# Patient Record
Sex: FEMALE | Race: WHITE | NOT HISPANIC OR LATINO | Employment: OTHER | URBAN - METROPOLITAN AREA
[De-identification: names, ages, dates, MRNs, and addresses within clinical notes are randomized per-mention and may not be internally consistent; named-entity substitution may affect disease eponyms.]

---

## 2017-07-13 ENCOUNTER — TELEPHONE (OUTPATIENT)
Dept: MEDICAL GROUP | Facility: MEDICAL CENTER | Age: 67
End: 2017-07-13

## 2017-07-13 NOTE — TELEPHONE ENCOUNTER
Future Appointments       Provider Department Center    7/19/2017 7:40 AM [facility] MG 2 [facility] [address]    7/19/2017 8:40 AM CHRISTIAN Taylor.P.N. [address]    10/17/2017 9:00 AM [address] 1 [facility] CT [address]    12/18/2017 11:00 AM CHRISTIAN Taylor.P.LEORA.; [facility]  [address]        ESTABLISHED PATIENT PRE-VISIT PLANNING     Note: Patient will not be contacted if there is no indication to call.     1.  Reviewed note from last office visit with PCP and/or other med group provider: Yes    2.  If any orders were placed at last visit, do we have Results/Consult Notes?        •  Labs - Labs ordered, NOT completed. Patient advised to complete prior to next appointment.       •  Imaging - Imaging was not ordered at last office visit.       •  Referrals - No referrals were ordered at last office visit.    3.  Immunizations were updated in [facility] using WebIZ?: Yes       •  Web Iz Recommendations: HEPATITIS A , PNEUMOVAX (PPSV23) and ZOSTAVAX (Shingles)    4.  Patient is due for the following Health Maintenance Topics:   Health Maintenance Due   Topic Date Due   • BONE DENSITY  12/23/2014   • MAMMOGRAM  01/04/2017           5.  Patient was not informed to arrive 15 min prior to their scheduled appointment and bring in their medication bottles.

## 2017-07-15 ENCOUNTER — HOSPITAL ENCOUNTER (OUTPATIENT)
Dept: LAB | Facility: MEDICAL CENTER | Age: 67
End: 2017-07-15
Attending: NURSE PRACTITIONER
Payer: MEDICARE

## 2017-07-15 DIAGNOSIS — E55.9 VITAMIN D DEFICIENCY DISEASE: ICD-10-CM

## 2017-07-15 DIAGNOSIS — R73.01 IMPAIRED FASTING GLUCOSE: ICD-10-CM

## 2017-07-15 DIAGNOSIS — Z13.220 SCREENING CHOLESTEROL LEVEL: ICD-10-CM

## 2017-07-15 LAB
ALBUMIN SERPL BCP-MCNC: 4.2 G/DL (ref 3.2–4.9)
ALBUMIN/GLOB SERPL: 1.7 G/DL
ALP SERPL-CCNC: 40 U/L (ref 30–99)
ALT SERPL-CCNC: 9 U/L (ref 2–50)
ANION GAP SERPL CALC-SCNC: 8 MMOL/L (ref 0–11.9)
AST SERPL-CCNC: 14 U/L (ref 12–45)
BILIRUB SERPL-MCNC: 0.6 MG/DL (ref 0.1–1.5)
BUN SERPL-MCNC: 24 MG/DL (ref 8–22)
CALCIUM SERPL-MCNC: 9.7 MG/DL (ref 8.5–10.5)
CHLORIDE SERPL-SCNC: 106 MMOL/L (ref 96–112)
CHOLEST SERPL-MCNC: 205 MG/DL (ref 100–199)
CO2 SERPL-SCNC: 27 MMOL/L (ref 20–33)
CREAT SERPL-MCNC: 0.91 MG/DL (ref 0.5–1.4)
EST. AVERAGE GLUCOSE BLD GHB EST-MCNC: 103 MG/DL
GFR SERPL CREATININE-BSD FRML MDRD: >60 ML/MIN/1.73 M 2
GLOBULIN SER CALC-MCNC: 2.5 G/DL (ref 1.9–3.5)
GLUCOSE SERPL-MCNC: 100 MG/DL (ref 65–99)
HBA1C MFR BLD: 5.2 % (ref 0–5.6)
HDLC SERPL-MCNC: 47 MG/DL
LDLC SERPL CALC-MCNC: 120 MG/DL
POTASSIUM SERPL-SCNC: 3.9 MMOL/L (ref 3.6–5.5)
PROT SERPL-MCNC: 6.7 G/DL (ref 6–8.2)
SODIUM SERPL-SCNC: 141 MMOL/L (ref 135–145)
TRIGL SERPL-MCNC: 192 MG/DL (ref 0–149)

## 2017-07-15 PROCEDURE — 83036 HEMOGLOBIN GLYCOSYLATED A1C: CPT

## 2017-07-15 PROCEDURE — 80061 LIPID PANEL: CPT

## 2017-07-15 PROCEDURE — 80053 COMPREHEN METABOLIC PANEL: CPT

## 2017-07-15 PROCEDURE — 82652 VIT D 1 25-DIHYDROXY: CPT

## 2017-07-15 PROCEDURE — 36415 COLL VENOUS BLD VENIPUNCTURE: CPT

## 2017-07-17 LAB — 1,25(OH)2D3 SERPL-MCNC: 40.9 PG/ML (ref 19.9–79.3)

## 2017-07-19 ENCOUNTER — HOSPITAL ENCOUNTER (OUTPATIENT)
Dept: RADIOLOGY | Facility: MEDICAL CENTER | Age: 67
End: 2017-07-19
Attending: NURSE PRACTITIONER
Payer: MEDICARE

## 2017-07-19 ENCOUNTER — OFFICE VISIT (OUTPATIENT)
Dept: MEDICAL GROUP | Facility: MEDICAL CENTER | Age: 67
End: 2017-07-19
Payer: MEDICARE

## 2017-07-19 VITALS
OXYGEN SATURATION: 96 % | TEMPERATURE: 97.2 F | RESPIRATION RATE: 16 BRPM | SYSTOLIC BLOOD PRESSURE: 114 MMHG | HEIGHT: 60 IN | BODY MASS INDEX: 34.75 KG/M2 | DIASTOLIC BLOOD PRESSURE: 62 MMHG | HEART RATE: 73 BPM | WEIGHT: 177 LBS

## 2017-07-19 DIAGNOSIS — D75.839 THROMBOCYTOSIS: ICD-10-CM

## 2017-07-19 DIAGNOSIS — E66.9 OBESITY (BMI 30-39.9): ICD-10-CM

## 2017-07-19 DIAGNOSIS — F12.90 MARIJUANA USE: ICD-10-CM

## 2017-07-19 DIAGNOSIS — Z12.31 ENCOUNTER FOR SCREENING MAMMOGRAM FOR BREAST CANCER: ICD-10-CM

## 2017-07-19 DIAGNOSIS — D45 POLYCYTHEMIA VERA(238.4): ICD-10-CM

## 2017-07-19 DIAGNOSIS — I10 ESSENTIAL HYPERTENSION: ICD-10-CM

## 2017-07-19 DIAGNOSIS — E55.9 VITAMIN D DEFICIENCY DISEASE: ICD-10-CM

## 2017-07-19 PROCEDURE — 77063 BREAST TOMOSYNTHESIS BI: CPT

## 2017-07-19 PROCEDURE — 99213 OFFICE O/P EST LOW 20 MIN: CPT | Performed by: NURSE PRACTITIONER

## 2017-07-19 RX ORDER — ERGOCALCIFEROL 1.25 MG/1
50000 CAPSULE ORAL
Qty: 4 CAP | Refills: 11 | Status: SHIPPED | OUTPATIENT
Start: 2017-07-19 | End: 2018-07-20 | Stop reason: SDUPTHER

## 2017-07-19 NOTE — PROGRESS NOTES
Subjective:      Jina Barnard is a 66 y.o. female who presents with Follow-Up            HPI Jina Barnard is a pleasant 66-year-old female here today for follow-up on lab results related to vitamin D deficiency and elevated fasting blood sugar.      1. Essential hypertension  Patient continues with her hydrochlorothiazide and blood pressure has been well maintained and potassium levels are normal.    2. Vitamin D deficiency disease  Patient on 50,000 units of vitamin D weekly and she feels it is very helpful for her. Vitamin D levels are within normal range on the medication.    3. Marijuana use  Patient admits she smokes marijuana on a daily basis because it makes her feel better. She states she does not have any abnormal side effects from the marijuana. She states she does not drink alcohol or use other drugs.    4. Thrombocytosis (CMS-HCC)  Patient reports she continues to follow with Dr. Rojas in hematology and it has been controlled.    5. Polycythemia vera (CMS-HCC)  Patient reports she has not needed phlebotomy in years and continues on the hydroxyurea which he prescribes for this. She reports she was last seen by him in October although we do not have those notes.        Social History   Substance Use Topics   • Smoking status: Never Smoker    • Smokeless tobacco: Never Used   • Alcohol Use: No     Current Outpatient Prescriptions   Medication Sig Dispense Refill   • vitamin D, Ergocalciferol, (DRISDOL) 16111 UNITS Cap capsule Take 1 Cap by mouth every 7 days. 4 Cap 11   • hydrochlorothiazide (HYDRODIURIL) 12.5 MG tablet Take 1 Tab by mouth every day. 30 Tab 11   • BLACK COHOSH PO Take  by mouth.     • ibuprofen (MOTRIN) 200 MG Tab Take 200 mg by mouth every 6 hours as needed.     • hydroxyurea (HYDREA) 500 MG Cap Take  by mouth every day.       No current facility-administered medications for this visit.     Past Medical History   Diagnosis Date   • Thrombocytosis (CMS-HCC)    • Hypertension       Family History   Problem Relation Age of Onset   • Alcohol/Drug Father      alcohol   • Other Father      malnutrition   • Psychiatry Father    • Other Mother      blood clot from surgery   • Hypertension Mother    • Diabetes Mother      ?   • Psychiatry Sister    • Other Sister      malnutrition   • No Known Problems Maternal Grandmother    • No Known Problems Maternal Grandfather    • No Known Problems Paternal Grandmother    • No Known Problems Paternal Grandfather        Review of Systems   All other systems reviewed and are negative.         Objective:     /62 mmHg  Pulse 73  Temp(Src) 36.2 °C (97.2 °F)  Resp 16  Ht 1.524 m (5')  Wt 80.287 kg (177 lb)  BMI 34.57 kg/m2  SpO2 96%     Physical Exam   Constitutional: She is oriented to person, place, and time. She appears well-developed and well-nourished. No distress.   HENT:   Head: Normocephalic and atraumatic.   Right Ear: External ear normal.   Left Ear: External ear normal.   Nose: Nose normal.   Eyes: Right eye exhibits no discharge. Left eye exhibits no discharge.   Neck: Normal range of motion. Neck supple. No thyromegaly present.   Cardiovascular: Normal rate, regular rhythm and normal heart sounds.  Exam reveals no gallop and no friction rub.    No murmur heard.  Pulmonary/Chest: Effort normal and breath sounds normal. She has no wheezes. She has no rales.   Musculoskeletal: She exhibits no edema or tenderness.   Neurological: She is alert and oriented to person, place, and time. She displays normal reflexes.   Skin: Skin is warm and dry. No rash noted. She is not diaphoretic.   Psychiatric: She has a normal mood and affect. Her behavior is normal. Judgment and thought content normal.   Nursing note and vitals reviewed.         Component      Latest Ref Rng 7/15/2017 7/15/2017 7/15/2017 7/15/2017           7:25 AM  7:25 AM  7:25 AM  7:25 AM   Sodium      135 - 145 mmol/L    141   Potassium      3.6 - 5.5 mmol/L    3.9   Chloride      96  - 112 mmol/L    106   Co2      20 - 33 mmol/L    27   Anion Gap      0.0 - 11.9    8.0   Glucose      65 - 99 mg/dL    100 (H)   Bun      8 - 22 mg/dL    24 (H)   Creatinine      0.50 - 1.40 mg/dL    0.91   Calcium      8.5 - 10.5 mg/dL    9.7   AST(SGOT)      12 - 45 U/L    14   ALT(SGPT)      2 - 50 U/L    9   Alkaline Phosphatase      30 - 99 U/L    40   Total Bilirubin      0.1 - 1.5 mg/dL    0.6   Albumin      3.2 - 4.9 g/dL    4.2   Total Protein      6.0 - 8.2 g/dL    6.7   Globulin      1.9 - 3.5 g/dL    2.5   A-G Ratio          1.7   Cholesterol,Tot      100 - 199 mg/dL  205 (H)     Triglycerides      0 - 149 mg/dL  192 (H)     HDL      >=40 mg/dL  47     LDL      <100 mg/dL  120 (H)     Glycohemoglobin      0.0 - 5.6 %   5.2    Estim. Avg Glu         103    GFR If African American      >60 mL/min/1.73 m 2 >60      GFR If Non African American      >60 mL/min/1.73 m 2 >60      Vitamin D-1, 25-Dihydroxy      19.9 - 79.3 pg/mL         Component      Latest Ref Rng 7/15/2017           7:25 AM   Sodium      135 - 145 mmol/L    Potassium      3.6 - 5.5 mmol/L    Chloride      96 - 112 mmol/L    Co2      20 - 33 mmol/L    Anion Gap      0.0 - 11.9    Glucose      65 - 99 mg/dL    Bun      8 - 22 mg/dL    Creatinine      0.50 - 1.40 mg/dL    Calcium      8.5 - 10.5 mg/dL    AST(SGOT)      12 - 45 U/L    ALT(SGPT)      2 - 50 U/L    Alkaline Phosphatase      30 - 99 U/L    Total Bilirubin      0.1 - 1.5 mg/dL    Albumin      3.2 - 4.9 g/dL    Total Protein      6.0 - 8.2 g/dL    Globulin      1.9 - 3.5 g/dL    A-G Ratio          Cholesterol,Tot      100 - 199 mg/dL    Triglycerides      0 - 149 mg/dL    HDL      >=40 mg/dL    LDL      <100 mg/dL    Glycohemoglobin      0.0 - 5.6 %    Estim. Avg Glu          GFR If African American      >60 mL/min/1.73 m 2    GFR If Non African American      >60 mL/min/1.73 m 2    Vitamin D-1, 25-Dihydroxy      19.9 - 79.3 pg/mL 40.9        Assessment/Plan:     1. Essential  hypertension  Blood pressure continues to be well controlled and electrolytes are good. I will have her continue on medication and do yearly blood work. I reviewed with her that her hemoglobin A1c was good.    2. Vitamin D deficiency disease  Patient wishes to continue with the weekly vitamin D rather than go to daily over-the-counter vitamin D.  - vitamin D, Ergocalciferol, (DRISDOL) 62125 UNITS Cap capsule; Take 1 Cap by mouth every 7 days.  Dispense: 4 Cap; Refill: 11    3. Marijuana use  I reviewed with patient the risks of regular marijuana usage including falls and confusion. She does not wish to quit this.    4. Thrombocytosis (CMS-HCC)  Patient follows with Dr. Rojas twice a year and we will request records today.    5. Polycythemia vera (CMS-HCC)  Patient reports that this has become stable and she has not needed phlebotomy in years.    6. Obesity (BMI 30-39.9)    - Patient identified as having weight management issue.  Appropriate orders and counseling given.

## 2017-07-19 NOTE — MR AVS SNAPSHOT
Jina Barnard   2017 8:40 AM   Office Visit   MRN: [id number]    Department:  [address] [place] [address]   Dept Phone:  [phone]    Description:  Female : 1950   Provider:  RAMILA Taylor           Reason for Visit     Follow-Up lab results      Allergies as of 2017     No Known Allergies      You were diagnosed with     Essential hypertension   [6690551]       Vitamin D deficiency disease   [865950]       Marijuana use   [660674]       Thrombocytosis (CMS-HCC)   [628297]       Polycythemia vera (CMS-HCC)   [238.4.ICD-9-CM]       Obesity (BMI 30-39.9)   [193142]         Vital Signs     Blood Pressure Pulse Temperature Respirations Height Weight    114/62 mmHg 73 36.2 °C (97.2 °F) 16 1.524 m (5') 80.287 kg (177 lb)    Body Mass Index Oxygen Saturation Smoking Status             34.57 kg/m2 96% Never Smoker          Basic Information     Date Of Birth Sex Race Ethnicity Preferred Language    1950 Female White Non- English      Your appointments     Oct 17, 2017  9:00 AM   CT BODY WITH with [address]   [facility] IMAGING - CT - [address]    [address]  [facility] [id number] [phone]           Some exams require specific prep instructions that would have been given to you at time of scheduling. If you have any additional questions about the prep instructions, please call Imaging Scheduling at [phone] and press #2.            Dec 18, 2017 11:00 AM   ANNUAL WELLNESS with RAMILA Taylor, [facility]    [facility] [address]    [address]  [place] [id number] [phone]            2018 10:00 AM   Established Patient with RAMILA Taylor   [facility] [address]    [address]  [place] [id number] [phone]           You will be receiving a confirmation call a few days before your appointment from our automated call confirmation system.            Problem List              ICD-10-CM Priority Class Noted - Resolved    Thrombocytosis (CMS-HCC) D47.3   6/9/2016 - Present    Essential hypertension I10   6/9/2016 - Present    Vitamin D deficiency disease E55.9   6/9/2016 - Present    Refused pneumococcal vaccination Z28.21   10/10/2016 - Present    Refused influenza vaccine Z28.21   10/10/2016 - Present    Marijuana use F12.10   7/19/2017 - Present    Polycythemia vera (CMS-HCC) D45   7/19/2017 - Present    Obesity (BMI 30-39.9) E66.9   7/19/2017 - Present      Health Maintenance        Date Due Completion Dates    BONE DENSITY 12/23/2014 ---    MAMMOGRAM 1/4/2017 1/4/2016, 1/4/2016    IMM PNEUMOCOCCAL 65+ (ADULT) LOW/MEDIUM RISK SERIES (2 of 2 - PPSV23) 12/7/2017 (Originally 12/29/2016) 12/29/2015    IMM ZOSTER VACCINE 12/7/2017 (Originally 12/23/2009) ---    IMM INFLUENZA (1) 12/8/2017 (Originally 9/1/2017) ---    COLONOSCOPY 12/29/2025 12/29/2015 (Done)    Override on 12/29/2015: Done    IMM DTaP/Tdap/Td Vaccine (2 - Td) 12/29/2025 12/29/2015            Current Immunizations     13-VALENT PCV PREVNAR 12/29/2015    Tdap Vaccine 12/29/2015      Below and/or attached are the medications your provider expects you to take. Review all of your home medications and newly ordered medications with your provider and/or pharmacist. Follow medication instructions as directed by your provider and/or pharmacist. Please keep your medication list with you and share with your provider. Update the information when medications are discontinued, doses are changed, or new medications (including over-the-counter products) are added; and carry medication information at all times in the event of emergency situations     Allergies:  No Known Allergies          Medications  Valid as of: July 19, 2017 -  8:48 AM    Generic Name Brand Name Tablet Size Instructions for use    Black Cohosh   Take  by mouth.        Ergocalciferol (Cap) DRISDOL 02887 UNITS Take 1 Cap by mouth every 7  days.        HydroCHLOROthiazide (Tab) HYDRODIURIL 12.5 MG Take 1 Tab by mouth every day.        Hydroxyurea (Cap) HYDREA 500 MG Take  by mouth every day.        Ibuprofen (Tab) MOTRIN 200 MG Take 200 mg by mouth every 6 hours as needed.        .                 Medicines prescribed today were sent to:     [id number] - [place], [place] - [address] [place] [address]    Phone: [phone] Fax: [phone]    Open 24 Hours?: No      Medication refill instructions:       If your prescription bottle indicates you have medication refills left, it is not necessary to call your provider’s office. Please contact your pharmacy and they will refill your medication.    If your prescription bottle indicates you do not have any refills left, you may request refills at any time through one of the following ways: The online InfoNow system (except Urgent Care), by calling your provider’s office, or by asking your pharmacy to contact your provider’s office with a refill request. Medication refills are processed only during regular business hours and may not be available until the next business day. Your provider may request additional information or to have a follow-up visit with you prior to refilling your medication.   *Please Note: Medication refills are assigned a new Rx number when refilled electronically. Your pharmacy may indicate that no refills were authorized even though a new prescription for the same medication is available at the pharmacy. Please request the medicine by name with the pharmacy before contacting your provider for a refill.           InfoNow Access Code: [email or web address]  Expires: 8/18/2017  7:42 AM    InfoNow  A secure, online tool to manage your health information     [employer]’s InfoNow® is a secure, online tool that connects you to your personalized health information from the privacy of your home -- day or night - making it very easy for you to manage your  healthcare. Once the activation process is completed, you can even access your medical information using the PST Tankers marija, which is available for free in the Apple Marija store or Google Play store.     PST Tankers provides the following levels of access (as shown below):   My Chart Features   [facility] Doctor [facility]  Specialists [facility]  Urgent  Care Non-[facility]  Primary Care  Doctor   Email your healthcare team securely and privately 24/7 X X X    Manage appointments: schedule your next appointment; view details of past/upcoming appointments X      Request prescription refills. X      View recent personal medical records, including lab and immunizations X X X X   View health record, including health history, allergies, medications X X X X   Read reports about your outpatient visits, procedures, consult and ER notes X X X X   See your discharge summary, which is a recap of your hospital and/or ER visit that includes your diagnosis, lab results, and care plan. X X       How to register for PST Tankers:  1. Go to  [email or web address].  2. Click on the Sign Up Now box, which takes you to the New Member Sign Up page. You will need to provide the following information:  a. Enter your PST Tankers Access Code exactly as it appears at the top of this page. (You will not need to use this code after you’ve completed the sign-up process. If you do not sign up before the expiration date, you must request a new code.)   b. Enter your date of birth.   c. Enter your home email address.   d. Click Submit, and follow the next screen’s instructions.  3. Create a PST Tankers ID. This will be your PST Tankers login ID and cannot be changed, so think of one that is secure and easy to remember.  4. Create a PST Tankers password. You can change your password at any time.  5. Enter your Password Reset Question and Answer. This can be used at a later time if you forget your password.   6. Enter your e-mail address. This allows you to receive e-mail  notifications when new information is available in [employer].  7. Click Sign Up. You can now view your health information.    For assistance activating your theAudience account, call [phone]

## 2017-07-19 NOTE — Clinical Note
[facility]  RAMILA Taylor  [address]  [place] [address]  Fax: [phone]   Authorization for Release/Disclosure of   Protected Health Information   Name: JOSE MARIA COOK : 1950 SSN: [id number]   Address: [address]  [address]  [place] [address] Phone:    [phone] (home)    I authorize the entity listed below to release/disclose the PHI below to:   [facility]/RAMILA Taylor and RAMILA Taylor   Provider or Entity Name:     Address   City, State, Zip   Phone:      Fax:     Reason for request: continuity of care   Information to be released:    [  ] LAST COLONOSCOPY,  including any PATH REPORT and follow-up  [  ] LAST FIT/COLOGUARD RESULT [  ] LAST DEXA  [  ] LAST MAMMOGRAM  [  ] LAST PAP  [  ] LAST LABS [  ] RETINA EXAM REPORT  [  ] IMMUNIZATION RECORDS  [  ] Release all info      [  ] Check here and initial the line next to each item to release ALL health information INCLUDING  _____ Care and treatment for drug and / or alcohol abuse  _____ HIV testing, infection status, or AIDS  _____ Genetic Testing    DATES OF SERVICE OR TIME PERIOD TO BE DISCLOSED: _____________  I understand and acknowledge that:  * This Authorization may be revoked at any time by you in writing, except if your health information has already been used or disclosed.  * Your health information that will be used or disclosed as a result of you signing this authorization could be re-disclosed by the recipient. If this occurs, your re-disclosed health information may no longer be protected by State or Federal laws.  * You may refuse to sign this Authorization. Your refusal will not affect your ability to obtain treatment.  * This Authorization becomes effective upon signing and will  on (date) __________.      If no date is indicated, this Authorization will  one (1) year from the signature date.    Name: Jose Maria Cook    Signature:   Date:     2017       PLEASE FAX  REQUESTED RECORDS BACK TO: [phone]

## 2017-07-24 ENCOUNTER — HOSPITAL ENCOUNTER (OUTPATIENT)
Dept: RADIOLOGY | Facility: MEDICAL CENTER | Age: 67
End: 2017-07-24

## 2017-07-24 ENCOUNTER — TELEPHONE (OUTPATIENT)
Dept: MEDICAL GROUP | Facility: MEDICAL CENTER | Age: 67
End: 2017-07-24

## 2017-07-24 NOTE — TELEPHONE ENCOUNTER
1. Caller Name: Jina Barnard                                           Call Back Number: [phone] (home)         Patient approves a detailed voicemail message: N\A    Patient said she had her mammogram done on 7/19, and wanted to get her results? Please advise

## 2017-07-24 NOTE — TELEPHONE ENCOUNTER
It looks like it was done at [facility]. The results are normal and I will mail a copy to patient today.

## 2017-10-05 ENCOUNTER — TELEPHONE (OUTPATIENT)
Dept: MEDICAL GROUP | Facility: MEDICAL CENTER | Age: 67
End: 2017-10-05

## 2017-10-06 DIAGNOSIS — I10 ESSENTIAL HYPERTENSION: ICD-10-CM

## 2017-10-06 RX ORDER — HYDROCHLOROTHIAZIDE 12.5 MG/1
12.5 TABLET ORAL DAILY
Qty: 30 TAB | Refills: 11 | Status: SHIPPED | OUTPATIENT
Start: 2017-10-06 | End: 2018-11-15

## 2017-10-11 ENCOUNTER — OFFICE VISIT (OUTPATIENT)
Dept: MEDICAL GROUP | Facility: MEDICAL CENTER | Age: 67
End: 2017-10-11
Payer: MEDICARE

## 2017-10-11 VITALS
WEIGHT: 187 LBS | DIASTOLIC BLOOD PRESSURE: 86 MMHG | OXYGEN SATURATION: 96 % | BODY MASS INDEX: 34.41 KG/M2 | TEMPERATURE: 97.8 F | HEIGHT: 62 IN | SYSTOLIC BLOOD PRESSURE: 140 MMHG | RESPIRATION RATE: 16 BRPM | HEART RATE: 76 BPM

## 2017-10-11 DIAGNOSIS — D45 POLYCYTHEMIA VERA (HCC): ICD-10-CM

## 2017-10-11 DIAGNOSIS — I10 ESSENTIAL HYPERTENSION: ICD-10-CM

## 2017-10-11 DIAGNOSIS — D75.839 THROMBOCYTOSIS: ICD-10-CM

## 2017-10-11 DIAGNOSIS — L98.9 SKIN LESION: ICD-10-CM

## 2017-10-11 DIAGNOSIS — J30.1 CHRONIC ALLERGIC RHINITIS DUE TO POLLEN, UNSPECIFIED SEASONALITY: ICD-10-CM

## 2017-10-11 PROCEDURE — 99213 OFFICE O/P EST LOW 20 MIN: CPT | Performed by: NURSE PRACTITIONER

## 2017-10-11 ASSESSMENT — ENCOUNTER SYMPTOMS: NERVOUS/ANXIOUS: 1

## 2017-10-11 NOTE — PROGRESS NOTES
Subjective:      Jina Barnard is a 66 y.o. female who presents with Blood Pressure Problem            HPI Jina BarnardIs here today for pulse, blood pressure, and skin concerns.      1. Essential hypertension  Patient is currently on HCTZ 12.5 mg for hypertension and has been on this routine for quite a while. She is concerned because she takes her blood pressure at home a few times per week and sometimes her readings a bit high. She also states that her pulse is always 76 and sometimes it has been in the 80s range. Her blood pressure readings with her today do show occasional readings above 140/90 but consistently they are below that.    2. Skin lesion  Patient has a number of skin lesions she would like to have looked at by dermatology. There is one on her right lower leg that she would like me to look at today which may have changed in color but she has difficulty visualizing it.    3. Thrombocytosis (CMS-[facility])  This is followed by Dr. Rojas in hematology.    4. Polycythemia vera(238.4)  Patient follows with hematology yearly.    5. Chronic allergic rhinitis due to pollen, unspecified seasonality  Patient states she has been having some nasal congestion and sneezing for the past few weeks and admits to history of allergies and has been treating this with over-the-counter eardrops? She is having some fullness in her ears but no pain.  Current Outpatient Prescriptions   Medication Sig Dispense Refill   • hydrochlorothiazide (HYDRODIURIL) 12.5 MG tablet Take 1 Tab by mouth every day. 30 Tab 11   • vitamin D, Ergocalciferol, (DRISDOL) 14282 UNITS Cap capsule Take 1 Cap by mouth every 7 days. 4 Cap 11   • BLACK COHOSH PO Take  by mouth.     • ibuprofen (MOTRIN) 200 MG Tab Take 200 mg by mouth every 6 hours as needed.     • hydroxyurea (HYDREA) 500 MG Cap Take  by mouth every day.       No current facility-administered medications for this visit.      Social History   Substance Use Topics   • Smoking status:  "Never Smoker   • Smokeless tobacco: Never Used   • Alcohol use No     Family History   Problem Relation Age of Onset   • Alcohol/Drug Father      alcohol   • Other Father      malnutrition   • Psychiatry Father    • Other Mother      blood clot from surgery   • Hypertension Mother    • Diabetes Mother      ?   • Psychiatry Sister    • Other Sister      malnutrition   • No Known Problems Maternal Grandmother    • No Known Problems Maternal Grandfather    • No Known Problems Paternal Grandmother    • No Known Problems Paternal Grandfather      Past Medical History:   Diagnosis Date   • Hypertension    • Thrombocytosis (CMS-HCC)        Review of Systems   Skin: Positive for rash.   Endo/Heme/Allergies: Positive for environmental allergies.   Psychiatric/Behavioral: The patient is nervous/anxious.    All other systems reviewed and are negative.         Objective:     /86   Pulse 76   Temp 36.6 °C (97.8 °F)   Resp 16   Ht 1.575 m (5' 2\")   Wt 84.8 kg (187 lb)   SpO2 96%   BMI 34.20 kg/m²      Physical Exam   Constitutional: She is oriented to person, place, and time. She appears well-developed and well-nourished. No distress.   HENT:   Head: Normocephalic and atraumatic.   Right Ear: External ear normal.   Left Ear: External ear normal.   Nose: Nose normal.   Eyes: Right eye exhibits no discharge. Left eye exhibits no discharge.   Neck: Normal range of motion. Neck supple. No thyromegaly present.   Cardiovascular: Normal rate, regular rhythm and normal heart sounds.  Exam reveals no gallop and no friction rub.    No murmur heard.  Pulmonary/Chest: Effort normal and breath sounds normal. She has no wheezes. She has no rales.   Musculoskeletal: She exhibits no edema or tenderness.   Neurological: She is alert and oriented to person, place, and time. She displays normal reflexes.   Skin: Skin is warm and dry. Rash noted. She is not diaphoretic.   There is a hyperpigmented flat area on her right lower leg " posteriorly but it is not multicolored and is not asymmetrical. She has a number of other pigmented areas on her skin over most of her body.   Psychiatric: She has a normal mood and affect. Her behavior is normal. Judgment and thought content normal.   Nursing note and vitals reviewed.              Assessment/Plan:     1. Essential hypertension  I reviewed with patient her blood pressure in the office today and reviewed that most of her blood pressure readings are good and that it is not unusual to get the occasional low or high reading but that we need to look at the average which appears to be normal. I also told her that a pulse in the 80s is also normal and she is not having palpitations or chest pain.    2. Skin lesion  Skin lesion on right leg appears benign but she has multiple other areas which I will refer her to dermatology for evaluation.  - REFERRAL TO DERMATOLOGY    3. Thrombocytosis (CMS-HCC)  Patient will be following with hematology who does her CBCs.    4. Polycythemia vera(238.4)  Patient will be following with hematology.    5. Chronic allergic rhinitis due to pollen, unspecified seasonality  I advised patient that the eardrops she uses will not help her allergies and that she should consider an antihistamine and possible steroid nasal spray.

## 2017-10-13 ENCOUNTER — HOSPITAL ENCOUNTER (OUTPATIENT)
Facility: MEDICAL CENTER | Age: 67
End: 2017-10-13
Attending: INTERNAL MEDICINE
Payer: MEDICARE

## 2017-10-13 LAB
ALBUMIN SERPL BCP-MCNC: 3.7 G/DL (ref 3.2–4.9)
ALBUMIN/GLOB SERPL: 1.5 G/DL
ALP SERPL-CCNC: 45 U/L (ref 30–99)
ALT SERPL-CCNC: 13 U/L (ref 2–50)
ANION GAP SERPL CALC-SCNC: 9 MMOL/L (ref 0–11.9)
AST SERPL-CCNC: 15 U/L (ref 12–45)
BASOPHILS # BLD AUTO: 0.4 % (ref 0–1.8)
BASOPHILS # BLD: 0.03 K/UL (ref 0–0.12)
BILIRUB SERPL-MCNC: 0.4 MG/DL (ref 0.1–1.5)
BUN SERPL-MCNC: 24 MG/DL (ref 8–22)
CALCIUM SERPL-MCNC: 9.3 MG/DL (ref 8.5–10.5)
CHLORIDE SERPL-SCNC: 105 MMOL/L (ref 96–112)
CO2 SERPL-SCNC: 26 MMOL/L (ref 20–33)
CREAT SERPL-MCNC: 1.1 MG/DL (ref 0.5–1.4)
EOSINOPHIL # BLD AUTO: 0.2 K/UL (ref 0–0.51)
EOSINOPHIL NFR BLD: 2.9 % (ref 0–6.9)
ERYTHROCYTE [DISTWIDTH] IN BLOOD BY AUTOMATED COUNT: 53.4 FL (ref 35.9–50)
GFR SERPL CREATININE-BSD FRML MDRD: 49 ML/MIN/1.73 M 2
GLOBULIN SER CALC-MCNC: 2.5 G/DL (ref 1.9–3.5)
GLUCOSE SERPL-MCNC: 95 MG/DL (ref 65–99)
HCT VFR BLD AUTO: 43.4 % (ref 37–47)
HGB BLD-MCNC: 15.3 G/DL (ref 12–16)
IMM GRANULOCYTES # BLD AUTO: 0.04 K/UL (ref 0–0.11)
IMM GRANULOCYTES NFR BLD AUTO: 0.6 % (ref 0–0.9)
LYMPHOCYTES # BLD AUTO: 1.89 K/UL (ref 1–4.8)
LYMPHOCYTES NFR BLD: 27.3 % (ref 22–41)
MCH RBC QN AUTO: 41.5 PG (ref 27–33)
MCHC RBC AUTO-ENTMCNC: 35.3 G/DL (ref 33.6–35)
MCV RBC AUTO: 117.6 FL (ref 81.4–97.8)
MONOCYTES # BLD AUTO: 0.45 K/UL (ref 0–0.85)
MONOCYTES NFR BLD AUTO: 6.5 % (ref 0–13.4)
NEUTROPHILS # BLD AUTO: 4.32 K/UL (ref 2–7.15)
NEUTROPHILS NFR BLD: 62.3 % (ref 44–72)
NRBC # BLD AUTO: 0 K/UL
NRBC BLD AUTO-RTO: 0 /100 WBC
PLATELET # BLD AUTO: 227 K/UL (ref 164–446)
PMV BLD AUTO: 10 FL (ref 9–12.9)
POTASSIUM SERPL-SCNC: 4.1 MMOL/L (ref 3.6–5.5)
PROT SERPL-MCNC: 6.2 G/DL (ref 6–8.2)
RBC # BLD AUTO: 3.69 M/UL (ref 4.2–5.4)
SODIUM SERPL-SCNC: 140 MMOL/L (ref 135–145)
WBC # BLD AUTO: 6.9 K/UL (ref 4.8–10.8)

## 2017-10-13 PROCEDURE — 85025 COMPLETE CBC W/AUTO DIFF WBC: CPT

## 2017-10-13 PROCEDURE — 80053 COMPREHEN METABOLIC PANEL: CPT

## 2017-10-17 ENCOUNTER — HOSPITAL ENCOUNTER (OUTPATIENT)
Dept: RADIOLOGY | Facility: MEDICAL CENTER | Age: 67
End: 2017-10-17
Attending: INTERNAL MEDICINE
Payer: MEDICARE

## 2017-10-17 DIAGNOSIS — D45 CHRONIC ERYTHREMIA IN REMISSION (HCC): ICD-10-CM

## 2017-10-17 PROCEDURE — 74177 CT ABD & PELVIS W/CONTRAST: CPT

## 2017-10-17 PROCEDURE — 700117 HCHG RX CONTRAST REV CODE 255: Performed by: INTERNAL MEDICINE

## 2017-10-17 RX ADMIN — IOHEXOL 100 ML: 350 INJECTION, SOLUTION INTRAVENOUS at 09:12

## 2017-10-17 RX ADMIN — IOHEXOL 50 ML: 240 INJECTION, SOLUTION INTRATHECAL; INTRAVASCULAR; INTRAVENOUS; ORAL at 09:17

## 2017-11-09 ENCOUNTER — OFFICE VISIT (OUTPATIENT)
Dept: DERMATOLOGY | Facility: IMAGING CENTER | Age: 67
End: 2017-11-09
Payer: MEDICARE

## 2017-11-09 ENCOUNTER — HOSPITAL ENCOUNTER (OUTPATIENT)
Facility: MEDICAL CENTER | Age: 67
End: 2017-11-09
Attending: DERMATOLOGY
Payer: MEDICARE

## 2017-11-09 VITALS — TEMPERATURE: 98.9 F | HEIGHT: 62 IN | WEIGHT: 187 LBS | BODY MASS INDEX: 34.41 KG/M2

## 2017-11-09 DIAGNOSIS — D18.01 CHERRY ANGIOMA: ICD-10-CM

## 2017-11-09 DIAGNOSIS — L81.4 LENTIGINES: ICD-10-CM

## 2017-11-09 DIAGNOSIS — D48.5 NEOPLASM OF UNCERTAIN BEHAVIOR OF SKIN: ICD-10-CM

## 2017-11-09 DIAGNOSIS — R20.9 DISTURBANCE OF SKIN SENSATION: ICD-10-CM

## 2017-11-09 PROCEDURE — 88305 TISSUE EXAM BY PATHOLOGIST: CPT | Mod: 59

## 2017-11-09 PROCEDURE — 99203 OFFICE O/P NEW LOW 30 MIN: CPT | Mod: 25 | Performed by: DERMATOLOGY

## 2017-11-09 PROCEDURE — 11100 PR BIOPSY OF SKIN LESION: CPT | Performed by: DERMATOLOGY

## 2017-11-09 ASSESSMENT — ENCOUNTER SYMPTOMS
FEVER: 0
CHILLS: 0

## 2017-11-09 NOTE — PROGRESS NOTES
Dermatology New Patient Visit    Chief Complaint   Patient presents with   • Nevus       Subjective:     HPI:   Jina Barnard is a 66 y.o. female presenting for    Skin lesions of concern today: on abdomen, brown spots on the face, back   Spot on the right side, has been present for 15-20 years, but irritated often, has bled in the past, she is unsure of any change  Brown spots on the face, back - she cannot monitor them very well  Unsure of any changes, they are asymptomatic, but she does not like the appearance of several of them  History of skin cancer: No  History of biopsies:No  History of blistering/severe sunburns:Yes, Details: during youth  Family history of skin cancer:No  Family history of atypical moles:No        Past Medical History:   Diagnosis Date   • Hypertension    • Thrombocytosis (CMS-[facility])        Current Outpatient Prescriptions on File Prior to Visit   Medication Sig Dispense Refill   • hydrochlorothiazide (HYDRODIURIL) 12.5 MG tablet Take 1 Tab by mouth every day. 30 Tab 11   • vitamin D, Ergocalciferol, (DRISDOL) 89150 UNITS Cap capsule Take 1 Cap by mouth every 7 days. 4 Cap 11   • BLACK COHOSH PO Take  by mouth.     • ibuprofen (MOTRIN) 200 MG Tab Take 200 mg by mouth every 6 hours as needed.     • hydroxyurea (HYDREA) 500 MG Cap Take  by mouth every day.       No current facility-administered medications on file prior to visit.        No Known Allergies    Family History   Problem Relation Age of Onset   • Alcohol/Drug Father      alcohol   • Other Father      malnutrition   • Psychiatry Father    • Other Mother      blood clot from surgery   • Hypertension Mother    • Diabetes Mother      ?   • Psychiatry Sister    • Other Sister      malnutrition   • No Known Problems Maternal Grandmother    • No Known Problems Maternal Grandfather    • No Known Problems Paternal Grandmother    • No Known Problems Paternal Grandfather        Social History     Social History   • Marital status: Single  "    Spouse name: N/A   • Number of children: N/A   • Years of education: N/A     Occupational History   • Not on file.     Social History Main Topics   • Smoking status: Never Smoker   • Smokeless tobacco: Never Used   • Alcohol use No   • Drug use:      Types: Marijuana      Comment: daily   • Sexual activity: No     Other Topics Concern   • Not on file     Social History Narrative   • No narrative on file       Review of Systems   Constitutional: Negative for chills and fever.   Skin: Negative for itching and rash.   All other systems reviewed and are negative.       Objective:     A full mucocutaneous exam was completed including: scalp, hair, ears, face, eyelids, conjunctiva, lips, gums/tongue/oropharynx, neck, chest breasts, abdomen, back, left and right upper extremities (including hands/digits and fingernails), left and right lower extremities (including feet/toes, but toenails covered in polish), buttocks, excluding external genitalia (patient refusal) with the following pertinent findings listed below. Remaining above-listed examined areas within normal limits / negative for rashes or lesions.    Temperature 37.2 °C (98.9 °F), height 1.575 m (5' 2\"), weight 84.8 kg (187 lb).    Physical Exam   Constitutional: She is oriented to person, place, and time and well-developed, well-nourished, and in no distress.   HENT:   Head: Normocephalic and atraumatic.       Right Ear: External ear normal.   Left Ear: External ear normal.   Nose: Nose normal.   Mouth/Throat: Oropharynx is clear and moist.   Eyes: Conjunctivae and lids are normal.   Neck: Normal range of motion. Neck supple.   Cardiovascular: Intact distal pulses.    Pulmonary/Chest: Effort normal.   Neurological: She is alert and oriented to person, place, and time.   Skin: Skin is warm and dry.        Psychiatric: Mood and affect normal.   Vitals reviewed.      DATA: none applicable to review    Assessment and Plan:     1. Neoplasm of uncertain behavior of " skin - right lateral sidewall, + disturbance of sensation  Procedure Note   Procedure: Biopsy by shave technique  Location: as noted above  Size: as noted in exam  Preoperative diagnosis: angioma vs blue nevus vs other  Risks, benefits and alternatives of procedure discussed and written informed consent obtained. Time out completed. Area of biopsy prepped with alcohol. Anesthesia with 1% lidocaine with epinephrine administered with 30 gauge needle. Shave biopsy of the site performed. Hemostasis achieved with pressure and aluminum chloride. Vaseline applied to wound with bandage. Patient tolerated procedure well and there were no complications. The specimen was sent to the pathology lab by the staff. Wound care was discussed.    2. Lentigines  - Benign-appearing nature of lesions discussed. Advised to return to clinic for any new or concerning changes.  - discussed cosmetic treatment options, cryo vs BBL; patient will think about possible treatment in the future    3. Cherry angiomas  - Benign-appearing nature of lesions discussed. Advised to return to clinic for any new or concerning changes.    4. Skin cancer education  - discussed importance of sun protective clothing, eyewear  - discussed importance of daily use of broad spectrum sunscreen with SPF 30 or greater, as well as need for reapplication ~every 2 hours when exposed to UVR  - discussed importance of regular self-exams, ideally once per month, annual exams in clinic  - ABCDE's of melanoma discussed  - patient to bring any new or concerning lesions to my attention    Followup: Return in about 1 year (around 11/9/2018) for bee.    Jacklyn Dickson M.D.

## 2017-11-13 ENCOUNTER — TELEPHONE (OUTPATIENT)
Dept: DERMATOLOGY | Facility: IMAGING CENTER | Age: 67
End: 2017-11-13

## 2017-12-18 ENCOUNTER — TELEPHONE (OUTPATIENT)
Dept: MEDICAL GROUP | Facility: MEDICAL CENTER | Age: 67
End: 2017-12-18

## 2018-03-27 ENCOUNTER — OFFICE VISIT (OUTPATIENT)
Dept: MEDICAL GROUP | Facility: MEDICAL CENTER | Age: 68
End: 2018-03-27
Payer: MEDICARE

## 2018-03-27 VITALS
DIASTOLIC BLOOD PRESSURE: 72 MMHG | HEIGHT: 62 IN | HEART RATE: 87 BPM | OXYGEN SATURATION: 95 % | RESPIRATION RATE: 16 BRPM | SYSTOLIC BLOOD PRESSURE: 123 MMHG | BODY MASS INDEX: 34.04 KG/M2 | WEIGHT: 185 LBS | TEMPERATURE: 98.1 F

## 2018-03-27 DIAGNOSIS — D75.839 THROMBOCYTOSIS: ICD-10-CM

## 2018-03-27 DIAGNOSIS — F12.90 MARIJUANA USE: ICD-10-CM

## 2018-03-27 DIAGNOSIS — E66.9 OBESITY (BMI 30-39.9): ICD-10-CM

## 2018-03-27 DIAGNOSIS — D45 POLYCYTHEMIA VERA (HCC): ICD-10-CM

## 2018-03-27 DIAGNOSIS — I10 ESSENTIAL HYPERTENSION: ICD-10-CM

## 2018-03-27 DIAGNOSIS — R10.31 RIGHT LOWER QUADRANT ABDOMINAL PAIN: ICD-10-CM

## 2018-03-27 DIAGNOSIS — F43.9 STRESS AT HOME: ICD-10-CM

## 2018-03-27 PROCEDURE — 99213 OFFICE O/P EST LOW 20 MIN: CPT | Performed by: NURSE PRACTITIONER

## 2018-03-27 ASSESSMENT — ENCOUNTER SYMPTOMS
ABDOMINAL PAIN: 1
NERVOUS/ANXIOUS: 1

## 2018-03-27 ASSESSMENT — PATIENT HEALTH QUESTIONNAIRE - PHQ9: CLINICAL INTERPRETATION OF PHQ2 SCORE: 0

## 2018-03-27 NOTE — PROGRESS NOTES
Subjective:      Jina Hess is a 67 y.o. female who presents with Follow-Up (6 months )        CC: Patient here today for 6 month follow-up on hypertension and blood disorder as well new problem with stress and abdominal pain.    HPI Jina Hess      1. Stress at home  Patient spends most of the visit explaining her home situation and stress. She states it started 3 months ago with not having her car available due to repairs but then developing shingles. She states in January and February she had both of her children in the hospital, one for thyroidectomy due to thyroid cancer and the other for femoral artery bypass. 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 She does not believe in medications for anxiety or depression. She states she is not suicidal.    2. Polycythemia vera(238.4)  Patient follows with Dr. Rojas every 6 months and her last lab work from October was stable.    3. Thrombocytosis (CMS-[facility])  Patient follows with Dr. Rojas and platelet count in October was normal.    4. Essential hypertension  Patient continues on hydrochlorothiazide low dose blood pressure has been good.    5. Marijuana use  Patient states she smokes marijuana regularly for many years and does not wish to quit.    6. Obesity (BMI 30-39.9)  Weight remains elevated.    7. Right lower quadrant abdominal pain  Patient states she has history of diverticulitis and she has had a few episodes of pain in her right lower abdomen which she states is diverticulitis related. Symptoms improved on their own but she would be willing to go for imaging studies now. She denies diarrhea or constipation. She denies nausea, vomiting or abdominal pain elsewhere.  Social History   Substance Use Topics   • Smoking status: Never Smoker   • Smokeless tobacco: Never Used   • Alcohol use No     Current Outpatient Prescriptions   Medication Sig Dispense Refill   • hydrochlorothiazide  "(HYDRODIURIL) 12.5 MG tablet Take 1 Tab by mouth every day. 30 Tab 11   • vitamin D, Ergocalciferol, (DRISDOL) 78917 UNITS Cap capsule Take 1 Cap by mouth every 7 days. 4 Cap 11   • hydroxyurea (HYDREA) 500 MG Cap Take  by mouth every day.     • BLACK COHOSH PO Take  by mouth.     • ibuprofen (MOTRIN) 200 MG Tab Take 200 mg by mouth every 6 hours as needed.       No current facility-administered medications for this visit.      Family History   Problem Relation Age of Onset   • Alcohol/Drug Father      alcohol   • Other Father      malnutrition   • Psychiatry Father    • Other Mother      blood clot from surgery   • Hypertension Mother    • Diabetes Mother      ?   • Psychiatry Sister    • Other Sister      malnutrition   • No Known Problems Maternal Grandmother    • No Known Problems Maternal Grandfather    • No Known Problems Paternal Grandmother    • No Known Problems Paternal Grandfather      Past Medical History:   Diagnosis Date   • Hypertension    • Thrombocytosis (CMS-HCC)        Review of Systems   Gastrointestinal: Positive for abdominal pain.   Psychiatric/Behavioral: The patient is nervous/anxious.    All other systems reviewed and are negative.         Objective:     /72   Pulse 87   Temp 36.7 °C (98.1 °F)   Resp 16   Ht 1.575 m (5' 2\")   Wt 83.9 kg (185 lb)   SpO2 95%   BMI 33.84 kg/m²      Physical Exam   Constitutional: She is oriented to person, place, and time. She appears well-developed and well-nourished. No distress.   HENT:   Head: Normocephalic and atraumatic.   Right Ear: External ear normal.   Left Ear: External ear normal.   Nose: Nose normal.   Eyes: Right eye exhibits no discharge. Left eye exhibits no discharge.   Neck: Normal range of motion. Neck supple. No thyromegaly present.   Cardiovascular: Normal rate, regular rhythm and normal heart sounds.  Exam reveals no gallop and no friction rub.    No murmur heard.  Pulmonary/Chest: Effort normal and breath sounds normal. She " has no wheezes. She has no rales.   Abdominal: Soft. Bowel sounds are normal. She exhibits no distension and no mass. There is no tenderness. There is no rebound and no guarding.   Patient points to her right mid to lower abdomen as to where the pain occurs although there is no tenderness in the office.   Musculoskeletal: She exhibits no edema or tenderness.   Neurological: She is alert and oriented to person, place, and time. She displays normal reflexes.   Skin: Skin is warm and dry. No rash noted. She is not diaphoretic.   Psychiatric: She has a normal mood and affect. Her behavior is normal. Judgment and thought content normal.   Patient is crying intermittently while explaining her home situation but appears in good mood before leaving today.   Nursing note and vitals reviewed.              Assessment/Plan:     1. Stress at home  I discussed options with patient including counseling or medication and she declines both feeling that she can deal with the stress well herself. She is wanting me to increase her vitamin D, 50,000 units weekly to more frequent but I explained I cannot do this. Her last vitamin D levels were therapeutic at this dosage. I explained about the risk for toxicity with too high a dosage.    2. Polycythemia vera(238.4)  Patient states she has appointment with Dr. Rojas next month.    3. Thrombocytosis (CMS-[facility])  Platelet count was normal in October and she follows with Dr. Rojas.    4. Essential hypertension  Blood pressure remains good.    5. Marijuana use  Patient does not want to change her marijuana use habits.    6. Obesity (BMI 30-39.9)    - Patient identified as having weight management issue.  Appropriate orders and counseling given.    7. Right lower quadrant abdominal pain  Advised patient this does not sound like diverticulitis but I agree that an imaging study would be warranted and will order this for next week and have her do BUN and creatinine before the test.  -  CT-ABDOMEN-PELVIS WITH; Future  - BASIC METABOLIC PANEL; Future

## 2018-04-06 ENCOUNTER — HOSPITAL ENCOUNTER (OUTPATIENT)
Dept: LAB | Facility: MEDICAL CENTER | Age: 68
End: 2018-04-06
Attending: NURSE PRACTITIONER
Payer: MEDICARE

## 2018-04-06 DIAGNOSIS — R10.31 RIGHT LOWER QUADRANT ABDOMINAL PAIN: ICD-10-CM

## 2018-04-06 LAB
ANION GAP SERPL CALC-SCNC: 7 MMOL/L (ref 0–11.9)
BUN SERPL-MCNC: 22 MG/DL (ref 8–22)
CALCIUM SERPL-MCNC: 9.7 MG/DL (ref 8.5–10.5)
CHLORIDE SERPL-SCNC: 103 MMOL/L (ref 96–112)
CO2 SERPL-SCNC: 28 MMOL/L (ref 20–33)
CREAT SERPL-MCNC: 0.83 MG/DL (ref 0.5–1.4)
GLUCOSE SERPL-MCNC: 136 MG/DL (ref 65–99)
POTASSIUM SERPL-SCNC: 4.1 MMOL/L (ref 3.6–5.5)
SODIUM SERPL-SCNC: 138 MMOL/L (ref 135–145)

## 2018-04-06 PROCEDURE — 36415 COLL VENOUS BLD VENIPUNCTURE: CPT

## 2018-04-06 PROCEDURE — 80048 BASIC METABOLIC PNL TOTAL CA: CPT

## 2018-05-07 ENCOUNTER — HOSPITAL ENCOUNTER (OUTPATIENT)
Dept: RADIOLOGY | Facility: MEDICAL CENTER | Age: 68
End: 2018-05-07
Attending: NURSE PRACTITIONER
Payer: MEDICARE

## 2018-05-07 DIAGNOSIS — R10.31 RIGHT LOWER QUADRANT ABDOMINAL PAIN: ICD-10-CM

## 2018-05-07 DIAGNOSIS — D25.9 UTERINE LEIOMYOMA, UNSPECIFIED LOCATION: ICD-10-CM

## 2018-05-07 PROCEDURE — 700117 HCHG RX CONTRAST REV CODE 255: Performed by: NURSE PRACTITIONER

## 2018-05-07 PROCEDURE — 74177 CT ABD & PELVIS W/CONTRAST: CPT

## 2018-05-07 RX ORDER — METRONIDAZOLE 500 MG/1
500 TABLET ORAL 3 TIMES DAILY
Qty: 30 TAB | Refills: 0 | Status: SHIPPED | OUTPATIENT
Start: 2018-05-07 | End: 2018-05-10 | Stop reason: SDUPTHER

## 2018-05-07 RX ORDER — CIPROFLOXACIN 500 MG/1
500 TABLET, FILM COATED ORAL 2 TIMES DAILY
Qty: 20 TAB | Refills: 0 | Status: SHIPPED | OUTPATIENT
Start: 2018-05-07 | End: 2018-05-10 | Stop reason: SDUPTHER

## 2018-05-07 RX ADMIN — IOHEXOL 100 ML: 350 INJECTION, SOLUTION INTRAVENOUS at 10:20

## 2018-05-07 RX ADMIN — IOHEXOL 50 ML: 240 INJECTION, SOLUTION INTRATHECAL; INTRAVASCULAR; INTRAVENOUS; ORAL at 10:20

## 2018-05-08 ENCOUNTER — TELEPHONE (OUTPATIENT)
Dept: MEDICAL GROUP | Facility: MEDICAL CENTER | Age: 68
End: 2018-05-08

## 2018-05-10 RX ORDER — METRONIDAZOLE 500 MG/1
500 TABLET ORAL 3 TIMES DAILY
Qty: 30 TAB | Refills: 0 | Status: SHIPPED | OUTPATIENT
Start: 2018-05-10 | End: 2018-05-20

## 2018-05-10 RX ORDER — CIPROFLOXACIN 500 MG/1
500 TABLET, FILM COATED ORAL 2 TIMES DAILY
Qty: 20 TAB | Refills: 0 | Status: SHIPPED | OUTPATIENT
Start: 2018-05-10 | End: 2018-05-20

## 2018-06-13 ENCOUNTER — TELEPHONE (OUTPATIENT)
Dept: MEDICAL GROUP | Facility: MEDICAL CENTER | Age: 68
End: 2018-06-13

## 2018-06-13 DIAGNOSIS — D25.9 UTERINE LEIOMYOMA, UNSPECIFIED LOCATION: ICD-10-CM

## 2018-06-13 NOTE — TELEPHONE ENCOUNTER
Per referral department, a new referral needs to be  submitted in order to get it process for patient since the  previews one was assigned by them to this location that doesn't take her insurance

## 2018-06-13 NOTE — TELEPHONE ENCOUNTER
1. Caller Name: Jina                       Call Back Number: [phone] (home)       2. Message: patient had a referral to [facility]. And they are not taking medicare patients she would like the referral resubmitted for another office.    3. Patient approves office to leave a detailed voicemail/MyChart message: N\A

## 2018-06-13 NOTE — TELEPHONE ENCOUNTER
I did not assign her to this group, it was done through scheduling. Please contact outpatient scheduling or have patient do so to find a gynecologist who does take Medicare.

## 2018-06-14 NOTE — TELEPHONE ENCOUNTER
I left a detailed message for pt letting her know that [facility] actually did place a new referral and that she can call the referral department so they can schedule her with somebody that takes her insurance or they will be calling her in the next few days and to call me back if she has any questions

## 2018-07-20 DIAGNOSIS — E55.9 VITAMIN D DEFICIENCY DISEASE: ICD-10-CM

## 2018-07-20 RX ORDER — ERGOCALCIFEROL 1.25 MG/1
CAPSULE ORAL
Qty: 12 CAP | Refills: 2 | Status: SHIPPED | OUTPATIENT
Start: 2018-07-20 | End: 2018-11-15 | Stop reason: SDUPTHER

## 2018-10-09 ENCOUNTER — TELEPHONE (OUTPATIENT)
Dept: MEDICAL GROUP | Facility: MEDICAL CENTER | Age: 68
End: 2018-10-09

## 2018-10-09 NOTE — TELEPHONE ENCOUNTER
Pt called asking for a call back in regards to her referral,  Called her back but she did not answered

## 2018-10-29 ENCOUNTER — OFFICE VISIT (OUTPATIENT)
Dept: MEDICAL GROUP | Facility: MEDICAL CENTER | Age: 68
End: 2018-10-29
Payer: MEDICARE

## 2018-10-29 VITALS
TEMPERATURE: 97.7 F | RESPIRATION RATE: 16 BRPM | BODY MASS INDEX: 36.25 KG/M2 | SYSTOLIC BLOOD PRESSURE: 128 MMHG | HEIGHT: 62 IN | WEIGHT: 197 LBS | OXYGEN SATURATION: 97 % | DIASTOLIC BLOOD PRESSURE: 72 MMHG | HEART RATE: 76 BPM

## 2018-10-29 DIAGNOSIS — Z12.39 SCREENING FOR BREAST CANCER: ICD-10-CM

## 2018-10-29 DIAGNOSIS — D45 POLYCYTHEMIA VERA (HCC): ICD-10-CM

## 2018-10-29 DIAGNOSIS — D25.9 UTERINE LEIOMYOMA, UNSPECIFIED LOCATION: ICD-10-CM

## 2018-10-29 DIAGNOSIS — D75.839 THROMBOCYTOSIS: ICD-10-CM

## 2018-10-29 DIAGNOSIS — F33.1 MODERATE EPISODE OF RECURRENT MAJOR DEPRESSIVE DISORDER (HCC): ICD-10-CM

## 2018-10-29 DIAGNOSIS — R73.01 IMPAIRED FASTING GLUCOSE: ICD-10-CM

## 2018-10-29 DIAGNOSIS — I10 ESSENTIAL HYPERTENSION: ICD-10-CM

## 2018-10-29 PROCEDURE — 99213 OFFICE O/P EST LOW 20 MIN: CPT | Performed by: NURSE PRACTITIONER

## 2018-10-29 ASSESSMENT — ACTIVITIES OF DAILY LIVING (ADL): BATHING_REQUIRES_ASSISTANCE: 0

## 2018-10-29 ASSESSMENT — PATIENT HEALTH QUESTIONNAIRE - PHQ9
CLINICAL INTERPRETATION OF PHQ2 SCORE: 3
5. POOR APPETITE OR OVEREATING: 3 - NEARLY EVERY DAY
SUM OF ALL RESPONSES TO PHQ QUESTIONS 1-9: 18

## 2018-10-29 ASSESSMENT — ENCOUNTER SYMPTOMS
DEPRESSION: 1
GENERAL WELL-BEING: GOOD

## 2018-10-29 NOTE — PROGRESS NOTES
Subjective:      Jina Barnard is a 67 y.o. female who presents with Follow-Up (6 month)        CC: Patient here today for six-month follow-up on hypertension, thrombocytosis and polycythemia as well as new problem with depression.    HPI Jina Barnard      1. Moderate episode of recurrent major depressive disorder (HCC)  Patient showed depression when given depression testing by the medical assistant.  Patient states she is not suicidal but does often feel depressed.  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  She is against any type of medication for depression but states she might be willing to see a counselor.    2. Polycythemia vera(238.4)  Patient follows with Dr. Rojas although we do not have any consult notes from him.  She states she is coming up on her yearly visit with him.    3. Uterine leiomyoma, unspecified location  Patient was referred to gynecology twice for this and apparently there were some issues with getting an appointment but she states she plans on doing so in the near future.    4. Thrombocytosis (HCC)  Patient follows with Dr. Rojas does her CBC testing.  She is on Hydrea.    5. Essential hypertension  Patient continues on HCTZ which she has found helpful.    6. Screening for breast cancer  Patient overdue for yearly testing.    7. Impaired fasting glucose  Previous fasting blood sugars have been mildly elevated although her last hemoglobin A1c from a year ago was normal  Social History   Substance Use Topics   • Smoking status: Never Smoker   • Smokeless tobacco: Never Used   • Alcohol use No     Current Outpatient Prescriptions   Medication Sig Dispense Refill   • vitamin D, Ergocalciferol, (DRISDOL) 46664 units Cap capsule TAKE 1 CAPSULE BY MOUTH ONCE A WEEK 12 Cap 2   • hydrochlorothiazide (HYDRODIURIL) 12.5 MG tablet Take 1 Tab by mouth every day. 30 Tab 11   • ibuprofen (MOTRIN) 200 MG Tab Take 200  "mg by mouth every 6 hours as needed.     • hydroxyurea (HYDREA) 500 MG Cap Take  by mouth every day.     • BLACK COHOSH PO Take  by mouth.       No current facility-administered medications for this visit.      Past Medical History:   Diagnosis Date   • Hypertension    • Thrombocytosis (HCC)      Family History   Problem Relation Age of Onset   • Alcohol/Drug Father         alcohol   • Other Father         malnutrition   • Psychiatry Father    • Other Mother         blood clot from surgery   • Hypertension Mother    • Diabetes Mother         ?   • Psychiatry Sister    • Other Sister         malnutrition   • No Known Problems Maternal Grandmother    • No Known Problems Maternal Grandfather    • No Known Problems Paternal Grandmother    • No Known Problems Paternal Grandfather        Review of Systems   Psychiatric/Behavioral: Positive for depression.   All other systems reviewed and are negative.         Objective:     /72 (BP Location: Right arm, Patient Position: Sitting, BP Cuff Size: Adult)   Pulse 76   Temp 36.5 °C (97.7 °F) (Temporal)   Resp 16   Ht 1.575 m (5' 2\")   Wt 89.4 kg (197 lb)   SpO2 97%   BMI 36.03 kg/m²      Physical Exam   Constitutional: She is oriented to person, place, and time. She appears well-developed and well-nourished. No distress.   HENT:   Head: Normocephalic and atraumatic.   Right Ear: External ear normal.   Left Ear: External ear normal.   Nose: Nose normal.   Eyes: Right eye exhibits no discharge. Left eye exhibits no discharge.   Neck: Normal range of motion. Neck supple. No thyromegaly present.   Cardiovascular: Normal rate, regular rhythm and normal heart sounds.  Exam reveals no gallop and no friction rub.    No murmur heard.  Pulmonary/Chest: Effort normal and breath sounds normal. She has no wheezes. She has no rales.   Musculoskeletal: She exhibits no edema or tenderness.   Neurological: She is alert and oriented to person, place, and time. She displays normal " reflexes.   Skin: Skin is warm and dry. No rash noted. She is not diaphoretic.   Psychiatric: She has a normal mood and affect. Her behavior is normal. Judgment and thought content normal.   Patient alternating laughing and crying in the office.  She does not appear suicidal.   Nursing note and vitals reviewed.              Assessment/Plan:     1. Moderate episode of recurrent major depressive disorder (HCC)  I discussed options with patient and she does not feel she needs to go the emergency room because she is not suicidal or homicidal.  She declines going on medication for depression.  She states she would be willing to talk to a counselor so a referral has been placed.  - REFERRAL TO PSYCHOLOGY    2. Polycythemia vera(238.4)  Patient advised to be sure that her oncologist since this notes from her next visit.    3. Uterine leiomyoma, unspecified location  Patient given information regarding her 2 previous referrals and need to get back to scheduling to set up an appointment for this.    4. Thrombocytosis (HCC)  Patient on Hydrea and follows with oncology.    5. Essential hypertension  Blood pressure remains well controlled and she is due for her yearly blood work.  - COMP METABOLIC PANEL; Future  - LIPID PROFILE; Future  - TSH; Future    6. Screening for breast cancer    - MA-SCREENING MAMMO BILAT W/TOMOSYNTHESIS W/CAD; Future    7. Impaired fasting glucose  I will continue to monitor for diabetes since she is at risk with her obesity and age.  - HEMOGLOBIN A1C; Future

## 2018-11-10 ENCOUNTER — HOSPITAL ENCOUNTER (OUTPATIENT)
Dept: RADIOLOGY | Facility: MEDICAL CENTER | Age: 68
End: 2018-11-10
Attending: NURSE PRACTITIONER
Payer: MEDICARE

## 2018-11-10 DIAGNOSIS — Z12.39 SCREENING FOR BREAST CANCER: ICD-10-CM

## 2018-11-10 PROCEDURE — 77067 SCR MAMMO BI INCL CAD: CPT

## 2018-11-12 ENCOUNTER — OFFICE VISIT (OUTPATIENT)
Dept: DERMATOLOGY | Facility: IMAGING CENTER | Age: 68
End: 2018-11-12
Payer: MEDICARE

## 2018-11-12 DIAGNOSIS — L57.0 ACTINIC KERATOSES: ICD-10-CM

## 2018-11-12 DIAGNOSIS — D18.01 CHERRY ANGIOMA: ICD-10-CM

## 2018-11-12 DIAGNOSIS — N63.10 BREAST MASS, RIGHT: ICD-10-CM

## 2018-11-12 DIAGNOSIS — L82.1 SEBORRHEIC KERATOSES: ICD-10-CM

## 2018-11-12 DIAGNOSIS — L72.0 MILIA: ICD-10-CM

## 2018-11-12 PROCEDURE — 17000 DESTRUCT PREMALG LESION: CPT | Performed by: DERMATOLOGY

## 2018-11-12 PROCEDURE — 99213 OFFICE O/P EST LOW 20 MIN: CPT | Mod: 25 | Performed by: DERMATOLOGY

## 2018-11-12 PROCEDURE — 17003 DESTRUCT PREMALG LES 2-14: CPT | Performed by: DERMATOLOGY

## 2018-11-12 ASSESSMENT — ENCOUNTER SYMPTOMS
CHILLS: 0
FEVER: 0

## 2018-11-12 NOTE — PROGRESS NOTES
Dermatology Return Patient Visit    No chief complaint on file.      Subjective:     HPI:   Jina Barnard is a 66 y.o. female presenting for    Follow up MELI  Areas of concern: face, chest  Has a few rough spots  Appeared a few months ago  Itchy  No treatments    HPI/location: white spot on the forehead  Time present: months  Painful lesion: No  Itching lesion: No  Enlarging lesion: No  Anything make it better or worse? N/a - just doesn't like it    Also notes increasing brown spots on the face, chest  Does not monitor them well  No itching/bleeding/pain    History of skin cancer: No  History of biopsies:No  History of blistering/severe sunburns:Yes, Details: during youth  Family history of skin cancer:No  Family history of atypical moles:No        Past Medical History:   Diagnosis Date   • Hypertension    • Thrombocytosis (HCC)        Current Outpatient Prescriptions on File Prior to Visit   Medication Sig Dispense Refill   • vitamin D, Ergocalciferol, (DRISDOL) 01970 units Cap capsule TAKE 1 CAPSULE BY MOUTH ONCE A WEEK 12 Cap 2   • hydrochlorothiazide (HYDRODIURIL) 12.5 MG tablet Take 1 Tab by mouth every day. 30 Tab 11   • BLACK COHOSH PO Take  by mouth.     • ibuprofen (MOTRIN) 200 MG Tab Take 200 mg by mouth every 6 hours as needed.     • hydroxyurea (HYDREA) 500 MG Cap Take  by mouth every day.       No current facility-administered medications on file prior to visit.        No Known Allergies    Family History   Problem Relation Age of Onset   • Alcohol/Drug Father         alcohol   • Other Father         malnutrition   • Psychiatry Father    • Other Mother         blood clot from surgery   • Hypertension Mother    • Diabetes Mother         ?   • Psychiatry Sister    • Other Sister         malnutrition   • No Known Problems Maternal Grandmother    • No Known Problems Maternal Grandfather    • No Known Problems Paternal Grandmother    • No Known Problems Paternal Grandfather        Social History          Social History   • Marital status: Single     Spouse name: N/A   • Number of children: N/A   • Years of education: N/A     Occupational History   • Not on file.     Social History Main Topics   • Smoking status: Never Smoker   • Smokeless tobacco: Never Used   • Alcohol use No   • Drug use: Yes     Types: Marijuana      Comment: daily   • Sexual activity: No     Other Topics Concern   • Not on file     Social History Narrative   • No narrative on file       Review of Systems   Constitutional: Negative for chills and fever.   Skin: Negative for itching and rash.   All other systems reviewed and are negative.       Objective:     A full mucocutaneous exam was completed including: scalp, hair, ears, face, eyelids, conjunctiva, lips, gums/tongue/oropharynx, neck, chest breasts, abdomen, back, left and right upper extremities (including hands/digits and fingernails), left and right lower extremities (including feet/toes, but toenails covered in polish), buttocks, excluding external genitalia (patient refusal) with the following pertinent findings listed below. Remaining above-listed examined areas within normal limits / negative for rashes or lesions.    There were no vitals taken for this visit.    Physical Exam   Constitutional: She is oriented to person, place, and time and well-developed, well-nourished, and in no distress.   HENT:   Head: Normocephalic and atraumatic.       Right Ear: External ear normal.   Left Ear: External ear normal.   Nose: Nose normal.   Mouth/Throat: Oropharynx is clear and moist.   Eyes: Conjunctivae and lids are normal.   Neck: Normal range of motion. Neck supple.   Cardiovascular: Intact distal pulses.    Pulmonary/Chest: Effort normal.   Neurological: She is alert and oriented to person, place, and time.   Skin: Skin is warm and dry.        Psychiatric: Mood and affect normal.       DATA: none applicable to review    Assessment and Plan:     1. Actinic keratoses  CRYOTHERAPY:  Risks  (including, but not limited to: hypo or hyperpigmentation, redness, blister, blood blister, recurrence, need for further treatment, infection, scar) and benefits of cryotherapy discussed. Patient verbally agreed to proceed with treatment. 2 cryotherapy freeze thaw cycles of 10 seconds were applied to 6 lesions on the face, left upper chest with cryac. Patient tolerated procedure well. Aftercare instructions given.  - patient instructed to RTC if not resolved in 6-8 weeks    2. Milia  - Benign-appearing nature of lesion discussed. Advised to return to clinic for any new or concerning changes.    3. Seborrheic keratoses  - Benign-appearing nature of lesions discussed. Advised to return to clinic for any new or concerning changes.    4. Cherry angiomas  - Benign-appearing nature of lesions discussed. Advised to return to clinic for any new or concerning changes.      Followup: Return in about 1 year (around 11/12/2019).    Jacklyn Dickson M.D.

## 2018-11-15 DIAGNOSIS — E55.9 VITAMIN D DEFICIENCY DISEASE: ICD-10-CM

## 2018-11-15 RX ORDER — HYDROCHLOROTHIAZIDE 12.5 MG/1
CAPSULE, GELATIN COATED ORAL
Qty: 90 CAP | Refills: 2 | Status: SHIPPED | OUTPATIENT
Start: 2018-11-15 | End: 2019-08-28 | Stop reason: SDUPTHER

## 2018-11-15 RX ORDER — ERGOCALCIFEROL 1.25 MG/1
CAPSULE ORAL
Qty: 12 CAP | Refills: 2 | Status: SHIPPED | OUTPATIENT
Start: 2018-11-15 | End: 2019-08-30 | Stop reason: SDUPTHER

## 2018-11-16 ENCOUNTER — APPOINTMENT (OUTPATIENT)
Dept: RADIOLOGY | Facility: MEDICAL CENTER | Age: 68
End: 2018-11-16
Attending: NURSE PRACTITIONER
Payer: MEDICARE

## 2018-11-21 ENCOUNTER — APPOINTMENT (OUTPATIENT)
Dept: RADIOLOGY | Facility: MEDICAL CENTER | Age: 68
End: 2018-11-21
Attending: NURSE PRACTITIONER
Payer: MEDICARE

## 2018-12-03 ENCOUNTER — APPOINTMENT (OUTPATIENT)
Dept: OBGYN | Facility: CLINIC | Age: 68
End: 2018-12-03
Payer: MEDICARE

## 2018-12-11 ENCOUNTER — TELEPHONE (OUTPATIENT)
Dept: MEDICAL GROUP | Facility: MEDICAL CENTER | Age: 68
End: 2018-12-11

## 2018-12-11 NOTE — TELEPHONE ENCOUNTER
1. Caller Name: Jina Barnard                                         Call Back Number: [phone] (home)       Patient approves a detailed voicemail message: yes      Vannessa, with the breast center called to ask if we could reach out to the pt about her abnormal mammogram. She canceled her first appointment because of her insurance not being able to cover the appoitment. The breast center has a new code and has been trying to reach her.

## 2018-12-12 ENCOUNTER — TELEPHONE (OUTPATIENT)
Dept: MEDICAL GROUP | Facility: MEDICAL CENTER | Age: 68
End: 2018-12-12

## 2019-02-05 ENCOUNTER — APPOINTMENT (OUTPATIENT)
Dept: RADIOLOGY | Facility: MEDICAL CENTER | Age: 69
End: 2019-02-05
Attending: NURSE PRACTITIONER
Payer: MEDICARE

## 2019-02-06 ENCOUNTER — HOSPITAL ENCOUNTER (OUTPATIENT)
Dept: RADIOLOGY | Facility: MEDICAL CENTER | Age: 69
End: 2019-02-06
Attending: NURSE PRACTITIONER
Payer: MEDICARE

## 2019-02-06 DIAGNOSIS — R92.8 ABNORMAL MAMMOGRAM: ICD-10-CM

## 2019-02-06 PROCEDURE — 76642 ULTRASOUND BREAST LIMITED: CPT | Mod: RT

## 2019-02-06 PROCEDURE — G0279 TOMOSYNTHESIS, MAMMO: HCPCS | Mod: RT

## 2019-02-07 ENCOUNTER — TELEPHONE (OUTPATIENT)
Dept: MEDICAL GROUP | Facility: MEDICAL CENTER | Age: 69
End: 2019-02-07

## 2019-02-07 NOTE — TELEPHONE ENCOUNTER
1. Caller Name: Jina            Call Back Number: [phone] (home)       2. Message: patient is requesting an order for her labs for doris barr to see if that's why she feels so fatigued. She would like the orders sent to her in the mail     3. Patient approves office to leave a detailed voicemail/MyChart message: yes

## 2019-02-07 NOTE — TELEPHONE ENCOUNTER
Please advise patient that there is no medical correlation between an elevated Tracy-Barr and fatigue so we do not do Tracy-Jenkins titers and adults at this office.  I am willing to see her to look at other options such as thyroid disease or diabetes.  If she insists that she needs this, she would need to find another office which be willing to do this.

## 2019-03-01 ENCOUNTER — TELEPHONE (OUTPATIENT)
Dept: MEDICAL GROUP | Facility: MEDICAL CENTER | Age: 69
End: 2019-03-01

## 2019-03-01 DIAGNOSIS — Z78.0 MENOPAUSE: ICD-10-CM

## 2019-03-01 NOTE — TELEPHONE ENCOUNTER
I have ordered lab work as she requested and will need to discuss with gynecology the results when they are in.

## 2019-03-01 NOTE — TELEPHONE ENCOUNTER
Pt called she states she has an appt with GYN and is getting labwork done but would like to know if you could please place some lab orders to check her hormone levels? She said she would like this to be completed before she sees her GYN

## 2019-03-20 ENCOUNTER — GYNECOLOGY VISIT (OUTPATIENT)
Dept: OBGYN | Facility: CLINIC | Age: 69
End: 2019-03-20
Payer: MEDICARE

## 2019-03-20 VITALS — DIASTOLIC BLOOD PRESSURE: 80 MMHG | BODY MASS INDEX: 36.03 KG/M2 | WEIGHT: 197 LBS | SYSTOLIC BLOOD PRESSURE: 150 MMHG

## 2019-03-20 DIAGNOSIS — R10.31 RIGHT LOWER QUADRANT ABDOMINAL PAIN: ICD-10-CM

## 2019-03-20 DIAGNOSIS — N85.8 UTERINE MASS: ICD-10-CM

## 2019-03-20 DIAGNOSIS — R93.89 ABNORMAL CT SCAN: ICD-10-CM

## 2019-03-20 DIAGNOSIS — R93.89 THICKENED ENDOMETRIUM: ICD-10-CM

## 2019-03-20 DIAGNOSIS — Z78.0 POSTMENOPAUSAL: ICD-10-CM

## 2019-03-20 PROCEDURE — 99203 OFFICE O/P NEW LOW 30 MIN: CPT | Performed by: OBSTETRICS & GYNECOLOGY

## 2019-03-20 NOTE — PROGRESS NOTES
Subjective:      Jina Barnard is a 68 y.o. female who presents to discuss abnormal CAT scan results            HPI patient is a 68-year-old postmenopausal female who was referred to discuss abnormal CT scan findings.  Patient has had chronic right lower quadrant pain with diverticulosis and had a CT scan of in May of last year which was abnormal.  Patient states she missed her previous appointment but presented today to discuss findings.  She reports continuation of chronic intermittent right lower quadrant pain.    Patient states she went through menopause around age 49 and has not use any hormone replacement therapy.  She denies any postmenopausal bleeding.  She denies any menopausal symptoms. she reports normal bladder functions.    Patient reports that her last Pap smear was 3 years ago and was normal.  Has had no prior abnormal Pap smear.      ROS all organ systems were reviewed and are currently negative except for complaints in HPI       Objective:     /80 (BP Location: Left arm)   Wt 89.4 kg (197 lb)   BMI 36.03 kg/m²      Physical Exam   Constitutional: She appears well-developed and well-nourished. No distress.   Skin: She is not diaphoretic.   Psychiatric: She has a normal mood and affect. Her behavior is normal. Judgment and thought content normal.   Nursing note and vitals reviewed.         Discussion:  I reviewed pelvic CT scan findings with patient showing an 8-1/2 cm mixed density mass with lobulated margins which is contiguous with the anterior superior aspect of the uterus and I discussed this may likely represent a fibroid tumor.  I discussed that the fibroid could be contributing to her pain symptoms due to mass/compression effects on surrounding organs and tissues.  I discussed that there was some abnormal thickening of the endometrium on 1 of her CT scan also.  I discussed that I would recommend a pelvic ultrasound to evaluate the pelvic mass and endometrium and patient agrees.  I  discussed treatment options for fibroid tumors including observation versus surgical management and I discussed that if her endometrium continues to appear abnormally thickened that I would recommend endometrial sampling and patient agrees.  Pelvic ultrasound was ordered and patient will follow-up after study for evaluation.     5/7/2018 10:15 AM    HISTORY/REASON FOR EXAM:  gives history of diverticulitis and having pain right side  Pain.  Chronic erythremia/polycythemia in remission    TECHNIQUE/EXAM DESCRIPTION:   CT scan of the abdomen and pelvis with contrast.    Contrast-enhanced helical scanning was obtained from the diaphragmatic domes through the pubic symphysis following the bolus administration of nonionic contrast without complication.    100 mL of Omnipaque 350 nonionic contrast was administered without complication.    Low dose optimization technique was utilized for this CT exam including automated exposure control and adjustment of the mA and/or kV according to patient size.    COMPARISON: 10/17/2017, 9/21/2016    FINDINGS: The visible lung bases are clear.  CT Abdomen:  The liver is unchanged in size. Hypodense lesion in the posterior superior right hepatic lobe segment 7 measures 2.2 x 1.8 cm. It measured 2.1 x 2.5 cm on previous exam. Low-attenuation lesion in segment 6 measures 9.3 x 9.9 mm compared to 14 x 10 mm on   previous exam. No new liver lesions are appreciated. The spleen is normal in size and unchanged. The adrenal glands and pancreas are unchanged in appearance. There is a suggestion of very low density material within the gallbladder which may represent   small stones. The kidneys are symmetric in size and parenchymal enhancement. Tiny very low attenuation lesion is present in the lateral upper pole of the left kidney, unchanged. Peripheral calcification is appreciated in the aorta and branch vessels. No   dilated upper abdominal bowel loops are appreciated. Diverticula are present  throughout the colon.    CT Pelvis:  There is an 8.2 x 8.2 x 8.4 cm mixed density mass with lobulated margins which is contiguous with the anterior superior aspect of the uterus and causes mass effect on the central and left sides of the bladder dome. There are scattered low-density   components within the mass. Finding may represent a large pedunculated fibroid. Focal uterine parenchymal calcification is also appreciated. Uterine endometrium appears thickened. Numerous diverticula are present in the proximal right colon and pericecal   distribution, however no obvious acute right lower quadrant inflammatory process can be appreciated. Extensive diverticular formation is present throughout the sigmoid colon. Some streaky soft tissue infiltration of pericolonic fat is a little   identified along the inferior aspect of the sigmoid colon which could indicate diverticulitis. A small amount of free fluid is present in the inferior right adnexa and the inferior aspect of the right hemipelvis. No findings suspicious for abscess are   appreciated. No adenopathy is observed.    Degenerative changes are present throughout the bony structures.   Impression       1.  Extensive diverticulosis.    2.  Streaky soft tissue infiltration of pericolonic fat along the inferior margin of the sigmoid colon which could indicate diverticulitis. No focal abscess appreciated at this time.    3.  Small amount of fluid in the inferior right adnexa and posterior inferior right hemipelvis.    4.  8.2 x 8.2 x 8.4 cm mixed density soft tissue mass contiguous with the anterior superior uterus which may represent a very large pedunculated fibroid. Finding causes mass effect on the uterus and dome of the bladder.    5.  Stable hypodense liver lesions.    6.  Atherosclerotic vascular disease.    7.  Probable tiny gallstones.       Assessment/Plan:     1. Uterine mass  Abnormal appearing uterine mass on CT scan of the pelvis.  We discussed likelihood  of fibroid tumor.  Will reevaluate with pelvic ultrasound.  - US-PELVIC TRANSVAGINAL ONLY; Future    2. Thickened endometrium  Findings reviewed with patient.  Will reevaluate with pelvic ultrasound.  Will possibly need endometrial biopsy endometrium is abnormally thickened on follow-up study  - US-PELVIC TRANSVAGINAL ONLY; Future    3. Right lower quadrant abdominal pain  Potential causes of pain were discussed including mass compression and diverticulitis  - US-PELVIC TRANSVAGINAL ONLY; Future    4. Abnormal CT scan  Results reviewed with patient  - US-PELVIC TRANSVAGINAL ONLY; Future    5. Postmenopausal  Patient is asymptomatic currently    6.  Precautions and plan of care were reviewed.  Patient will follow-up for evaluation after pelvic ultrasound.    20 minutes spent with patient today.  All time was for face-to-face counseling

## 2019-04-02 ENCOUNTER — APPOINTMENT (OUTPATIENT)
Dept: RADIOLOGY | Facility: MEDICAL CENTER | Age: 69
End: 2019-04-02
Attending: OBSTETRICS & GYNECOLOGY
Payer: MEDICARE

## 2019-04-08 ENCOUNTER — TELEPHONE (OUTPATIENT)
Dept: MEDICAL GROUP | Facility: MEDICAL CENTER | Age: 69
End: 2019-04-08

## 2019-04-08 NOTE — TELEPHONE ENCOUNTER
I have not seen patient in 6 months and she was not seen for abdominal pain at that visit.  It appears she was seen at gynecology recently and they discussed options for her abdominal pain.  I would need to see her to assess this and decide how treatment would be and cannot do this over the phone.  I agree that we should offer her an early appointment and if she decides not to come, but hopefully she will at least go to urgent care or the ER if the pain worsens.

## 2019-04-08 NOTE — TELEPHONE ENCOUNTER
"1. Caller Name: Jina Barnard                                          Call Back Number: [phone] (home)       Patient called stating she was having abdominal pain and she wanted you to send an antibiotic to the pharmacy because she thinks is diverticulitis, I explain to the patient that in order for Aniket to prescribe antibiotic she would need to be seen because we need to make sure that that's  What it is... She got really upset and started saying that I wasn't listening  to her, that she does not have the energy to come to the office to be seen to get the antibiotics that she doesn't even feel like getting dress and started saying that if a visit is what she wanted then she would have schedule it already... 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

## 2019-04-26 ENCOUNTER — OFFICE VISIT (OUTPATIENT)
Dept: MEDICAL GROUP | Facility: MEDICAL CENTER | Age: 69
End: 2019-04-26
Payer: MEDICARE

## 2019-04-26 ENCOUNTER — HOSPITAL ENCOUNTER (OUTPATIENT)
Dept: LAB | Facility: MEDICAL CENTER | Age: 69
End: 2019-04-26
Attending: NURSE PRACTITIONER
Payer: MEDICARE

## 2019-04-26 VITALS
BODY MASS INDEX: 34.96 KG/M2 | HEIGHT: 62 IN | DIASTOLIC BLOOD PRESSURE: 76 MMHG | HEART RATE: 71 BPM | OXYGEN SATURATION: 99 % | SYSTOLIC BLOOD PRESSURE: 122 MMHG | RESPIRATION RATE: 16 BRPM | WEIGHT: 190 LBS

## 2019-04-26 DIAGNOSIS — I10 ESSENTIAL HYPERTENSION: ICD-10-CM

## 2019-04-26 DIAGNOSIS — R73.01 IMPAIRED FASTING GLUCOSE: ICD-10-CM

## 2019-04-26 DIAGNOSIS — K57.90 DIVERTICULOSIS OF INTESTINE WITHOUT BLEEDING, UNSPECIFIED INTESTINAL TRACT LOCATION: ICD-10-CM

## 2019-04-26 DIAGNOSIS — R10.32 LEFT LOWER QUADRANT PAIN: ICD-10-CM

## 2019-04-26 DIAGNOSIS — F33.1 MODERATE EPISODE OF RECURRENT MAJOR DEPRESSIVE DISORDER (HCC): ICD-10-CM

## 2019-04-26 DIAGNOSIS — N85.8 UTERINE MASS: ICD-10-CM

## 2019-04-26 DIAGNOSIS — D45 POLYCYTHEMIA VERA (HCC): ICD-10-CM

## 2019-04-26 LAB
ALBUMIN SERPL BCP-MCNC: 3.9 G/DL (ref 3.2–4.9)
ALBUMIN/GLOB SERPL: 1.6 G/DL
ALP SERPL-CCNC: 52 U/L (ref 30–99)
ALT SERPL-CCNC: 11 U/L (ref 2–50)
ANION GAP SERPL CALC-SCNC: 7 MMOL/L (ref 0–11.9)
AST SERPL-CCNC: 12 U/L (ref 12–45)
BILIRUB SERPL-MCNC: 0.4 MG/DL (ref 0.1–1.5)
BUN SERPL-MCNC: 22 MG/DL (ref 8–22)
CALCIUM SERPL-MCNC: 9.4 MG/DL (ref 8.5–10.5)
CHLORIDE SERPL-SCNC: 101 MMOL/L (ref 96–112)
CHOLEST SERPL-MCNC: 186 MG/DL (ref 100–199)
CO2 SERPL-SCNC: 29 MMOL/L (ref 20–33)
CREAT SERPL-MCNC: 0.9 MG/DL (ref 0.5–1.4)
DHEA-S SERPL-MCNC: 46 UG/DL (ref 9.4–246)
EST. AVERAGE GLUCOSE BLD GHB EST-MCNC: 134 MG/DL
FASTING STATUS PATIENT QL REPORTED: NORMAL
FSH SERPL-ACNC: 71.8 MIU/ML
GLOBULIN SER CALC-MCNC: 2.5 G/DL (ref 1.9–3.5)
GLUCOSE SERPL-MCNC: 124 MG/DL (ref 65–99)
HBA1C MFR BLD: 6.3 % (ref 0–5.6)
HDLC SERPL-MCNC: 42 MG/DL
LDLC SERPL CALC-MCNC: 113 MG/DL
LH SERPL-ACNC: 24 IU/L
POTASSIUM SERPL-SCNC: 3.5 MMOL/L (ref 3.6–5.5)
PROLACTIN SERPL-MCNC: 9.69 NG/ML (ref 2.8–26)
PROT SERPL-MCNC: 6.4 G/DL (ref 6–8.2)
SODIUM SERPL-SCNC: 137 MMOL/L (ref 135–145)
T4 FREE SERPL-MCNC: 0.95 NG/DL (ref 0.53–1.43)
TESTOST SERPL-MCNC: 35 NG/DL (ref 9–75)
TRIGL SERPL-MCNC: 157 MG/DL (ref 0–149)
TSH SERPL DL<=0.005 MIU/L-ACNC: 3.24 UIU/ML (ref 0.38–5.33)

## 2019-04-26 PROCEDURE — 84146 ASSAY OF PROLACTIN: CPT

## 2019-04-26 PROCEDURE — 82627 DEHYDROEPIANDROSTERONE: CPT

## 2019-04-26 PROCEDURE — 80053 COMPREHEN METABOLIC PANEL: CPT

## 2019-04-26 PROCEDURE — 83002 ASSAY OF GONADOTROPIN (LH): CPT

## 2019-04-26 PROCEDURE — 80061 LIPID PANEL: CPT

## 2019-04-26 PROCEDURE — 84443 ASSAY THYROID STIM HORMONE: CPT

## 2019-04-26 PROCEDURE — 83036 HEMOGLOBIN GLYCOSYLATED A1C: CPT | Mod: GA

## 2019-04-26 PROCEDURE — 84439 ASSAY OF FREE THYROXINE: CPT

## 2019-04-26 PROCEDURE — 99214 OFFICE O/P EST MOD 30 MIN: CPT | Performed by: NURSE PRACTITIONER

## 2019-04-26 PROCEDURE — 36415 COLL VENOUS BLD VENIPUNCTURE: CPT

## 2019-04-26 PROCEDURE — 84403 ASSAY OF TOTAL TESTOSTERONE: CPT

## 2019-04-26 PROCEDURE — 83001 ASSAY OF GONADOTROPIN (FSH): CPT

## 2019-04-26 RX ORDER — CIPROFLOXACIN 500 MG/1
500 TABLET, FILM COATED ORAL 2 TIMES DAILY
Qty: 20 TAB | Refills: 0 | Status: SHIPPED | OUTPATIENT
Start: 2019-04-26 | End: 2019-05-06

## 2019-04-26 RX ORDER — METRONIDAZOLE 500 MG/1
500 TABLET ORAL 3 TIMES DAILY
Qty: 30 TAB | Refills: 0 | Status: SHIPPED | OUTPATIENT
Start: 2019-04-26 | End: 2019-05-06

## 2019-04-26 ASSESSMENT — ENCOUNTER SYMPTOMS
ABDOMINAL PAIN: 1
DEPRESSION: 1

## 2019-04-26 NOTE — PROGRESS NOTES
Subjective:      Jina Barnard is a 68 y.o. female who presents with Follow-Up (6 month)        CC: Patient here today for abdominal concerns and depression.    HPI Jina Barnard        1. Left lower quadrant pain  Patient states approximately 3 to 4 weeks ago she developed abdominal pain.  It affected mostly her left lower abdomen and she thought it might be her diverticulitis acting up.  We advised her to come into the office and she is only coming in today.  She states it is still irritating her though it is not as bad as before.  CT of the abdomen from a year ago showed diverticulosis with possible diverticulitis which responded to antibiotics.  She denies nausea, vomiting, fever or chills.  She states she is moving her bowels.    2. Diverticulosis of intestine without bleeding, unspecified intestinal tract location  Previous finding on CT as mentioned above and she believes it has been bothering her recently again with her last episode a year ago.    3. Moderate episode of recurrent major depressive disorder (HCC)  Patient crying in the office today and describes her depression.  She has declined medication in the past and I did refer her to psychology in October of last year but I do not see that the referral went to any further and patient states she was never contacted.  She would like to do counseling.    4. Essential hypertension  Patient continues on low-dose HCTZ and blood pressure has been controlled.  Patient adjusted her lab work this morning.    5. Uterine mass  Patient followed with gynecology  Last month because of abnormal CT in May with questionable mass.  Gynecology discussed with her possibilities including fibroid tumor but that she needed a pelvic ultrasound especially with thickened endometrium.  It was explained that she may need a biopsy following the study.  Unfortunately, patient canceled her ultrasound because she did not feel well.  She also was depressed.      6. Polycythemia  "vera(238.4)  Patient sees Dr. Rojas every 6 months for this and is on Hydrea.  The last CBC I see in her chart was from 2017 with normal platelet count and normal hemoglobin and hematocrit.  Current Outpatient Prescriptions   Medication Sig Dispense Refill   • metroNIDAZOLE (FLAGYL) 500 MG Tab Take 1 Tab by mouth 3 times a day for 10 days. 30 Tab 0   • ciprofloxacin (CIPRO) 500 MG Tab Take 1 Tab by mouth 2 times a day for 10 days. 20 Tab 0   • hydrochlorothiazide (MICROZIDE) 12.5 MG capsule TAKE 1 CAPSULE BY MOUTH EVERY DAY 90 Cap 2   • vitamin D, Ergocalciferol, (DRISDOL) 11721 units Cap capsule TAKE 1 CAPSULE BY MOUTH EVERY WEEK 12 Cap 2   • BLACK COHOSH PO Take  by mouth.     • ibuprofen (MOTRIN) 200 MG Tab Take 200 mg by mouth every 6 hours as needed.     • hydroxyurea (HYDREA) 500 MG Cap Take  by mouth every day.       No current facility-administered medications for this visit.      Social History   Substance Use Topics   • Smoking status: Never Smoker   • Smokeless tobacco: Never Used   • Alcohol use No     Past Medical History:   Diagnosis Date   • Hypertension    • Thrombocytosis (HCC)      Family History   Problem Relation Age of Onset   • Alcohol/Drug Father         alcohol   • Other Father         malnutrition   • Psychiatry Father    • Other Mother         blood clot from surgery   • Hypertension Mother    • Diabetes Mother         ?   • Psychiatry Sister    • Other Sister         malnutrition   • No Known Problems Maternal Grandmother    • No Known Problems Maternal Grandfather    • No Known Problems Paternal Grandmother    • No Known Problems Paternal Grandfather        Review of Systems   Gastrointestinal: Positive for abdominal pain.   Psychiatric/Behavioral: Positive for depression.   All other systems reviewed and are negative.         Objective:     /76 (BP Location: Right arm, Patient Position: Sitting, BP Cuff Size: Adult)   Pulse 71   Resp 16   Ht 1.575 m (5' 2\")   Wt 86.2 kg (190 " lb)   SpO2 99%   BMI 34.75 kg/m²      Physical Exam   Constitutional: She is oriented to person, place, and time. She appears well-developed and well-nourished. No distress.   HENT:   Head: Normocephalic and atraumatic.   Right Ear: External ear normal.   Left Ear: External ear normal.   Nose: Nose normal.   Eyes: Right eye exhibits no discharge. Left eye exhibits no discharge.   Neck: Normal range of motion. Neck supple. No thyromegaly present.   Cardiovascular: Normal rate, regular rhythm and normal heart sounds.  Exam reveals no gallop and no friction rub.    No murmur heard.  Pulmonary/Chest: Effort normal and breath sounds normal. She has no wheezes. She has no rales.   Abdominal: Soft. Normal appearance. There is tenderness in the left lower quadrant.   Musculoskeletal: She exhibits no edema or tenderness.   Neurological: She is alert and oriented to person, place, and time. She displays normal reflexes.   Skin: Skin is warm and dry. No rash noted. She is not diaphoretic.   Psychiatric: Her behavior is normal. Judgment and thought content normal. She exhibits a depressed mood.   Patient was crying frequently throughout the visit splinting her situation but was feeling better at time of checkout.   Nursing note and vitals reviewed.              Assessment/Plan:     1. Left lower quadrant pain  Patient has history of diverticulosis and possible diverticulitis so I advised her to follow-up with gastroenterology because she probably needs a colonoscopy follow-up.  She also has a ultrasound pending for gynecology and I spoke with her about a repeat CT but she declined.  I told her if the pain worsens despite treatment, she is to go to the emergency room.  Otherwise she should consider a repeat CT.  - metroNIDAZOLE (FLAGYL) 500 MG Tab; Take 1 Tab by mouth 3 times a day for 10 days.  Dispense: 30 Tab; Refill: 0  - ciprofloxacin (CIPRO) 500 MG Tab; Take 1 Tab by mouth 2 times a day for 10 days.  Dispense: 20 Tab;  Refill: 0  - REFERRAL TO GASTROENTEROLOGY    2. Diverticulosis of intestine without bleeding, unspecified intestinal tract location  Patient needs to follow with GI and she was started on medication today for possible diverticulitis.  I did explain that we cannot call in antibiotics for this and in the future she would again need to be seen.  - REFERRAL TO GASTROENTEROLOGY    3. Moderate episode of recurrent major depressive disorder (HCC)  Patient depressed but does not want to start on medicines.  I did place a referral for her 6 months ago but apparently she was not contacted.  I will again start the process and advised her to call scheduling in 2 weeks if she has not heard anything by then.  She will go to the ER if her depression becomes severe.  - REFERRAL TO PSYCHOLOGY    4. Essential hypertension  Appears controlled on her HCTZ.  She is wanting me to prescribe electrolytes for her such as potassium pills but I told her I will need to wait until I see her lab work next week to decide whether she needs medication.    5. Uterine mass  Patient strongly encouraged to get her ultrasound done for gynecology.    6. Polycythemia vera(238.4)  Patient states she will be making her appointment soon with Dr. Rojas for follow-up lab work.

## 2019-05-07 ENCOUNTER — TELEPHONE (OUTPATIENT)
Dept: MEDICAL GROUP | Facility: MEDICAL CENTER | Age: 69
End: 2019-05-07

## 2019-05-07 DIAGNOSIS — I10 ESSENTIAL HYPERTENSION: ICD-10-CM

## 2019-05-07 RX ORDER — POTASSIUM CHLORIDE 750 MG/1
10 TABLET, EXTENDED RELEASE ORAL DAILY
Qty: 90 EACH | Refills: 3 | Status: SHIPPED | OUTPATIENT
Start: 2019-05-07 | End: 2019-08-06 | Stop reason: SDUPTHER

## 2019-05-07 NOTE — TELEPHONE ENCOUNTER
1. Caller Name: Jina Barnard                                          Call Back Number: [phone] (home)     Patient left a vm sating her potassium is low in her results and she thinks she should have gotten a prescription for it....she would like you to send it to her local pharmacy        Patient approves a detailed voicemail message: N\A

## 2019-05-08 NOTE — TELEPHONE ENCOUNTER
Pt call me back upset stating that with the potassium she now feels so much better. She was asking me to give her the exact date that she called stating she had abdominal pain and depression, I told her it was 4/8, and raising her voice she started telling me that if we should have giving her the potasium rx since then that she wouldn't had the depression and abdominal pain that she had till now and that she had to go back and read her labwork and notice the potassium was low and kept on blaming me for not sending the rx for potassium and stated she wanted everything documented in her chart. I told her that everything gets documented and kept on arguing and finally hang up.

## 2019-05-08 NOTE — TELEPHONE ENCOUNTER
I attempted to reach patient by telephone and left message that not taking 10 mEq of potassium with a potassium level of 3.5 should not cause depression or abdominal pain.  I advised her if she is unhappy with thought she may wish to consider establishing with another medical provider.

## 2019-05-17 ENCOUNTER — HOSPITAL ENCOUNTER (OUTPATIENT)
Dept: RADIOLOGY | Facility: MEDICAL CENTER | Age: 69
End: 2019-05-17
Attending: OBSTETRICS & GYNECOLOGY
Payer: MEDICARE

## 2019-05-17 DIAGNOSIS — R93.89 ABNORMAL CT SCAN: ICD-10-CM

## 2019-05-17 DIAGNOSIS — N85.8 UTERINE MASS: ICD-10-CM

## 2019-05-17 DIAGNOSIS — R10.31 RIGHT LOWER QUADRANT ABDOMINAL PAIN: ICD-10-CM

## 2019-05-17 DIAGNOSIS — R93.89 THICKENED ENDOMETRIUM: ICD-10-CM

## 2019-05-17 PROCEDURE — 76856 US EXAM PELVIC COMPLETE: CPT

## 2019-05-20 ENCOUNTER — TELEPHONE (OUTPATIENT)
Dept: MEDICAL GROUP | Facility: MEDICAL CENTER | Age: 69
End: 2019-05-20

## 2019-05-20 DIAGNOSIS — R10.2 PELVIC PAIN: ICD-10-CM

## 2019-05-20 DIAGNOSIS — R93.89 THICKENED ENDOMETRIUM: ICD-10-CM

## 2019-05-20 DIAGNOSIS — N85.2 BULKY OR ENLARGED UTERUS: ICD-10-CM

## 2019-05-20 DIAGNOSIS — Z78.0 POSTMENOPAUSAL: ICD-10-CM

## 2019-05-20 NOTE — TELEPHONE ENCOUNTER
1. Caller Name:    Jina Barnard                                    Call Back Number: [phone] (home)       Patient called asking if we had received her ultrasound results, I told her we did received them but the person who orders them, needs to give her the results and this time was her GYN office, I gave her their phone number and she said she would call them to get the.... Also she started asking when was the fist time that Aniket ordered labwork on her, I told her it was 7/2017, she asked what was her potassium result at that time, I told her it was 3.9... Then she changed the tone of her voice and started being upset and telling me she is trying to figure it why she started taking potassium and right away after 30 minutes she started feeling much better and kept on going repeating  herself and I told her that I wasn't sure but that I had to go room a pt and I was going to offer her an appt to come in and discuss it with Aniket but she just hang up on me.            Patient approves a detailed voicemail message: N\A

## 2019-05-21 ENCOUNTER — TELEPHONE (OUTPATIENT)
Dept: OBGYN | Facility: CLINIC | Age: 69
End: 2019-05-21

## 2019-05-21 NOTE — TELEPHONE ENCOUNTER
----- Message from Julio Singletary M.D. sent at 5/20/2019 10:55 AM PDT -----  Patient has an enlarged fibroid uterus.  She will need endometrial biopsy and follow-up evaluation.  Endometrial biopsy request placed.      -Called patient, no answer. LVM asking patient to give us a call back to go over her most recent test results.     Patient called back asking for results, it was explained to patient what MD stated on his note. Patient was also adv once her referral has been approved by her insurance someone from our office will be calling her to schedule for this procedure.  Patient verbalized understanding and had no further questions.

## 2019-06-11 ENCOUNTER — TELEPHONE (OUTPATIENT)
Dept: MEDICAL GROUP | Facility: MEDICAL CENTER | Age: 69
End: 2019-06-11

## 2019-06-12 ENCOUNTER — TELEPHONE (OUTPATIENT)
Dept: MEDICAL GROUP | Facility: MEDICAL CENTER | Age: 69
End: 2019-06-12

## 2019-06-12 DIAGNOSIS — E87.6 HYPOKALEMIA: ICD-10-CM

## 2019-06-12 NOTE — TELEPHONE ENCOUNTER
1. Caller Name: Jina Barnard                                         Call Back Number: [phone] (home)     Patient lvm stating she would like to know if you can order lab work to see how is her potassium? And if this labwork will need to be fasting?           Patient approves a detailed voicemail message: N\A

## 2019-06-28 ENCOUNTER — HOSPITAL ENCOUNTER (OUTPATIENT)
Dept: LAB | Facility: MEDICAL CENTER | Age: 69
End: 2019-06-28
Attending: NURSE PRACTITIONER
Payer: MEDICARE

## 2019-06-28 DIAGNOSIS — E87.6 HYPOKALEMIA: ICD-10-CM

## 2019-06-28 LAB
ALBUMIN SERPL BCP-MCNC: 4.1 G/DL (ref 3.2–4.9)
ALBUMIN/GLOB SERPL: 1.6 G/DL
ALP SERPL-CCNC: 54 U/L (ref 30–99)
ALT SERPL-CCNC: 9 U/L (ref 2–50)
ANION GAP SERPL CALC-SCNC: 12 MMOL/L (ref 0–11.9)
AST SERPL-CCNC: 14 U/L (ref 12–45)
BILIRUB SERPL-MCNC: 0.6 MG/DL (ref 0.1–1.5)
BUN SERPL-MCNC: 19 MG/DL (ref 8–22)
CALCIUM SERPL-MCNC: 9.5 MG/DL (ref 8.5–10.5)
CHLORIDE SERPL-SCNC: 102 MMOL/L (ref 96–112)
CO2 SERPL-SCNC: 27 MMOL/L (ref 20–33)
CREAT SERPL-MCNC: 1 MG/DL (ref 0.5–1.4)
GLOBULIN SER CALC-MCNC: 2.6 G/DL (ref 1.9–3.5)
GLUCOSE SERPL-MCNC: 109 MG/DL (ref 65–99)
POTASSIUM SERPL-SCNC: 4.2 MMOL/L (ref 3.6–5.5)
PROT SERPL-MCNC: 6.7 G/DL (ref 6–8.2)
SODIUM SERPL-SCNC: 141 MMOL/L (ref 135–145)

## 2019-06-28 PROCEDURE — 36415 COLL VENOUS BLD VENIPUNCTURE: CPT

## 2019-06-28 PROCEDURE — 80053 COMPREHEN METABOLIC PANEL: CPT

## 2019-07-01 ENCOUNTER — TELEPHONE (OUTPATIENT)
Dept: MEDICAL GROUP | Facility: MEDICAL CENTER | Age: 69
End: 2019-07-01

## 2019-07-01 NOTE — TELEPHONE ENCOUNTER
A letter with results was mailed out today since she is not on my chart but I did attempt to contact her by phone and received voice mail where I left a message that everything looked good with her potassium and her blood sugar was just a little bit high and her GFR just a little bit low.

## 2019-07-25 ENCOUNTER — OFFICE VISIT (OUTPATIENT)
Dept: DERMATOLOGY | Facility: IMAGING CENTER | Age: 69
End: 2019-07-25
Payer: MEDICARE

## 2019-07-25 DIAGNOSIS — D17.30 FIBROLIPOMA OF SKIN: ICD-10-CM

## 2019-07-25 DIAGNOSIS — L57.0 ACTINIC KERATOSES: ICD-10-CM

## 2019-07-25 DIAGNOSIS — R20.8 SKIN PAIN: ICD-10-CM

## 2019-07-25 DIAGNOSIS — L29.9 PRURITUS: ICD-10-CM

## 2019-07-25 PROCEDURE — 17000 DESTRUCT PREMALG LESION: CPT | Performed by: DERMATOLOGY

## 2019-07-25 PROCEDURE — 99211 OFF/OP EST MAY X REQ PHY/QHP: CPT | Mod: 25 | Performed by: DERMATOLOGY

## 2019-07-25 PROCEDURE — 17003 DESTRUCT PREMALG LES 2-14: CPT | Performed by: DERMATOLOGY

## 2019-07-25 ASSESSMENT — ENCOUNTER SYMPTOMS
FEVER: 0
CHILLS: 0

## 2019-07-25 NOTE — PROGRESS NOTES
Dermatology Return Patient Visit    Chief Complaint   Patient presents with   • Skin Lesion       Subjective:     HPI:   Jina Barnard is a 66 y.o. female presenting for    Follow up for new area of concern today    HPI/location: Upper & lower lip, crusty lesions bleed sometimes   Time present: Many years, but have worsened  Painful lesion: No  Itching lesion: Yes  Enlarging lesion: No  Anything make it better or worse? N/a    HPI/location: spot in gluteal cleft  Time present: years  Painful lesion: Yes  Itching lesion: Yes  Enlarging lesion: Yes  Anything make it better or worse? Always traumatizing it    History of skin cancer: No  History of biopsies:No  History of blistering/severe sunburns:Yes, Details: during youth  Family history of skin cancer:No  Family history of atypical moles:No        Past Medical History:   Diagnosis Date   • Hypertension    • Thrombocytosis (HCC)        Current Outpatient Prescriptions on File Prior to Visit   Medication Sig Dispense Refill   • potassium chloride SA (K-DUR) 10 MEQ Tab CR Take 1 Tab by mouth every day. 90 Each 3   • hydrochlorothiazide (MICROZIDE) 12.5 MG capsule TAKE 1 CAPSULE BY MOUTH EVERY DAY 90 Cap 2   • vitamin D, Ergocalciferol, (DRISDOL) 75116 units Cap capsule TAKE 1 CAPSULE BY MOUTH EVERY WEEK 12 Cap 2   • BLACK COHOSH PO Take  by mouth.     • ibuprofen (MOTRIN) 200 MG Tab Take 200 mg by mouth every 6 hours as needed.     • hydroxyurea (HYDREA) 500 MG Cap Take  by mouth every day.       No current facility-administered medications on file prior to visit.        No Known Allergies    Family History   Problem Relation Age of Onset   • Alcohol/Drug Father         alcohol   • Other Father         malnutrition   • Psychiatry Father    • Other Mother         blood clot from surgery   • Hypertension Mother    • Diabetes Mother         ?   • Psychiatry Sister    • Other Sister         malnutrition   • No Known Problems Maternal Grandmother    • No Known Problems  Maternal Grandfather    • No Known Problems Paternal Grandmother    • No Known Problems Paternal Grandfather        Social History     Social History   • Marital status: Single     Spouse name: N/A   • Number of children: N/A   • Years of education: N/A     Occupational History   • Not on file.     Social History Main Topics   • Smoking status: Never Smoker   • Smokeless tobacco: Never Used   • Alcohol use No   • Drug use: Yes     Types: Marijuana      Comment: daily   • Sexual activity: No     Other Topics Concern   • Not on file     Social History Narrative   • No narrative on file       Review of Systems   Constitutional: Negative for chills and fever.   Skin: Positive for itching. Negative for rash.   All other systems reviewed and are negative.       Objective:     A full mucocutaneous exam was completed including: scalp, hair, ears, face, eyelids, conjunctiva, lips, gums/tongue/oropharynx, neck, chest breasts, abdomen, back, left and right upper extremities (including hands/digits and fingernails), left and right lower extremities (including feet/toes, but toenails covered in polish), buttocks, excluding external genitalia (patient refusal) with the following pertinent findings listed below. Remaining above-listed examined areas within normal limits / negative for rashes or lesions.    There were no vitals taken for this visit.    Physical Exam   Constitutional: She is oriented to person, place, and time and well-developed, well-nourished, and in no distress.   HENT:   Head: Normocephalic and atraumatic.       Right Ear: External ear normal.   Left Ear: External ear normal.   Nose: Nose normal.   Mouth/Throat: Oropharynx is clear and moist.   Eyes: Conjunctivae and lids are normal.   Neck: Normal range of motion. Neck supple.   Cardiovascular: Intact distal pulses.    Pulmonary/Chest: Effort normal.   Neurological: She is alert and oriented to person, place, and time.   Skin: Skin is warm and dry.           Psychiatric: Mood and affect normal.       DATA: none applicable to review    Assessment and Plan:     1. Actinic keratoses  CRYOTHERAPY:  Risks (including, but not limited to: hypo or hyperpigmentation, redness, blister, blood blister, recurrence, need for further treatment, infection, scar) and benefits of cryotherapy discussed. Patient verbally agreed to proceed with treatment. 2 cryotherapy freeze thaw cycles of 10 seconds were applied to 4 lesions on the vermilion lips with cryac. Patient tolerated procedure well. Aftercare instructions given.    2. Fibrolipoma of skin, +skin pain/pruritus  - patient to make appointment for procedure per her schedule      Followup: Return for procedure.    Jacklyn Dickson M.D.

## 2019-08-06 DIAGNOSIS — I10 ESSENTIAL HYPERTENSION: ICD-10-CM

## 2019-08-06 RX ORDER — POTASSIUM CHLORIDE 750 MG/1
10 TABLET, EXTENDED RELEASE ORAL 2 TIMES DAILY
Qty: 120 TAB | Refills: 3 | Status: SHIPPED | OUTPATIENT
Start: 2019-08-06 | End: 2020-05-21 | Stop reason: SDUPTHER

## 2019-08-06 NOTE — TELEPHONE ENCOUNTER
Was the patient seen in the last year in this department? Yes    Does patient have an active prescription for medications requested? No     Received Request Via: Pharmacy   Pt takes 2 a day

## 2019-08-26 ENCOUNTER — OFFICE VISIT (OUTPATIENT)
Dept: DERMATOLOGY | Facility: IMAGING CENTER | Age: 69
End: 2019-08-26
Payer: MEDICARE

## 2019-08-26 VITALS
HEIGHT: 62 IN | DIASTOLIC BLOOD PRESSURE: 78 MMHG | TEMPERATURE: 98.3 F | SYSTOLIC BLOOD PRESSURE: 112 MMHG | WEIGHT: 183 LBS | BODY MASS INDEX: 33.68 KG/M2

## 2019-08-26 DIAGNOSIS — D48.5 NEOPLASM OF UNCERTAIN BEHAVIOR OF SKIN: ICD-10-CM

## 2019-08-26 DIAGNOSIS — R20.8 SKIN PAIN: ICD-10-CM

## 2019-08-26 DIAGNOSIS — D17.9 FIBROLIPOMA: ICD-10-CM

## 2019-08-26 PROCEDURE — 11102 TANGNTL BX SKIN SINGLE LES: CPT | Mod: 59 | Performed by: DERMATOLOGY

## 2019-08-26 PROCEDURE — 12031 INTMD RPR S/A/T/EXT 2.5 CM/<: CPT | Mod: 59 | Performed by: DERMATOLOGY

## 2019-08-26 PROCEDURE — 11402 EXC TR-EXT B9+MARG 1.1-2 CM: CPT | Performed by: DERMATOLOGY

## 2019-08-26 NOTE — PROGRESS NOTES
"EXCISION PROCEDURE NOTE:    Removal of skin lesion 2/2 skin painisks, benefits and alternatives of procedure, including, but not limited to scar/poor cosmetic outcome, bleeding, pain, infection, nerve damage, recurrence of lesion, failed surgery, and need for further surgery, were discussed and written informed consent obtained. Verbal time out completed.     Allergies reviewed: Yes  Pacemaker/defibrillator: No  Artificial joints: No  Antibiotics given: No  Blood thinners/anticoagulants: No    Pre-op diagnosis: fibprolipoma  Post-op diagnosis: Same  Site: right gluteal cleft  Pre-op size:2cm    /78   Temp 36.8 °C (98.3 °F)   Ht 1.575 m (5' 2\")   Wt 83 kg (183 lb)     Procedure: Area of surgery was prepped with alcohol, marked with a sterile marking pen. Anesthesia with 1% lidocaine with epinephrine administered with 30 gauge needle. The area was again cleaned with povidine-iodine swab. With sterile technique, a 15 blade scalpel was used to make an elliptical incision around the lesion to the level of the subcutaneous fat. Dissection was completed with iris/tissue scissors, and the mass was removed. Hemostasis was achieved with pressure.  Specimen was placed into biopsy container and sent to pathology by staff.    Intermediate closure note:  4.0 monocryl buried vertical mattress sutures were placed x 2 to close dead space. 4.0 superficial interrupted sutures x 3 were placed to approximate wound edge.  Vaseline applied to wound with bandage. Patient tolerated procedure well and there were no complications, blood loss was minimal.     Final wound size: 11mm    Spot on the chin, darkening over the years  No itching/bleeding/pain  Irregular medium brown patch on the chin    Neoplasm uncertain  Procedure Note   Procedure: Biopsy by shave technique  Location: chin  Size: as noted in exam  Preoperative diagnosis:macular SK, r/o atypia  Risks, benefits and alternatives of procedure discussed and written informed " consent obtained. Time out completed. Area of biopsy prepped with alcohol. Anesthesia with 1% lidocaine with epinephrine administered with 30 gauge needle. Shave biopsy of the site performed. Hemostasis achieved with pressure and aluminum chloride. Vaseline applied to wound with bandage. Patient tolerated procedure well and there were no complications. The specimen was sent to the pathology lab by the staff. Wound care was discussed.    Bandages placed with vaseline, telfa, gauze and tape. Wound care was discussed with the patient, and written instructions were provided. Patient to return to clinic in 10-14 days for suture removal. Patient to call us if any problems or concerns with the procedure site arise prior to scheduled appointment.    Jacklyn Dickson M.D.

## 2019-08-28 ENCOUNTER — TELEPHONE (OUTPATIENT)
Dept: DERMATOLOGY | Facility: IMAGING CENTER | Age: 69
End: 2019-08-28

## 2019-08-28 RX ORDER — HYDROCHLOROTHIAZIDE 12.5 MG/1
12.5 CAPSULE, GELATIN COATED ORAL
Qty: 90 CAP | Refills: 2 | Status: SHIPPED | OUTPATIENT
Start: 2019-08-28 | End: 2020-06-18 | Stop reason: SDUPTHER

## 2019-08-29 ENCOUNTER — TELEPHONE (OUTPATIENT)
Dept: DERMATOLOGY | Facility: IMAGING CENTER | Age: 69
End: 2019-08-29

## 2019-08-29 NOTE — TELEPHONE ENCOUNTER
Attempted to call patient regarding pathology results, left a VM. Will need f/u in 4-6 weeks to treat area on the chin.

## 2019-08-29 NOTE — TELEPHONE ENCOUNTER
Called LM for patient to inform of benign results through [facility] pathology . Follow up cryo in 4- 6 weeks

## 2019-08-30 DIAGNOSIS — E55.9 VITAMIN D DEFICIENCY DISEASE: ICD-10-CM

## 2019-09-03 RX ORDER — ERGOCALCIFEROL 1.25 MG/1
50000 CAPSULE ORAL
Qty: 12 CAP | Refills: 2 | Status: SHIPPED | OUTPATIENT
Start: 2019-09-03 | End: 2020-07-07 | Stop reason: SDUPTHER

## 2019-09-05 ENCOUNTER — NON-PROVIDER VISIT (OUTPATIENT)
Dept: DERMATOLOGY | Facility: IMAGING CENTER | Age: 69
End: 2019-09-05
Payer: MEDICARE

## 2019-09-05 NOTE — PROGRESS NOTES
Jina Barnard is a 68 y.o. female here for a Non-Provider Visit for Suture Removal.    Sutures were placed by Dr. Dickson on date: 8/26/19  Skin is healed: Yes  Provider notified if skin is not healed, or if there is redness, heat, pain, or drainage from incision: N\A  Sutures removed.   Mastisol and steristips are placed: No    Advised to use emollient (vaseline, aquaphor, etc.) as needed, avoid peroxide and antibiotic ointment to reduce irritation.     Path report has been reviewed by provider.  Path report has reviewed with patient.

## 2019-10-10 ENCOUNTER — TELEPHONE (OUTPATIENT)
Dept: DERMATOLOGY | Facility: IMAGING CENTER | Age: 69
End: 2019-10-10

## 2019-12-24 ENCOUNTER — OFFICE VISIT (OUTPATIENT)
Dept: MEDICAL GROUP | Facility: MEDICAL CENTER | Age: 69
End: 2019-12-24
Payer: MEDICARE

## 2019-12-24 VITALS
OXYGEN SATURATION: 96 % | HEIGHT: 62 IN | SYSTOLIC BLOOD PRESSURE: 122 MMHG | BODY MASS INDEX: 34.96 KG/M2 | WEIGHT: 190 LBS | DIASTOLIC BLOOD PRESSURE: 80 MMHG | HEART RATE: 78 BPM | TEMPERATURE: 98.1 F

## 2019-12-24 DIAGNOSIS — D25.1 INTRAMURAL LEIOMYOMA OF UTERUS: ICD-10-CM

## 2019-12-24 DIAGNOSIS — R06.02 SOB (SHORTNESS OF BREATH): ICD-10-CM

## 2019-12-24 DIAGNOSIS — E55.9 VITAMIN D DEFICIENCY DISEASE: ICD-10-CM

## 2019-12-24 DIAGNOSIS — F33.1 MODERATE EPISODE OF RECURRENT MAJOR DEPRESSIVE DISORDER (HCC): ICD-10-CM

## 2019-12-24 DIAGNOSIS — I10 ESSENTIAL HYPERTENSION: ICD-10-CM

## 2019-12-24 DIAGNOSIS — E78.5 DYSLIPIDEMIA: ICD-10-CM

## 2019-12-24 DIAGNOSIS — R73.01 IMPAIRED FASTING GLUCOSE: ICD-10-CM

## 2019-12-24 DIAGNOSIS — Z11.59 NEED FOR HEPATITIS C SCREENING TEST: ICD-10-CM

## 2019-12-24 LAB
HBA1C MFR BLD: 5.2 % (ref 0–5.6)
INT CON NEG: NEGATIVE
INT CON POS: POSITIVE

## 2019-12-24 PROCEDURE — 83036 HEMOGLOBIN GLYCOSYLATED A1C: CPT | Performed by: NURSE PRACTITIONER

## 2019-12-24 PROCEDURE — 99214 OFFICE O/P EST MOD 30 MIN: CPT | Performed by: NURSE PRACTITIONER

## 2019-12-24 ASSESSMENT — PATIENT HEALTH QUESTIONNAIRE - PHQ9
7. TROUBLE CONCENTRATING ON THINGS, SUCH AS READING THE NEWSPAPER OR WATCHING TELEVISION: NOT AT ALL
6. FEELING BAD ABOUT YOURSELF - OR THAT YOU ARE A FAILURE OR HAVE LET YOURSELF OR YOUR FAMILY DOWN: NOT AL ALL
1. LITTLE INTEREST OR PLEASURE IN DOING THINGS: NOT AT ALL
9. THOUGHTS THAT YOU WOULD BE BETTER OFF DEAD, OR OF HURTING YOURSELF: NOT AT ALL
SUM OF ALL RESPONSES TO PHQ9 QUESTIONS 1 AND 2: 0
2. FEELING DOWN, DEPRESSED, IRRITABLE, OR HOPELESS: NOT AT ALL
SUM OF ALL RESPONSES TO PHQ QUESTIONS 1-9: 0
5. POOR APPETITE OR OVEREATING: NOT AT ALL
3. TROUBLE FALLING OR STAYING ASLEEP OR SLEEPING TOO MUCH: NOT AT ALL
4. FEELING TIRED OR HAVING LITTLE ENERGY: NOT AT ALL
8. MOVING OR SPEAKING SO SLOWLY THAT OTHER PEOPLE COULD HAVE NOTICED. OR THE OPPOSITE, BEING SO FIGETY OR RESTLESS THAT YOU HAVE BEEN MOVING AROUND A LOT MORE THAN USUAL: NOT AT ALL

## 2019-12-24 ASSESSMENT — ENCOUNTER SYMPTOMS
SHORTNESS OF BREATH: 1
DEPRESSION: 1

## 2019-12-24 NOTE — PROGRESS NOTES
"Subjective:      Jina Barnard is a 69 y.o. female who presents with Requesting Labs (wants to know her potassium) and Shortness of Breath (since April 8, 2019)    CC: Patient here today accompanied by her son who is also a patient.  She is here for follow-up on impaired fasting glucose, depression, dyslipidemia and other issues.  Patient's last office visit was 8 months ago.        HPI       1. Impaired fasting glucose  Patient had previous hemoglobin A1c at 6.3 in April and she states she has made no change in her diet but today comes back surprisingly well at 5.2.    2. Moderate episode of recurrent major depressive disorder (HCC)  Patient has history of depression based on screenings but she has been resistant to treat this.  She was recently seen by the gerontology group and was advised to go on antidepressants but she declined then and declines now.  She feels she is doing well living with her son.    3. Essential hypertension  Patient currently on low-dose HCTZ with potassium and her blood pressure is good at 122/80.    4. Vitamin D deficiency disease  Patient continues on weekly vitamin D and calls her \"happy pill\" and does not want to switch to over-the-counter vitamin D    5. Intramural leiomyoma of uterus  Patient had previous CT of the abdomen showing a fibroid so was referred to gynecology and ultrasound showed enlarged fibroid uterus for which she was advised to have an endometrial biopsy but patient never followed back.    6. Dyslipidemia  Patient shows elevated cholesterol but declines any sort of medication treatment    7. SOB (shortness of breath)  Patient states she has occasional shortness of breath and much phlegm in her throat.  She feels it is helped by potassium?  She would like to have a pulmonary function test to check for underlying lung disease.    8. Need for hepatitis C screening test  Patient due for one-time screening  Past Medical History:   Diagnosis Date   • Hypertension    • " Thrombocytosis (HCC)      Social History     Socioeconomic History   • Marital status: Single     Spouse name: Not on file   • Number of children: Not on file   • Years of education: Not on file   • Highest education level: Not on file   Occupational History   • Not on file   Social Needs   • Financial resource strain: Not on file   • Food insecurity:     Worry: Not on file     Inability: Not on file   • Transportation needs:     Medical: Not on file     Non-medical: Not on file   Tobacco Use   • Smoking status: Never Smoker   • Smokeless tobacco: Never Used   Substance and Sexual Activity   • Alcohol use: No   • Drug use: Yes     Types: Marijuana     Comment: daily   • Sexual activity: Never     Partners: Male     Birth control/protection: Post-Menopausal   Lifestyle   • Physical activity:     Days per week: Not on file     Minutes per session: Not on file   • Stress: Not on file   Relationships   • Social connections:     Talks on phone: Not on file     Gets together: Not on file     Attends [employer] service: Not on file     Active member of club or organization: Not on file     Attends meetings of clubs or organizations: Not on file     Relationship status: Not on file   • Intimate partner violence:     Fear of current or ex partner: Not on file     Emotionally abused: Not on file     Physically abused: Not on file     Forced sexual activity: Not on file   Other Topics Concern   • Not on file   Social History Narrative   • Not on file     Current Outpatient Medications   Medication Sig Dispense Refill   • vitamin D, Ergocalciferol, (DRISDOL) 50601 units Cap capsule Take 1 Cap by mouth every 7 days. 12 Cap 2   • hydrochlorothiazide (MICROZIDE) 12.5 MG capsule Take 1 Cap by mouth every day. 90 Cap 2   • potassium chloride SA (K-DUR) 10 MEQ Tab CR Take 1 Tab by mouth 2 times a day. 120 Tab 3   • BLACK COHOSH PO Take  by mouth.     • ibuprofen (MOTRIN) 200 MG Tab Take 200 mg by mouth every 6 hours as needed.     •  "hydroxyurea (HYDREA) 500 MG Cap Take  by mouth every day.       No current facility-administered medications for this visit.      Family History   Problem Relation Age of Onset   • Alcohol/Drug Father         alcohol   • Other Father         malnutrition   • Psychiatric Illness Father    • Other Mother         blood clot from surgery   • Hypertension Mother    • Diabetes Mother         ?   • Psychiatric Illness Sister    • Other Sister         malnutrition   • No Known Problems Maternal Grandmother    • No Known Problems Maternal Grandfather    • No Known Problems Paternal Grandmother    • No Known Problems Paternal Grandfather          Review of Systems   Respiratory: Positive for shortness of breath.    Psychiatric/Behavioral: Positive for depression.   All other systems reviewed and are negative.         Objective:     /80 (BP Location: Right arm, Patient Position: Sitting, BP Cuff Size: Adult)   Pulse 78   Temp 36.7 °C (98.1 °F) (Temporal)   Ht 1.575 m (5' 2\")   Wt 86.2 kg (190 lb)   SpO2 96%   BMI 34.75 kg/m²      Physical Exam  Vitals signs and nursing note reviewed.   Constitutional:       General: She is not in acute distress.     Appearance: She is well-developed. She is not diaphoretic.   HENT:      Head: Normocephalic and atraumatic.      Right Ear: External ear normal.      Left Ear: External ear normal.      Nose: Nose normal.   Eyes:      General:         Right eye: No discharge.         Left eye: No discharge.   Neck:      Musculoskeletal: Normal range of motion and neck supple.      Thyroid: No thyromegaly.   Cardiovascular:      Rate and Rhythm: Normal rate and regular rhythm.      Heart sounds: Normal heart sounds. No murmur. No friction rub. No gallop.    Pulmonary:      Effort: Pulmonary effort is normal.      Breath sounds: Normal breath sounds. No wheezing or rales.      Comments: No wheezing or rhonchi noted  Musculoskeletal:         General: No tenderness.   Skin:     General: " Skin is warm and dry.      Findings: No rash.   Neurological:      Mental Status: She is alert and oriented to person, place, and time.      Deep Tendon Reflexes: Reflexes normal.   Psychiatric:         Behavior: Behavior normal.         Thought Content: Thought content normal.         Judgment: Judgment normal.      Comments: In the office patient is pleasant and talkative and does not appear depressed                 Assessment/Plan:       1. Impaired fasting glucose  Patient's hemoglobin A1c surprisingly has gone down to normal range at 5.2 and I told her to continue to try to watch her carbohydrates and lose weight.  - HEMOGLOBIN A1C; Future  - POCT  A1C    2. Moderate episode of recurrent major depressive disorder (HCC)  Patient had previous screening showing depression but she does not feel she is depressed and does not want to start on antidepressants or go to counseling.    3. Essential hypertension  Blood pressure appears well-controlled on current medication    4. Vitamin D deficiency disease  Patient does not want to switch to over-the-counter vitamin D.  - VITAMIN D,25 HYDROXY; Future    5. Intramural leiomyoma of uterus  I told patient is very important she get back to her gynecologist who wanted to do a biopsy to rule out neoplasm    6. Dyslipidemia  Cholesterol levels have been elevated but patient refuses statins  - Comp Metabolic Panel; Future  - Lipid Profile; Future    7. SOB (shortness of breath)  I will do a PFT to check lung function and refer her if necessary pulmonology which she declines today.  - PULMONARY FUNCTION TESTS -Test requested: Complete Pulmonary Function Test; Future    8. Need for hepatitis C screening test    - HEP C VIRUS ANTIBODY; Future

## 2020-01-08 ENCOUNTER — APPOINTMENT (OUTPATIENT)
Dept: PULMONOLOGY | Facility: MEDICAL CENTER | Age: 70
End: 2020-01-08
Attending: NURSE PRACTITIONER
Payer: MEDICARE

## 2020-01-16 ENCOUNTER — HOSPITAL ENCOUNTER (OUTPATIENT)
Dept: PULMONOLOGY | Facility: MEDICAL CENTER | Age: 70
End: 2020-01-16
Attending: NURSE PRACTITIONER
Payer: MEDICARE

## 2020-01-16 DIAGNOSIS — R06.02 SOB (SHORTNESS OF BREATH): ICD-10-CM

## 2020-01-16 PROCEDURE — 94729 DIFFUSING CAPACITY: CPT

## 2020-01-16 PROCEDURE — 94726 PLETHYSMOGRAPHY LUNG VOLUMES: CPT

## 2020-01-16 PROCEDURE — 94060 EVALUATION OF WHEEZING: CPT

## 2020-01-16 ASSESSMENT — PULMONARY FUNCTION TESTS
FEV1_PREDICTED: 2.46
FEV1/FVC: 73
FEV1/FVC_PERCENT_CHANGE: 2
FEV1_PERCENT_CHANGE: 22
FEV1/FVC: 72.88
FEV1_PERCENT_PREDICTED: 69
FEV1/FVC_PERCENT_LLN: 64.68
FEV1/FVC_PERCENT_PREDICTED: 78
FEV1: 1.71
FEV1_LLN: 2.05
FEV1/FVC: 74.38
FEV1/FVC_PERCENT_PREDICTED: 94
FVC_LLN: 2.65
FEV1/FVC_PERCENT_CHANGE: 116
FEV1/FVC_PREDICTED: 77.46
FEV1/FVC_PERCENT_LLN: 64.68
FVC_PREDICTED: 3.17
FEV1/FVC_PERCENT_PREDICTED: 93
FEV1_PERCENT_PREDICTED: 56
FEV1_LLN: 2.05
FVC_PERCENT_PREDICTED: 72
FVC: 2.3
FVC: 1.92
FEV1/FVC: 74.35
FVC_PERCENT_PREDICTED: 60
FVC_LLN: 2.65
FEV1_PERCENT_CHANGE: 19
FEV1/FVC_PERCENT_PREDICTED: 96
FEV1/FVC_PERCENT_PREDICTED: 96
FEV1: 1.4

## 2020-01-17 ENCOUNTER — TELEPHONE (OUTPATIENT)
Dept: MEDICAL GROUP | Facility: MEDICAL CENTER | Age: 70
End: 2020-01-17

## 2020-01-17 DIAGNOSIS — R06.02 SOB (SHORTNESS OF BREATH): ICD-10-CM

## 2020-01-17 RX ORDER — ALBUTEROL SULFATE 90 UG/1
2 AEROSOL, METERED RESPIRATORY (INHALATION) EVERY 6 HOURS PRN
Qty: 8.5 G | Refills: 11 | Status: SHIPPED | OUTPATIENT
Start: 2020-01-17 | End: 2020-08-24 | Stop reason: SDUPTHER

## 2020-01-17 NOTE — TELEPHONE ENCOUNTER
1. Caller Name: Jina Barnard                                         Call Back Number: [phone] (home)       Patient called wondering if we received the report form Pulmonology? Also she said she thinks she needs an Inhaler to be sent to the pharmacy         Patient approves a detailed voicemail message: N\A

## 2020-01-17 NOTE — TELEPHONE ENCOUNTER
I have not received anything from pulmonology as of yet.  An albuterol inhaler has been sent to her pharmacy.

## 2020-01-19 PROCEDURE — 94060 EVALUATION OF WHEEZING: CPT | Mod: 26 | Performed by: INTERNAL MEDICINE

## 2020-01-19 PROCEDURE — 94729 DIFFUSING CAPACITY: CPT | Mod: 26 | Performed by: INTERNAL MEDICINE

## 2020-01-19 PROCEDURE — 94726 PLETHYSMOGRAPHY LUNG VOLUMES: CPT | Mod: 26 | Performed by: INTERNAL MEDICINE

## 2020-01-19 NOTE — PROCEDURES
DATE OF SERVICE:  01/16/2020    PULMONARY FUNCTION TEST INTERPRETATION    REQUESTING PROVIDER:  KENYETTA Mason    REASON FOR REQUEST:  Shortness of breath.    FINDINGS:  1. Acceptable and reproducible.  2. FEV1 1.4 liters (56%), FVC 1.92 liters (60%), ratio 72%.  3. Flow volume loops consistent with restriction.  4. TLC 5.87 liters (108%).  5. DLCO corrected for hemoglobin 18.09 mL/min/mmHg (90%).    IMPRESSION:  Pseudorestriction with reduced forced vital capacity of 60% with   response to bronchodilator.  Evidence of air trapping, but total lung capacity   is normal.  Elevated RV/TLC supports underlying obstructive lung disease and   normal gas transfer.  Clinically correlate.             ____________________________________     MD MERVAT Hamm / PARI    DD:  01/19/2020 07:16:27  DT:  01/19/2020 07:45:23    D#:  [id number]  Job#:  [id number]    cc: ASHLEY KUMARI

## 2020-01-21 NOTE — TELEPHONE ENCOUNTER
I attempted to reach patient at 430 this afternoon and got her voicemail.  I left a message regarding the results.

## 2020-05-21 ENCOUNTER — HOSPITAL ENCOUNTER (OUTPATIENT)
Dept: LAB | Facility: MEDICAL CENTER | Age: 70
End: 2020-05-21
Attending: NURSE PRACTITIONER
Payer: MEDICARE

## 2020-05-21 DIAGNOSIS — E78.5 DYSLIPIDEMIA: ICD-10-CM

## 2020-05-21 DIAGNOSIS — Z11.59 NEED FOR HEPATITIS C SCREENING TEST: ICD-10-CM

## 2020-05-21 DIAGNOSIS — E55.9 VITAMIN D DEFICIENCY DISEASE: ICD-10-CM

## 2020-05-21 DIAGNOSIS — I10 ESSENTIAL HYPERTENSION: ICD-10-CM

## 2020-05-21 DIAGNOSIS — R73.01 IMPAIRED FASTING GLUCOSE: ICD-10-CM

## 2020-05-21 LAB
25(OH)D3 SERPL-MCNC: 54 NG/ML (ref 30–100)
ALBUMIN SERPL BCP-MCNC: 4.2 G/DL (ref 3.2–4.9)
ALBUMIN/GLOB SERPL: 1.8 G/DL
ALP SERPL-CCNC: 52 U/L (ref 30–99)
ALT SERPL-CCNC: 11 U/L (ref 2–50)
ANION GAP SERPL CALC-SCNC: 13 MMOL/L (ref 7–16)
AST SERPL-CCNC: 16 U/L (ref 12–45)
BILIRUB SERPL-MCNC: 0.4 MG/DL (ref 0.1–1.5)
BUN SERPL-MCNC: 20 MG/DL (ref 8–22)
CALCIUM SERPL-MCNC: 9.7 MG/DL (ref 8.5–10.5)
CHLORIDE SERPL-SCNC: 99 MMOL/L (ref 96–112)
CHOLEST SERPL-MCNC: 204 MG/DL (ref 100–199)
CO2 SERPL-SCNC: 22 MMOL/L (ref 20–33)
CREAT SERPL-MCNC: 0.78 MG/DL (ref 0.5–1.4)
EST. AVERAGE GLUCOSE BLD GHB EST-MCNC: 120 MG/DL
GLOBULIN SER CALC-MCNC: 2.4 G/DL (ref 1.9–3.5)
GLUCOSE SERPL-MCNC: 121 MG/DL (ref 65–99)
HBA1C MFR BLD: 5.8 % (ref 0–5.6)
HCV AB SER QL: NORMAL
HDLC SERPL-MCNC: 53 MG/DL
LDLC SERPL CALC-MCNC: 126 MG/DL
POTASSIUM SERPL-SCNC: 3.8 MMOL/L (ref 3.6–5.5)
PROT SERPL-MCNC: 6.6 G/DL (ref 6–8.2)
SODIUM SERPL-SCNC: 134 MMOL/L (ref 135–145)
TRIGL SERPL-MCNC: 127 MG/DL (ref 0–149)

## 2020-05-21 PROCEDURE — 82306 VITAMIN D 25 HYDROXY: CPT

## 2020-05-21 PROCEDURE — 83036 HEMOGLOBIN GLYCOSYLATED A1C: CPT | Mod: GA

## 2020-05-21 PROCEDURE — 36415 COLL VENOUS BLD VENIPUNCTURE: CPT

## 2020-05-21 PROCEDURE — 80061 LIPID PANEL: CPT

## 2020-05-21 PROCEDURE — 80053 COMPREHEN METABOLIC PANEL: CPT

## 2020-05-21 PROCEDURE — 86803 HEPATITIS C AB TEST: CPT

## 2020-05-21 RX ORDER — POTASSIUM CHLORIDE 750 MG/1
10 TABLET, EXTENDED RELEASE ORAL 2 TIMES DAILY
Qty: 120 TAB | Refills: 3 | Status: SHIPPED | OUTPATIENT
Start: 2020-05-21 | End: 2020-06-10 | Stop reason: SDUPTHER

## 2020-05-22 ENCOUNTER — OFFICE VISIT (OUTPATIENT)
Dept: MEDICAL GROUP | Facility: MEDICAL CENTER | Age: 70
End: 2020-05-22
Payer: MEDICARE

## 2020-05-22 ENCOUNTER — TELEPHONE (OUTPATIENT)
Dept: MEDICAL GROUP | Facility: MEDICAL CENTER | Age: 70
End: 2020-05-22

## 2020-05-22 VITALS
HEART RATE: 89 BPM | BODY MASS INDEX: 36.07 KG/M2 | WEIGHT: 196 LBS | RESPIRATION RATE: 16 BRPM | TEMPERATURE: 98.2 F | DIASTOLIC BLOOD PRESSURE: 74 MMHG | SYSTOLIC BLOOD PRESSURE: 132 MMHG | OXYGEN SATURATION: 96 % | HEIGHT: 62 IN

## 2020-05-22 DIAGNOSIS — Z12.31 ENCOUNTER FOR SCREENING MAMMOGRAM FOR BREAST CANCER: ICD-10-CM

## 2020-05-22 DIAGNOSIS — D45 POLYCYTHEMIA VERA (HCC): ICD-10-CM

## 2020-05-22 DIAGNOSIS — E55.9 VITAMIN D DEFICIENCY: ICD-10-CM

## 2020-05-22 DIAGNOSIS — D25.1 INTRAMURAL LEIOMYOMA OF UTERUS: ICD-10-CM

## 2020-05-22 DIAGNOSIS — F33.1 MODERATE EPISODE OF RECURRENT MAJOR DEPRESSIVE DISORDER (HCC): ICD-10-CM

## 2020-05-22 DIAGNOSIS — E66.9 OBESITY (BMI 35.0-39.9 WITHOUT COMORBIDITY): ICD-10-CM

## 2020-05-22 DIAGNOSIS — E87.6 HYPOKALEMIA: ICD-10-CM

## 2020-05-22 PROCEDURE — 99214 OFFICE O/P EST MOD 30 MIN: CPT | Performed by: NURSE PRACTITIONER

## 2020-05-22 ASSESSMENT — ENCOUNTER SYMPTOMS: DEPRESSION: 1

## 2020-05-22 NOTE — PROGRESS NOTES
Subjective:      Jina Barnard is a 69 y.o. female who presents with Follow-Up (6 month)        CC: Patient is here today for 5-month follow-up on hypokalemia, polycythemia and depression.    HPI       1. Polycythemia vera(238.4)  Patient continues to follow with oncology and review of last note shows she is stable and was advised to continue with her Hydrea on a regular basis.    2. Intramural leiomyoma of uterus  Patient was supposed to have biopsy and at her last 2 visits I have reminded her about the need to follow through and she has not done so but might be willing to follow through now.    3. Moderate episode of recurrent major depressive disorder (HCC)  History of depression but she has declined counseling or SSRI medication.    4. Hypokalemia  Most recent potassium was at 3.8 on her 10 mEq of potassium twice a day.  She is on HCTZ.  Sodium was just mildly low.  Her oncologist did request a copy of labs which we will send to him.    5. Vitamin D deficiency disease  Vitamin D levels are good on her 50,000 units of vitamin D weekly and patient has wanted to continue with this dosing because she feels it helps with her depression.    6. Encounter for screening mammogram for breast cancer  Patient overdue for mammogram    7. Obesity (BMI 35.0-39.9 without comorbidity)  Patient continues to be obese.  Past Medical History:   Diagnosis Date   • Hypertension    • Thrombocytosis (HCC)      Social History     Socioeconomic History   • Marital status: Single     Spouse name: Not on file   • Number of children: Not on file   • Years of education: Not on file   • Highest education level: Not on file   Occupational History   • Not on file   Social Needs   • Financial resource strain: Not on file   • Food insecurity     Worry: Not on file     Inability: Not on file   • Transportation needs     Medical: Not on file     Non-medical: Not on file   Tobacco Use   • Smoking status: Never Smoker   • Smokeless tobacco: Never  Used   Substance and Sexual Activity   • Alcohol use: No   • Drug use: Yes     Types: Marijuana     Comment: daily   • Sexual activity: Never     Partners: Male     Birth control/protection: Post-Menopausal   Lifestyle   • Physical activity     Days per week: Not on file     Minutes per session: Not on file   • Stress: Not on file   Relationships   • Social connections     Talks on phone: Not on file     Gets together: Not on file     Attends Taoist service: Not on file     Active member of club or organization: Not on file     Attends meetings of clubs or organizations: Not on file     Relationship status: Not on file   • Intimate partner violence     Fear of current or ex partner: Not on file     Emotionally abused: Not on file     Physically abused: Not on file     Forced sexual activity: Not on file   Other Topics Concern   • Not on file   Social History Narrative   • Not on file     Current Outpatient Medications   Medication Sig Dispense Refill   • potassium chloride SA (K-DUR) 10 MEQ Tab CR Take 1 Tab by mouth 2 times a day. 120 Tab 3   • albuterol 108 (90 Base) MCG/ACT Aero Soln inhalation aerosol Inhale 2 Puffs by mouth every 6 hours as needed. 8.5 g 11   • vitamin D, Ergocalciferol, (DRISDOL) 21851 units Cap capsule Take 1 Cap by mouth every 7 days. 12 Cap 2   • hydrochlorothiazide (MICROZIDE) 12.5 MG capsule Take 1 Cap by mouth every day. 90 Cap 2   • BLACK COHOSH PO Take  by mouth.     • ibuprofen (MOTRIN) 200 MG Tab Take 200 mg by mouth every 6 hours as needed.     • hydroxyurea (HYDREA) 500 MG Cap Take  by mouth every day.       No current facility-administered medications for this visit.      Family History   Problem Relation Age of Onset   • Alcohol/Drug Father         alcohol   • Other Father         malnutrition   • Psychiatric Illness Father    • Other Mother         blood clot from surgery   • Hypertension Mother    • Diabetes Mother         ?   • Psychiatric Illness Sister    • Other Sister   "       malnutrition   • No Known Problems Maternal Grandmother    • No Known Problems Maternal Grandfather    • No Known Problems Paternal Grandmother    • No Known Problems Paternal Grandfather          Review of Systems   Psychiatric/Behavioral: Positive for depression.   All other systems reviewed and are negative.         Objective:     /74 (BP Location: Right arm, Patient Position: Sitting, BP Cuff Size: Adult)   Pulse 89   Temp 36.8 °C (98.2 °F) (Temporal)   Resp 16   Ht 1.575 m (5' 2\")   Wt 88.9 kg (196 lb)   SpO2 96%   BMI 35.85 kg/m²      Physical Exam  Vitals signs and nursing note reviewed.   Constitutional:       General: She is not in acute distress.     Appearance: She is well-developed. She is not diaphoretic.   HENT:      Head: Normocephalic and atraumatic.      Right Ear: External ear normal.      Left Ear: External ear normal.      Nose: Nose normal.   Eyes:      General:         Right eye: No discharge.         Left eye: No discharge.   Neck:      Musculoskeletal: Normal range of motion and neck supple.      Thyroid: No thyromegaly.   Cardiovascular:      Rate and Rhythm: Normal rate and regular rhythm.      Heart sounds: Normal heart sounds. No murmur. No friction rub. No gallop.    Pulmonary:      Effort: Pulmonary effort is normal.      Breath sounds: Normal breath sounds. No wheezing or rales.   Musculoskeletal:         General: No tenderness.   Skin:     General: Skin is warm and dry.      Findings: No rash.   Neurological:      Mental Status: She is alert and oriented to person, place, and time.      Deep Tendon Reflexes: Reflexes normal.   Psychiatric:         Behavior: Behavior normal.         Thought Content: Thought content normal.         Judgment: Judgment normal.                 Assessment/Plan:       1. Polycythemia vera(238.4)  I will send a copy of patient's lab work to her oncologist and apparently she is staying stable on her Hydrea.  We did discuss going on a baby " aspirin as did her oncologist but she insists on taking her ibuprofen regularly and we would be concerned for GI bleed.    2. Intramural leiomyoma of uterus  Patient again will be referred to gynecology and I stressed the importance of following through.  Her son states he will help to be sure she does so.  - REFERRAL TO GYNECOLOGY    3. Moderate episode of recurrent major depressive disorder (HCC)  Patient again declines use of antidepressants or counseling    4. Hypokalemia  Potassium levels within acceptable range on her potassium twice a day.  She declined stopping her low-dose diuretic.    5. Vitamin D deficiency disease  Vitamin D levels are within normal range and she wants to continue on her weekly vitamin D    6. Encounter for screening mammogram for breast cancer    - MA-SCREENING MAMMO BILAT W/TOMOSYNTHESIS W/CAD; Future    7. Obesity (BMI 35.0-39.9 without comorbidity)    - Patient identified as having weight management issue.  Appropriate orders and counseling given.

## 2020-06-08 ENCOUNTER — GYNECOLOGY VISIT (OUTPATIENT)
Dept: OBGYN | Facility: CLINIC | Age: 70
End: 2020-06-08
Payer: MEDICARE

## 2020-06-08 VITALS — DIASTOLIC BLOOD PRESSURE: 86 MMHG | BODY MASS INDEX: 35.85 KG/M2 | WEIGHT: 196 LBS | SYSTOLIC BLOOD PRESSURE: 138 MMHG

## 2020-06-08 DIAGNOSIS — N85.2 BULKY OR ENLARGED UTERUS: ICD-10-CM

## 2020-06-08 PROCEDURE — 99213 OFFICE O/P EST LOW 20 MIN: CPT | Performed by: OBSTETRICS & GYNECOLOGY

## 2020-06-08 NOTE — PROGRESS NOTES
69 y.o. No obstetric history on file. female previously seen for : Chief Complaint:  fibroids    Specialty Problems     None      . Patient now here in follow up. Patient with apparent known Uterine Fibroids , and was seen 2019 . And was to have follow up scan , she did, but didnt follow up with us .   Patient who denies pain , of PMB , and no sexually active presetns for F/U    No LMP recorded. Patient has had an ablation.      Subjective: Abdominal Pain:  Vaginal Bleedingnegative  Menstrual Cycle: normal: negative  Dysmenorrhea:negative:   Dyspareunia:negative  Urinary Symptoms: negative   Vaginal Discharge:{No          Current Outpatient Medications:   •  potassium chloride SA (K-DUR) 10 MEQ Tab CR, Take 1 Tab by mouth 2 times a day., Disp: 120 Tab, Rfl: 3  •  albuterol 108 (90 Base) MCG/ACT Aero Soln inhalation aerosol, Inhale 2 Puffs by mouth every 6 hours as needed., Disp: 8.5 g, Rfl: 11  •  vitamin D, Ergocalciferol, (DRISDOL) 19710 units Cap capsule, Take 1 Cap by mouth every 7 days., Disp: 12 Cap, Rfl: 2  •  hydrochlorothiazide (MICROZIDE) 12.5 MG capsule, Take 1 Cap by mouth every day., Disp: 90 Cap, Rfl: 2  •  ibuprofen (MOTRIN) 200 MG Tab, Take 200 mg by mouth every 6 hours as needed., Disp: , Rfl:   •  hydroxyurea (HYDREA) 500 MG Cap, Take  by mouth every day., Disp: , Rfl:   •  BLACK COHOSH PO, Take  by mouth., Disp: , Rfl:   ROS: no change in ROS since visit of : Visit date not found.  :No results found for this or any previous visit (from the past 336 hour(s)).    Vitals:    20 0928   BP: 138/86   Weight: 88.9 kg (196 lb)     Past Medical History:   Diagnosis Date   • Hypertension    • Thrombocytosis (HCC)      PGYN: Tubal Ligation and  x 2   Social History     Socioeconomic History   • Marital status: Single     Spouse name: Not on file   • Number of children: Not on file   • Years of education: Not on file   • Highest education level: Not on file   Occupational History   • Not on file    Social Needs   • Financial resource strain: Not on file   • Food insecurity     Worry: Not on file     Inability: Not on file   • Transportation needs     Medical: Not on file     Non-medical: Not on file   Tobacco Use   • Smoking status: Never Smoker   • Smokeless tobacco: Never Used   Substance and Sexual Activity   • Alcohol use: No   • Drug use: Yes     Types: Marijuana     Comment: daily   • Sexual activity: Never     Partners: Male     Birth control/protection: Post-Menopausal   Lifestyle   • Physical activity     Days per week: Not on file     Minutes per session: Not on file   • Stress: Not on file   Relationships   • Social connections     Talks on phone: Not on file     Gets together: Not on file     Attends Jain service: Not on file     Active member of club or organization: Not on file     Attends meetings of clubs or organizations: Not on file     Relationship status: Not on file   • Intimate partner violence     Fear of current or ex partner: Not on file     Emotionally abused: Not on file     Physically abused: Not on file     Forced sexual activity: Not on file   Other Topics Concern   • Not on file   Social History Narrative   • Not on file     Family History   Problem Relation Age of Onset   • Alcohol/Drug Father         alcohol   • Other Father         malnutrition   • Psychiatric Illness Father    • Other Mother         blood clot from surgery   • Hypertension Mother    • Diabetes Mother         ?   • Psychiatric Illness Sister    • Other Sister         malnutrition   • No Known Problems Maternal Grandmother    • No Known Problems Maternal Grandfather    • No Known Problems Paternal Grandmother    • No Known Problems Paternal Grandfather      Past Surgical History:   Procedure Laterality Date   • TUBAL COAGULATION LAPAROSCOPIC BILATERAL           Exam:   General : Awake, alert and oriented x 3, Cranial nerves II-XII grossly intact, Reflexes symmetrical  Abdominal : obese, soft,  non-distended no rebound or guarding  Pelvic :  external genitalia normal, Bartholin's glands, urethra, Lagro's glands negative, vaginal mucosa normal;  uterine fibroids palpable 9-10 weeks size , non tender  Pelvic Support system : normal      Ass: Uterine fibroids , currently asymptomatic,           menopasual , no bleeding    Spent 15 minutes minutes with the patient ; Face to Face, with >50% of this time spent in counseling and coordination of care, surrounding the above mentioned issues as well as:discuss MIS , vs observation , Patient wishes to observe for now   P.     Follow up : Return visit in 6 month(s)

## 2020-06-10 DIAGNOSIS — I10 ESSENTIAL HYPERTENSION: ICD-10-CM

## 2020-06-10 RX ORDER — POTASSIUM CHLORIDE 750 MG/1
10 TABLET, EXTENDED RELEASE ORAL 2 TIMES DAILY
Qty: 180 TAB | Refills: 3 | Status: SHIPPED | OUTPATIENT
Start: 2020-06-10 | End: 2021-05-24

## 2020-06-18 RX ORDER — HYDROCHLOROTHIAZIDE 12.5 MG/1
12.5 CAPSULE, GELATIN COATED ORAL
Qty: 90 CAP | Refills: 2 | Status: SHIPPED | OUTPATIENT
Start: 2020-06-18 | End: 2021-03-15

## 2020-07-07 DIAGNOSIS — E55.9 VITAMIN D DEFICIENCY DISEASE: ICD-10-CM

## 2020-07-07 RX ORDER — ERGOCALCIFEROL 1.25 MG/1
50000 CAPSULE ORAL
Qty: 12 CAP | Refills: 2 | Status: SHIPPED | OUTPATIENT
Start: 2020-07-07 | End: 2020-12-15 | Stop reason: SDUPTHER

## 2020-08-24 DIAGNOSIS — R06.02 SOB (SHORTNESS OF BREATH): ICD-10-CM

## 2020-08-24 RX ORDER — ALBUTEROL SULFATE 90 UG/1
2 AEROSOL, METERED RESPIRATORY (INHALATION) EVERY 6 HOURS PRN
Qty: 8.5 G | Refills: 11 | Status: SHIPPED | OUTPATIENT
Start: 2020-08-24 | End: 2020-10-23 | Stop reason: SDUPTHER

## 2020-10-12 ENCOUNTER — TELEPHONE (OUTPATIENT)
Dept: MEDICAL GROUP | Facility: MEDICAL CENTER | Age: 70
End: 2020-10-12

## 2020-10-12 DIAGNOSIS — R06.02 SOB (SHORTNESS OF BREATH): ICD-10-CM

## 2020-10-12 NOTE — TELEPHONE ENCOUNTER
Pt lvm stating she has been using her inhaler more than as needed and would like to know if you could refer her to see a pulmonologist

## 2020-10-23 DIAGNOSIS — R06.02 SOB (SHORTNESS OF BREATH): ICD-10-CM

## 2020-10-23 RX ORDER — ALBUTEROL SULFATE 90 UG/1
2 AEROSOL, METERED RESPIRATORY (INHALATION) EVERY 6 HOURS PRN
Qty: 8.5 G | Refills: 11 | Status: SHIPPED | OUTPATIENT
Start: 2020-10-23 | End: 2020-12-14 | Stop reason: SDUPTHER

## 2020-11-19 ENCOUNTER — OFFICE VISIT (OUTPATIENT)
Dept: SLEEP MEDICINE | Facility: MEDICAL CENTER | Age: 70
End: 2020-11-19
Payer: MEDICARE

## 2020-11-19 VITALS
DIASTOLIC BLOOD PRESSURE: 82 MMHG | SYSTOLIC BLOOD PRESSURE: 124 MMHG | WEIGHT: 212 LBS | HEIGHT: 62 IN | BODY MASS INDEX: 39.01 KG/M2 | OXYGEN SATURATION: 97 % | RESPIRATION RATE: 16 BRPM | HEART RATE: 74 BPM

## 2020-11-19 DIAGNOSIS — F12.90 MARIJUANA USE: ICD-10-CM

## 2020-11-19 DIAGNOSIS — J44.89 COPD WITH ASTHMA (HCC): ICD-10-CM

## 2020-11-19 DIAGNOSIS — D45 POLYCYTHEMIA VERA (HCC): ICD-10-CM

## 2020-11-19 PROCEDURE — 99203 OFFICE O/P NEW LOW 30 MIN: CPT | Performed by: INTERNAL MEDICINE

## 2020-11-19 ASSESSMENT — PAIN SCALES - GENERAL: PAINLEVEL: NO PAIN

## 2020-11-19 NOTE — PROGRESS NOTES
Jina Barnard is a 69 y.o. female here for reactive airways. Patient was referred by her primary care doctor.    History of Present Illness: As follows  This lady comes in with her son for evaluation of reactive airways.  She underwent lung function testing in January, shows fairly dramatic reduction of mid flows at 49%, residual volume is up to 183% COPD pattern is explained by her constant daily use of marijuana.  She tells me she started at age 18, smoked many times a day, more recently with the more potent preparations is down to 3 times a day but I suspect this accounts for her hyperinflation COPD and reactive airways.    She uses the rescue inhaler with good benefit, we did talk about long-acting inhalers but she politely declines.  She also politely declines the flu shot and pneumonia shot.    We will check an x-ray to make sure that it is clean and clear, assuming the best her current approach is well understood and we would see her on an as-needed basis.  Constitutional ROS: No unexpected change in weight, No unexplained fevers  Eyes: No change in vision or blurring or double vision  Mouth/Throat ROS: No sore throat, No recent change in voice or hoarseness  Pulmonary ROS: See present history for pertinent positives  Cardiovascular ROS: No chest pain to suggest acute coronary syndrome  Gastrointestinal ROS: No abdominal pain to suggest peptic disease  Musculoskeletal/Extremities ROS: no acute artritis or unusual swelling  Hematologic/Lymphatic ROS: No easy bleeding or unusual lymph node swelling  Neurologic ROS: No new or unusual weakness  Psychiatric ROS: No hallucinations  Allergic/Immunologic: No  urticaria or allergic rash      Current Outpatient Medications   Medication Sig Dispense Refill   • albuterol 108 (90 Base) MCG/ACT Aero Soln inhalation aerosol Inhale 2 Puffs by mouth every 6 hours as needed. 8.5 g 11   • vitamin D, Ergocalciferol, (DRISDOL) 1.25 MG (26715 UT) Cap capsule Take 1 Cap by  "mouth every 7 days. 12 Cap 2   • hydrochlorothiazide (MICROZIDE) 12.5 MG capsule Take 1 Cap by mouth every day. 90 Cap 2   • potassium chloride SA (K-DUR) 10 MEQ Tab CR Take 1 Tab by mouth 2 times a day. 180 Tab 3   • BLACK COHOSH PO Take  by mouth.     • hydroxyurea (HYDREA) 500 MG Cap Take  by mouth every day.     • ibuprofen (MOTRIN) 200 MG Tab Take 200 mg by mouth every 6 hours as needed.       No current facility-administered medications for this visit.        Social History     Tobacco Use   • Smoking status: Never Smoker   • Smokeless tobacco: Never Used   Substance Use Topics   • Alcohol use: No   • Drug use: Yes     Types: Marijuana     Comment: daily        Past Medical History:   Diagnosis Date   • Hypertension    • Thrombocytosis (HCC)        Past Surgical History:   Procedure Laterality Date   • TUBAL COAGULATION LAPAROSCOPIC BILATERAL         Allergies: Patient has no known allergies.    Family History   Problem Relation Age of Onset   • Alcohol/Drug Father         alcohol   • Other Father         malnutrition   • Psychiatric Illness Father    • Other Mother         blood clot from surgery   • Hypertension Mother    • Diabetes Mother         ?   • Psychiatric Illness Sister    • Other Sister         malnutrition   • No Known Problems Maternal Grandmother    • No Known Problems Maternal Grandfather    • No Known Problems Paternal Grandmother    • No Known Problems Paternal Grandfather        Physical Examination    Vitals:    11/19/20 1346   Height: 1.575 m (5' 2\")   Weight: 96.2 kg (212 lb)   Weight % change since last entry.: 0 %   BP: 124/82   Pulse: 74   BMI (Calculated): 38.78   Resp: 16       General Appearance: alert, no distress  Skin: Skin color, texture, turgor normal. No rashes or lesions.  Eyes: negative  Oropharynx: Lips, mucosa, and tongue normal. Teeth and gums normal. Oropharynx moist and without lesion  Lungs: positive findings: Quiet and clear without wheezes or rhonchi  Heart: " negative. RRR without murmur, gallop, or rubs.  No ectopy.  Abdomen: Abdomen soft, non-tender. . No masses,  No organomegaly  Extremities:  No deformities, edema, or skin discoloration  Joints: No acute arthritis  Peripheral Pulses:perfused  Neurologic: intact grossly  Giggles uncontrollably at times    Imaging: Ordered and pending    PFTS: Noted previously, bronchodilator response      Assessment and Plan  1. Marijuana use  Substantial daily for over 50 years, accounts for her underlying COPD, hyperinflation, reduced exercise tolerance, and is not going to be a resolvable problem given her extended ongoing use    2. Polycythemia vera(238.4)  Noted, followed by hematology    3. COPD with asthma (HCC)  Noted      Await current x-ray, if any surprises we will contact the patient, otherwise follow-up as needed  Followup Return if symptoms worsen or fail to improve.

## 2020-11-19 NOTE — PATIENT INSTRUCTIONS
This lady comes in with her son for evaluation of reactive airways.  She underwent lung function testing in January, shows fairly dramatic reduction of mid flows at 49%, residual volume is up to 183% COPD pattern is explained by her constant daily use of marijuana.  She tells me she started at age 18, smoked many times a day, more recently with the more potent preparations is down to 3 times a day but I suspect this accounts for her hyperinflation COPD and reactive airways.    She uses the rescue inhaler with good benefit, we did talk about long-acting inhalers but she politely declines.  She also politely declines the flu shot and pneumonia shot.    We will check an x-ray to make sure that it is clean and clear, assuming the best her current approach is well understood and we would see her on an as-needed basis.

## 2020-11-25 ENCOUNTER — TELEPHONE (OUTPATIENT)
Dept: MEDICAL GROUP | Facility: MEDICAL CENTER | Age: 70
End: 2020-11-25

## 2020-11-25 NOTE — TELEPHONE ENCOUNTER
"Phone Number Called: [phone]    Call outcome: Left detailed message for patient. Informed to call back with any additional questions.    Message: Patient called wanting to know if we do \"blood work\" to check for COVID or if you must have a nasal swab to see if you have had COVID. This RN informed the patient that we only do COVID swabs for currently active cases and it will not tell you if you have had COVID in the past. This RN informed patient that [facility] does not currently do blood testing for COVID.    "

## 2020-12-14 ENCOUNTER — HOSPITAL ENCOUNTER (OUTPATIENT)
Dept: RADIOLOGY | Facility: MEDICAL CENTER | Age: 70
End: 2020-12-14
Attending: INTERNAL MEDICINE
Payer: MEDICARE

## 2020-12-14 ENCOUNTER — HOSPITAL ENCOUNTER (OUTPATIENT)
Dept: LAB | Facility: MEDICAL CENTER | Age: 70
End: 2020-12-14
Attending: INTERNAL MEDICINE
Payer: MEDICARE

## 2020-12-14 ENCOUNTER — TELEPHONE (OUTPATIENT)
Dept: MEDICAL GROUP | Facility: MEDICAL CENTER | Age: 70
End: 2020-12-14

## 2020-12-14 DIAGNOSIS — I10 ESSENTIAL HYPERTENSION: ICD-10-CM

## 2020-12-14 DIAGNOSIS — F12.90 MARIJUANA USE: ICD-10-CM

## 2020-12-14 DIAGNOSIS — J44.89 COPD WITH ASTHMA (HCC): ICD-10-CM

## 2020-12-14 DIAGNOSIS — D45 POLYCYTHEMIA VERA (HCC): ICD-10-CM

## 2020-12-14 DIAGNOSIS — D75.839 THROMBOCYTOSIS: ICD-10-CM

## 2020-12-14 DIAGNOSIS — R06.02 SOB (SHORTNESS OF BREATH): ICD-10-CM

## 2020-12-14 LAB
ALBUMIN SERPL BCP-MCNC: 4.5 G/DL (ref 3.2–4.9)
ALBUMIN/GLOB SERPL: 2 G/DL
ALP SERPL-CCNC: 51 U/L (ref 30–99)
ALT SERPL-CCNC: 14 U/L (ref 2–50)
ANION GAP SERPL CALC-SCNC: 10 MMOL/L (ref 7–16)
AST SERPL-CCNC: 15 U/L (ref 12–45)
BASOPHILS # BLD AUTO: 0.6 % (ref 0–1.8)
BASOPHILS # BLD: 0.04 K/UL (ref 0–0.12)
BILIRUB SERPL-MCNC: 0.4 MG/DL (ref 0.1–1.5)
BUN SERPL-MCNC: 23 MG/DL (ref 8–22)
CALCIUM SERPL-MCNC: 9.7 MG/DL (ref 8.5–10.5)
CHLORIDE SERPL-SCNC: 100 MMOL/L (ref 96–112)
CO2 SERPL-SCNC: 30 MMOL/L (ref 20–33)
CREAT SERPL-MCNC: 0.85 MG/DL (ref 0.5–1.4)
EOSINOPHIL # BLD AUTO: 0.3 K/UL (ref 0–0.51)
EOSINOPHIL NFR BLD: 4.3 % (ref 0–6.9)
ERYTHROCYTE [DISTWIDTH] IN BLOOD BY AUTOMATED COUNT: 54.5 FL (ref 35.9–50)
GLOBULIN SER CALC-MCNC: 2.2 G/DL (ref 1.9–3.5)
GLUCOSE SERPL-MCNC: 98 MG/DL (ref 65–99)
HCT VFR BLD AUTO: 47.2 % (ref 37–47)
HGB BLD-MCNC: 16 G/DL (ref 12–16)
IMM GRANULOCYTES # BLD AUTO: 0.02 K/UL (ref 0–0.11)
IMM GRANULOCYTES NFR BLD AUTO: 0.3 % (ref 0–0.9)
LYMPHOCYTES # BLD AUTO: 2.78 K/UL (ref 1–4.8)
LYMPHOCYTES NFR BLD: 39.8 % (ref 22–41)
MCH RBC QN AUTO: 37.9 PG (ref 27–33)
MCHC RBC AUTO-ENTMCNC: 33.9 G/DL (ref 33.6–35)
MCV RBC AUTO: 111.8 FL (ref 81.4–97.8)
MONOCYTES # BLD AUTO: 0.52 K/UL (ref 0–0.85)
MONOCYTES NFR BLD AUTO: 7.4 % (ref 0–13.4)
NEUTROPHILS # BLD AUTO: 3.32 K/UL (ref 2–7.15)
NEUTROPHILS NFR BLD: 47.6 % (ref 44–72)
NRBC # BLD AUTO: 0 K/UL
NRBC BLD-RTO: 0 /100 WBC
PLATELET # BLD AUTO: 331 K/UL (ref 164–446)
PMV BLD AUTO: 10.6 FL (ref 9–12.9)
POTASSIUM SERPL-SCNC: 3.9 MMOL/L (ref 3.6–5.5)
PROT SERPL-MCNC: 6.7 G/DL (ref 6–8.2)
RBC # BLD AUTO: 4.22 M/UL (ref 4.2–5.4)
SODIUM SERPL-SCNC: 140 MMOL/L (ref 135–145)
WBC # BLD AUTO: 7 K/UL (ref 4.8–10.8)

## 2020-12-14 PROCEDURE — 71046 X-RAY EXAM CHEST 2 VIEWS: CPT

## 2020-12-14 PROCEDURE — 80053 COMPREHEN METABOLIC PANEL: CPT

## 2020-12-14 PROCEDURE — 85025 COMPLETE CBC W/AUTO DIFF WBC: CPT

## 2020-12-14 PROCEDURE — 36415 COLL VENOUS BLD VENIPUNCTURE: CPT

## 2020-12-14 RX ORDER — ALBUTEROL SULFATE 90 UG/1
2 AEROSOL, METERED RESPIRATORY (INHALATION) EVERY 6 HOURS PRN
Qty: 8.5 G | Refills: 11 | Status: SHIPPED | OUTPATIENT
Start: 2020-12-14 | End: 2021-04-01 | Stop reason: SDUPTHER

## 2020-12-14 NOTE — TELEPHONE ENCOUNTER
ericm for pt letting her know her labwork has been ordered as requested and to call me back If she has any questions

## 2020-12-15 ENCOUNTER — TELEPHONE (OUTPATIENT)
Dept: MEDICAL GROUP | Facility: MEDICAL CENTER | Age: 70
End: 2020-12-15

## 2020-12-15 DIAGNOSIS — E55.9 VITAMIN D DEFICIENCY DISEASE: ICD-10-CM

## 2020-12-15 RX ORDER — ERGOCALCIFEROL 1.25 MG/1
50000 CAPSULE ORAL
Qty: 12 CAP | Refills: 2 | Status: SHIPPED | OUTPATIENT
Start: 2020-12-15 | End: 2021-05-24

## 2020-12-15 NOTE — TELEPHONE ENCOUNTER
Phone Number Called: [phone]    Call outcome: Spoke to patient regarding message below.    Message: Called to inform patient of message below.     Patient wants a prescription for Vitamin D every five days if Heath is willing as she feels better when she does that.       ----- Message from RAMILA Taylor sent at 12/14/2020  3:44 PM PST -----  Advise patient that her CBC looks basically unchanged from 3 years ago and continue to follow with Dr. Rojas if needed.  Chemistry panel shows normal blood sugar, liver and kidney functions.  No other lab work was needed since much was done in May.

## 2020-12-15 NOTE — TELEPHONE ENCOUNTER
Phone Number Called: [phone]    Call outcome: Left detailed message for patient. Informed to call back with any additional questions.    Message: Called to inform patient that Heath ordered Vitamin D for patient to use once every seven days.

## 2020-12-15 NOTE — TELEPHONE ENCOUNTER
A prescription for vitamin D 50,000 units has been sent to her pharmacy but it cannot be used more than once every 7 days.

## 2021-01-14 ENCOUNTER — OFFICE VISIT (OUTPATIENT)
Dept: MEDICAL GROUP | Facility: MEDICAL CENTER | Age: 71
End: 2021-01-14
Payer: MEDICARE

## 2021-01-14 VITALS
WEIGHT: 196 LBS | BODY MASS INDEX: 36.07 KG/M2 | HEIGHT: 62 IN | RESPIRATION RATE: 16 BRPM | DIASTOLIC BLOOD PRESSURE: 72 MMHG | TEMPERATURE: 97.2 F | OXYGEN SATURATION: 95 % | HEART RATE: 71 BPM | SYSTOLIC BLOOD PRESSURE: 124 MMHG

## 2021-01-14 DIAGNOSIS — D25.1 INTRAMURAL LEIOMYOMA OF UTERUS: ICD-10-CM

## 2021-01-14 DIAGNOSIS — Z78.0 MENOPAUSE: ICD-10-CM

## 2021-01-14 DIAGNOSIS — E55.9 VITAMIN D DEFICIENCY: ICD-10-CM

## 2021-01-14 DIAGNOSIS — D45 POLYCYTHEMIA VERA (HCC): ICD-10-CM

## 2021-01-14 DIAGNOSIS — J44.89 COPD WITH ASTHMA (HCC): ICD-10-CM

## 2021-01-14 DIAGNOSIS — I10 ESSENTIAL HYPERTENSION: ICD-10-CM

## 2021-01-14 DIAGNOSIS — E78.5 DYSLIPIDEMIA: ICD-10-CM

## 2021-01-14 DIAGNOSIS — F12.90 MARIJUANA USE: ICD-10-CM

## 2021-01-14 DIAGNOSIS — R73.01 IMPAIRED FASTING GLUCOSE: ICD-10-CM

## 2021-01-14 DIAGNOSIS — E87.6 HYPOKALEMIA: ICD-10-CM

## 2021-01-14 DIAGNOSIS — R22.1 NECK SWELLING: ICD-10-CM

## 2021-01-14 PROBLEM — E66.9 OBESITY (BMI 30-39.9): Status: RESOLVED | Noted: 2017-07-19 | Resolved: 2021-01-14

## 2021-01-14 PROCEDURE — 99214 OFFICE O/P EST MOD 30 MIN: CPT | Performed by: NURSE PRACTITIONER

## 2021-01-14 ASSESSMENT — FIBROSIS 4 INDEX: FIB4 SCORE: 0.86

## 2021-01-14 ASSESSMENT — ENCOUNTER SYMPTOMS
SHORTNESS OF BREATH: 1
GENERAL WELL-BEING: EXCELLENT

## 2021-01-14 ASSESSMENT — PATIENT HEALTH QUESTIONNAIRE - PHQ9: CLINICAL INTERPRETATION OF PHQ2 SCORE: 0

## 2021-01-14 ASSESSMENT — LIFESTYLE VARIABLES: SUBSTANCE_ABUSE: 1

## 2021-01-14 ASSESSMENT — ACTIVITIES OF DAILY LIVING (ADL): BATHING_REQUIRES_ASSISTANCE: 0

## 2021-01-14 NOTE — PROGRESS NOTES
Subjective:      Jina Barnard is a 70 y.o. female who presents with Annual Exam        CC: Patient is here today accompanied by her son for issues including COPD, hypertension, hypokalemia, polycythemia, vitamin D deficiency, impaired fasting glucose and dyslipidemia.    HPI         1. COPD with asthma (HCC)  Patient recently seen by pulmonology for follow-up on x-ray showing hyperinflation and pulmonary function testing showing reduction of airflow.  It was appropriately felt that this most likely was secondary to her chronic daily marijuana usage and she continues to use this and does not wish to stop.  She states she does feel some fullness in her neck and wants me to prescribe antibiotics for her on a regular basis just to be on the safe side.  She does have albuterol to use as needed and declined preventative inhalers    2. Essential hypertension  Blood pressure appears controlled on her low-dose HCTZ which she insists on using this medicine only.    3. Hypokalemia  Previous potassium levels were low on HCTZ and patient did not want to stop the medicine so she was started on potassium twice a day and her most recent potassium levels were normal    4. Intramural leiomyoma of uterus  Patient finally followed with gynecology for this and patient being asymptomatic preferred no treatment.    5. Marijuana use  As mentioned above, patient uses marijuana on a daily basis which does contribute to her COPD but she does not wish to quit    6. Polycythemia vera(238.4)  Patient follows with hematology regularly and is on hydroxyurea through the hematology office and she does have an appointment coming up within the month.  Recent CBC results on medication showed hematocrit just slightly above normal and hemoglobin normal with mild macrocytosis.    7. Vitamin D deficiency disease  Patient taking over-the-counter vitamin D    8. Impaired fasting glucose  Previous A1c from May was mildly elevated at 5.8.  She has never  needed diabetic medicine    9. Dyslipidemia  Most recent lipid panel showed LDL of 126 with a good HDL of 53.  Her recent chest x-ray did show some atherosclerosis    10. Menopause  Patient due for bone density scan    11. Neck swelling  Patient feels she has some swelling in the side of her neck bilaterally although she admits this could be musculoskeletal and she has not palpated any particular notes.  She has had no difficulty with swallowing or breathing.  Past Medical History:   Diagnosis Date   • Hypertension    • Thrombocytosis (HCC)      Social History     Socioeconomic History   • Marital status: Single     Spouse name: Not on file   • Number of children: Not on file   • Years of education: Not on file   • Highest education level: Not on file   Occupational History   • Not on file   Social Needs   • Financial resource strain: Not on file   • Food insecurity     Worry: Not on file     Inability: Not on file   • Transportation needs     Medical: Not on file     Non-medical: Not on file   Tobacco Use   • Smoking status: Never Smoker   • Smokeless tobacco: Never Used   Substance and Sexual Activity   • Alcohol use: No   • Drug use: Yes     Types: Marijuana     Comment: daily   • Sexual activity: Never     Partners: Male     Birth control/protection: Post-Menopausal   Lifestyle   • Physical activity     Days per week: Not on file     Minutes per session: Not on file   • Stress: Not on file   Relationships   • Social connections     Talks on phone: Not on file     Gets together: Not on file     Attends Yarsani service: Not on file     Active member of club or organization: Not on file     Attends meetings of clubs or organizations: Not on file     Relationship status: Not on file   • Intimate partner violence     Fear of current or ex partner: Not on file     Emotionally abused: Not on file     Physically abused: Not on file     Forced sexual activity: Not on file   Other Topics Concern   • Not on file   Social  "History Narrative   • Not on file     Current Outpatient Medications   Medication Sig Dispense Refill   • vitamin D, Ergocalciferol, (DRISDOL) 1.25 MG (90239 UT) Cap capsule Take 1 Cap by mouth every 7 days. 12 Cap 2   • albuterol 108 (90 Base) MCG/ACT Aero Soln inhalation aerosol Inhale 2 Puffs every 6 hours as needed. 8.5 g 11   • hydrochlorothiazide (MICROZIDE) 12.5 MG capsule Take 1 Cap by mouth every day. 90 Cap 2   • potassium chloride SA (K-DUR) 10 MEQ Tab CR Take 1 Tab by mouth 2 times a day. 180 Tab 3   • ibuprofen (MOTRIN) 200 MG Tab Take 200 mg by mouth every 6 hours as needed.     • hydroxyurea (HYDREA) 500 MG Cap Take  by mouth every day.     • BLACK COHOSH PO Take  by mouth.       No current facility-administered medications for this visit.      Family History   Problem Relation Age of Onset   • Alcohol/Drug Father         alcohol   • Other Father         malnutrition   • Psychiatric Illness Father    • Other Mother         blood clot from surgery   • Hypertension Mother    • Diabetes Mother         ?   • Psychiatric Illness Sister    • Other Sister         malnutrition   • No Known Problems Maternal Grandmother    • No Known Problems Maternal Grandfather    • No Known Problems Paternal Grandmother    • No Known Problems Paternal Grandfather          Review of Systems   Respiratory: Positive for shortness of breath.    Psychiatric/Behavioral: Positive for substance abuse.   All other systems reviewed and are negative.         Objective:     /72 (BP Location: Right arm, Patient Position: Sitting, BP Cuff Size: Adult)   Pulse 71   Temp 36.2 °C (97.2 °F) (Temporal)   Resp 16   Ht 1.575 m (5' 2\")   Wt 88.9 kg (196 lb)   SpO2 95%   BMI 35.85 kg/m²      Physical Exam  Vitals signs and nursing note reviewed.   Constitutional:       General: She is not in acute distress.     Appearance: She is well-developed. She is not diaphoretic.   HENT:      Head: Normocephalic and atraumatic.      Right Ear: " External ear normal.      Left Ear: External ear normal.      Nose: Nose normal.   Eyes:      General:         Right eye: No discharge.         Left eye: No discharge.   Neck:      Musculoskeletal: Normal range of motion and neck supple.      Thyroid: No thyromegaly.      Comments: No obvious lymphadenopathy noted or swelling of neck  Cardiovascular:      Rate and Rhythm: Normal rate and regular rhythm.      Heart sounds: Normal heart sounds. No murmur. No friction rub. No gallop.    Pulmonary:      Effort: Pulmonary effort is normal.      Breath sounds: Normal breath sounds. No wheezing or rales.   Musculoskeletal:         General: No tenderness.   Skin:     General: Skin is warm and dry.      Findings: No rash.   Neurological:      Mental Status: She is alert and oriented to person, place, and time.      Deep Tendon Reflexes: Reflexes normal.   Psychiatric:         Behavior: Behavior normal.         Thought Content: Thought content normal.         Judgment: Judgment normal.                 Assessment/Plan:        1. COPD with asthma (HCC)  I reminded patient that she would do better if she stopped her marijuana usage but she is not ready to do so.  She would like to try some pulmonary rehab so a referral was placed today.  She has albuterol to use as needed but has not wanted a preventative inhaler.  - REFERRAL TO PULMONARY REHAB    2. Essential hypertension  Blood pressure controlled on current medications  - Comp Metabolic Panel; Future    3. Hypokalemia  Potassium levels okay on potassium supplementation and she wants to continue on HCTZ  - Comp Metabolic Panel; Future    4. Intramural leiomyoma of uterus  Patient finally followed with gynecology and will continue to follow-up as needed    5. Marijuana use  Patient advised to decrease or stop marijuana usage because of her lung function    6. Polycythemia vera(238.4)  Patient will be following with her hematologist soon and she was given copies of her lab work  to bring with her with her most recent CBC showing good hemoglobin and hematocrit on Hydrea.    7. Vitamin D deficiency disease  Patient insists on continuing with weekly vitamin D calling at her happy pill although I had to insist she not take it more than once every 7 days  - VITAMIN D,25 HYDROXY; Future    8. Impaired fasting glucose  Patient had mild elevation in A1c at her last testing so would like to recheck this since she is at risk for diabetes  - HEMOGLOBIN A1C; Future    9. Dyslipidemia  If cholesterol comes back elevated on her next testing I will recommend a statin with her recent chest x-ray showing atherosclerosis  - Lipid Profile; Future    10. Menopause  I advised a bone density scan when she does her mammogram  - DS-BONE DENSITY STUDY (DEXA); Future    11. Neck swelling  Although I could not feel any obvious swelling or lymphadenopathy I will order an ultrasound since patient is nervous over this.  - US-SOFT TISSUES OF HEAD - NECK; Future

## 2021-01-15 DIAGNOSIS — Z23 NEED FOR VACCINATION: ICD-10-CM

## 2021-02-03 ENCOUNTER — HOSPITAL ENCOUNTER (OUTPATIENT)
Dept: RADIOLOGY | Facility: MEDICAL CENTER | Age: 71
End: 2021-02-03
Attending: NURSE PRACTITIONER
Payer: MEDICARE

## 2021-02-03 DIAGNOSIS — R22.1 NECK SWELLING: ICD-10-CM

## 2021-02-03 PROCEDURE — 76536 US EXAM OF HEAD AND NECK: CPT

## 2021-02-04 ENCOUNTER — TELEPHONE (OUTPATIENT)
Dept: MEDICAL GROUP | Facility: MEDICAL CENTER | Age: 71
End: 2021-02-04

## 2021-02-04 NOTE — TELEPHONE ENCOUNTER
Phone Number Called: [phone]    Call outcome: Spoke to patient    Message: Called to inform patient of message below.    Patient son Albert had to have an amuputation. They had to amputate right below his knee.        ----- Message from RAMILA Taylor sent at 2/3/2021  2:27 PM PST -----  Please advise patient that her thyroid ultrasound is normal and no nodules found.

## 2021-02-19 ENCOUNTER — TELEPHONE (OUTPATIENT)
Dept: MEDICAL GROUP | Facility: MEDICAL CENTER | Age: 71
End: 2021-02-19

## 2021-02-19 NOTE — TELEPHONE ENCOUNTER
Patient is requesting a referral to see a psychiatrist. She is having a hard time dealing with her son and feels she needs to see someone.

## 2021-02-20 DIAGNOSIS — F43.21 GRIEF REACTION: ICD-10-CM

## 2021-03-01 ENCOUNTER — TELEPHONE (OUTPATIENT)
Dept: MEDICAL GROUP | Facility: MEDICAL CENTER | Age: 71
End: 2021-03-01

## 2021-03-01 NOTE — TELEPHONE ENCOUNTER
"1. Caller Name: Jina Barnard                          Call Back Number: [phone] (home)     Patient called stating she requested a referral to see somebody to \"talk\" and she hasn't heard back from anybody to schedule, I checked and seems like she was referred to [facility] on the 20th, I gave her their address and phone number to call and schedule an appt.. she did sound desperate, she said she was hoping she could speak to you, she said this is in regards something that happen with her son.... I did mention to her that you are busy with patients but that I would relay the message            How would the patient prefer to be contacted with a response: Phone call OK to leave a detailed message      "

## 2021-03-01 NOTE — TELEPHONE ENCOUNTER
This morning I  received an escalation e-mail to see if we could get patient in to see Aniket sooner.. there was an opening at 10:00:am so I called patient multiple times with no success to try to see if she could come in, vm was also left.

## 2021-03-01 NOTE — TELEPHONE ENCOUNTER
Patient frustrated over events with her son's roommate at time of patient's admission to the hospital and she felt better after venting her frustration as she is awaiting her son to return home to his place.

## 2021-04-01 DIAGNOSIS — R06.02 SOB (SHORTNESS OF BREATH): ICD-10-CM

## 2021-04-01 RX ORDER — ALBUTEROL SULFATE 90 UG/1
2 AEROSOL, METERED RESPIRATORY (INHALATION) EVERY 6 HOURS PRN
Qty: 8.5 G | Refills: 0 | Status: SHIPPED | OUTPATIENT
Start: 2021-04-01 | End: 2021-07-02 | Stop reason: SDUPTHER

## 2021-04-01 NOTE — TELEPHONE ENCOUNTER
Patient called stating she needs a refill on her albuterol inhaler but she needs 2 prescriptions because 1 is not enough... she said she had discuss this with Heath this before

## 2021-05-24 DIAGNOSIS — I10 ESSENTIAL HYPERTENSION: ICD-10-CM

## 2021-05-24 DIAGNOSIS — E55.9 VITAMIN D DEFICIENCY DISEASE: ICD-10-CM

## 2021-05-24 RX ORDER — ERGOCALCIFEROL 1.25 MG/1
CAPSULE ORAL
Qty: 12 CAPSULE | Refills: 1 | Status: SHIPPED | OUTPATIENT
Start: 2021-05-24 | End: 2022-10-21 | Stop reason: SDUPTHER

## 2021-05-24 RX ORDER — POTASSIUM CHLORIDE 750 MG/1
TABLET, EXTENDED RELEASE ORAL
Qty: 180 TABLET | Refills: 2 | Status: SHIPPED | OUTPATIENT
Start: 2021-05-24 | End: 2021-10-21 | Stop reason: SDUPTHER

## 2021-05-25 ENCOUNTER — TELEPHONE (OUTPATIENT)
Dept: MEDICAL GROUP | Facility: MEDICAL CENTER | Age: 71
End: 2021-05-25

## 2021-05-25 DIAGNOSIS — J44.89 COPD WITH ASTHMA (HCC): ICD-10-CM

## 2021-05-25 NOTE — TELEPHONE ENCOUNTER
"Patient called stating she has called three times in 6 months re:    Albuterol change request  She states she uses 4 Puffs Q4-6 hours and wants her prescription changed so she has more than 300 doses per month to reflect usage of 4 puffs     She states she will make an appointment if she can get a ride. States \"She is very worried about this and running out\"            "

## 2021-05-26 NOTE — TELEPHONE ENCOUNTER
She is unfortunately using more than the FDA approved dose.  We cannot increase that prescription to 4 puffs at a time, that is not appropriate.  Also, Medicare will not authorize it.  Since she is needing her inhaler so often she should see a lung specialist.  I have made a referral to pulmonology.

## 2021-07-02 ENCOUNTER — OFFICE VISIT (OUTPATIENT)
Dept: SLEEP MEDICINE | Facility: MEDICAL CENTER | Age: 71
End: 2021-07-02
Payer: MEDICARE

## 2021-07-02 VITALS
BODY MASS INDEX: 33.49 KG/M2 | RESPIRATION RATE: 16 BRPM | HEART RATE: 81 BPM | WEIGHT: 182 LBS | DIASTOLIC BLOOD PRESSURE: 76 MMHG | HEIGHT: 62 IN | OXYGEN SATURATION: 94 % | SYSTOLIC BLOOD PRESSURE: 128 MMHG

## 2021-07-02 DIAGNOSIS — R06.02 SHORTNESS OF BREATH: ICD-10-CM

## 2021-07-02 DIAGNOSIS — J45.40 MODERATE PERSISTENT ASTHMA, UNSPECIFIED WHETHER COMPLICATED: ICD-10-CM

## 2021-07-02 DIAGNOSIS — I10 ESSENTIAL HYPERTENSION: ICD-10-CM

## 2021-07-02 DIAGNOSIS — F12.90 MARIJUANA USE: ICD-10-CM

## 2021-07-02 PROCEDURE — 99214 OFFICE O/P EST MOD 30 MIN: CPT | Performed by: NURSE PRACTITIONER

## 2021-07-02 RX ORDER — ALBUTEROL SULFATE 90 UG/1
2 AEROSOL, METERED RESPIRATORY (INHALATION) EVERY 6 HOURS PRN
Qty: 8.5 G | Refills: 5 | Status: SHIPPED | OUTPATIENT
Start: 2021-07-02 | End: 2021-12-09

## 2021-07-02 RX ORDER — IPRATROPIUM BROMIDE AND ALBUTEROL SULFATE 2.5; .5 MG/3ML; MG/3ML
3 SOLUTION RESPIRATORY (INHALATION) EVERY 6 HOURS PRN
Qty: 3 ML | Refills: 5 | Status: SHIPPED | OUTPATIENT
Start: 2021-07-02 | End: 2021-07-07

## 2021-07-02 ASSESSMENT — FIBROSIS 4 INDEX: FIB4 SCORE: 0.86

## 2021-07-02 NOTE — PROGRESS NOTES
"Chief Complaint   Patient presents with   • Follow-Up     Asthma-Last seen 11/19/2020       HPI:      Mrs. Barnard is a 69 y/o female patient who is in today for asthma f/u. PMH includes normocytosis, hypertension, vitamin D deficiency, marijuana use since 1972, polycythemia, depression,  asthma, dyslipidemia, obesity, never smoker.    She was last seen by Dr. Maurer on 11/19/2020.  Patient is in today complaining of shortness of breath at rest which is worse with exertion.  She states that it is \"harder to breathe\" and she feels it is getting worse.  She has had to use her rescue albuterol 2 puffs every 4 hours daily or more.  She reports that she used to have a productive cough with phlegm that was brown but as of 2019 the phlegm has changed to clear or white and it is more \"sticky\".  She has difficulty clearing the mucus and she reports that she coughs consistently throughout the day.  The cough is wet.  She denies coughing up blood.  She reports wheezing but denies chest pain or chest tightness.  She continues to smoke marijuana and states she takes \"2 bong hits 3 times a day.  She has been smoking marijuana since 1972 and does not have any plans to quit.  She denies smoking tobacco.  She also reports that she takes potassium 10 mg 4-5 times a day which is prescribed by her PCP and this also helps her to breathe better.  She has difficulty with staying active due to the shortness of breath and typically after about 2 or 5 minutes she has to stop and catch her breath.    Pulmonary/Cardiac Study History:   Chest X-ray from 12/14/20 indicated no evidence of acute cardiopulmonary disease. Emphysema. Atherosclerosis.     PFT from 1/16/20 indicated pseudorestriction with reduced forced vital capacity of 60% with response to bronchodilator. Evidence of air trapping, but total lung capacity is normal.  Elevated RV/TLC supports underlying obstructive lung disease and normal gas transfer. FVC 1.92 or 60% predicted, FEV1 of " 1.4 or 56% predicted, FEV1/FVC ratio 72, TLC 5.87 or 108% predicted, RV/TLC 62.75 or 98% predicted, DLCO cor 18.09 with 90% predicted, DLCO unc 19.65 with 98% predicted. Study was reviewed with Dr. Villarreal on on 7/2/2021 which is indicative of asthma rather than COPD.    No Echo on file.     ROS:  Constitutional: Denies fevers, chills, sweats, fatigue, weight loss  Eyes: Denies glasses, vision loss, pain, drainage, double vision  Ears/Nose/Mouth/Throat: Denies rhinitis, nasal congestion, ear ache, difficulty hearing, sore throat, persistent hoarseness, decayed teeth/toothache  Cardiovascular: Denies chest pain, tightness, palpitations, swelling in feet/legs, fainting, difficulty breathing when laying down  Respiratory: Denies shortness of breath, cough, sputum, wheezing, painful breathing, coughing up blood  GI: Denies heartburn, difficulty swallowing, nausea, vomiting, abdominal pain, diarrhea, + diverticulosis, constiptation  : Denies frequent urination, painful urination  Integumentary: Denies rashes, lumps or color changes  Neurological: Denies frequent headaches, dizziness, weakness        Past Medical History:   Diagnosis Date   • Hypertension    • Thrombocytosis (HCC)        Past Surgical History:   Procedure Laterality Date   • TUBAL COAGULATION LAPAROSCOPIC BILATERAL         Family History   Problem Relation Age of Onset   • Alcohol/Drug Father         alcohol   • Other Father         malnutrition   • Psychiatric Illness Father    • Other Mother         blood clot from surgery   • Hypertension Mother    • Diabetes Mother         ?   • Psychiatric Illness Sister    • Other Sister         malnutrition   • No Known Problems Maternal Grandmother    • No Known Problems Maternal Grandfather    • No Known Problems Paternal Grandmother    • No Known Problems Paternal Grandfather        Social History     Socioeconomic History   • Marital status: Single     Spouse name: Not on file   • Number of children: Not on  file   • Years of education: Not on file   • Highest education level: Not on file   Occupational History   • Not on file   Tobacco Use   • Smoking status: Never Smoker   • Smokeless tobacco: Never Used   Vaping Use   • Vaping Use: Never used   Substance and Sexual Activity   • Alcohol use: No   • Drug use: Yes     Types: Marijuana     Comment: daily   • Sexual activity: Never     Partners: Male     Birth control/protection: Post-Menopausal   Other Topics Concern   • Not on file   Social History Narrative   • Not on file     Social Determinants of Health     Financial Resource Strain:    • Difficulty of Paying Living Expenses:    Food Insecurity:    • Worried About Running Out of Food in the Last Year:    • Ran Out of Food in the Last Year:    Transportation Needs:    • Lack of Transportation (Medical):    • Lack of Transportation (Non-Medical):    Physical Activity:    • Days of Exercise per Week:    • Minutes of Exercise per Session:    Stress:    • Feeling of Stress :    Social Connections:    • Frequency of Communication with Friends and Family:    • Frequency of Social Gatherings with Friends and Family:    • Attends Jehovah's witness Services:    • Active Member of Clubs or Organizations:    • Attends Club or Organization Meetings:    • Marital Status:    Intimate Partner Violence:    • Fear of Current or Ex-Partner:    • Emotionally Abused:    • Physically Abused:    • Sexually Abused:        Allergies as of 07/02/2021   • (No Known Allergies)        Vitals:  Vitals:    07/02/21 1249   BP: 128/76   Pulse: 81   Resp: 16   SpO2: 94%       Current medications as of today   Current Outpatient Medications   Medication Sig Dispense Refill   • Fluticasone Furoate-Vilanterol (BREO ELLIPTA) 200-25 MCG/INH AEROSOL POWDER, BREATH ACTIVATED Inhale 1 Puff every day for 30 days. Rinse mouth after use. 1 Each 3   • albuterol 108 (90 Base) MCG/ACT Aero Soln inhalation aerosol Inhale 2 Puffs every 6 hours as needed. 8.5 g 5   •  ipratropium-albuterol (DUONEB) 0.5-2.5 (3) MG/3ML nebulizer solution Take 3 mL by nebulization every 6 hours as needed for Shortness of Breath for up to 30 days. 3 mL 5   • potassium chloride SA (K-DUR) 10 MEQ Tab CR TAKE 1 TABLET BY MOUTH TWICE DAILY 180 tablet 2   • vitamin D, Ergocalciferol, (DRISDOL) 1.25 MG (75044 UT) Cap capsule TAKE 1 CAPSULE BY MOUTH EVERY 7 DAYS 12 capsule 1   • hydrochlorothiazide (MICROZIDE) 12.5 MG capsule TAKE 1 CAPSULE BY MOUTH EVERY DAY 90 capsule 3   • BLACK COHOSH PO Take  by mouth.     • ibuprofen (MOTRIN) 200 MG Tab Take 200 mg by mouth every 6 hours as needed.     • hydroxyurea (HYDREA) 500 MG Cap Take  by mouth every day.       No current facility-administered medications for this visit.         Physical Exam:   Appearance: Well developed, well nourished, no acute distress  Eyes: PERRL, EOM intact, sclera white, conjunctiva moist  Ears: no lesions or deformities  Hearing: grossly intact  Nose: no lesions or deformities  Respiratory effort: no intercostal retractions or use of accessory muscles  Lung auscultation: no rales, rhonchi bilaterally. Clear to ausculation bilaterally, but noted expiratory wheezing bilaterally throughout. Wet, nonproductive cough is noted.   Heart auscultation: no murmur rub or gallop, RRR  Extremities: no cyanosis or edema  Abdomen: soft  Gait and Station: normal  Digits and nails: no clubbing, cyanosis, petechiae or nodes.  Cranial nerves: grossly intact  Skin: no visible rashes, lesions or ulcers noted  Orientation: Oriented to time, person and place  Mood and affect: mood and affect appropriate, normal interaction with examiner  Judgement: Intact    Assessment:  1. Moderate persistent asthma, unspecified whether complicated  PULMONARY FUNCTION TESTS -Test requested: Complete Pulmonary Function Test    Fluticasone Furoate-Vilanterol (BREO ELLIPTA) 200-25 MCG/INH AEROSOL POWDER, BREATH ACTIVATED    albuterol 108 (90 Base) MCG/ACT Aero Soln inhalation  aerosol    DME Nebulizer    ipratropium-albuterol (DUONEB) 0.5-2.5 (3) MG/3ML nebulizer solution   2. Shortness of breath  PULMONARY FUNCTION TESTS -Test requested: Complete Pulmonary Function Test    Fluticasone Furoate-Vilanterol (BREO ELLIPTA) 200-25 MCG/INH AEROSOL POWDER, BREATH ACTIVATED    albuterol 108 (90 Base) MCG/ACT Aero Soln inhalation aerosol    DME Nebulizer    ipratropium-albuterol (DUONEB) 0.5-2.5 (3) MG/3ML nebulizer solution   3. Marijuana use     4. Essential hypertension     5. BMI 33.0-33.9,adult           Plan    1-2.  Recent review of prior PFT study with Dr. Villarreal is indicative of asthma rather than COPD.      *Order updated PFT which will be reviewed at next follow-up office visit. Chest x-ray or CT imaging is not indicated at this time.     *Start Breo Ellipta 200-25 mcg 1 puff qd, filled today. Continue albuterol 108 mcg q 6 hrs prn SOB, filled today. Start Duoneb 3 ml q 6 hrs prn SOB, filled today.  Will reassess efficacy of inhalers at follow-up office visit and if indicated will obtain imaging.  *DME order ([facility]) for nebulizer is also placed.     3. Patient is a non-smoker but continues to smoke marijuana with no plan to quit. Patient is encouraged to consume marijuana instead of inhaling it to avoid exacerbation of asthma symptoms.  4. Continue to f/u with PCP for BP management.  5. Encouraged a healthy diet and light conditioning to help with weight loss and management.   6. Follow up with the appropriate healthcare practitioners for all other medical problems.  7. F/u in 2 months for COPD with PFT.       CARMINA Wynn.      This dictation was created using voice recognition software. The accuracy of the dictation is limited to the abilities of the software. I expect there may be some errors of grammar and possibly content.

## 2021-07-07 DIAGNOSIS — J45.40 MODERATE PERSISTENT ASTHMA, UNSPECIFIED WHETHER COMPLICATED: ICD-10-CM

## 2021-07-07 DIAGNOSIS — R06.02 SHORTNESS OF BREATH: ICD-10-CM

## 2021-07-07 RX ORDER — IPRATROPIUM BROMIDE AND ALBUTEROL SULFATE 2.5; .5 MG/3ML; MG/3ML
SOLUTION RESPIRATORY (INHALATION)
Qty: 1080 ML | Refills: 3 | Status: SHIPPED | OUTPATIENT
Start: 2021-07-07 | End: 2022-05-09

## 2021-07-07 NOTE — TELEPHONE ENCOUNTER
Have we ever prescribed this med? Yes.  If yes, what date? 07/02/2021    PT REQUEST 90 DAY SUPPLY

## 2021-07-16 ENCOUNTER — NON-PROVIDER VISIT (OUTPATIENT)
Dept: SLEEP MEDICINE | Facility: MEDICAL CENTER | Age: 71
End: 2021-07-16
Payer: MEDICARE

## 2021-07-16 VITALS — WEIGHT: 182 LBS | BODY MASS INDEX: 33.49 KG/M2 | HEIGHT: 62 IN

## 2021-07-16 DIAGNOSIS — J45.40 MODERATE PERSISTENT ASTHMA, UNSPECIFIED WHETHER COMPLICATED: ICD-10-CM

## 2021-07-16 DIAGNOSIS — R06.02 SHORTNESS OF BREATH: ICD-10-CM

## 2021-07-16 PROCEDURE — 94010 BREATHING CAPACITY TEST: CPT | Performed by: INTERNAL MEDICINE

## 2021-07-16 PROCEDURE — 94726 PLETHYSMOGRAPHY LUNG VOLUMES: CPT | Performed by: INTERNAL MEDICINE

## 2021-07-16 PROCEDURE — 94729 DIFFUSING CAPACITY: CPT | Performed by: INTERNAL MEDICINE

## 2021-07-16 ASSESSMENT — PULMONARY FUNCTION TESTS
FEV1_PERCENT_PREDICTED: 39
FEV1/FVC: 53
FEV1/FVC: 53
FEV1/FVC_PERCENT_PREDICTED: 78
FVC_PREDICTED: 2.62
FEV1/FVC_PERCENT_PREDICTED: 68
FVC_LLN: 2.19
FVC_PERCENT_PREDICTED: 57
FVC: 1.52
FEV1/FVC_PERCENT_LLN: 65
FEV1: .81
FEV1_LLN: 1.70
FEV1/FVC_PREDICTED: 78
FEV1/FVC_PERCENT_PREDICTED: 68
FEV1_PREDICTED: 2.04

## 2021-07-16 ASSESSMENT — FIBROSIS 4 INDEX: FIB4 SCORE: 0.86

## 2021-07-16 NOTE — PROCEDURES
Tech: Jazz Correia, RRT, CPFT  Tech notes: Good patient effort & cooperation.  FVC induced bronchospasm.  The results of this test meet the ATS/ERS standards for acceptability & reproducibility.  Test was performed on the Vital Access Body Plethysmograph-Elite DX system.  Predicted values were GLI-2012 for spirometry, GLI- 2017 for DLCO, ITS for Lung Volumes.  The DLCO was uncorrected for Hgb.    Interpretation:  1.  There is a moderately severe obstructive ventilatory defect on spirometry.  No bronchodilator testing was performed.  Compared to prior testing in 1/2020, the FEV1 has declined significantly from 1.40 L to 0.81 L, or 39% predicted.  2.  Lung volumes show evidence of air trapping evidenced by the elevated RV and RV/TLC ratio.  Expiratory reserve volume is low, likely attributable to patient's reported BMI of 33.3.  3.  Diffusion capacity is preserved.  4.  Flow volume loop consistent with above interpretation.  __________  Ham Aparicio MD  Pulmonary and Critical Care Medicine  [facility]

## 2021-08-13 ENCOUNTER — OFFICE VISIT (OUTPATIENT)
Dept: DERMATOLOGY | Facility: IMAGING CENTER | Age: 71
End: 2021-08-13
Payer: MEDICARE

## 2021-08-13 DIAGNOSIS — D22.9 NEVUS: ICD-10-CM

## 2021-08-13 DIAGNOSIS — L57.0 ACTINIC KERATOSIS: ICD-10-CM

## 2021-08-13 DIAGNOSIS — L82.1 SEBORRHEIC KERATOSIS: ICD-10-CM

## 2021-08-13 DIAGNOSIS — L90.8 SKIN AGING: ICD-10-CM

## 2021-08-13 DIAGNOSIS — D18.01 CHERRY ANGIOMA: ICD-10-CM

## 2021-08-13 DIAGNOSIS — Z12.83 SKIN CANCER SCREENING: ICD-10-CM

## 2021-08-13 DIAGNOSIS — L81.4 LENTIGO: ICD-10-CM

## 2021-08-13 PROCEDURE — 99213 OFFICE O/P EST LOW 20 MIN: CPT | Mod: 25 | Performed by: DERMATOLOGY

## 2021-08-13 PROCEDURE — 17003 DESTRUCT PREMALG LES 2-14: CPT | Performed by: DERMATOLOGY

## 2021-08-13 PROCEDURE — 17000 DESTRUCT PREMALG LESION: CPT | Performed by: DERMATOLOGY

## 2021-08-13 RX ORDER — FLUOROURACIL 50 MG/G
CREAM TOPICAL
Qty: 40 G | Refills: 0 | Status: SHIPPED | OUTPATIENT
Start: 2021-08-13 | End: 2022-08-09

## 2021-08-13 NOTE — PROGRESS NOTES
CC: Follow up MELI     Subjective: Previously seen patient here for skin check.     Last exam 07/25/2019  No new concerns today. Denies sites I/B/C/B  Desires trx of tan spots on face.     History of skin cancer: No  History of biopsies:No  History of blistering/severe sunburns:Yes, Details: during youth  Family history of skin cancer:No  Family history of atypical moles:No     ROS: no fevers/chills.  No itch.  No cough  Relevant PMH: mult med comorbidities  Social: NS    PE: Gen:WDWN female in NAD.  Skin: Scalp/face/eyes/lips/neck/chest/back/arms/legs/hands/feet/buttocks - without suspicious lesions noted.  Genitals exam declined  -thin HK papules on face  -many tan macules on face, very few waxy papules on face/torso/extremities  -scattered hyperpigmented macules/papules appearing on torso/extremities, appearing benign without suspicious features  -cherry red vascular macules/papules on face/body  -xerosis of skin    A/P: AKs:  -counseled on diagnosis and treatment, including risks/benefits/alternatives of cyrotherapy  -LN2 25 sec X 2 cycles X 2lesions  -f/u if persists at 1 month    Efudex cream BID X 2-4 weeks, patient preference for AKs found in interim to next visit.    Nevi: benign appearing:  -Reviewed ABCDEs  -Reviewed skin cancer detection/prevention  -RTC PRN growth/changes/concerning features    Lentigos/SKs: benign  -reassurance  -reviewed skin cancer detection/prevention  -counseled on cosmetic treatment PRN, including IPL procedures    Cherry angioma: benign  -counseled on cosmetic treatment PRN, including laser procedures    F/u 1 year/PRN    I have reviewed medications relevant to my specialty.

## 2021-09-08 ENCOUNTER — TELEPHONE (OUTPATIENT)
Dept: SLEEP MEDICINE | Facility: MEDICAL CENTER | Age: 71
End: 2021-09-08

## 2021-09-08 DIAGNOSIS — J44.89 COPD WITH ASTHMA (HCC): ICD-10-CM

## 2021-09-08 NOTE — TELEPHONE ENCOUNTER
Patient called in stating she is using Albuterol up to 6 times a day and is requesting a 3 month supply because she is using up to one and a half inhalers a month.   I advised that over using this inhaler can do more harm then good, she states that it is the only thing that helps her breathe during the day and it is causing her extreme anxiety. She advised the breo does not work and so she is not using that at this time.   Patient is very anxious to get another inhaler and repeats she is using over 300 puffs a month.

## 2021-09-14 NOTE — TELEPHONE ENCOUNTER
Spoke with patient reviewing current symptoms.  Not using maintenance inhaler, denied benefit but reviewed purpose of rescue and maintenance inhaler.  Patient states improved understanding and amenable to using Trelegy x 2 weeks until follow up appointment with me on 9/28/21. Reviewed oral care. She has nebulizer available if has difficulty breathing.

## 2021-09-28 ENCOUNTER — SLEEP CENTER VISIT (OUTPATIENT)
Dept: SLEEP MEDICINE | Facility: MEDICAL CENTER | Age: 71
End: 2021-09-28
Payer: MEDICARE

## 2021-09-28 VITALS
SYSTOLIC BLOOD PRESSURE: 130 MMHG | RESPIRATION RATE: 16 BRPM | WEIGHT: 186 LBS | DIASTOLIC BLOOD PRESSURE: 80 MMHG | OXYGEN SATURATION: 95 % | HEART RATE: 70 BPM | HEIGHT: 62 IN | BODY MASS INDEX: 34.23 KG/M2

## 2021-09-28 DIAGNOSIS — F12.90 MARIJUANA USE: ICD-10-CM

## 2021-09-28 DIAGNOSIS — J44.89 COPD WITH ASTHMA (HCC): ICD-10-CM

## 2021-09-28 PROCEDURE — 99213 OFFICE O/P EST LOW 20 MIN: CPT | Performed by: PHYSICIAN ASSISTANT

## 2021-09-28 ASSESSMENT — COPD QUESTIONNAIRES
GOLD_GROUP: GOLD GROUP B
QUESTION2_PHLEGM: 3
CAT_TOTALSCORE: 27
QUESTION3_CHESTTIGHTNESS: NO TIGHTNESS AT ALL
QUESTION4_WALKINCLINE: VERY BREATHLESS
TOTAL_EXACERBATIONS_PASTYEAR: 0 OR 1 WITHOUT HOSPITALIZATION
QUESTION1_COUGHFREQUENCY: 3
QUESTION5_HOMEACTIVITIES: 3
MMRC DYSPNEA SCALE: I STOP FOR BREATH AFTER WALKING 100 YARDS OR AFTER A FEW MINUTES ON LEVEL GROUND
QUESTION8_ENERGYLEVEL: NO ENERGY AT ALL
QUESTION6_LEAVINGHOUSE: CONFIDENT LEAVING HOME

## 2021-09-28 ASSESSMENT — ENCOUNTER SYMPTOMS
SINUS PAIN: 0
HEARTBURN: 1
HEADACHES: 0
DIZZINESS: 0
SORE THROAT: 0
INSOMNIA: 1
WEIGHT LOSS: 0
CHILLS: 0
SPUTUM PRODUCTION: 1
PALPITATIONS: 0
FEVER: 0
WHEEZING: 1
COUGH: 1
SHORTNESS OF BREATH: 1
ORTHOPNEA: 0
TREMORS: 0

## 2021-09-28 ASSESSMENT — FIBROSIS 4 INDEX: FIB4 SCORE: 0.86

## 2021-09-28 NOTE — PROGRESS NOTES
CC: Follow-up    HPI:  Jina Barnard is a 70 y.o. year old female here today for follow-up on COPD with asthma. Last seen in clinic 7-2-2021 by DOMINGA Andrews.  Patient is a never smoker.    Pertinent past medical history includes hypertension, thrombocytosis, marijuana use, polycythemia vera, intramural leiomyoma uterus.    Reviewed in clinic vitals including /80,  HR 70, oxygen saturation 95%.  Patient's body mass index is 34.02 kg/m². Exercise and nutrition counseling were performed at this visit.    Reviewed home medication regimen including Trelegy, DuoNeb and albuterol.  She reports use of albuterol 2-4 times a day, DuoNeb 1-2 times per day more during poor air quality conditions.    Reviewed most recent imaging including chest x-ray obtained 12/14/2020 demonstrating no acute cardiopulmonary disease, emphysema, atherosclerosis.    Reviewed pulmonary function testing demonstrating an FEV1 of 0.81 L or 39% predicted, FVC of 1.52 L or 57% predicted, FEV1/FVC ratio of 53, residual volume 163% predicted, % predicted, DLCO 91% predicted.  Per pulmonologist interpretation moderately severe obstructive ventilatory defect, significant reduction in FEV1 noted, air trapping, preserved diffusion capacity and flow volume loop consistent with interpretation.    Review of Systems   Constitutional: Negative for chills, fever, malaise/fatigue and weight loss.   HENT: Positive for congestion (mild ). Negative for hearing loss, nosebleeds, sinus pain, sore throat and tinnitus.    Eyes:        Presc glasses   Respiratory: Positive for cough, sputum production (slimey clear ), shortness of breath (all the time ) and wheezing.    Cardiovascular: Positive for leg swelling (mild left ). Negative for chest pain, palpitations and orthopnea.   Gastrointestinal: Positive for heartburn (occasional ).        Diverticulitis, 8 teeth bottom front remaining, no difficulty swallowing    Neurological: Negative for  dizziness, tremors and headaches.   Psychiatric/Behavioral: The patient has insomnia (both falling and staying asleep ).        Past Medical History:   Diagnosis Date   • Hypertension    • Thrombocytosis (HCC)        Past Surgical History:   Procedure Laterality Date   • TUBAL COAGULATION LAPAROSCOPIC BILATERAL         Family History   Problem Relation Age of Onset   • Alcohol/Drug Father         alcohol   • Other Father         malnutrition   • Psychiatric Illness Father    • Other Mother         blood clot from surgery   • Hypertension Mother    • Diabetes Mother         ?   • Psychiatric Illness Sister    • Other Sister         malnutrition   • No Known Problems Maternal Grandmother    • No Known Problems Maternal Grandfather    • No Known Problems Paternal Grandmother    • No Known Problems Paternal Grandfather        Social History     Socioeconomic History   • Marital status: Single     Spouse name: Not on file   • Number of children: Not on file   • Years of education: Not on file   • Highest education level: Not on file   Occupational History   • Not on file   Tobacco Use   • Smoking status: Never Smoker   • Smokeless tobacco: Never Used   Vaping Use   • Vaping Use: Never used   Substance and Sexual Activity   • Alcohol use: No   • Drug use: Yes     Types: Marijuana     Comment: daily   • Sexual activity: Never     Partners: Male     Birth control/protection: Post-Menopausal   Other Topics Concern   • Not on file   Social History Narrative   • Not on file     Social Determinants of Health     Financial Resource Strain:    • Difficulty of Paying Living Expenses:    Food Insecurity:    • Worried About Running Out of Food in the Last Year:    • Ran Out of Food in the Last Year:    Transportation Needs:    • Lack of Transportation (Medical):    • Lack of Transportation (Non-Medical):    Physical Activity:    • Days of Exercise per Week:    • Minutes of Exercise per Session:    Stress:    • Feeling of Stress :   "  Social Connections:    • Frequency of Communication with Friends and Family:    • Frequency of Social Gatherings with Friends and Family:    • Attends Orthodoxy Services:    • Active Member of Clubs or Organizations:    • Attends Club or Organization Meetings:    • Marital Status:    Intimate Partner Violence:    • Fear of Current or Ex-Partner:    • Emotionally Abused:    • Physically Abused:    • Sexually Abused:        Allergies as of 09/28/2021   • (No Known Allergies)        @Vital signs for this encounter:  Vitals:    09/28/21 0800   Height: 1.575 m (5' 2\")   Weight: 84.4 kg (186 lb)   Weight % change since last entry.: 0 %   BP: 130/80   Pulse: 70   BMI (Calculated): 34.02   Resp: 16       Current medications as of today   Current Outpatient Medications   Medication Sig Dispense Refill   • Fluticasone-Umeclidin-Vilant (TRELEGY ELLIPTA) 100-62.5-25 MCG/INH AEROSOL POWDER, BREATH ACTIVATED Inhale 1 Puff every day. Rinse mouth after use 1 Each 3   • fluorouracil (EFUDEX) 5 % cream AAA face BID for rough/red crusted skin - actinic keratoses.  Use for 2-4 weeks, then stop. 40 g 0   • ipratropium-albuterol (DUONEB) 0.5-2.5 (3) MG/3ML nebulizer solution USE ONE VIAL IN NEBULIZER EVERY 6 HOURS AS NEEDED FOR SHORTNESS OF BREATH UP TO 30 DAYS 1080 mL 3   • albuterol 108 (90 Base) MCG/ACT Aero Soln inhalation aerosol Inhale 2 Puffs every 6 hours as needed. 8.5 g 5   • potassium chloride SA (K-DUR) 10 MEQ Tab CR TAKE 1 TABLET BY MOUTH TWICE DAILY 180 tablet 2   • vitamin D, Ergocalciferol, (DRISDOL) 1.25 MG (29257 UT) Cap capsule TAKE 1 CAPSULE BY MOUTH EVERY 7 DAYS 12 capsule 1   • hydrochlorothiazide (MICROZIDE) 12.5 MG capsule TAKE 1 CAPSULE BY MOUTH EVERY DAY 90 capsule 3   • BLACK COHOSH PO Take  by mouth.     • ibuprofen (MOTRIN) 200 MG Tab Take 200 mg by mouth every 6 hours as needed.     • hydroxyurea (HYDREA) 500 MG Cap Take  by mouth every day.       No current facility-administered medications for this " visit.         Physical Exam:   Gen:           Alert and oriented, No apparent distress. Mood and affect appropriate, normal interaction with provider.  Eyes:          sclere white, conjunctive moist.  Hearing:     Grossly intact.  Dentition:    Upper dentures, poor bottom dentition   oropharynx:   Tongue normal, posterior pharynx without erythema or exudate.  Neck:        Supple, trachea midline, no masses.  Respiratory Effort: No intercostal retractions or use of accessory muscles.   Lung Auscultation:      Clear to auscultation bilaterally; no rales, rhonchi or wheezing.  CV:            Regular rate and rhythm. No edema. No murmurs, rubs or gallops.  Digits, Nails, Ext: No clubbing, cyanosis, petechiae, or nodes.   Skin:        No rashes, lesions or ulcers noted on exposed skin surfaces.                     Assessment:  1. COPD with asthma (HCC)  Fluticasone-Umeclidin-Vilant (TRELEGY ELLIPTA) 100-62.5-25 MCG/INH AEROSOL POWDER, BREATH ACTIVATED   2. Adult BMI 34.0-34.9 kg/sq m     3. Marijuana use         Immunizations:    Flu: Refused  Pneumovax 23: Refused  Prevnar 13: 12/29/2015  SARS-CoV-2 vaccine: Refused    Plan:  70 y.o. year old female here today for follow-up on COPD with asthma. Last seen in clinic 7-2-2021 by DOMINGA Andrews.  Patient is a never smoker.    Marijuana smoker: Complete smoking cessation advised.    Pertinent past medical history includes hypertension, thrombocytosis, marijuana use, polycythemia vera, intramural leiomyoma uterus.    Reviewed in clinic vitals including /80,  HR 70, oxygen saturation 95%.  Patient's body mass index is 34.02 kg/m².     Elevated BMI: Exercise and nutrition counseling were performed at this visit.    Reviewed home medication regimen including Trelegy, DuoNeb and albuterol.  She reports use of albuterol 2-4 times a day, DuoNeb 1-2 times per day more during poor air quality conditions.    COPD with asthma: Reviewed medication types and therapeutic  goals, reviewed pulmonary function testing, reviewed oral care.  Refills provided.  Continue same regimen.    Declines vaccinations.  Follow-up in 6 months.    This dictation was created using voice recognition software. The accuracy of the dictation is limited to the abilities of the software. I expect there may be some errors of grammar and possibly content.

## 2021-09-28 NOTE — PATIENT INSTRUCTIONS
1-doing well on Trelegy  2-3 months at a time refill provided  3-reviewed oral care to include brushing teeth and tongue  4-reviewed PFT results repeat next year  5-declines vaccination  6-follow up in 6 months

## 2021-09-29 ENCOUNTER — HOSPITAL ENCOUNTER (OUTPATIENT)
Dept: LAB | Facility: MEDICAL CENTER | Age: 71
End: 2021-09-29
Attending: NURSE PRACTITIONER
Payer: MEDICARE

## 2021-09-29 DIAGNOSIS — R73.01 IMPAIRED FASTING GLUCOSE: ICD-10-CM

## 2021-09-29 DIAGNOSIS — E87.6 HYPOKALEMIA: ICD-10-CM

## 2021-09-29 DIAGNOSIS — E78.5 DYSLIPIDEMIA: ICD-10-CM

## 2021-09-29 DIAGNOSIS — E55.9 VITAMIN D DEFICIENCY: ICD-10-CM

## 2021-09-29 DIAGNOSIS — I10 ESSENTIAL HYPERTENSION: ICD-10-CM

## 2021-09-29 LAB
25(OH)D3 SERPL-MCNC: 38 NG/ML (ref 30–100)
ALBUMIN SERPL BCP-MCNC: 4.3 G/DL (ref 3.2–4.9)
ALBUMIN/GLOB SERPL: 2 G/DL
ALP SERPL-CCNC: 48 U/L (ref 30–99)
ALT SERPL-CCNC: 10 U/L (ref 2–50)
ANION GAP SERPL CALC-SCNC: 12 MMOL/L (ref 7–16)
AST SERPL-CCNC: 19 U/L (ref 12–45)
BILIRUB SERPL-MCNC: 0.3 MG/DL (ref 0.1–1.5)
BUN SERPL-MCNC: 18 MG/DL (ref 8–22)
CALCIUM SERPL-MCNC: 9.7 MG/DL (ref 8.5–10.5)
CHLORIDE SERPL-SCNC: 103 MMOL/L (ref 96–112)
CHOLEST SERPL-MCNC: 216 MG/DL (ref 100–199)
CO2 SERPL-SCNC: 22 MMOL/L (ref 20–33)
CREAT SERPL-MCNC: 0.78 MG/DL (ref 0.5–1.4)
EST. AVERAGE GLUCOSE BLD GHB EST-MCNC: 114 MG/DL
FASTING STATUS PATIENT QL REPORTED: NORMAL
GLOBULIN SER CALC-MCNC: 2.1 G/DL (ref 1.9–3.5)
GLUCOSE SERPL-MCNC: 93 MG/DL (ref 65–99)
HBA1C MFR BLD: 5.6 % (ref 4–5.6)
HDLC SERPL-MCNC: 62 MG/DL
LDLC SERPL CALC-MCNC: 124 MG/DL
POTASSIUM SERPL-SCNC: 4.4 MMOL/L (ref 3.6–5.5)
PROT SERPL-MCNC: 6.4 G/DL (ref 6–8.2)
SODIUM SERPL-SCNC: 137 MMOL/L (ref 135–145)
TRIGL SERPL-MCNC: 152 MG/DL (ref 0–149)

## 2021-09-29 PROCEDURE — 83036 HEMOGLOBIN GLYCOSYLATED A1C: CPT | Mod: GA

## 2021-09-29 PROCEDURE — 82306 VITAMIN D 25 HYDROXY: CPT

## 2021-09-29 PROCEDURE — 80061 LIPID PANEL: CPT

## 2021-09-29 PROCEDURE — 36415 COLL VENOUS BLD VENIPUNCTURE: CPT | Mod: GA

## 2021-09-29 PROCEDURE — 80053 COMPREHEN METABOLIC PANEL: CPT

## 2021-10-21 ENCOUNTER — OFFICE VISIT (OUTPATIENT)
Dept: MEDICAL GROUP | Facility: MEDICAL CENTER | Age: 71
End: 2021-10-21
Payer: MEDICARE

## 2021-10-21 VITALS
TEMPERATURE: 97.2 F | DIASTOLIC BLOOD PRESSURE: 70 MMHG | RESPIRATION RATE: 16 BRPM | WEIGHT: 187 LBS | SYSTOLIC BLOOD PRESSURE: 122 MMHG | HEIGHT: 62 IN | BODY MASS INDEX: 34.41 KG/M2 | HEART RATE: 78 BPM | OXYGEN SATURATION: 95 %

## 2021-10-21 DIAGNOSIS — I10 ESSENTIAL HYPERTENSION: ICD-10-CM

## 2021-10-21 DIAGNOSIS — F33.1 MODERATE EPISODE OF RECURRENT MAJOR DEPRESSIVE DISORDER (HCC): ICD-10-CM

## 2021-10-21 DIAGNOSIS — Z78.0 MENOPAUSE: ICD-10-CM

## 2021-10-21 DIAGNOSIS — R60.9 EDEMA, UNSPECIFIED TYPE: ICD-10-CM

## 2021-10-21 DIAGNOSIS — F12.90 MARIJUANA USE: ICD-10-CM

## 2021-10-21 DIAGNOSIS — J44.89 COPD WITH ASTHMA (HCC): ICD-10-CM

## 2021-10-21 DIAGNOSIS — Z12.31 ENCOUNTER FOR SCREENING MAMMOGRAM FOR MALIGNANT NEOPLASM OF BREAST: ICD-10-CM

## 2021-10-21 DIAGNOSIS — R73.01 IMPAIRED FASTING GLUCOSE: ICD-10-CM

## 2021-10-21 PROCEDURE — 99213 OFFICE O/P EST LOW 20 MIN: CPT | Performed by: STUDENT IN AN ORGANIZED HEALTH CARE EDUCATION/TRAINING PROGRAM

## 2021-10-21 RX ORDER — POTASSIUM CHLORIDE 750 MG/1
10 TABLET, EXTENDED RELEASE ORAL 3 TIMES DAILY
Qty: 270 EACH | Refills: 3 | Status: SHIPPED
Start: 2021-10-21 | End: 2022-08-09

## 2021-10-21 ASSESSMENT — FIBROSIS 4 INDEX: FIB4 SCORE: 1.29

## 2021-10-21 NOTE — PROGRESS NOTES
Subjective:     CC: Edema, COPD    HPI:   Jina presents today to establish care and discuss COPD, marijuana use, polycythemia, stress, and edema.  This is a previous patient of Aniket ORR.    Edema  Patient states that she has lower extremity edema worse in the left leg than the right.  Patient states that her son recently had to have his leg amputated due to a clot in his lower extremity.  Patient states that this is caused a lot of distress and now patient is extremely fearful that she also has a clot in her lower extremity.  Patient requesting CC Dr. Villegas with [facility], the same doctor her son saw, because she feels that she also needs evaluated.  Patient has no previous history of DVT or clots.  Patient does have history of polycythemia for which she is seeing hematology every 6 months.    Marijuana use  Patient continues to use marijuana daily.  Pulmonary has advised complete cessation.    Asthma  Patient continues on Trelegy.  Patient states that she was previously using albuterol consistently and this has reduced her use.  Patient states the Trelegy has been extremely helpful in improving her lung quality.    Polycythemia  Patient states that she does continue to follow with hematology.  Recent CBC shows elevated hematocrit.    Patient continues on black cohosh.  Patient states that it is helpful and mind clarity.  Black cohosh is normally used for menopause.    Patient states that she is using potassium up to 4 times daily.  Patient states that she has always been hypokalemic and that the increase in potassium has relieved depression and body aches.  Recent labs show potassium within normal limits.    ROS:  Gen: no fevers/chills  Pulm: no sob, no cough  CV: no chest pain, no palpitations  GI: no nausea/vomiting, no diarrhea  Neuro: no headaches, no numbness/tingling  Heme/Lymph: no easy bruising      Objective:     Exam:  /70 (BP Location: Right arm, Patient Position: Sitting, BP Cuff  "Size: Adult)   Pulse 78   Temp 36.2 °C (97.2 °F) (Temporal)   Resp 16   Ht 1.575 m (5' 2\")   Wt 84.8 kg (187 lb)   SpO2 95%   BMI 34.20 kg/m²  Body mass index is 34.2 kg/m².    Gen: Alert and oriented, No apparent distress.  Neck: Neck is supple without lymphadenopathy.  Lungs: Normal effort, CTA bilaterally, no wheezes, rhonchi, or rales  CV: Regular rate and rhythm. No murmurs, rubs, or gallops.  Ext: No clubbing, cyanosis, edema.      Assessment & Plan:     70 y.o. female with the following -     1. Edema, unspecified type  Patient states that her son recently to get an amputation due to a blood clot in lower extremity.  Patient states that this has made her extremely fearful and due to her varicosities and increased leg swelling she would also like to be examined.  Ultrasound of bilateral lower extremities has been ordered.  Patient also is requesting referral to Dr. Villegas at [facility] for further evaluation.   - REFERRAL TO VASCULAR MEDICINE  - US-EXTREMITY VENOUS LOWER BILAT; Future    2. Moderate episode of recurrent major depressive disorder (HCC)  Patient is not currently on medication for depression.  Patient notes that she has had multiple stressors in her life.  Patient's PHQ-9 score today is 0.    3. Menopause  - DS-BONE DENSITY STUDY (DEXA); Future    4. Encounter for screening mammogram for malignant neoplasm of breast  - MA-SCREENING MAMMO BILAT W/TOMOSYNTHESIS W/CAD; Future    5. Marijuana use  Chronic, stable.  Patient continues to use marijuana daily.  Patient has been strongly encouraged to stop using.    6. COPD with asthma (HCC)  Chronic, stable.  Patient continues on Trelegy.  Patient states that this medication is extremely helpful.    7. Impaired fasting glucose  Patient with previously elevated fasting blood glucose.  Patient recent A1c at 5.6%.    Return in about 6 months (around 4/21/2022).    Please note that this dictation was created using voice recognition software. I " have made every reasonable attempt to correct obvious errors, but I expect that there are errors of grammar and possibly content that I did not discover before finalizing the note.

## 2021-10-27 ENCOUNTER — HOSPITAL ENCOUNTER (OUTPATIENT)
Dept: RADIOLOGY | Facility: MEDICAL CENTER | Age: 71
End: 2021-10-27
Attending: STUDENT IN AN ORGANIZED HEALTH CARE EDUCATION/TRAINING PROGRAM
Payer: MEDICARE

## 2021-10-27 DIAGNOSIS — R60.9 EDEMA, UNSPECIFIED TYPE: ICD-10-CM

## 2021-10-27 PROCEDURE — 93970 EXTREMITY STUDY: CPT

## 2021-10-28 NOTE — RESULT ENCOUNTER NOTE
Will you call and let patient know that there is no evidence of DVT bilaterally.  Normal ultrasound.

## 2021-11-05 ENCOUNTER — TELEPHONE (OUTPATIENT)
Dept: MEDICAL GROUP | Facility: MEDICAL CENTER | Age: 71
End: 2021-11-05

## 2021-11-05 NOTE — TELEPHONE ENCOUNTER
Patient called stating she is scheduled to have an appt with [facility] on December 28 but she said she needs to see them earlier

## 2021-11-08 ENCOUNTER — TELEPHONE (OUTPATIENT)
Dept: DERMATOLOGY | Facility: IMAGING CENTER | Age: 71
End: 2021-11-08

## 2021-11-08 NOTE — TELEPHONE ENCOUNTER
" FYI: Patient called in regards to Efudex cream. States she is not seeing any results. Very unhappy because she paid around $300 and thinks \"it was a waste of time and money\". Wanted to get the other medication, but aware medicare will not cover it. Told her she can reach out to her insurance to see what other options she has for them to cover it. Patient was very nice, but definitely unhappy. Wanted a call back from Dr. Castellanos, but at the end did not request anything and hung up.   "

## 2021-12-08 DIAGNOSIS — R06.02 SHORTNESS OF BREATH: ICD-10-CM

## 2021-12-08 DIAGNOSIS — J45.40 MODERATE PERSISTENT ASTHMA, UNSPECIFIED WHETHER COMPLICATED: ICD-10-CM

## 2021-12-09 RX ORDER — ALBUTEROL SULFATE 90 UG/1
AEROSOL, METERED RESPIRATORY (INHALATION)
Qty: 25.5 G | Refills: 3 | Status: SHIPPED | OUTPATIENT
Start: 2021-12-09 | End: 2022-10-19

## 2021-12-10 NOTE — TELEPHONE ENCOUNTER
Have we ever prescribed this med? Yes.  If yes, what date? 07/02/2021    Last OV: 09/28/2021- SHYLA Figueredo     Next OV: 03/28/2022- SHYLA Figueredo     DX: Moderate persistent asthma, unspecified whether complicated (J45.40); Shortness of breath (R06.02)    Medications:   Requested Prescriptions     Pending Prescriptions Disp Refills   • albuterol 108 (90 Base) MCG/ACT Aero Soln inhalation aerosol [Pharmacy Med Name: ALBUTEROL HFA INH (200 PUFFS)8.5GM] 25.5 g      Sig: INHALE 2 PUFFS BY MOUTH EVERY 6 HOURS AS NEEDED

## 2022-01-03 DIAGNOSIS — J44.89 COPD WITH ASTHMA (HCC): ICD-10-CM

## 2022-01-04 DIAGNOSIS — J44.89 COPD WITH ASTHMA (HCC): ICD-10-CM

## 2022-01-04 NOTE — TELEPHONE ENCOUNTER
Have we ever prescribed this med? Yes.  If yes, what date? 09/28/21    Last OV: 07/02/21 with Stacia ORR     Next OV: 03/28/22 with Niki Figueredo PA-C    DX: COPD with asthma (HCC) (J44.9)    Medications:   Requested Prescriptions     Pending Prescriptions Disp Refills   • TRELEGY ELLIPTA 100-62.5-25 MCG/INH AEROSOL POWDER, BREATH ACTIVATED inhalation [Pharmacy Med Name: TRELEGY ELLIPTA 100-62.5MCG INH 30P] 60 Each      Sig: INHALE 1 PUFF BY MOUTH EVERY DAY. RINSE MOUTH AFTER USE

## 2022-01-05 NOTE — TELEPHONE ENCOUNTER
Have we ever prescribed this med? Yes.  If yes, what date? 01/05/22    Last OV: 09/28/21 with Niki Figueredo PA-C    Next OV: 03/28/22 with Niki Figueredo PA-C    DX: COPD with asthma (HCC) (J44.9)    Medications:   Requested Prescriptions     Pending Prescriptions Disp Refills   • TRELEGY ELLIPTA 100-62.5-25 MCG/INH AEROSOL POWDER, BREATH ACTIVATED inhalation [Pharmacy Med Name: TRELEGY ELLIPTA 100-62.5MCG INH 30P] 60 Each      Sig: INHALE 1 PUFF BY MOUTH EVERY DAY. RINSE MOUTH AFTER USE

## 2022-01-14 NOTE — TELEPHONE ENCOUNTER
Patient called back requesting 90 day RX for trelegy , states that it is easier for her to do 90 day refills at a time    RX Pending

## 2022-02-16 NOTE — TELEPHONE ENCOUNTER
1. Caller Name: Jina Barnard                                         Call Back Number: [phone] (home)     Patient called in regard to her ct scan results, I have giving her ezio's interpretation form the result notes        Patient approves a detailed voicemail message: N\A      
Admitted

## 2022-03-28 ENCOUNTER — OFFICE VISIT (OUTPATIENT)
Dept: SLEEP MEDICINE | Facility: MEDICAL CENTER | Age: 72
End: 2022-03-28
Payer: MEDICARE

## 2022-03-28 VITALS
SYSTOLIC BLOOD PRESSURE: 120 MMHG | HEART RATE: 75 BPM | DIASTOLIC BLOOD PRESSURE: 80 MMHG | BODY MASS INDEX: 36.8 KG/M2 | WEIGHT: 200 LBS | RESPIRATION RATE: 16 BRPM | OXYGEN SATURATION: 96 % | HEIGHT: 62 IN

## 2022-03-28 DIAGNOSIS — J44.89 COPD WITH ASTHMA (HCC): ICD-10-CM

## 2022-03-28 DIAGNOSIS — F12.90 MARIJUANA USE: ICD-10-CM

## 2022-03-28 PROCEDURE — 99213 OFFICE O/P EST LOW 20 MIN: CPT | Performed by: PHYSICIAN ASSISTANT

## 2022-03-28 RX ORDER — MONTELUKAST SODIUM 10 MG/1
10 TABLET ORAL EVERY EVENING
Qty: 30 TABLET | Refills: 0 | Status: SHIPPED | OUTPATIENT
Start: 2022-03-28 | End: 2022-04-26

## 2022-03-28 RX ORDER — SULFAMETHOXAZOLE AND TRIMETHOPRIM 800; 160 MG/1; MG/1
1 TABLET ORAL 2 TIMES DAILY
COMMUNITY
Start: 2022-02-21 | End: 2022-04-21

## 2022-03-28 RX ORDER — AMOXICILLIN AND CLAVULANATE POTASSIUM 875; 125 MG/1; MG/1
TABLET, FILM COATED ORAL
COMMUNITY
Start: 2022-01-26 | End: 2022-04-21

## 2022-03-28 RX ORDER — POTASSIUM CHLORIDE 750 MG/1
10 TABLET, FILM COATED, EXTENDED RELEASE ORAL 3 TIMES DAILY
COMMUNITY
Start: 2022-03-10 | End: 2023-01-09

## 2022-03-28 RX ORDER — METRONIDAZOLE 500 MG/1
500 TABLET ORAL 3 TIMES DAILY
COMMUNITY
Start: 2022-02-21 | End: 2022-04-21

## 2022-03-28 ASSESSMENT — ENCOUNTER SYMPTOMS
HEARTBURN: 1
SORE THROAT: 0
FEVER: 0
SINUS PAIN: 0
CONSTIPATION: 1
COUGH: 1
SPUTUM PRODUCTION: 1
INSOMNIA: 1
WHEEZING: 1
PALPITATIONS: 0
WEIGHT LOSS: 0
DIZZINESS: 0
ORTHOPNEA: 0
TREMORS: 0
CHILLS: 0
HEADACHES: 0
ABDOMINAL PAIN: 1
DIARRHEA: 1
SHORTNESS OF BREATH: 1

## 2022-03-28 ASSESSMENT — FIBROSIS 4 INDEX: FIB4 SCORE: 1.31

## 2022-03-28 NOTE — PROGRESS NOTES
CC: Follow-up COPD with asthma    HPI:  Jina Barnard is a 71 y.o. year old female here today for follow-up on COPD with asthma.  Last seen in clinic 9/28/2021.  Patient is a never tobacco smoker, history of marijuana use.     Pertinent past medical history includes hypertension, thrombocytosis, marijuana use, polycythemia vera, intramural leiomyoma uterus    Reviewed in clinic vitals including /80, HR 75, O2 sat of 96% and BMI of 36.58 kg/m²    Reviewed home medication regimen including Trelegy, DuoNeb, montelukast, albuterol. Patient reports using albuterol up to 4 times per day but not requiring nebulizer.    Reviewed most recent imaging including chest x-ray obtained 12/14/2020 demonstrating emphysema, atherosclerosis, no acute cardiopulmonary disease.    Reviewed pulmonary function testing obtained 7/16/2021 demonstrating an FEV1 of 0.81 L or 39% predicted, FVC of 1.52 L or 57% predicted, FEV1/FVC ratio of 53, residual volume 163% predicted, % predicted, DLCO 91% predicted.  Per pulmonologist interpretation moderately severe obstructive ventilatory defect, significant reduction in FEV1 noted, air trapping, preserved diffusion capacity and flow volume loop consistent with interpretation.    Review of Systems   Constitutional: Positive for malaise/fatigue (mild ). Negative for chills, fever and weight loss.   HENT: Positive for congestion (mild). Negative for hearing loss, nosebleeds, sinus pain, sore throat and tinnitus.    Eyes:        Presc glasses    Respiratory: Positive for cough, sputum production (clear thick ), shortness of breath (with exertion ) and wheezing.    Cardiovascular: Positive for leg swelling (left). Negative for chest pain, palpitations and orthopnea.   Gastrointestinal: Positive for abdominal pain, constipation, diarrhea and heartburn (related to diverticultis ).        Diverticulitis, upper, no difficulty swallowing    Neurological: Negative for dizziness, tremors and  "headaches.   Psychiatric/Behavioral: The patient has insomnia (mild chronic, staying asleep ).        Past Medical History:   Diagnosis Date   • Hypertension    • Thrombocytosis        Past Surgical History:   Procedure Laterality Date   • TUBAL COAGULATION LAPAROSCOPIC BILATERAL         Family History   Problem Relation Age of Onset   • Alcohol/Drug Father         alcohol   • Other Father         malnutrition   • Psychiatric Illness Father    • Other Mother         blood clot from surgery   • Hypertension Mother    • Diabetes Mother         ?   • Psychiatric Illness Sister    • Other Sister         malnutrition   • No Known Problems Maternal Grandmother    • No Known Problems Maternal Grandfather    • No Known Problems Paternal Grandmother    • No Known Problems Paternal Grandfather        Social History     Socioeconomic History   • Marital status: Single     Spouse name: Not on file   • Number of children: Not on file   • Years of education: Not on file   • Highest education level: Not on file   Occupational History   • Not on file   Tobacco Use   • Smoking status: Never Smoker   • Smokeless tobacco: Never Used   Vaping Use   • Vaping Use: Never used   Substance and Sexual Activity   • Alcohol use: No   • Drug use: Yes     Types: Marijuana     Comment: daily   • Sexual activity: Never     Partners: Male     Birth control/protection: Post-Menopausal   Other Topics Concern   • Not on file   Social History Narrative   • Not on file     Social Determinants of Health     Financial Resource Strain: Not on file   Food Insecurity: Not on file   Transportation Needs: Not on file   Physical Activity: Not on file   Stress: Not on file   Social Connections: Not on file   Intimate Partner Violence: Not on file   Housing Stability: Not on file       Allergies as of 03/28/2022   • (Not on File)        @Vital signs for this encounter:  /80   Pulse 75   Resp 16   Ht 1.575 m (5' 2\")   Wt 90.7 kg (200 lb)   SpO2 96% "     Current medications as of today   Current Outpatient Medications   Medication Sig Dispense Refill   • amoxicillin-clavulanate (AUGMENTIN) 875-125 MG Tab TAKE 1 TABLET BY MOUTH TWICE DAILY FOR 7 DAYS     • sulfamethoxazole-trimethoprim (BACTRIM DS) 800-160 MG tablet Take 1 Tablet by mouth 2 times a day. FOR 10 DAYS     • potassium chloride ER (KLOR-CON) 10 MEQ tablet Take 10 mEq by mouth 3 times a day.     • metroNIDAZOLE (FLAGYL) 500 MG Tab Take 500 mg by mouth 3 times a day.     • Fluticasone-Umeclidin-Vilant (TRELEGY ELLIPTA) 100-62.5-25 MCG/INH AEROSOL POWDER, BREATH ACTIVATED inhalation INHALE 1 PUFF BY MOUTH EVERY DAY. RINSE MOUTH AFTER USE 3 Each 3   • albuterol 108 (90 Base) MCG/ACT Aero Soln inhalation aerosol INHALE 2 PUFFS BY MOUTH EVERY 6 HOURS AS NEEDED 25.5 g 3   • potassium chloride SA (K-DUR) 10 MEQ Tab CR Take 1 Tablet by mouth 3 times a day. 270 Each 3   • fluorouracil (EFUDEX) 5 % cream AAA face BID for rough/red crusted skin - actinic keratoses.  Use for 2-4 weeks, then stop. 40 g 0   • ipratropium-albuterol (DUONEB) 0.5-2.5 (3) MG/3ML nebulizer solution USE ONE VIAL IN NEBULIZER EVERY 6 HOURS AS NEEDED FOR SHORTNESS OF BREATH UP TO 30 DAYS 1080 mL 3   • vitamin D, Ergocalciferol, (DRISDOL) 1.25 MG (87536 UT) Cap capsule TAKE 1 CAPSULE BY MOUTH EVERY 7 DAYS 12 capsule 1   • hydrochlorothiazide (MICROZIDE) 12.5 MG capsule TAKE 1 CAPSULE BY MOUTH EVERY DAY 90 capsule 3   • BLACK COHOSH PO Take  by mouth.     • ibuprofen (MOTRIN) 200 MG Tab Take 200 mg by mouth every 6 hours as needed.     • hydroxyurea (HYDREA) 500 MG Cap Take  by mouth every day.       No current facility-administered medications for this visit.         Physical Exam:   Gen:           Alert and oriented, No apparent distress. Mood and affect appropriate, normal interaction with provider.  Eyes:          sclere white, conjunctive moist.  Hearing:     Grossly intact.  Dentition:    Upper dentures, poor lower  dentition  Oropharynx:   Tongue normal, posterior pharynx without erythema or exudate.  Neck:        Supple, trachea midline, no masses.  Respiratory Effort: No intercostal retractions or use of accessory muscles.   Lung Auscultation:      Decreased slightly; no rales, rhonchi or wheezing.  CV:            Regular rate and rhythm. No edema. No murmurs, rubs or gallops.  Digits, Nails, Ext: No clubbing, cyanosis, petechiae, or nodes.   Skin:        No rashes, lesions or ulcers noted on exposed skin surfaces.                     Assessment:  1. COPD with asthma ([facility])  PULMONARY FUNCTION TESTS -Test requested: Complete Pulmonary Function Test    montelukast (SINGULAIR) 10 MG Tab   2. Marijuana use     3. BMI 36.0-36.9,adult         Immunizations:    Flu: 7/1/2020  Pneumovax 23: 12/29/2016  Prevnar 13: 12/29/2015  SARS-CoV-2 vaccine: Patient declined    Plan:  71 y.o. year old female here today for follow-up on COPD with asthma.      COPD with asthma: Benefit with current regimen, does use albuterol inhaler several times per day.  Update pulmonary function testing at next visit. Gold group B.    Elevated BMI: Encouraged gentle activity as tolerated, monitor nutritional intake.     Marijuana smoker: No history of tobacco use, complete smoking cessation advised.    Follow-up in 6 months, sooner if needed.     This dictation was created using voice recognition software. The accuracy of the dictation is limited to the abilities of the software. I expect there may be some errors of grammar and possibly content.

## 2022-03-28 NOTE — PATIENT INSTRUCTIONS
1-trial extended release melatonin for insomnia/staying asleep   2-reviewed indications for albuterol use/spacer provided    -increased shortness of breath   -increased work of breathing   -wheezing  3-trial montelukast/Singulair for allergy symptoms   4-increase activity as tolerated  5-continue current regimen  6-follow up in 6 months with PFT

## 2022-03-30 ENCOUNTER — HOSPITAL ENCOUNTER (OUTPATIENT)
Dept: LAB | Facility: MEDICAL CENTER | Age: 72
End: 2022-03-30
Attending: PHYSICIAN ASSISTANT
Payer: MEDICARE

## 2022-03-30 LAB
ALBUMIN SERPL BCP-MCNC: 4.6 G/DL (ref 3.2–4.9)
ALBUMIN/GLOB SERPL: 2.7 G/DL
ALP SERPL-CCNC: 50 U/L (ref 30–99)
ALT SERPL-CCNC: 19 U/L (ref 2–50)
ANION GAP SERPL CALC-SCNC: 14 MMOL/L (ref 7–16)
ANISOCYTOSIS BLD QL SMEAR: ABNORMAL
AST SERPL-CCNC: 19 U/L (ref 12–45)
BASOPHILS # BLD AUTO: 0.4 % (ref 0–1.8)
BASOPHILS # BLD: 0.03 K/UL (ref 0–0.12)
BILIRUB SERPL-MCNC: 0.5 MG/DL (ref 0.1–1.5)
BUN SERPL-MCNC: 13 MG/DL (ref 8–22)
CALCIUM SERPL-MCNC: 9.4 MG/DL (ref 8.5–10.5)
CHLORIDE SERPL-SCNC: 100 MMOL/L (ref 96–112)
CO2 SERPL-SCNC: 23 MMOL/L (ref 20–33)
COMMENT 1642: NORMAL
CREAT SERPL-MCNC: 0.77 MG/DL (ref 0.5–1.4)
CRP SERPL HS-MCNC: <0.3 MG/DL (ref 0–0.75)
EOSINOPHIL # BLD AUTO: 0.12 K/UL (ref 0–0.51)
EOSINOPHIL NFR BLD: 1.7 % (ref 0–6.9)
ERYTHROCYTE [DISTWIDTH] IN BLOOD BY AUTOMATED COUNT: 65.8 FL (ref 35.9–50)
GFR SERPLBLD CREATININE-BSD FMLA CKD-EPI: 82 ML/MIN/1.73 M 2
GLOBULIN SER CALC-MCNC: 1.7 G/DL (ref 1.9–3.5)
GLUCOSE SERPL-MCNC: 103 MG/DL (ref 65–99)
HCT VFR BLD AUTO: 47.1 % (ref 37–47)
HGB BLD-MCNC: 16.7 G/DL (ref 12–16)
IMM GRANULOCYTES # BLD AUTO: 0.03 K/UL (ref 0–0.11)
IMM GRANULOCYTES NFR BLD AUTO: 0.4 % (ref 0–0.9)
LYMPHOCYTES # BLD AUTO: 2.77 K/UL (ref 1–4.8)
LYMPHOCYTES NFR BLD: 38.5 % (ref 22–41)
MACROCYTES BLD QL SMEAR: ABNORMAL
MCH RBC QN AUTO: 40.1 PG (ref 27–33)
MCHC RBC AUTO-ENTMCNC: 35.5 G/DL (ref 33.6–35)
MCV RBC AUTO: 113.2 FL (ref 81.4–97.8)
MONOCYTES # BLD AUTO: 0.66 K/UL (ref 0–0.85)
MONOCYTES NFR BLD AUTO: 9.2 % (ref 0–13.4)
MORPHOLOGY BLD-IMP: NORMAL
NEUTROPHILS # BLD AUTO: 3.59 K/UL (ref 2–7.15)
NEUTROPHILS NFR BLD: 49.8 % (ref 44–72)
NRBC # BLD AUTO: 0 K/UL
NRBC BLD-RTO: 0 /100 WBC
PLATELET # BLD AUTO: 305 K/UL (ref 164–446)
PLATELET BLD QL SMEAR: NORMAL
PMV BLD AUTO: 9.8 FL (ref 9–12.9)
POTASSIUM SERPL-SCNC: 4.5 MMOL/L (ref 3.6–5.5)
PROT SERPL-MCNC: 6.3 G/DL (ref 6–8.2)
RBC # BLD AUTO: 4.16 M/UL (ref 4.2–5.4)
RBC BLD AUTO: PRESENT
SODIUM SERPL-SCNC: 137 MMOL/L (ref 135–145)
WBC # BLD AUTO: 7.2 K/UL (ref 4.8–10.8)

## 2022-03-30 PROCEDURE — 85025 COMPLETE CBC W/AUTO DIFF WBC: CPT

## 2022-03-30 PROCEDURE — 86140 C-REACTIVE PROTEIN: CPT

## 2022-03-30 PROCEDURE — 80053 COMPREHEN METABOLIC PANEL: CPT

## 2022-03-30 PROCEDURE — 36415 COLL VENOUS BLD VENIPUNCTURE: CPT

## 2022-04-13 ENCOUNTER — HOSPITAL ENCOUNTER (OUTPATIENT)
Dept: RADIOLOGY | Facility: MEDICAL CENTER | Age: 72
End: 2022-04-13
Attending: PHYSICIAN ASSISTANT
Payer: MEDICARE

## 2022-04-13 DIAGNOSIS — K57.30 COLON, DIVERTICULOSIS: ICD-10-CM

## 2022-04-13 DIAGNOSIS — R10.30 ABDOMINAL PAIN, LOWER: ICD-10-CM

## 2022-04-13 PROCEDURE — 700117 HCHG RX CONTRAST REV CODE 255: Performed by: PHYSICIAN ASSISTANT

## 2022-04-13 PROCEDURE — 74177 CT ABD & PELVIS W/CONTRAST: CPT | Mod: MH

## 2022-04-13 RX ADMIN — IOHEXOL 100 ML: 350 INJECTION, SOLUTION INTRAVENOUS at 11:18

## 2022-04-13 RX ADMIN — IOHEXOL 25 ML: 240 INJECTION, SOLUTION INTRATHECAL; INTRAVASCULAR; INTRAVENOUS; ORAL at 11:19

## 2022-04-21 ENCOUNTER — OFFICE VISIT (OUTPATIENT)
Dept: MEDICAL GROUP | Facility: MEDICAL CENTER | Age: 72
End: 2022-04-21
Payer: MEDICARE

## 2022-04-21 VITALS
HEART RATE: 74 BPM | SYSTOLIC BLOOD PRESSURE: 124 MMHG | DIASTOLIC BLOOD PRESSURE: 72 MMHG | TEMPERATURE: 97.6 F | WEIGHT: 198 LBS | BODY MASS INDEX: 36.44 KG/M2 | RESPIRATION RATE: 16 BRPM | HEIGHT: 62 IN | OXYGEN SATURATION: 96 %

## 2022-04-21 DIAGNOSIS — J44.89 COPD WITH ASTHMA (HCC): ICD-10-CM

## 2022-04-21 DIAGNOSIS — F33.1 MODERATE EPISODE OF RECURRENT MAJOR DEPRESSIVE DISORDER (HCC): ICD-10-CM

## 2022-04-21 DIAGNOSIS — E66.9 OBESITY (BMI 35.0-39.9 WITHOUT COMORBIDITY): ICD-10-CM

## 2022-04-21 DIAGNOSIS — D45 POLYCYTHEMIA VERA (HCC): ICD-10-CM

## 2022-04-21 DIAGNOSIS — K57.92 DIVERTICULITIS: ICD-10-CM

## 2022-04-21 DIAGNOSIS — I10 ESSENTIAL HYPERTENSION: ICD-10-CM

## 2022-04-21 PROCEDURE — 99214 OFFICE O/P EST MOD 30 MIN: CPT | Performed by: STUDENT IN AN ORGANIZED HEALTH CARE EDUCATION/TRAINING PROGRAM

## 2022-04-21 ASSESSMENT — PATIENT HEALTH QUESTIONNAIRE - PHQ9
4. FEELING TIRED OR HAVING LITTLE ENERGY: NEARLY EVERY DAY
2. FEELING DOWN, DEPRESSED, IRRITABLE, OR HOPELESS: NOT AT ALL
3. TROUBLE FALLING OR STAYING ASLEEP OR SLEEPING TOO MUCH: NEARLY EVERY DAY
5. POOR APPETITE OR OVEREATING: MORE THAN HALF THE DAYS
SUM OF ALL RESPONSES TO PHQ QUESTIONS 1-9: 10
9. THOUGHTS THAT YOU WOULD BE BETTER OFF DEAD, OR OF HURTING YOURSELF: NOT AT ALL
7. TROUBLE CONCENTRATING ON THINGS, SUCH AS READING THE NEWSPAPER OR WATCHING TELEVISION: SEVERAL DAYS
8. MOVING OR SPEAKING SO SLOWLY THAT OTHER PEOPLE COULD HAVE NOTICED. OR THE OPPOSITE, BEING SO FIGETY OR RESTLESS THAT YOU HAVE BEEN MOVING AROUND A LOT MORE THAN USUAL: NOT AT ALL
6. FEELING BAD ABOUT YOURSELF - OR THAT YOU ARE A FAILURE OR HAVE LET YOURSELF OR YOUR FAMILY DOWN: NOT AL ALL
SUM OF ALL RESPONSES TO PHQ9 QUESTIONS 1 AND 2: 1
1. LITTLE INTEREST OR PLEASURE IN DOING THINGS: SEVERAL DAYS

## 2022-04-21 ASSESSMENT — FIBROSIS 4 INDEX: FIB4 SCORE: 1.03

## 2022-04-21 NOTE — PROGRESS NOTES
Subjective:     CC: Diverticulitis, COPD, edema 6-month follow-up    HPI:   Jina presents today with   Edema  At last visit patient with significant anxiety requesting referral to Dr. Villegas with [facility] for evaluation of lower leg edema for concern about DVT after son had recent amputation.  Patient was seen and evaluated.  Patient reassured that everything is normal.    COPD with asthma  Patient following with pulmonary.  Patient with daily marijuana use.  Patient continues on Trelegy, DuoNeb, montelukast, albuterol.  Patient using albuterol up to 4 times per day.  Pulmonary ordered pulmonary function testing.     Diverticulitis  Patient notes chronic recurring diverticulitis.  Patient notes that she has had multiple episodes of diverticulitis in the last month.  Patient notes that that antibiotics work like pain medications and that she constantly wants to be on antibiotics and that they never gave her the strongest ones or the longest course.  Discussed with patient that that is poor antibiotic management.  Patient notes that her diet is poor and she just eats cookies and puffed chips.  Patient has started to increase fiber by eating an apple daily and does eat some protein.  Patient notes that for the last 4 months she has not gotten any movement and has had no energy due to recurrent diverticulitis.  Patient following with GI consultants.  Patient is aware that if she does not make changes they may need to do surgery to remove the diverticulitis.    Depression  Patient with PHQ-9 score of 10 today.  Patient denies SI.  Patient denies self-harm.  Patient states that she is just fatigued and tired secondary to her physical health.  Patient believes that her depression is stemming from her diverticulitis.  Patient is not interested in medication or therapy at this time.      ROS:  Gen: no fevers/chills  Pulm: no sob, no cough  CV: no chest pain, no palpitations  GI: no nausea/vomiting, no  "diarrhea  Neuro: no headaches      Objective:     Exam:  /72   Pulse 74   Temp 36.4 °C (97.6 °F)   Resp 16   Ht 1.575 m (5' 2\")   Wt 89.8 kg (198 lb)   SpO2 96%   BMI 36.21 kg/m²  Body mass index is 36.21 kg/m².    Gen: Alert and oriented, No apparent distress.  Neck: Neck is supple without lymphadenopathy.  Lungs: Normal effort, CTA bilaterally, no wheezes, rhonchi, or rales  CV: Regular rate and rhythm. No murmurs, rubs, or gallops.  Ext: No clubbing, cyanosis, edema.      Assessment & Plan:     71 y.o. female with the following -     1. Diverticulitis  Chronic, stable.  Discussed with patient that she needs to improve her diet and cannot discontinue taking antibiotics.  Patient advised to continue follow-up with GI for further evaluation of diverticulitis.    2. Moderate episode of recurrent major depressive disorder (HCC)  Chronic, uncontrolled.  Patient's PHQ-9 score today at 10.  Patient denies SI.  Patient declines referral to therapy or medication.  Patient believes depression is secondary to physical health.    3. Polycythemia vera (HCC)  Chronic, stable.  Patient continues to follow with hematology.    4. Obesity (BMI 35.0-39.9 without comorbidity) (HCC)  Chronic, stable.  Patient's BMI elevated at 36.  Thorough discussion with patient about dietary changes and increasing movement.    5. COPD with asthma (HCC)  Chronic, stable.  Patient continues to follow with pulmonary.    6. Essential hypertension  Chronic, stable.  Patient's blood pressure controlled at 124/72.  Patient continues on hydrochlorothiazide.    Return in about 6 months (around 10/21/2022).    Please note that this dictation was created using voice recognition software. I have made every reasonable attempt to correct obvious errors, but I expect that there are errors of grammar and possibly content that I did not discover before finalizing the note.      "

## 2022-04-23 DIAGNOSIS — J44.89 COPD WITH ASTHMA (HCC): ICD-10-CM

## 2022-04-26 RX ORDER — MONTELUKAST SODIUM 10 MG/1
10 TABLET ORAL EVERY EVENING
Qty: 30 TABLET | Refills: 5 | Status: SHIPPED | OUTPATIENT
Start: 2022-04-26 | End: 2022-11-03 | Stop reason: SDUPTHER

## 2022-04-26 NOTE — TELEPHONE ENCOUNTER
Have we ever prescribed this med? Yes.  If yes, what date? 3/28/22    Last OV: 3/28/22 w/ SHYLA Figueredo Pa-C     Next OV: 9/28/22 w/ SHYLA Figueredo Pa-C    DX: COPD with asthma (HCC) (J44.9)    Medications:   Requested Prescriptions     Pending Prescriptions Disp Refills   • montelukast (SINGULAIR) 10 MG Tab [Pharmacy Med Name: MONTELUKAST 10MG TABLETS] 30 Tablet 0     Sig: TAKE 1 TABLET BY MOUTH EVERY EVENING

## 2022-05-09 ENCOUNTER — APPOINTMENT (OUTPATIENT)
Dept: PULMONOLOGY | Facility: MEDICAL CENTER | Age: 72
End: 2022-05-09
Attending: PHYSICIAN ASSISTANT
Payer: MEDICARE

## 2022-05-09 DIAGNOSIS — J45.40 MODERATE PERSISTENT ASTHMA, UNSPECIFIED WHETHER COMPLICATED: ICD-10-CM

## 2022-05-09 DIAGNOSIS — R06.02 SHORTNESS OF BREATH: ICD-10-CM

## 2022-05-09 RX ORDER — IPRATROPIUM BROMIDE AND ALBUTEROL SULFATE 2.5; .5 MG/3ML; MG/3ML
SOLUTION RESPIRATORY (INHALATION)
Qty: 120 EACH | Refills: 3 | Status: SHIPPED | OUTPATIENT
Start: 2022-05-09 | End: 2022-08-17

## 2022-05-09 NOTE — TELEPHONE ENCOUNTER
Caller Name: Jina Barnard                 Call Back Number: [phone] (home)         Patient approves a detailed voicemail message: N\A    Have we ever prescribed this med? Yes.  If yes, what date? 7/7/21     Last OV: 3/28/22 SHYLA Figueredo PA-c     Next OV: 9/28/22 GIANCARLO Strickland    DX: COPD     Medications:  Current Outpatient Medications   Medication Sig Dispense Refill   • montelukast (SINGULAIR) 10 MG Tab TAKE 1 TABLET BY MOUTH EVERY EVENING 30 Tablet 5   • potassium chloride ER (KLOR-CON) 10 MEQ tablet Take 10 mEq by mouth 3 times a day.     • Fluticasone-Umeclidin-Vilant (TRELEGY ELLIPTA) 100-62.5-25 MCG/INH AEROSOL POWDER, BREATH ACTIVATED inhalation INHALE 1 PUFF BY MOUTH EVERY DAY. RINSE MOUTH AFTER USE 3 Each 3   • albuterol 108 (90 Base) MCG/ACT Aero Soln inhalation aerosol INHALE 2 PUFFS BY MOUTH EVERY 6 HOURS AS NEEDED 25.5 g 3   • potassium chloride SA (K-DUR) 10 MEQ Tab CR Take 1 Tablet by mouth 3 times a day. 270 Each 3   • fluorouracil (EFUDEX) 5 % cream AAA face BID for rough/red crusted skin - actinic keratoses.  Use for 2-4 weeks, then stop. 40 g 0   • ipratropium-albuterol (DUONEB) 0.5-2.5 (3) MG/3ML nebulizer solution USE ONE VIAL IN NEBULIZER EVERY 6 HOURS AS NEEDED FOR SHORTNESS OF BREATH UP TO 30 DAYS 1080 mL 3   • vitamin D, Ergocalciferol, (DRISDOL) 1.25 MG (60795 UT) Cap capsule TAKE 1 CAPSULE BY MOUTH EVERY 7 DAYS 12 capsule 1   • hydrochlorothiazide (MICROZIDE) 12.5 MG capsule TAKE 1 CAPSULE BY MOUTH EVERY DAY 90 capsule 3   • BLACK COHOSH PO Take  by mouth.     • ibuprofen (MOTRIN) 200 MG Tab Take 200 mg by mouth every 6 hours as needed.     • hydroxyurea (HYDREA) 500 MG Cap Take  by mouth every day.       No current facility-administered medications for this visit.

## 2022-05-11 ENCOUNTER — TELEPHONE (OUTPATIENT)
Dept: MEDICAL GROUP | Facility: MEDICAL CENTER | Age: 72
End: 2022-05-11
Payer: MEDICARE

## 2022-05-11 NOTE — TELEPHONE ENCOUNTER
1. Caller Name: Jina Barnard                         Call Back Number: [phone] (home)     Patient called she would like you to review her recent CT scan done last month and let her know if she needs to see a specialist to look at her Adrenal glands.    Also she said she feels her neck tender and would like to be referred to  physical therapy if possible              How would the patient prefer to be contacted with a response: call back

## 2022-05-20 NOTE — TELEPHONE ENCOUNTER
Patient called today lvm stating she would like to know if she needs a referral for her Adrenal gland, she states she is tired and sweats

## 2022-05-31 ENCOUNTER — TELEPHONE (OUTPATIENT)
Dept: MEDICAL GROUP | Facility: MEDICAL CENTER | Age: 72
End: 2022-05-31
Payer: MEDICARE

## 2022-05-31 RX ORDER — AMOXICILLIN AND CLAVULANATE POTASSIUM 875; 125 MG/1; MG/1
TABLET, FILM COATED ORAL
COMMUNITY
Start: 2022-05-06 | End: 2022-08-09

## 2022-05-31 RX ORDER — POLYETHYLENE GLYCOL 3350, SODIUM CHLORIDE, SODIUM BICARBONATE, POTASSIUM CHLORIDE 420; 11.2; 5.72; 1.48 G/4L; G/4L; G/4L; G/4L
POWDER, FOR SOLUTION ORAL
COMMUNITY
Start: 2022-05-08 | End: 2023-12-20

## 2022-05-31 NOTE — TELEPHONE ENCOUNTER
Patient called and left a voice mail requesting a referral to a hormone specialist. I returned her call LVM / no answer. Advised patient to make an appt.

## 2022-06-14 ENCOUNTER — APPOINTMENT (OUTPATIENT)
Dept: SLEEP MEDICINE | Facility: MEDICAL CENTER | Age: 72
End: 2022-06-14
Attending: PHYSICIAN ASSISTANT
Payer: MEDICARE

## 2022-07-01 ENCOUNTER — NON-PROVIDER VISIT (OUTPATIENT)
Dept: SLEEP MEDICINE | Facility: MEDICAL CENTER | Age: 72
End: 2022-07-01
Attending: PHYSICIAN ASSISTANT
Payer: MEDICARE

## 2022-07-01 VITALS — HEIGHT: 62 IN | BODY MASS INDEX: 35.15 KG/M2 | WEIGHT: 191 LBS

## 2022-07-01 DIAGNOSIS — J44.89 COPD WITH ASTHMA (HCC): ICD-10-CM

## 2022-07-01 PROCEDURE — 94729 DIFFUSING CAPACITY: CPT | Performed by: INTERNAL MEDICINE

## 2022-07-01 PROCEDURE — 94726 PLETHYSMOGRAPHY LUNG VOLUMES: CPT | Performed by: INTERNAL MEDICINE

## 2022-07-01 PROCEDURE — 94060 EVALUATION OF WHEEZING: CPT | Performed by: INTERNAL MEDICINE

## 2022-07-01 ASSESSMENT — PULMONARY FUNCTION TESTS
FEV1_PERCENT_CHANGE: 6
FVC_LLN: 2.16
FEV1/FVC_PERCENT_PREDICTED: 84
FEV1/FVC: 64
FEV1/FVC_PERCENT_PREDICTED: 82
FEV1/FVC: 66
FEV1/FVC_PERCENT_CHANGE: 33
FEV1_PERCENT_CHANGE: 2
FEV1_PERCENT_PREDICTED: 60
FEV1/FVC: 66
FEV1: 1.22
FEV1/FVC: 64.21
FVC: 1.9
FVC_PERCENT_PREDICTED: 73
FEV1/FVC_PERCENT_CHANGE: -3
FEV1/FVC_PERCENT_LLN: 65
FEV1: 1.19
FEV1_LLN: 1.68
FEV1/FVC_PERCENT_PREDICTED: 86
FVC_PREDICTED: 2.59
FVC: 1.79
FEV1/FVC_PERCENT_PREDICTED: 81
FEV1/FVC_PREDICTED: 78
FEV1_PREDICTED: 2.01
FEV1_PERCENT_PREDICTED: 59
FEV1/FVC_PERCENT_PREDICTED: 78
FVC_PERCENT_PREDICTED: 69

## 2022-07-01 ASSESSMENT — FIBROSIS 4 INDEX: FIB4 SCORE: 1.03

## 2022-07-01 NOTE — PROCEDURES
Technician: JOLYNN Christian    Technician Comment:  Good patient effort & cooperation.  The results of this test meet the ATS/ERS standards for acceptability & reproducibility.  Test was performed on the Gen[employer] Body Plethysmograph-Elite DX system.  Predicted values were GLI-2012 for spirometry, GLI-2017 for DLCO, ITS for Lung Volumes.  The DLCO was uncorrected for Hgb.  A bronchodilator of Ventolin HFA -2puffs via spacer administered.  DLCO performed during dilation period.    Interpretation:  Baseline spirometry shows airflow obstruction with FEV1/FVC ratio 66 and FEV1 of 1.19 L or 59% predicted.  No significant bronchodilator response.  Total lung capacity is within normal limits at 5.35 L or 112% predicted.  There is air trapping with residual volume of 158% predicted.  Diffusion capacity is preserved at 85% predicted.  Pulmonary function testing shows obstructive airways disease that appears to be fixed.  Findings could be consistent with bronchiectasis, uncontrolled asthma or COPD.  Correlate clinically and with imaging.

## 2022-07-29 ENCOUNTER — TELEPHONE (OUTPATIENT)
Dept: SLEEP MEDICINE | Facility: MEDICAL CENTER | Age: 72
End: 2022-07-29
Payer: MEDICARE

## 2022-07-29 NOTE — TELEPHONE ENCOUNTER
Attempted to call patient, unable to reach her.  Left voice message.  The Trelegy cannot be used more than once a day as it contains 3 medicines and that exceeds the recommendations for these medications.  She can use an over-the-counter medication such as guaifenesin or Mucinex to help thin secretions.  Seek medical care if worsening respiratory symptoms.

## 2022-07-29 NOTE — TELEPHONE ENCOUNTER
Patient has been having trouble breathing. She said the Trelegy really help here but runs out about half way through the day. Patient would like to know if she could use the Trelegy 2 times a day. Patient is also using her inhaler and Nebulizer frequently. patients bigest complaint is the mucous build up is hard to get rid of.

## 2022-08-09 ENCOUNTER — PATIENT OUTREACH (OUTPATIENT)
Dept: HEALTH INFORMATION MANAGEMENT | Facility: OTHER | Age: 72
End: 2022-08-09

## 2022-08-09 ENCOUNTER — OFFICE VISIT (OUTPATIENT)
Dept: MEDICAL GROUP | Facility: MEDICAL CENTER | Age: 72
End: 2022-08-09
Payer: MEDICARE

## 2022-08-09 VITALS
SYSTOLIC BLOOD PRESSURE: 130 MMHG | BODY MASS INDEX: 35.7 KG/M2 | TEMPERATURE: 97.2 F | HEART RATE: 107 BPM | DIASTOLIC BLOOD PRESSURE: 74 MMHG | HEIGHT: 62 IN | WEIGHT: 194 LBS | RESPIRATION RATE: 16 BRPM | OXYGEN SATURATION: 94 %

## 2022-08-09 DIAGNOSIS — J44.89 COPD WITH ASTHMA (HCC): ICD-10-CM

## 2022-08-09 DIAGNOSIS — R06.02 SHORTNESS OF BREATH: ICD-10-CM

## 2022-08-09 DIAGNOSIS — K57.90 DIVERTICULOSIS: ICD-10-CM

## 2022-08-09 DIAGNOSIS — E27.8 ADRENAL NODULE (HCC): ICD-10-CM

## 2022-08-09 DIAGNOSIS — N85.8 UTERINE MASS: ICD-10-CM

## 2022-08-09 DIAGNOSIS — J45.40 MODERATE PERSISTENT ASTHMA, UNSPECIFIED WHETHER COMPLICATED: ICD-10-CM

## 2022-08-09 PROCEDURE — 99214 OFFICE O/P EST MOD 30 MIN: CPT | Performed by: STUDENT IN AN ORGANIZED HEALTH CARE EDUCATION/TRAINING PROGRAM

## 2022-08-09 ASSESSMENT — FIBROSIS 4 INDEX: FIB4 SCORE: 1.03

## 2022-08-09 NOTE — PROGRESS NOTES
This RN spoke with patient after scheduled PCP appointment. This RN introduced CCM program to patient and patient stated she was interested. However, patient was rushing out of the door as she had to use to restroom and she had a ride waiting. This RN said she will be calling her later this week to get her set up with the program.

## 2022-08-09 NOTE — PROGRESS NOTES
"Subjective:     Chief Complaint   Patient presents with   • Diverticulitis         HPI:   Jina presents today with    Diverticulosis  Patient presents today requesting Bactrim.  Patient states that Bactrim is the only thing that that will cure her diverticulitis.  Patient has been following with GI consultants and had colonoscopy 1 month ago.  Patient is angry with GI consultants stating that they would not give her this antibiotic.  Patient requesting an antibiotic for me today.  Review of patient colonoscopy shows that she has diverticulitis but no concerns for diverticulitis.  Patient has also had previous treatment from GI consultants for diverticulitis with no relief.  Patient advised that I cannot give her this medication today and that she will need to follow-up with GI consultants, patient very angry and upset throughout visit today due to not getting this antibiotic.  Discussed other causes of the pain she may be having but patient have not said it is secondary to diverticulitis and that the only possible treatment is Bactrim.      Adrenal nodules  Patient's recent CT scan showed 18 mm left adrenal nodule and 13 mm right adrenal nodule.  Patient requesting referral to endocrine for further evaluation.    Pelvic mass  Patient's recent CT on 4/13/2022 shows mass from uterine fundus that is now partially calcified.  Mass measures 9.1 x 7 x 8.6 cm.  Patient's pain is in her lower pelvic region, tried to discussed with patient that pain may be secondary to uterine mass.  Would recommend further evaluation.  Will refer to GYN.      ROS:  Gen: no fevers/chills  Pulm: no sob, no cough  CV: no chest pain, no palpitations  GI: no nausea/vomiting, no diarrhea        Objective:     Exam:  /74 (BP Location: Right arm, Patient Position: Sitting, BP Cuff Size: Adult)   Pulse (!) 107   Temp 36.2 °C (97.2 °F) (Temporal)   Resp 16   Ht 1.575 m (5' 2\")   Wt 88 kg (194 lb)   SpO2 94%   BMI 35.48 kg/m²  Body mass " index is 35.48 kg/m².    Gen: Alert and oriented, No apparent distress.  Neck: Neck is supple without lymphadenopathy.  Lungs: Normal effort, CTA bilaterally, no wheezes, rhonchi, or rales  CV: Regular rate and rhythm. No murmurs, rubs, or gallops.  Ext: No clubbing, cyanosis, edema.      Assessment & Plan:     71 y.o. female with the following -     1. Adrenal nodule (HCC)  - Referral to Endocrinology    2. Uterine mass  Referred to GYN surgeon for further evaluation of uterine mass.    3. Diverticulosis  Patient advised to continue with follow-up with GI.  Discussed with patient that treatment is not recommended for diverticulitis.  Patient's vital signs stable.  No new abdominal pain.      Return in about 3 months (around 11/9/2022).    Please note that this dictation was created using voice recognition software. I have made every reasonable attempt to correct obvious errors, but I expect that there are errors of grammar and possibly content that I did not discover before finalizing the note.

## 2022-08-12 ENCOUNTER — PATIENT OUTREACH (OUTPATIENT)
Dept: HEALTH INFORMATION MANAGEMENT | Facility: OTHER | Age: 72
End: 2022-08-12
Payer: MEDICARE

## 2022-08-12 DIAGNOSIS — J44.89 COPD WITH ASTHMA (HCC): ICD-10-CM

## 2022-08-12 NOTE — PROGRESS NOTES
08/12/2022:    1330: Attempt x1 to reach out to patient for scheduled intake call. LVM with contact information.     08/15/2022:    0923: Patient currently in the ER. Will reach out tomorrow.     08/16/2022:    Patient called this RN back stating that she only needs help with housekeeping. This RN called patient back with no answer. LVM that this RN will let VICKI Miranda know that she does need those resources. This RN explained that CCM is next steps in care and reintroduced program. Asked patient to call back for enrollment.     08/18/2022:    Have not heard back from patient. Will decline program. Will send message to Ruth for housekeeping resources.

## 2022-08-15 ENCOUNTER — HOSPITAL ENCOUNTER (EMERGENCY)
Facility: MEDICAL CENTER | Age: 72
End: 2022-08-15
Attending: EMERGENCY MEDICINE
Payer: MEDICARE

## 2022-08-15 ENCOUNTER — APPOINTMENT (OUTPATIENT)
Dept: RADIOLOGY | Facility: MEDICAL CENTER | Age: 72
End: 2022-08-15
Attending: EMERGENCY MEDICINE
Payer: MEDICARE

## 2022-08-15 VITALS
OXYGEN SATURATION: 92 % | TEMPERATURE: 99 F | HEIGHT: 62 IN | SYSTOLIC BLOOD PRESSURE: 144 MMHG | DIASTOLIC BLOOD PRESSURE: 98 MMHG | RESPIRATION RATE: 18 BRPM | BODY MASS INDEX: 34.89 KG/M2 | HEART RATE: 98 BPM | WEIGHT: 189.6 LBS

## 2022-08-15 DIAGNOSIS — E87.6 HYPOKALEMIA: ICD-10-CM

## 2022-08-15 DIAGNOSIS — K57.92 DIVERTICULITIS: ICD-10-CM

## 2022-08-15 LAB
ALBUMIN SERPL BCP-MCNC: 4.5 G/DL (ref 3.2–4.9)
ALBUMIN/GLOB SERPL: 1.6 G/DL
ALP SERPL-CCNC: 55 U/L (ref 30–99)
ALT SERPL-CCNC: 14 U/L (ref 2–50)
ANION GAP SERPL CALC-SCNC: 18 MMOL/L (ref 7–16)
AST SERPL-CCNC: 13 U/L (ref 12–45)
BASOPHILS # BLD AUTO: 0.2 % (ref 0–1.8)
BASOPHILS # BLD: 0.02 K/UL (ref 0–0.12)
BILIRUB SERPL-MCNC: 0.7 MG/DL (ref 0.1–1.5)
BUN SERPL-MCNC: 14 MG/DL (ref 8–22)
CALCIUM SERPL-MCNC: 9.2 MG/DL (ref 8.5–10.5)
CHLORIDE SERPL-SCNC: 92 MMOL/L (ref 96–112)
CO2 SERPL-SCNC: 25 MMOL/L (ref 20–33)
CREAT SERPL-MCNC: 0.94 MG/DL (ref 0.5–1.4)
EKG IMPRESSION: NORMAL
EOSINOPHIL # BLD AUTO: 0.02 K/UL (ref 0–0.51)
EOSINOPHIL NFR BLD: 0.2 % (ref 0–6.9)
ERYTHROCYTE [DISTWIDTH] IN BLOOD BY AUTOMATED COUNT: 55.8 FL (ref 35.9–50)
GFR SERPLBLD CREATININE-BSD FMLA CKD-EPI: 64 ML/MIN/1.73 M 2
GLOBULIN SER CALC-MCNC: 2.8 G/DL (ref 1.9–3.5)
GLUCOSE SERPL-MCNC: 167 MG/DL (ref 65–99)
HCT VFR BLD AUTO: 46.2 % (ref 37–47)
HGB BLD-MCNC: 16.9 G/DL (ref 12–16)
IMM GRANULOCYTES # BLD AUTO: 0.03 K/UL (ref 0–0.11)
IMM GRANULOCYTES NFR BLD AUTO: 0.3 % (ref 0–0.9)
LIPASE SERPL-CCNC: 27 U/L (ref 11–82)
LYMPHOCYTES # BLD AUTO: 1.41 K/UL (ref 1–4.8)
LYMPHOCYTES NFR BLD: 13.8 % (ref 22–41)
MAGNESIUM SERPL-MCNC: 2 MG/DL (ref 1.5–2.5)
MCH RBC QN AUTO: 40.1 PG (ref 27–33)
MCHC RBC AUTO-ENTMCNC: 36.6 G/DL (ref 33.6–35)
MCV RBC AUTO: 109.7 FL (ref 81.4–97.8)
MONOCYTES # BLD AUTO: 0.73 K/UL (ref 0–0.85)
MONOCYTES NFR BLD AUTO: 7.1 % (ref 0–13.4)
NEUTROPHILS # BLD AUTO: 8.04 K/UL (ref 2–7.15)
NEUTROPHILS NFR BLD: 78.4 % (ref 44–72)
NRBC # BLD AUTO: 0 K/UL
NRBC BLD-RTO: 0 /100 WBC
PLATELET # BLD AUTO: 269 K/UL (ref 164–446)
PMV BLD AUTO: 9.4 FL (ref 9–12.9)
POTASSIUM SERPL-SCNC: 2.6 MMOL/L (ref 3.6–5.5)
PROT SERPL-MCNC: 7.3 G/DL (ref 6–8.2)
RBC # BLD AUTO: 4.21 M/UL (ref 4.2–5.4)
SODIUM SERPL-SCNC: 135 MMOL/L (ref 135–145)
TROPONIN T SERPL-MCNC: 25 NG/L (ref 6–19)
TROPONIN T SERPL-MCNC: 40 NG/L (ref 6–19)
WBC # BLD AUTO: 10.3 K/UL (ref 4.8–10.8)

## 2022-08-15 PROCEDURE — 99285 EMERGENCY DEPT VISIT HI MDM: CPT

## 2022-08-15 PROCEDURE — 700105 HCHG RX REV CODE 258: Performed by: EMERGENCY MEDICINE

## 2022-08-15 PROCEDURE — 84484 ASSAY OF TROPONIN QUANT: CPT

## 2022-08-15 PROCEDURE — 700117 HCHG RX CONTRAST REV CODE 255: Performed by: EMERGENCY MEDICINE

## 2022-08-15 PROCEDURE — 85025 COMPLETE CBC W/AUTO DIFF WBC: CPT

## 2022-08-15 PROCEDURE — 93005 ELECTROCARDIOGRAM TRACING: CPT | Performed by: EMERGENCY MEDICINE

## 2022-08-15 PROCEDURE — A9270 NON-COVERED ITEM OR SERVICE: HCPCS | Performed by: EMERGENCY MEDICINE

## 2022-08-15 PROCEDURE — 36415 COLL VENOUS BLD VENIPUNCTURE: CPT

## 2022-08-15 PROCEDURE — 83690 ASSAY OF LIPASE: CPT

## 2022-08-15 PROCEDURE — 83735 ASSAY OF MAGNESIUM: CPT

## 2022-08-15 PROCEDURE — 80053 COMPREHEN METABOLIC PANEL: CPT

## 2022-08-15 PROCEDURE — 700102 HCHG RX REV CODE 250 W/ 637 OVERRIDE(OP): Performed by: EMERGENCY MEDICINE

## 2022-08-15 PROCEDURE — 74177 CT ABD & PELVIS W/CONTRAST: CPT | Mod: ME

## 2022-08-15 RX ORDER — SULFAMETHOXAZOLE AND TRIMETHOPRIM 800; 160 MG/1; MG/1
1 TABLET ORAL ONCE
Status: COMPLETED | OUTPATIENT
Start: 2022-08-15 | End: 2022-08-15

## 2022-08-15 RX ORDER — SULFAMETHOXAZOLE AND TRIMETHOPRIM 800; 160 MG/1; MG/1
1 TABLET ORAL 2 TIMES DAILY
Qty: 24 TABLET | Refills: 0 | Status: SHIPPED | OUTPATIENT
Start: 2022-08-15 | End: 2022-08-27

## 2022-08-15 RX ORDER — POTASSIUM CHLORIDE 20 MEQ/1
20 TABLET, EXTENDED RELEASE ORAL ONCE
Status: COMPLETED | OUTPATIENT
Start: 2022-08-15 | End: 2022-08-15

## 2022-08-15 RX ORDER — SODIUM CHLORIDE, SODIUM LACTATE, POTASSIUM CHLORIDE, CALCIUM CHLORIDE 600; 310; 30; 20 MG/100ML; MG/100ML; MG/100ML; MG/100ML
1000 INJECTION, SOLUTION INTRAVENOUS ONCE
Status: COMPLETED | OUTPATIENT
Start: 2022-08-15 | End: 2022-08-15

## 2022-08-15 RX ADMIN — SODIUM CHLORIDE, POTASSIUM CHLORIDE, SODIUM LACTATE AND CALCIUM CHLORIDE 1000 ML: 600; 310; 30; 20 INJECTION, SOLUTION INTRAVENOUS at 10:29

## 2022-08-15 RX ADMIN — SULFAMETHOXAZOLE AND TRIMETHOPRIM 1 TABLET: 800; 160 TABLET ORAL at 11:24

## 2022-08-15 RX ADMIN — POTASSIUM CHLORIDE 20 MEQ: 1500 TABLET, EXTENDED RELEASE ORAL at 10:29

## 2022-08-15 RX ADMIN — IOHEXOL 100 ML: 350 INJECTION, SOLUTION INTRAVENOUS at 11:15

## 2022-08-15 ASSESSMENT — FIBROSIS 4 INDEX: FIB4 SCORE: 1.03

## 2022-08-15 NOTE — ED PROVIDER NOTES
ED Provider Note    ED Provider Note    Primary care provider: Corine Alas P.A.-C.  Means of arrival: Walk-in  History obtained from: Patient    CHIEF COMPLAINT  Chief Complaint   Patient presents with    Abdominal Pain     X1 week, hx of diverticulitis. +diarrhea.      Seen at 10:02 AM.   HPI  Jina Barnard is a 71 y.o. female who presents to the Emergency Department for epigastric pain for the past week, constant, steadily worsening without any modifying factors.  She now notes copious diarrhea, for watery episodes this morning, 4-5 watery episodes yesterday afternoon.  She has had some mild nausea without vomiting.    She denies any fevers, chills, chest pain.  She does have a history of COPD but shortness of breath no worse than baseline today.  No numbness or focal weakness.  No dysuria.    She is quite adamant that she needs Bactrim to resolve her symptoms.  Her last course of antibiotics was 6 to 8 weeks ago.  She received Bactrim once earlier this year for abdominal pain and it works magically for her, it usually resolves her abdominal pain fairly promptly but she has been on other antibiotics over the past few years that have not had any significant benefit.  She is GI consultants as well as her primary care provider in the past 10 days and neither were willing to prescribe her antibiotics.    REVIEW OF SYSTEMS  See HPI,   Remainder of ROS negative.     PAST MEDICAL HISTORY   has a past medical history of Hypertension and Thrombocytosis.    SURGICAL HISTORY   has a past surgical history that includes tubal coagulation laparoscopic bilateral.    SOCIAL HISTORY  Social History     Tobacco Use    Smoking status: Never    Smokeless tobacco: Never   Vaping Use    Vaping Use: Never used   Substance Use Topics    Alcohol use: No    Drug use: Yes     Types: Marijuana     Comment: daily      Social History     Substance and Sexual Activity   Drug Use Yes    Types: Marijuana    Comment: daily       FAMILY  "HISTORY  Family History   Problem Relation Age of Onset    Alcohol/Drug Father         alcohol    Other Father         malnutrition    Psychiatric Illness Father     Other Mother         blood clot from surgery    Hypertension Mother     Diabetes Mother         ?    Psychiatric Illness Sister     Other Sister         malnutrition    No Known Problems Maternal Grandmother     No Known Problems Maternal Grandfather     No Known Problems Paternal Grandmother     No Known Problems Paternal Grandfather        CURRENT MEDICATIONS  Reviewed.  See Encounter Summary.     ALLERGIES  No Known Allergies    PHYSICAL EXAM  VITAL SIGNS: BP (!) 144/98   Pulse 98   Temp 37.2 °C (99 °F) (Temporal)   Resp 18   Ht 1.575 m (5' 2\")   Wt 86 kg (189 lb 9.5 oz)   SpO2 92%   BMI 34.68 kg/m²   Constitutional: Awake, alert in no apparent distress.  HENT: Normocephalic, Bilateral external ears normal. Nose normal.  Dry mucosal membranes.  Eyes: Conjunctiva normal, non-icteric, EOMI.    Thorax & Lungs: Easy unlabored respirations, Clear to ascultation bilaterally.  Cardiovascular: Tachycardic, No murmurs, rubs or gallops. Bilateral pulses symmetrical.   Abdomen:  Soft, mild tenderness in the left mid to lower abdomen without rebound or guarding, nondistended, normal active bowel sounds.   :    Skin: Visualized skin is  Dry, No erythema, No rash.   Musculoskeletal:   No cyanosis, clubbing or edema. No leg asymmetry.   Neurologic: Alert, Grossly non-focal.   Psychiatric: Normal affect, Normal mood  Lymphatic:  No cervical LAD    EKG   12 lead Interpreted by me  Rhythm:  Normal sinus rhythm   Rate: 97  Axis: normal  Ectopy: none  Conduction: normal  ST Segments: Depressions laterally without EKG for prior comparison.  T Waves: no acute change  Clinical Impression: Normal sinus rhythm, no ST elevations, nonspecific T wave changes without prior EKG for direct comparison.    RADIOLOGY  CT-ABDOMEN-PELVIS WITH   Final Result      1.  Prominent " pericolonic vascularity with questionable mild inflammation surrounding the proximal transverse colon, possibly related to diverticulitis. There are diverticula scattered throughout the decompressed colon. No abscess or pneumoperitoneum.      2.  Contracted gallbladder containing hyperdense material. Pneumobilia and air within the gallbladder may be related to instrumentation.      3.  Atherosclerosis.      4.  Complex exophytic mass in the uterine fundus is likely unchanged. Measurement differences are likely related to changes in orientation.      5.  Small fat-containing umbilical hernia      6.  Stable hypodense hepatic lesions      7.  Bilateral adrenal nodules are grossly unchanged.            COURSE & MEDICAL DECISION MAKING  Pertinent Labs & Imaging studies reviewed. (See chart for details)    Differential diagnoses include but are not limited to: Viral syndrome, diarrhea NOS, diverticulitis, anxiety, dehydration, gastritis, less likely ACS    10:02 AM - Medical record reviewed, CT from April of this year showed possible sigmoid diverticulitis, patient also has a uterine mass.  She was at the midlevel provider last week requesting Bactrim, her request was denied.    12 PM discussed the test results and treatment plan.  Patient looking forward to being discharged and would prefer to be on Bactrim as she has had good results with this in the past.      Decision Making:  This is a pleasant 71 y.o. year old female who presents with 1 week of abdominal pain, 24 hours of copious diarrhea.  The patient is significantly hypokalemic.  She was given 1 L of lactated Ringer's, 40 mill equivalents of KCl, she clinically appears improved.  She has a normal lipase, normal white count, no leftward shift, there is some slight neutrophil predominance.  Exam is benign today.    Given her advanced age, last CT was many months ago I did not feel that it was important to verify the diagnosis, the patient was fairly confident this  was diverticulitis though previously she had findings of sigmoid diverticulitis which would not explain her epigastric pain.  CT today shows findings suggestive of transverse colon diverticulitis which would be more in line with her presentation.  She does not have an acute abdomen.  Her vitals are reassuring, white count of 10.3 with slight neutrophil predominance.  She is not septic.  She is a good outpatient candidate.    Because she did have some nausea and epigastric pain I did a cardiac work-up.  EKG did not show acute ischemic changes.  Initial troponin was 40 without priors for direct comparison.  Delta troponin decreased to 25.  Because this is in a downward trend and she is asymptomatic I think she be safely discharged at this time.  At no point did she ever have chest pain or shortness of breath.    She is directed return for any worsening pain, intractable vomiting, shortness of breath or any other concern.  She does have ample KCl at home and I recommend she take additional 40 mEq today.      Discharge Medications:  Discharge Medication List as of 8/15/2022 12:21 PM        START taking these medications    Details   sulfamethoxazole-trimethoprim (BACTRIM DS) 800-160 MG tablet Take 1 Tablet by mouth 2 times a day for 12 days., Disp-24 Tablet, R-0, Normal             The patient was discharged home (see d/c instructions) was told to return immediately for any signs or symptoms listed, or any worsening at all.  The patient verbally agreed to the discharge precautions and follow-up plan which is documented in EPIC.        FINAL IMPRESSION  1. Diverticulitis    2. Hypokalemia

## 2022-08-15 NOTE — ED NOTES
Patient given discharge teaching and education. Verbalizes understanding. Given chance to ask questions, all questions answered. Educated patient of signs and symptoms to returned to ER, and educated patient they can return for any concerning symptoms. One prescription provided to patient. Education given to patient about medications. Patient states they have their belongings. Denies additional needs at this time. Reports pain is well controlled. Patient monitored every hour and PRN for safety and comfort. Patient ambulatory out of department.

## 2022-08-15 NOTE — ED TRIAGE NOTES
"Chief Complaint   Patient presents with    Abdominal Pain     X1 week, hx of diverticulitis. +diarrhea.      BP (!) 140/71   Pulse (!) 103   Temp (!) 35.2 °C (95.3 °F) (Temporal)   Resp 17   Ht 1.575 m (5' 2\")   Wt 86 kg (189 lb 9.5 oz)   SpO2 96%   BMI 34.68 kg/m²     Pt ambulatory to triage for above, protocol ordered.   "

## 2022-08-16 NOTE — TELEPHONE ENCOUNTER
Have we ever prescribed this med? Yes.  If yes, what date? 05/09/22 SHYLA Figueredo PA-C     Last OV: 03/28/22 SHYLA Figueredo PA-C    Next OV: 09/28/22 SHYLA Figueredo PA-C    DX:   Moderate persistent asthma, unspecified whether complicated       Shortness of breath          Medications: ipratropium-albuterol (DUONEB) 0.5-2.5 (3) MG/3ML nebulizer solution

## 2022-08-17 RX ORDER — IPRATROPIUM BROMIDE AND ALBUTEROL SULFATE 2.5; .5 MG/3ML; MG/3ML
SOLUTION RESPIRATORY (INHALATION)
Qty: 120 ML | Refills: 11 | Status: SHIPPED | OUTPATIENT
Start: 2022-08-17

## 2022-08-18 ENCOUNTER — PATIENT OUTREACH (OUTPATIENT)
Dept: HEALTH INFORMATION MANAGEMENT | Facility: OTHER | Age: 72
End: 2022-08-18
Payer: MEDICARE

## 2022-08-18 NOTE — PROGRESS NOTES
8/18/2022  RN Pamela referred patient as she is needing housekeeping resources. CHW called patient to further discuss. Patient was unavailable and CHW left voice message requesting call back. CHW will attempt 2nd outreach.       8/26/2022  CHW called patient back to discuss resources. Patient states that she is needing assistance cleaning and making the bed. Patient states that she receives ss 900/month and rent is 600/month and utilities is 100/month. CHW states she will mail out CBC program application. Patient stated that her son was calling and ended call. CHW unable to complete SDOH screening.     CHW mailed out to patient CBC application.

## 2022-08-29 ENCOUNTER — TELEPHONE (OUTPATIENT)
Dept: MEDICAL GROUP | Facility: MEDICAL CENTER | Age: 72
End: 2022-08-29
Payer: MEDICARE

## 2022-08-29 DIAGNOSIS — N85.8 UTERINE MASS: ICD-10-CM

## 2022-08-29 NOTE — TELEPHONE ENCOUNTER
Patient called stating she needs a new referral to be placed to see OBGYN. The last referral she had, was sent to an office that does not accept Medicare/ Medicaid

## 2022-09-06 DIAGNOSIS — K57.92 DIVERTICULITIS: ICD-10-CM

## 2022-09-06 NOTE — TELEPHONE ENCOUNTER
Pt called asking for a refill on the bactrim. She believes the infection hasnt gone completley away and doesn't want to go to the ER or Urgent care for a refill

## 2022-09-08 RX ORDER — SULFAMETHOXAZOLE AND TRIMETHOPRIM 800; 160 MG/1; MG/1
1 TABLET ORAL 2 TIMES DAILY
Qty: 24 TABLET | Refills: 0 | OUTPATIENT
Start: 2022-09-08 | End: 2022-09-20

## 2022-09-12 ENCOUNTER — TELEPHONE (OUTPATIENT)
Dept: MEDICAL GROUP | Facility: MEDICAL CENTER | Age: 72
End: 2022-09-12
Payer: MEDICARE

## 2022-09-12 NOTE — TELEPHONE ENCOUNTER
"Caller Name:[phone] (home)     Call Back Number: Jina Barnard    Patient lvm stating she feels like she is going to start an infection due to her diverticulits. \" Seems not wanting to help her\" she says infection is really scary and really needs antibiotics.        How would the patient prefer to be contacted with a response: Phone call OK to leave a detailed message      "

## 2022-09-16 ENCOUNTER — PATIENT OUTREACH (OUTPATIENT)
Dept: HEALTH INFORMATION MANAGEMENT | Facility: OTHER | Age: 72
End: 2022-09-16
Payer: MEDICARE

## 2022-09-16 NOTE — PROGRESS NOTES
9/16/2022  CHW returned patient phone call. Patient has been dealing with ongoing diverticulitis since ED visit 8/15 and has not had a chance to fill out CBC application. CHW set up home visit on 9/21@10am to assist patient.     9/21/2022  CHW was informed by MAURA López that patient was able to complete application and will be cancelling scheduled home visit.     CHW called patient to discuss any other needs she nay have. CHW left voice message requesting call back.

## 2022-09-16 NOTE — TELEPHONE ENCOUNTER
Patient advised of the message below, she said not been able to chew her food is triggering her diverticulitis and she needs antibiotics because of it and no one is listening  to her.... she also needs A new referral to see GI because the one that she was referred to, cant see her till January and she cant wait that long

## 2022-09-19 ENCOUNTER — TELEPHONE (OUTPATIENT)
Dept: SLEEP MEDICINE | Facility: MEDICAL CENTER | Age: 72
End: 2022-09-19

## 2022-09-19 ENCOUNTER — OFFICE VISIT (OUTPATIENT)
Dept: MEDICAL GROUP | Facility: MEDICAL CENTER | Age: 72
End: 2022-09-19
Payer: MEDICARE

## 2022-09-19 VITALS
OXYGEN SATURATION: 96 % | HEIGHT: 62 IN | WEIGHT: 194 LBS | BODY MASS INDEX: 35.7 KG/M2 | RESPIRATION RATE: 16 BRPM | SYSTOLIC BLOOD PRESSURE: 124 MMHG | TEMPERATURE: 97.4 F | HEART RATE: 90 BPM | DIASTOLIC BLOOD PRESSURE: 72 MMHG

## 2022-09-19 DIAGNOSIS — E66.09 CLASS 2 OBESITY DUE TO EXCESS CALORIES WITHOUT SERIOUS COMORBIDITY WITH BODY MASS INDEX (BMI) OF 35.0 TO 35.9 IN ADULT: ICD-10-CM

## 2022-09-19 DIAGNOSIS — E66.01 MORBID OBESITY (HCC): ICD-10-CM

## 2022-09-19 DIAGNOSIS — K57.92 DIVERTICULITIS: ICD-10-CM

## 2022-09-19 PROCEDURE — 99213 OFFICE O/P EST LOW 20 MIN: CPT | Performed by: STUDENT IN AN ORGANIZED HEALTH CARE EDUCATION/TRAINING PROGRAM

## 2022-09-19 ASSESSMENT — FIBROSIS 4 INDEX: FIB4 SCORE: 0.93

## 2022-09-19 NOTE — PROGRESS NOTES
"Subjective:     Chief Complaint   Patient presents with    Follow-Up         HPI:   Jina presents today with     Diverticulitis  Patient seen in this office 8/9/2022 requesting Bactrim.  Concerned about diverticulitis and states that this medication is the only thing that provides relief.  and patient seen in ED a week later continuing to note abdominal pain and diarrhea.  Patient received CT that showed transverse colon diverticulitis.  Patient presents today to discuss her concerns.  Patient is angry that she cannot just get the antibiotics every time her stomach hurts.  Patient does not understand why if the antibiotics can treat the infection and decrease her pain why they are not the first line of treatment.  Patient concerned that if she was to have another flare of diverticulitis she would not be able to get antibiotics.  Discussed with patient that first-line of management is liquid diet and preventative care to avoid flares.  Discussed with patient that if symptoms were bad or causing systemic infection, we could still treat with antibiotics.  Patient denies any abdominal pain, nausea, vomiting, fever, chills at today's visit.      ROS:  Gen: no fevers/chills  Pulm: no sob, no cough  CV: no chest pain, no palpitations  GI: no nausea/vomiting, no diarrhea        Objective:     Exam:  /72 (BP Location: Right arm, Patient Position: Sitting, BP Cuff Size: Adult)   Pulse 90   Temp 36.3 °C (97.4 °F) (Temporal)   Resp 16   Ht 1.575 m (5' 2\")   Wt 88 kg (194 lb)   SpO2 96%   BMI 35.48 kg/m²  Body mass index is 35.48 kg/m².    Gen: Alert and oriented, No apparent distress.  Neck: Neck is supple without lymphadenopathy.  Lungs: Normal effort, CTA bilaterally, no wheezes, rhonchi, or rales  CV: Regular rate and rhythm. No murmurs, rubs, or gallops.  Ext: No clubbing, cyanosis, edema.      Assessment & Plan:     71 y.o. female with the following -     1. Diverticulitis  Chronic, stable.  Patient advised to " follow-up with GI doctor.  Thorough discussion with patient about antibiotic stewardship and first-line treatments for diverticulitis with diet.  Patient frustrated that she cannot have antibiotics on hand.  We discussed starting with symptomatic treatment and following up with symptom proving.  Discussed informing me when symptoms start to better evaluate.  Discussed signs and symptoms that would warrant antibiotic usage.      2. Class 2 obesity due to excess calories without serious comorbidity with body mass index (BMI) of 35.0 to 35.9 in adult       Return if symptoms worsen or fail to improve.    Please note that this dictation was created using voice recognition software. I have made every reasonable attempt to correct obvious errors, but I expect that there are errors of grammar and possibly content that I did not discover before finalizing the note.

## 2022-09-19 NOTE — TELEPHONE ENCOUNTER
VOICEMAIL  1. Caller Name: Jina                      Call Back Number: [phone] (home)     2. Message: Pt called and lm.  She said she Ineck no that long ago with same symptoms.  She suggested Mucinex.  I tried.  It hasn't changed except coughing.  Is there a rx I can take? Trelegy does help.     3. Patient approves office to leave a detailed voicemail/MyChart message: N\A          Please advise

## 2022-09-20 ENCOUNTER — TELEPHONE (OUTPATIENT)
Dept: SLEEP MEDICINE | Facility: MEDICAL CENTER | Age: 72
End: 2022-09-20
Payer: MEDICARE

## 2022-09-20 NOTE — TELEPHONE ENCOUNTER
Patient left voice message and would like to have a Rx for cough. Patient states she is constantly coughing and is coughing up clear mucus. He states that the trelegy helps for about 8 hours and then stops working. She has tried OTC medication and thy did not help.

## 2022-09-21 ENCOUNTER — TELEPHONE (OUTPATIENT)
Dept: MEDICAL GROUP | Facility: MEDICAL CENTER | Age: 72
End: 2022-09-21
Payer: MEDICARE

## 2022-09-21 NOTE — TELEPHONE ENCOUNTER
Jina called and cancelled the home visit today with Ruth (CHW). Jina said she was able to complete the paperwork needed on her own.

## 2022-09-24 NOTE — TELEPHONE ENCOUNTER
Have attempted to reach patient again with no answer. Called son Albert who will attempt to contact patient and let her know I will call again in a few minutes, no MyChart oxygen.

## 2022-09-28 ENCOUNTER — OFFICE VISIT (OUTPATIENT)
Dept: SLEEP MEDICINE | Facility: MEDICAL CENTER | Age: 72
End: 2022-09-28
Payer: MEDICARE

## 2022-09-28 VITALS
WEIGHT: 196 LBS | SYSTOLIC BLOOD PRESSURE: 128 MMHG | HEART RATE: 84 BPM | RESPIRATION RATE: 16 BRPM | HEIGHT: 62 IN | OXYGEN SATURATION: 95 % | DIASTOLIC BLOOD PRESSURE: 88 MMHG | BODY MASS INDEX: 36.07 KG/M2

## 2022-09-28 DIAGNOSIS — J45.40 MODERATE PERSISTENT ASTHMA, UNSPECIFIED WHETHER COMPLICATED: ICD-10-CM

## 2022-09-28 DIAGNOSIS — R05.3 PERSISTENT COUGH FOR 3 WEEKS OR LONGER: ICD-10-CM

## 2022-09-28 DIAGNOSIS — E66.9 OBESITY (BMI 35.0-39.9 WITHOUT COMORBIDITY): ICD-10-CM

## 2022-09-28 PROCEDURE — 99214 OFFICE O/P EST MOD 30 MIN: CPT | Performed by: PHYSICIAN ASSISTANT

## 2022-09-28 RX ORDER — METHYLPREDNISOLONE 4 MG/1
TABLET ORAL
Qty: 21 TABLET | Refills: 0 | Status: SHIPPED | OUTPATIENT
Start: 2022-09-28 | End: 2023-12-20

## 2022-09-28 RX ORDER — PROMETHAZINE HYDROCHLORIDE AND CODEINE PHOSPHATE 6.25; 1 MG/5ML; MG/5ML
5 SYRUP ORAL 4 TIMES DAILY PRN
Qty: 120 ML | Refills: 0 | Status: SHIPPED | OUTPATIENT
Start: 2022-09-28 | End: 2022-10-22

## 2022-09-28 ASSESSMENT — ENCOUNTER SYMPTOMS
WHEEZING: 1
CHILLS: 0
PALPITATIONS: 0
HEADACHES: 0
SPUTUM PRODUCTION: 1
COUGH: 1
SINUS PAIN: 0
NAUSEA: 1
FEVER: 0
WEIGHT LOSS: 0
SORE THROAT: 0
DIZZINESS: 0
INSOMNIA: 1
SHORTNESS OF BREATH: 1
TREMORS: 0
HEARTBURN: 0
VOMITING: 1
ORTHOPNEA: 0
DIARRHEA: 1

## 2022-09-28 ASSESSMENT — FIBROSIS 4 INDEX: FIB4 SCORE: 0.93

## 2022-09-28 NOTE — PROGRESS NOTES
CC: Cough    HPI:  Jina Barnard is a 71 y.o. year old female here today for follow-up on increased cough.  Patient has been difficult to get a hold of outside of clinic, she acknowledges ignoring the phone due to political labs. Last seen in clinic 3/28/2022 by Niki Figueredo PA-C.  Patient is a never tobacco smoker, history of marijuana use.    Pertinent past medical history includes essential hypertension, thrombocytosis, polycythemia vera, intramural leiomyoma uterus, hypokalemia, diverticulitis.    Reviewed in clinic vitals including /88, HR 84, oxygen saturation 95%, BMI 3585 kg/m2.     Reviewed home medication regimen including Trelegy 100 mcg, DuoNeb, montelukast, albuterol.  Patient reports using DuoNeb 2-3 times per day.  Patient reports Trelegy works for about 6 hours then she experiences increased cough.    Reviewed most recent imaging including chest x-ray obtained 12/14/2020 demonstrating emphysema, atherosclerosis, no acute cardiopulmonary disease.     Reviewed pulmonary function testing obtained 7/1/2022 demonstrating an FEV1 of 1.19 L or 59% predicted, FVC of 1.79 L or 69% predicted, FEV1/FVC ratio of 66, residual volume 158% predicted, % predicted, DLCO 85% predicted.  Per pulmonologist interpretation airflow obstruction present on baseline spirometry, no significant bronchodilator response, air trapping with residual volume 158% predicted, TLC within normal limits at 112% predicted.  Preserved diffusion capacity.  Obstructive airway disease appears fixed, findings consistent with bronchiectasis, uncontrolled asthma or COPD.    Review of Systems   Constitutional:  Positive for malaise/fatigue. Negative for chills, fever and weight loss.   HENT:  Positive for congestion. Negative for hearing loss, nosebleeds, sinus pain, sore throat and tinnitus.    Eyes:         Presc glasses   Respiratory:  Positive for cough, sputum production (mostly thick clear), shortness of breath (with  activity) and wheezing.    Cardiovascular:  Positive for leg swelling (trace). Negative for chest pain, palpitations and orthopnea.   Gastrointestinal:  Positive for diarrhea, nausea (diverticulitis-worst flare) and vomiting. Negative for heartburn (takes baking soda).        Dentures, some difficulty swallowing    Neurological:  Negative for dizziness, tremors and headaches.   Psychiatric/Behavioral:  The patient has insomnia.      Past Medical History:   Diagnosis Date    Hypertension     Thrombocytosis        Past Surgical History:   Procedure Laterality Date    TUBAL COAGULATION LAPAROSCOPIC BILATERAL         Family History   Problem Relation Age of Onset    Alcohol/Drug Father         alcohol    Other Father         malnutrition    Psychiatric Illness Father     Other Mother         blood clot from surgery    Hypertension Mother     Diabetes Mother         ?    Psychiatric Illness Sister     Other Sister         malnutrition    No Known Problems Maternal Grandmother     No Known Problems Maternal Grandfather     No Known Problems Paternal Grandmother     No Known Problems Paternal Grandfather        Social History     Socioeconomic History    Marital status: Single     Spouse name: Not on file    Number of children: Not on file    Years of education: Not on file    Highest education level: Not on file   Occupational History    Not on file   Tobacco Use    Smoking status: Never    Smokeless tobacco: Never   Vaping Use    Vaping Use: Never used   Substance and Sexual Activity    Alcohol use: No    Drug use: Yes     Types: Marijuana     Comment: daily    Sexual activity: Never     Partners: Male     Birth control/protection: Post-Menopausal   Other Topics Concern    Not on file   Social History Narrative    Not on file     Social Determinants of Health     Financial Resource Strain: Not on file   Food Insecurity: Not on file   Transportation Needs: Not on file   Physical Activity: Not on file   Stress: Not on file  "  Social Connections: Not on file   Intimate Partner Violence: Not on file   Housing Stability: Not on file       Allergies as of 09/28/2022    (No Known Allergies)        @Vital signs for this encounter:  /88   Pulse 84   Resp 16   Ht 1.575 m (5' 2\")   Wt 88.9 kg (196 lb)   SpO2 95%     Current medications as of today   Current Outpatient Medications   Medication Sig Dispense Refill    ipratropium-albuterol (DUONEB) 0.5-2.5 (3) MG/3ML nebulizer solution USE 1 VIAL IN NEBULIZER 4 TIMES DAILY - And As Needed 120 mL 11    polyethylene glycol-electrolytes (NULYTELY) 420 GM solution       montelukast (SINGULAIR) 10 MG Tab TAKE 1 TABLET BY MOUTH EVERY EVENING 30 Tablet 5    potassium chloride ER (KLOR-CON) 10 MEQ tablet Take 10 mEq by mouth 3 times a day.      Fluticasone-Umeclidin-Vilant (TRELEGY ELLIPTA) 100-62.5-25 MCG/INH AEROSOL POWDER, BREATH ACTIVATED inhalation INHALE 1 PUFF BY MOUTH EVERY DAY. RINSE MOUTH AFTER USE 3 Each 3    albuterol 108 (90 Base) MCG/ACT Aero Soln inhalation aerosol INHALE 2 PUFFS BY MOUTH EVERY 6 HOURS AS NEEDED 25.5 g 3    vitamin D, Ergocalciferol, (DRISDOL) 1.25 MG (31896 UT) Cap capsule TAKE 1 CAPSULE BY MOUTH EVERY 7 DAYS 12 capsule 1    hydrochlorothiazide (MICROZIDE) 12.5 MG capsule TAKE 1 CAPSULE BY MOUTH EVERY DAY 90 capsule 3    BLACK COHOSH PO Take  by mouth.      ibuprofen (MOTRIN) 200 MG Tab Take 200 mg by mouth every 6 hours as needed.      hydroxyurea (HYDREA) 500 MG Cap Take  by mouth every day.       No current facility-administered medications for this visit.         Physical Exam:   Gen:           Alert and oriented, No apparent distress. Mood and affect appropriate, normal interaction with provider.  Eyes:          sclere white, conjunctive moist.  Hearing:     Grossly intact.  Dentition:    Upper dentures, poor lower dentition  Oropharynx:   Tongue normal, posterior pharynx without erythema or exudate.  Neck:        Supple, trachea midline, no " masses.  Respiratory Effort: No intercostal retractions or use of accessory muscles.   Lung Auscultation:      Decreased with scattered fine expiratory wheezing; no rales or rhonchi CV:            Regular rate and rhythm. No edema. No murmurs, rubs or gallops.  Digits, Nails, Ext: No clubbing, cyanosis, petechiae, or nodes.   Skin:        No rashes, lesions or ulcers noted on exposed skin surfaces.                     Assessment:  1. Persistent cough for 3 weeks or longer  promethazine-codeine (PHENERGAN-CODEINE) 6.25-10 MG/5ML Syrup    methylPREDNISolone (MEDROL DOSEPAK) 4 MG Tablet Therapy Pack    CT-CHEST (THORAX) W/O      2. Moderate persistent asthma, unspecified whether complicated  Budeson-Glycopyrrol-Formoterol (BREZTRI AEROSPHERE) 160-9-4.8 MCG/ACT Aerosol    Budeson-Glycopyrrol-Formoterol (BREZTRI AEROSPHERE) 160-9-4.8 MCG/ACT Aerosol      3. Obesity (BMI 35.0-39.9 without comorbidity) ([facility])            Immunizations:    Flu: Declined  Pneumovax 23: Deferred  Prevnar 13: 12/29/2015    Plan:  71-year-old female here to follow-up on moderate persistent asthma, PFT results and increased cough.    Moderate persistent asthma: Patient is a nontobacco smoker although she does have a significant marijuana smoking history.  No recent imaging, update CT scan.  Complete smoking cessation advised.  Patient reports Trelegy does not provide sustained relief.  Provided with samples of Breztri as alternative to Trelegy to trial.  Also provided with paper prescription should she desire to continue.  Reviewed oral care following Breztri use.  Patient was provided with financial assistance form for drug company.    Persistent increased cough: Patient insistent on cough suppressant for sleep, small amount provided.  Mild steroid course provided with instructions.    Elevated BMI: Discussion regarding impact central adiposity on pulmonary function.  Encouraged to increase activity as tolerated and monitor nutritional intake.       Patient to follow-up in 6 months, sooner if needed.    This dictation was created using voice recognition software. The accuracy of the dictation is limited to the abilities of the software. I expect there may be some errors of grammar and possibly content.

## 2022-09-28 NOTE — PATIENT INSTRUCTIONS
1-trial Breztri as alternative to Trelegy  2-assess for benefit   3-mild steroid course provided  4-some cough suppressant   5-mild wheezing in clinic   6-Breztri script also provided  7-financial paperwork provided

## 2022-10-01 PROBLEM — J45.40 MODERATE PERSISTENT ASTHMA: Status: ACTIVE | Noted: 2022-10-01

## 2022-10-05 PROBLEM — E66.01 MORBID OBESITY (HCC): Status: ACTIVE | Noted: 2022-10-05

## 2022-10-10 ENCOUNTER — GYNECOLOGY VISIT (OUTPATIENT)
Dept: OBGYN | Facility: CLINIC | Age: 72
End: 2022-10-10
Payer: MEDICARE

## 2022-10-10 VITALS — BODY MASS INDEX: 36.21 KG/M2 | WEIGHT: 198 LBS | DIASTOLIC BLOOD PRESSURE: 87 MMHG | SYSTOLIC BLOOD PRESSURE: 147 MMHG

## 2022-10-10 DIAGNOSIS — D25.1 INTRAMURAL LEIOMYOMA OF UTERUS: Primary | ICD-10-CM

## 2022-10-10 DIAGNOSIS — N39.3 SUI (STRESS URINARY INCONTINENCE, FEMALE): ICD-10-CM

## 2022-10-10 PROCEDURE — 99214 OFFICE O/P EST MOD 30 MIN: CPT | Performed by: OBSTETRICS & GYNECOLOGY

## 2022-10-10 ASSESSMENT — FIBROSIS 4 INDEX: FIB4 SCORE: 0.93

## 2022-10-10 NOTE — PROGRESS NOTES
"GYN PROBLEM VISIT    CC:  Establish Care (Follow up on uterine mass )       HPI: Patient is a 71 y.o. . who presents by rec from her PCP and ED for follow up on uterine mass.  CT scan done in 2022 showed exophytic uterine fibroid arising from uterine fundus measuring 7x8cm.  This is NOT a new finding.  Patient has known history of fibroids and has seen providers in our practice in  and .  US in 2019 showed fundal fibroid measuring 8x8.5x9.  Per CT scan, fibroid is slightly decreased in size with more calcifications.    Patient denies any vaginal bleeding.  She is not sexually active. She complains of leaking of urine when she coughs and thought it was because of this \"mass on her bladder.\"     She reports medically that she is struggling with repeated bouts of diverticulitis so this is her primary concern right now.       ROS:   General: denies fever / chills  HEENT: denies sore throat:  CV: denies chest pain:  Repiratory: denies shortness of breath  GI: denies abdominal pain  : denies dysuria:    PFSH:  I personally reviewed the past medical and surgical histories.     Social History     Tobacco Use    Smoking status: Never    Smokeless tobacco: Never   Vaping Use    Vaping Use: Never used   Substance Use Topics    Alcohol use: No    Drug use: Yes     Types: Marijuana     Comment: daily       Social History     Substance and Sexual Activity   Sexual Activity Never    Partners: Male    Birth control/protection: Post-Menopausal        ALLERGIES / REACTIONS:  No Known Allergies                        PHYSICAL EXAMINATION:  Vital Signs:   Vitals:    10/10/22 0815   BP: (!) 147/87   BP Location: Right arm   Patient Position: Sitting   BP Cuff Size: Adult   Weight: 198 lb     Body mass index is 36.21 kg/m².    Gen: appears well, NAD  Respiratory: normal effort  Abdomen: Soft, non-tender.  Central obesity; uterus/fibroid not palpable  Pelvic: patient declines exam    ASSESSMENT AND PLAN:  71 y.o. "     1. Intramural leiomyoma of uterus   - reviewed imaging and history and offered reassurance that this fibroid is NOT NEW and not of concern.  Fibroid is stable in size and/or decreasing which suggests degeneration and calcification.  Given she is asymptomatic from uterine fibroid, workup or follow up is not necessary and certainly there is no reason to consider surgical removal for benign, asymptomatic fibroid    2. CORTEZ (stress urinary incontinence, female)   - asked whether pt wanted to address this   - discussed lifestyle and conservative approaches including weight loss, pelvic floor PT, pessary (if prolapse also exists).  Also discussed referral to UroGyn if desired. She denies vaginal bulge and states she knows simple things like Kegels, etc could help.  She is not concerned at this time and doesn't want to address this further    Recommend returning to us or PCP for annual exam including breast, pelvic exam and mammogram.  Patient declined pelvic exam today.       Emily Ramos D.O.

## 2022-10-10 NOTE — NON-PROVIDER
Pt here for follow up on CT scan- done 8/15/22: Uterine Mass   Good#[phone] (home)   Pharmacy verified

## 2022-10-13 ENCOUNTER — HOSPITAL ENCOUNTER (OUTPATIENT)
Dept: RADIOLOGY | Facility: MEDICAL CENTER | Age: 72
End: 2022-10-13
Attending: PHYSICIAN ASSISTANT
Payer: MEDICARE

## 2022-10-13 DIAGNOSIS — R05.3 PERSISTENT COUGH FOR 3 WEEKS OR LONGER: ICD-10-CM

## 2022-10-13 PROCEDURE — 71250 CT THORAX DX C-: CPT

## 2022-10-18 DIAGNOSIS — R06.02 SHORTNESS OF BREATH: ICD-10-CM

## 2022-10-18 DIAGNOSIS — J45.40 MODERATE PERSISTENT ASTHMA, UNSPECIFIED WHETHER COMPLICATED: ICD-10-CM

## 2022-10-19 RX ORDER — ALBUTEROL SULFATE 90 UG/1
AEROSOL, METERED RESPIRATORY (INHALATION)
Qty: 25.5 G | Refills: 3 | Status: SHIPPED | OUTPATIENT
Start: 2022-10-19 | End: 2023-03-03 | Stop reason: SDUPTHER

## 2022-10-19 NOTE — TELEPHONE ENCOUNTER
Have we ever prescribed this med? Yes.  If yes, what date? 12/9/2021    Last OV: 9/28/2022 SHYLA Figueredo P.A.-C.    Next OV: 3/28/2023 SHYLA Figueredo P.A.-C.    DX: Moderate persistent asthma, unspecified whether complicated (J45.40); Shortness of breath (R06.02)    Medications: albuterol 108 (90 Base) MCG/ACT Aero Soln inhalation aerosol

## 2022-10-21 DIAGNOSIS — E55.9 VITAMIN D DEFICIENCY DISEASE: ICD-10-CM

## 2022-10-22 RX ORDER — ERGOCALCIFEROL 1.25 MG/1
50000 CAPSULE ORAL
Qty: 12 CAPSULE | Refills: 1 | Status: SHIPPED | OUTPATIENT
Start: 2022-10-22 | End: 2023-09-05

## 2022-10-22 RX ORDER — HYDROCHLOROTHIAZIDE 12.5 MG/1
12.5 CAPSULE, GELATIN COATED ORAL
Qty: 90 CAPSULE | Refills: 3 | Status: SHIPPED | OUTPATIENT
Start: 2022-10-22 | End: 2023-11-08

## 2022-11-03 ENCOUNTER — TELEPHONE (OUTPATIENT)
Dept: SLEEP MEDICINE | Facility: MEDICAL CENTER | Age: 72
End: 2022-11-03

## 2022-11-03 DIAGNOSIS — J44.89 COPD WITH ASTHMA (HCC): ICD-10-CM

## 2022-11-03 NOTE — TELEPHONE ENCOUNTER
Have we ever prescribed this med? Yes.  If yes, what date? 03/28/22    Last OV: 09/28/22    Next OV: 03/28/23    DX: copd    Medications: montelukast

## 2022-11-04 RX ORDER — MONTELUKAST SODIUM 10 MG/1
10 TABLET ORAL EVERY EVENING
Qty: 30 TABLET | Refills: 11 | Status: SHIPPED | OUTPATIENT
Start: 2022-11-04 | End: 2024-01-09

## 2022-12-02 ENCOUNTER — OFFICE VISIT (OUTPATIENT)
Dept: DERMATOLOGY | Facility: IMAGING CENTER | Age: 72
End: 2022-12-02
Payer: MEDICARE

## 2022-12-02 ENCOUNTER — OFFICE VISIT (OUTPATIENT)
Dept: DERMATOLOGY | Facility: IMAGING CENTER | Age: 72
End: 2022-12-02

## 2022-12-02 DIAGNOSIS — D22.9 NEVUS: ICD-10-CM

## 2022-12-02 DIAGNOSIS — D18.01 CHERRY ANGIOMA: ICD-10-CM

## 2022-12-02 DIAGNOSIS — L82.1 SEBORRHEIC KERATOSIS: ICD-10-CM

## 2022-12-02 DIAGNOSIS — L90.8 SKIN AGING: ICD-10-CM

## 2022-12-02 DIAGNOSIS — L81.4 LENTIGO: ICD-10-CM

## 2022-12-02 DIAGNOSIS — Z12.83 SKIN CANCER SCREENING: ICD-10-CM

## 2022-12-02 PROCEDURE — 99212 OFFICE O/P EST SF 10 MIN: CPT | Performed by: DERMATOLOGY

## 2022-12-02 PROCEDURE — 99999 PR NO CHARGE: CPT | Performed by: DERMATOLOGY

## 2022-12-02 NOTE — PROGRESS NOTES
"CC: Follow up MELI     Subjective: Previously seen patient here for skin check.     Denies new, growing, changing, itching or bleeding skin lesions today.    Efudex cream did not work. Desires treatment of brown spots on face.   \"They bother me. I pick at them\".     History of skin cancer: No  History of biopsies:No  History of blistering/severe sunburns:Yes, Details: during youth  Family history of skin cancer:No  Family history of atypical moles:No     ROS: no fevers/chills.  No itch.  No cough  Relevant PMH: mult med comorbidities  Social: NS    PE: Gen:WDWN female in NAD.  Skin: Scalp/face/eyes/lips/neck/chest/back/arms/legs/hands/feet/buttocks - without suspicious lesions noted.  Genitals exam declined  -many tan macules on face, very few waxy papules on face/torso/extremities  -scattered hyperpigmented macules/papules appearing on torso/extremities, appearing benign without suspicious features  -cherry red vascular macules/papules on face/body    A/P:   Nevi: benign appearing:  -Reviewed skin cancer detection/prevention  -RTC PRN growth/changes/concerning features    Lentigos/SKs: benign  -reassurance  -reviewed skin cancer detection/prevention  -counseled on cosmetic treatment PRN, including IPL procedures. Reports \"I respond to that freezing stuff. That's what I want.\" Discussed payment and cosmetics services in advance.    Cherry angioma: benign      F/u 1 year/PRN    I have reviewed medications relevant to my specialty.  "

## 2022-12-03 NOTE — PROGRESS NOTES
CC: SK treatment - cosmetics    Subjective: Previously seen patient here for SK treatment.     Bothered by appearance  Sites on face and left arm, upper back    ROS: no fevers/chills. No itch.  No cough    PE: Gen:WDWN female in NAD.  Skin: scattered waxy papules on   Chin, cheeks-malar r/l and upper posterior back/left upper arm    A/P: SKs, treatment:  -7 sites chosen for trx  -R/B/A reviewed prior to treatment - LN2 20-25 sec X 1-2 cycles  -f/u PRN  -$150 cosmetics fee today    I have reviewed medications relevant to my specialty.

## 2022-12-16 ENCOUNTER — TELEPHONE (OUTPATIENT)
Dept: SLEEP MEDICINE | Facility: MEDICAL CENTER | Age: 72
End: 2022-12-16
Payer: MEDICARE

## 2023-01-09 RX ORDER — POTASSIUM CHLORIDE 750 MG/1
TABLET, FILM COATED, EXTENDED RELEASE ORAL
Qty: 270 TABLET | Refills: 0 | Status: SHIPPED | OUTPATIENT
Start: 2023-01-09 | End: 2023-03-27

## 2023-01-17 ENCOUNTER — OFFICE VISIT (OUTPATIENT)
Dept: ENDOCRINOLOGY | Facility: MEDICAL CENTER | Age: 73
End: 2023-01-17
Payer: MEDICARE

## 2023-01-17 VITALS
WEIGHT: 206.5 LBS | HEART RATE: 77 BPM | BODY MASS INDEX: 38 KG/M2 | OXYGEN SATURATION: 95 % | HEIGHT: 62 IN | SYSTOLIC BLOOD PRESSURE: 142 MMHG | DIASTOLIC BLOOD PRESSURE: 94 MMHG

## 2023-01-17 DIAGNOSIS — D35.00 ADRENAL ADENOMA, UNSPECIFIED LATERALITY: ICD-10-CM

## 2023-01-17 DIAGNOSIS — E87.6 HYPOKALEMIA: ICD-10-CM

## 2023-01-17 DIAGNOSIS — E55.9 VITAMIN D DEFICIENCY: ICD-10-CM

## 2023-01-17 DIAGNOSIS — E66.9 OBESITY (BMI 35.0-39.9 WITHOUT COMORBIDITY): ICD-10-CM

## 2023-01-17 DIAGNOSIS — R53.83 OTHER FATIGUE: ICD-10-CM

## 2023-01-17 DIAGNOSIS — I10 ESSENTIAL HYPERTENSION: ICD-10-CM

## 2023-01-17 PROCEDURE — 99211 OFF/OP EST MAY X REQ PHY/QHP: CPT

## 2023-01-17 PROCEDURE — 99214 OFFICE O/P EST MOD 30 MIN: CPT

## 2023-01-17 RX ORDER — DEXAMETHASONE 1 MG
1 TABLET ORAL
Qty: 2 TABLET | Refills: 0 | Status: SHIPPED | OUTPATIENT
Start: 2023-01-17 | End: 2023-06-09 | Stop reason: SDUPTHER

## 2023-01-17 ASSESSMENT — FIBROSIS 4 INDEX: FIB4 SCORE: 0.94

## 2023-01-17 NOTE — PROGRESS NOTES
New Patient Consult Note for Endocrinology  Referred by: Corine Alas P.A.-C.    Reason for consult: Initial consultation for the following conditions    HPI:  Jina Barnard is a 72 y.o. old patient who is seeing us today for care.    This is a pleasant patient and I appreciate the opportunity to participate in the care of this patient.    This is a new patient with me today.    1.  Adrenal Incidentaloma:  A CT scan done on 4/13/2022 for evaluation of pelvic mass showed a 0.18cm L adrenal nodule and a 0.13 cm R adrenal nodule   Reports weight gain,   Denies purple streahc marks, facial phetora,     2. Hypertension:  Currently taking HCTZ 12.5 MG DAILY-does not take her medication consistently   BP today 142/94    3. Hypokalemia:  This is followed by PCP  Currently taking Klor-con 10 Meq tid     Latest Reference Range & Units 03/30/22 07:22 08/15/22 08:35   Potassium 3.6 - 5.5 mmol/L 4.5 2.6 (LL)     4.  Other fatigue:  Reports fatigue for the past few months    5.  Vitamin D deficiency:  Currently not taking any supplementation    6.  Obesity:  BMI is 37.77    ROS:   Constitutional: No change in weight , No fatigue, No night sweats.  HEENT: No Headache.  Eyes:  No blurred vision, No visual changes.  Cardiac: No chest pain, No palpitations.  Resp: No shortness of breath, No cough,   Gastro: No nausea or vomiting, No diarrhea.  Neuro: Denies numbness or tinging in bilateral feet or taking every  Freeto, and no loss of sensation.  Endo: No heat or cold intolerance.  : No polyuria, No polydipsia, No chronic UTI's.  Lower extremities: No lower leg edema bilateral.  All other systems were reviewed and were negative.    Past Medical History:  Patient Active Problem List    Diagnosis Date Noted    Morbid obesity (HCC) 10/05/2022    Moderate persistent asthma 10/01/2022    Diverticulitis 04/21/2022    Dyslipidemia 01/14/2021    Impaired fasting glucose 01/14/2021    COPD with asthma (HCC) 11/19/2020    Obesity (BMI  35.0-39.9 without comorbidity) ([facility]) 05/22/2020    Intramural leiomyoma of uterus 12/24/2019    Hypokalemia 06/12/2019    Moderate episode of recurrent major depressive disorder ([facility]) 10/29/2018    Stress at home 03/27/2018    Marijuana use 07/19/2017    Polycythemia vera(238.4) 07/19/2017    Refused pneumococcal vaccination 10/10/2016    Refused influenza vaccine 10/10/2016    Thrombocytosis 06/09/2016    Essential hypertension 06/09/2016    Vitamin D deficiency disease 06/09/2016       Past Surgical History:  Past Surgical History:   Procedure Laterality Date    TUBAL COAGULATION LAPAROSCOPIC BILATERAL         Allergies:  Patient has no known allergies.    Social History:  Social History     Socioeconomic History    Marital status: Single     Spouse name: Not on file    Number of children: Not on file    Years of education: Not on file    Highest education level: Not on file   Occupational History    Not on file   Tobacco Use    Smoking status: Never    Smokeless tobacco: Never   Vaping Use    Vaping Use: Never used   Substance and Sexual Activity    Alcohol use: No    Drug use: Yes     Types: Marijuana     Comment: daily    Sexual activity: Never     Partners: Male     Birth control/protection: Post-Menopausal   Other Topics Concern    Not on file   Social History Narrative    Not on file     Social Determinants of Health     Financial Resource Strain: Not on file   Food Insecurity: Not on file   Transportation Needs: Not on file   Physical Activity: Not on file   Stress: Not on file   Social Connections: Not on file   Intimate Partner Violence: Not on file   Housing Stability: Not on file       Family History:  Family History   Problem Relation Age of Onset    Alcohol/Drug Father         alcohol    Other Father         malnutrition    Psychiatric Illness Father     Other Mother         blood clot from surgery    Hypertension Mother     Diabetes Mother         ?    Psychiatric Illness Sister     Other Sister          malnutrition    No Known Problems Maternal Grandmother     No Known Problems Maternal Grandfather     No Known Problems Paternal Grandmother     No Known Problems Paternal Grandfather        Medications:    Current Outpatient Medications:     potassium chloride ER (KLOR-CON) 10 MEQ tablet, TAKE ONE CAPLET BY MOUTH THREE TIMES DAILY, Disp: 270 Tablet, Rfl: 0    montelukast (SINGULAIR) 10 MG Tab, Take 1 Tablet by mouth every evening., Disp: 30 Tablet, Rfl: 11    vitamin D2, Ergocalciferol, (DRISDOL) 1.25 MG (56728 UT) Cap capsule, Take 1 Capsule by mouth every 7 days., Disp: 12 Capsule, Rfl: 1    hydrochlorothiazide (MICROZIDE) 12.5 MG capsule, Take 1 Capsule by mouth every day., Disp: 90 Capsule, Rfl: 3    albuterol 108 (90 Base) MCG/ACT Aero Soln inhalation aerosol, INHALE 2 PUFFS BY MOUTH EVERY 6 HOURS AS NEEDED, Disp: 25.5 g, Rfl: 3    Budeson-Glycopyrrol-Formoterol (BREZTRI AEROSPHERE) 160-9-4.8 MCG/ACT Aerosol, Inhale 2 Puffs 2 times a day., Disp: 11.8 g, Rfl: 0    methylPREDNISolone (MEDROL DOSEPAK) 4 MG Tablet Therapy Pack, Take as directed. (Patient not taking: Reported on 10/10/2022), Disp: 21 Tablet, Rfl: 0    Budeson-Glycopyrrol-Formoterol (BREZTRI AEROSPHERE) 160-9-4.8 MCG/ACT Aerosol, Inhale 2 Puffs 2 (two) times a day., Disp: 10.7 g, Rfl: 11    ipratropium-albuterol (DUONEB) 0.5-2.5 (3) MG/3ML nebulizer solution, USE 1 VIAL IN NEBULIZER 4 TIMES DAILY - And As Needed, Disp: 120 mL, Rfl: 11    polyethylene glycol-electrolytes (NULYTELY) 420 GM solution, , Disp: , Rfl:     Fluticasone-Umeclidin-Vilant (TRELEGY ELLIPTA) 100-62.5-25 MCG/INH AEROSOL POWDER, BREATH ACTIVATED inhalation, INHALE 1 PUFF BY MOUTH EVERY DAY. RINSE MOUTH AFTER USE (Patient not taking: Reported on 10/10/2022), Disp: 3 Each, Rfl: 3    BLACK COHOSH PO, Take  by mouth., Disp: , Rfl:     ibuprofen (MOTRIN) 200 MG Tab, Take 200 mg by mouth every 6 hours as needed., Disp: , Rfl:     hydroxyurea (HYDREA) 500 MG Cap, Take  by mouth every  "day., Disp: , Rfl:       Physical Examination:   Vital signs: BP (!) 142/94 (BP Location: Right arm, Patient Position: Sitting, BP Cuff Size: Large adult)   Pulse 77   Ht 1.575 m (5' 2\")   Wt 93.7 kg (206 lb 8 oz)   SpO2 95%   BMI 37.77 kg/m²   General: No distress, cooperative, well dressed and well nourished.   Eyes: No scleral icterus or discharge, No hyposphagma  ENMT: Normal on external inspection of nose, lips, No nasal drainage   Neck: No abnormal masses on inspection  Resp: Normal effort, Bilateral clear to auscultation, No wheezing, No rales  CVS: Regular rate and rhythm, S1 S2 normal, No murmur. No gallop  Extremities: No edema bilateral extremities  Neuro: Alert and oriented  Skin: No rash, No Ulcers  Psych: Normal mood and affect    Assessment and Plan:  1. Adrenal adenoma, unspecified laterality  Unstable  We will evaluate the following hormones with differential diagnosis as above pheochromocytoma, primary aldosteronism, subclinical Cushing's  - 17-OH PROGESTERONE  - HYDROXYLASE-21 AUTOANTIBODY  - RENIN ACTIVITY AND ALDOSTERONE  - CATECHOLAMINE+VMA, 24-HR URINE  - METANEPHRINES, FRAC., PL. FREE  - METANEPHRINES, URINE RANDOM OR 24 HR  - CATECHOLAMINES PLASMA FRACTIONATED  - PROLACTIN; Future  - IGF-1 SOMATOMEDIN; Future      Instructions for the Dexamethasone Suppression Test  Take 1mg of the dexamethasone pill (or 1 pill)    at or near midnight on   BEFORE YOU TAKE THE PILL MAKE SURE THE LAB IS OPEN AND THAT YOU CAN GO FOR THE 8 AM TEST  Go to the lab the following morning at 8 am and get you blood drawn.   - CORTISOL; Future  - ACTH; Future  - Miscellaneous Test  - CORTISOL - AM  - dexamethasone (DECADRON) 1 MG Tab; Take 1 Tablet by mouth 1 time a day as needed (for midnight suppression test).  Dispense: 2 Tablet; Refill: 0    2. Essential hypertension  Unstable  Patient is a consistent with taking her blood pressure medication  Discussed to take her BP medications on time  - RENIN ACTIVITY AND " ALDOSTERONE    3. Hypokalemia  Unstable  Managed by primary care  Continue regimen-HPI  - RENIN ACTIVITY AND ALDOSTERONE    The following CMP today or tomorrow for potassium evaluation-continue regimen in the meantime  - Comp Metabolic Panel    4. Other fatigue  Unstable  We will evaluate her thyroid hormone levels as well as antibodies for Hashimoto's disease  - TSH; Future  - FREE THYROXINE; Future  - T3 FREE; Future  - Comp Metabolic Panel  - THYROID PEROXIDASE  (TPO) AB; Future  - ANTITHYROGLOBULIN AB; Future    5. Vitamin D deficiency  I will evaluate levels  - VITAMIN D,25 HYDROXY (DEFICIENCY); Future    6. Obesity (BMI 35.0-39.9 without comorbidity)  Unstable  Treatment for diagnoses #1    Disposition: Make an appointment to follow-up in 4 to 8 weeks  Do your blood work 2 weeks prior to next appointment, fasting, discussed with him as possible      Thank you kindly for allowing me to participate in the diabetes care plan for this patient.    Mathew Austin A.P.R.NSaturnino   01/17/2023    CC:   Corine Alas P.A.-C.

## 2023-01-18 ENCOUNTER — TELEPHONE (OUTPATIENT)
Dept: MEDICAL GROUP | Facility: MEDICAL CENTER | Age: 73
End: 2023-01-18
Payer: MEDICARE

## 2023-02-01 ENCOUNTER — TELEPHONE (OUTPATIENT)
Dept: SLEEP MEDICINE | Facility: MEDICAL CENTER | Age: 73
End: 2023-02-01
Payer: MEDICARE

## 2023-02-01 NOTE — TELEPHONE ENCOUNTER
"Patient called and said about 4 days ago her throat stared to become very tender. She thinks it was the start of Thrush and stopped taking her Breztri until she \"feels safe enough\" to start again. He wants to bump he montelukast up from 1 time at night to: 1 time in the morning and 1 at night. She said it helps clear the fluid from her lungs. I told her I would send a message to grace and either she or I would call you back but she said she would prefer us to jut call the pharmacy.  "

## 2023-02-03 NOTE — TELEPHONE ENCOUNTER
Attempted to contact patient, message left on voicemail, unclear whether patient is receiving treatment for her possible thrush, order was not changed on her montelukast as that would not be an appropriate alternative treatment.

## 2023-02-15 ENCOUNTER — TELEPHONE (OUTPATIENT)
Dept: MEDICAL GROUP | Facility: MEDICAL CENTER | Age: 73
End: 2023-02-15
Payer: MEDICARE

## 2023-02-15 ENCOUNTER — HOSPITAL ENCOUNTER (OUTPATIENT)
Dept: LAB | Facility: MEDICAL CENTER | Age: 73
End: 2023-02-15
Payer: MEDICARE

## 2023-02-15 DIAGNOSIS — D35.00 ADRENAL ADENOMA, UNSPECIFIED LATERALITY: ICD-10-CM

## 2023-02-15 DIAGNOSIS — E55.9 VITAMIN D DEFICIENCY: ICD-10-CM

## 2023-02-15 DIAGNOSIS — R60.9 EDEMA, UNSPECIFIED TYPE: ICD-10-CM

## 2023-02-15 DIAGNOSIS — K57.90 DIVERTICULOSIS: ICD-10-CM

## 2023-02-15 DIAGNOSIS — R53.83 OTHER FATIGUE: ICD-10-CM

## 2023-02-15 LAB
25(OH)D3 SERPL-MCNC: 33 NG/ML (ref 30–100)
CORTIS SERPL-MCNC: 3.1 UG/DL (ref 0–23)
PROLACTIN SERPL-MCNC: 11 NG/ML (ref 2.8–26)
T3FREE SERPL-MCNC: 2.67 PG/ML (ref 2–4.4)
T4 FREE SERPL-MCNC: 1.25 NG/DL (ref 0.93–1.7)
THYROPEROXIDASE AB SERPL-ACNC: <9 IU/ML (ref 0–9)
TSH SERPL DL<=0.005 MIU/L-ACNC: 1.97 UIU/ML (ref 0.38–5.33)

## 2023-02-15 PROCEDURE — 83835 ASSAY OF METANEPHRINES: CPT

## 2023-02-15 PROCEDURE — 84146 ASSAY OF PROLACTIN: CPT

## 2023-02-15 PROCEDURE — 84305 ASSAY OF SOMATOMEDIN: CPT

## 2023-02-15 PROCEDURE — 84481 FREE ASSAY (FT-3): CPT

## 2023-02-15 PROCEDURE — 84244 ASSAY OF RENIN: CPT

## 2023-02-15 PROCEDURE — 82533 TOTAL CORTISOL: CPT

## 2023-02-15 PROCEDURE — 82088 ASSAY OF ALDOSTERONE: CPT

## 2023-02-15 PROCEDURE — 82024 ASSAY OF ACTH: CPT

## 2023-02-15 PROCEDURE — 82306 VITAMIN D 25 HYDROXY: CPT

## 2023-02-15 PROCEDURE — 84439 ASSAY OF FREE THYROXINE: CPT

## 2023-02-15 PROCEDURE — 86376 MICROSOMAL ANTIBODY EACH: CPT

## 2023-02-15 PROCEDURE — 36415 COLL VENOUS BLD VENIPUNCTURE: CPT

## 2023-02-15 PROCEDURE — 84443 ASSAY THYROID STIM HORMONE: CPT

## 2023-02-15 PROCEDURE — 86800 THYROGLOBULIN ANTIBODY: CPT

## 2023-02-15 NOTE — TELEPHONE ENCOUNTER
"Patient called she would like a referral to be place to DR. Ousmane Villegas \"vein doctor\".. patient would also like to be Referral to Gastroenterology but not the one on [facility].      "

## 2023-02-17 LAB
ACTH PLAS-MCNC: <1.5 PG/ML (ref 7.2–63.3)
IGF-I SERPL-MCNC: 147 NG/ML (ref 23–221)
IGF-I Z-SCORE SERPL: 1
THYROGLOB AB SERPL-ACNC: <0.9 IU/ML (ref 0–4)

## 2023-02-18 ENCOUNTER — HOSPITAL ENCOUNTER (OUTPATIENT)
Facility: MEDICAL CENTER | Age: 73
End: 2023-02-18
Payer: MEDICARE

## 2023-02-18 LAB — ALDOST SERPL-MCNC: 12.3 NG/DL

## 2023-02-18 PROCEDURE — 82384 ASSAY THREE CATECHOLAMINES: CPT

## 2023-02-18 PROCEDURE — 84585 ASSAY OF URINE VMA: CPT

## 2023-02-19 LAB
METANEPHS SERPL-SCNC: 0.36 NMOL/L (ref 0–0.49)
NORMETANEPHRINE SERPL-SCNC: 1.01 NMOL/L (ref 0–0.89)

## 2023-02-21 LAB — RENIN PLAS-CCNC: <0.1 NG/ML/HR

## 2023-02-22 LAB
CATECHOLS UR-IMP: NORMAL
COLLECT DURATION TIME SPEC: 24 HRS
CREAT 24H UR-MCNC: 40 MG/DL
CREAT 24H UR-MRATE: 1120 MG/D (ref 500–1400)
DOPAMINE 24H UR-MRATE: 179 UG/D (ref 71–485)
DOPAMINE UR-MCNC: 64 UG/L
DOPAMINE/CREAT UR: 160 UG/G CRT (ref 0–250)
EPINEPH 24H UR-MRATE: 6 UG/D (ref 1–14)
EPINEPH UR-MCNC: 2 UG/L
EPINEPH/CREAT UR: 5 UG/G CRT (ref 0–20)
NOREPINEPH 24H UR-MRATE: 36 UG/D (ref 14–120)
NOREPINEPH UR-MCNC: 13 UG/L
NOREPINEPH/CREAT UR: 32 UG/G CRT (ref 0–45)
SPECIMEN VOL ?TM UR: 2800 ML

## 2023-02-23 LAB
COLLECT DURATION TIME SPEC: 24 HRS
CREAT 24H UR-MCNC: 40 MG/DL
CREAT 24H UR-MRATE: 1120 MG/D (ref 500–1400)
SPECIMEN VOL ?TM UR: 2800 ML
VMA & CREAT 24H UR-IMP: NORMAL
VMA 24H UR-MCNC: 1.4 MG/L
VMA 24H UR-MRATE: 3.9 MG/D (ref 0–7)
VMA/CREAT 24H UR: 4 MG/GCR (ref 0–6)

## 2023-03-03 DIAGNOSIS — J45.40 MODERATE PERSISTENT ASTHMA, UNSPECIFIED WHETHER COMPLICATED: ICD-10-CM

## 2023-03-03 DIAGNOSIS — R06.02 SHORTNESS OF BREATH: ICD-10-CM

## 2023-03-03 NOTE — TELEPHONE ENCOUNTER
Have we ever prescribed this med? Yes.  If yes, what date? 10/19/22    Last OV: 09/28/22    Next OV: 03/28/23    DX: asthma    Medications: albuterol 108

## 2023-03-07 RX ORDER — ALBUTEROL SULFATE 90 UG/1
AEROSOL, METERED RESPIRATORY (INHALATION)
Qty: 360 EACH | Refills: 3 | Status: SHIPPED | OUTPATIENT
Start: 2023-03-07

## 2023-03-09 ENCOUNTER — APPOINTMENT (OUTPATIENT)
Dept: ENDOCRINOLOGY | Facility: MEDICAL CENTER | Age: 73
End: 2023-03-09
Payer: MEDICARE

## 2023-03-10 ENCOUNTER — HOSPITAL ENCOUNTER (OUTPATIENT)
Dept: LAB | Facility: MEDICAL CENTER | Age: 73
End: 2023-03-10
Attending: INTERNAL MEDICINE
Payer: MEDICARE

## 2023-03-10 LAB
ALBUMIN SERPL BCP-MCNC: 4.3 G/DL (ref 3.2–4.9)
ALBUMIN/GLOB SERPL: 2 G/DL
ALP SERPL-CCNC: 48 U/L (ref 30–99)
ALT SERPL-CCNC: 15 U/L (ref 2–50)
ANION GAP SERPL CALC-SCNC: 11 MMOL/L (ref 7–16)
AST SERPL-CCNC: 13 U/L (ref 12–45)
BASOPHILS # BLD AUTO: 0.4 % (ref 0–1.8)
BASOPHILS # BLD: 0.03 K/UL (ref 0–0.12)
BILIRUB SERPL-MCNC: 0.3 MG/DL (ref 0.1–1.5)
BUN SERPL-MCNC: 19 MG/DL (ref 8–22)
CALCIUM ALBUM COR SERPL-MCNC: 9.6 MG/DL (ref 8.5–10.5)
CALCIUM SERPL-MCNC: 9.8 MG/DL (ref 8.5–10.5)
CHLORIDE SERPL-SCNC: 104 MMOL/L (ref 96–112)
CO2 SERPL-SCNC: 24 MMOL/L (ref 20–33)
CREAT SERPL-MCNC: 0.85 MG/DL (ref 0.5–1.4)
EOSINOPHIL # BLD AUTO: 0.22 K/UL (ref 0–0.51)
EOSINOPHIL NFR BLD: 3.3 % (ref 0–6.9)
ERYTHROCYTE [DISTWIDTH] IN BLOOD BY AUTOMATED COUNT: 59.1 FL (ref 35.9–50)
GFR SERPLBLD CREATININE-BSD FMLA CKD-EPI: 72 ML/MIN/1.73 M 2
GLOBULIN SER CALC-MCNC: 2.2 G/DL (ref 1.9–3.5)
GLUCOSE SERPL-MCNC: 94 MG/DL (ref 65–99)
HCT VFR BLD AUTO: 44.2 % (ref 37–47)
HGB BLD-MCNC: 15.5 G/DL (ref 12–16)
IMM GRANULOCYTES # BLD AUTO: 0.03 K/UL (ref 0–0.11)
IMM GRANULOCYTES NFR BLD AUTO: 0.4 % (ref 0–0.9)
LYMPHOCYTES # BLD AUTO: 2.61 K/UL (ref 1–4.8)
LYMPHOCYTES NFR BLD: 38.9 % (ref 22–41)
MCH RBC QN AUTO: 41.2 PG (ref 27–33)
MCHC RBC AUTO-ENTMCNC: 35.1 G/DL (ref 33.6–35)
MCV RBC AUTO: 117.6 FL (ref 81.4–97.8)
MONOCYTES # BLD AUTO: 0.71 K/UL (ref 0–0.85)
MONOCYTES NFR BLD AUTO: 10.6 % (ref 0–13.4)
NEUTROPHILS # BLD AUTO: 3.11 K/UL (ref 2–7.15)
NEUTROPHILS NFR BLD: 46.4 % (ref 44–72)
NRBC # BLD AUTO: 0 K/UL
NRBC BLD-RTO: 0 /100 WBC
PLATELET # BLD AUTO: 253 K/UL (ref 164–446)
PMV BLD AUTO: 10 FL (ref 9–12.9)
POTASSIUM SERPL-SCNC: 4.3 MMOL/L (ref 3.6–5.5)
PROT SERPL-MCNC: 6.5 G/DL (ref 6–8.2)
RBC # BLD AUTO: 3.76 M/UL (ref 4.2–5.4)
SODIUM SERPL-SCNC: 139 MMOL/L (ref 135–145)
URATE SERPL-MCNC: 4 MG/DL (ref 1.9–8.2)
WBC # BLD AUTO: 6.7 K/UL (ref 4.8–10.8)

## 2023-03-10 PROCEDURE — 85025 COMPLETE CBC W/AUTO DIFF WBC: CPT

## 2023-03-10 PROCEDURE — 36415 COLL VENOUS BLD VENIPUNCTURE: CPT

## 2023-03-10 PROCEDURE — 80053 COMPREHEN METABOLIC PANEL: CPT

## 2023-03-10 PROCEDURE — 84550 ASSAY OF BLOOD/URIC ACID: CPT

## 2023-03-28 ENCOUNTER — OFFICE VISIT (OUTPATIENT)
Dept: SLEEP MEDICINE | Facility: MEDICAL CENTER | Age: 73
End: 2023-03-28
Attending: PHYSICIAN ASSISTANT
Payer: MEDICARE

## 2023-03-28 VITALS
WEIGHT: 202.5 LBS | DIASTOLIC BLOOD PRESSURE: 62 MMHG | HEART RATE: 68 BPM | RESPIRATION RATE: 16 BRPM | SYSTOLIC BLOOD PRESSURE: 124 MMHG | BODY MASS INDEX: 40.82 KG/M2 | OXYGEN SATURATION: 93 % | HEIGHT: 59 IN

## 2023-03-28 DIAGNOSIS — E66.01 MORBID OBESITY WITH BMI OF 40.0-44.9, ADULT (HCC): ICD-10-CM

## 2023-03-28 DIAGNOSIS — K21.9 GASTROESOPHAGEAL REFLUX DISEASE, UNSPECIFIED WHETHER ESOPHAGITIS PRESENT: ICD-10-CM

## 2023-03-28 DIAGNOSIS — F51.04 CHRONIC INSOMNIA: ICD-10-CM

## 2023-03-28 DIAGNOSIS — J44.89 COPD WITH ASTHMA (HCC): ICD-10-CM

## 2023-03-28 PROCEDURE — 3074F SYST BP LT 130 MM HG: CPT | Performed by: PHYSICIAN ASSISTANT

## 2023-03-28 PROCEDURE — 99213 OFFICE O/P EST LOW 20 MIN: CPT | Performed by: PHYSICIAN ASSISTANT

## 2023-03-28 PROCEDURE — 99212 OFFICE O/P EST SF 10 MIN: CPT | Performed by: PHYSICIAN ASSISTANT

## 2023-03-28 PROCEDURE — 3078F DIAST BP <80 MM HG: CPT | Performed by: PHYSICIAN ASSISTANT

## 2023-03-28 RX ORDER — PROMETHAZINE HYDROCHLORIDE AND CODEINE PHOSPHATE 6.25; 1 MG/5ML; MG/5ML
SYRUP ORAL
COMMUNITY
Start: 2022-09-30 | End: 2023-06-09

## 2023-03-28 RX ORDER — COVID-19 ANTIGEN TEST
KIT MISCELLANEOUS
COMMUNITY
Start: 2023-03-16 | End: 2023-06-09

## 2023-03-28 ASSESSMENT — ENCOUNTER SYMPTOMS
INSOMNIA: 1
HEARTBURN: 1
SINUS PAIN: 0
FEVER: 0
SPUTUM PRODUCTION: 1
WHEEZING: 1
SORE THROAT: 0
SHORTNESS OF BREATH: 1
ORTHOPNEA: 0
COUGH: 1
PALPITATIONS: 0
WEIGHT LOSS: 0
DIZZINESS: 0
HEADACHES: 0
TREMORS: 0
CHILLS: 0

## 2023-03-28 ASSESSMENT — FIBROSIS 4 INDEX: FIB4 SCORE: 0.97

## 2023-03-28 NOTE — PROGRESS NOTES
"CC    HPI:  Jina Barnard is a 72 y.o. year old female here today for follow-up on COPD with asthma.  Last seen in clinic 9/28/2022 by Niki Figueredo PA-C.  Patient is a never tobacco smoker but has a history of significant marijuana use.    Pertinent past medical history includes essential hypertension, intramural leiomyoma uterus, thrombocytosis, polycythemia vera, hypokalemia, diverticulitis.    Reviewed in clinic vitals  /62 (BP Location: Left arm, Patient Position: Sitting, BP Cuff Size: Large adult)   Pulse 68   Resp 16   Ht 1.499 m (4' 11\")   Wt 91.9 kg (202 lb 8 oz)   SpO2 93% on room air.  BMI of 40.9 kg/m².    Reviewed home medication regimen including Trelegy 100 mcg, DuoNeb, montelukast, albuterol.  She reports using DuoNeb during night and albuterol 4-5 times per day.  Reports Trelegy wears off after about 6 hours and then she begins to experience increased cough.  Trial alternative maintenance inhaler with sample provided of Sharmila, patient to check on insurance coverage.    Reviewed most recent imaging  including chest CT obtained 10/13/2022 demonstrated several scattered tree-in-bud groundglass nodules likely post infectious/inflammatory, no evidence of active pneumonia.      Chest x-ray obtained 12/14/2020 demonstrating emphysema, atherosclerosis, no acute cardiopulmonary disease.     Reviewed pulmonary function testing obtained 7/1/2022 demonstrating an FEV1 of 1.19 L or 59% predicted, FVC of 1.79 L or 69% predicted, FEV1/FVC ratio of 66, residual volume 158% predicted, % predicted, DLCO 85% predicted.  Per pulmonologist interpretation airflow obstruction present on baseline spirometry, no significant bronchodilator response, air trapping with residual volume 158% predicted, TLC within normal limits at 112% predicted.  Preserved diffusion capacity.  Fixed obstructive airway disease, findings consistent with bronchiectasis, uncontrolled asthma or COPD.  Who did you " see      Review of Systems   Constitutional:  Positive for malaise/fatigue. Negative for chills, fever and weight loss.   HENT:  Positive for hearing loss. Negative for congestion, nosebleeds, sinus pain, sore throat and tinnitus (rare related to BP).    Eyes:         Presc glasses   Respiratory:  Positive for cough, sputum production (not much but sticky clear), shortness of breath (with any activity) and wheezing.    Cardiovascular:  Negative for chest pain, palpitations, orthopnea and leg swelling (left >right).   Gastrointestinal:  Positive for heartburn (occasional noted).        Upper dentures, no swallowing issues    Neurological:  Negative for dizziness, tremors and headaches.   Psychiatric/Behavioral:  The patient has insomnia (staying asleep).        Past Medical History:   Diagnosis Date    Hypertension     Thrombocytosis        Past Surgical History:   Procedure Laterality Date    TUBAL COAGULATION LAPAROSCOPIC BILATERAL         Family History   Problem Relation Age of Onset    Alcohol/Drug Father         alcohol    Other Father         malnutrition    Psychiatric Illness Father     Other Mother         blood clot from surgery    Hypertension Mother     Diabetes Mother         ?    Psychiatric Illness Sister     Other Sister         malnutrition    No Known Problems Maternal Grandmother     No Known Problems Maternal Grandfather     No Known Problems Paternal Grandmother     No Known Problems Paternal Grandfather        Social History     Socioeconomic History    Marital status: Single     Spouse name: Not on file    Number of children: Not on file    Years of education: Not on file    Highest education level: Not on file   Occupational History    Not on file   Tobacco Use    Smoking status: Never    Smokeless tobacco: Never   Vaping Use    Vaping Use: Never used   Substance and Sexual Activity    Alcohol use: No    Drug use: Yes     Types: Marijuana     Comment: daily    Sexual activity: Never      Partners: Male     Birth control/protection: Post-Menopausal   Other Topics Concern    Not on file   Social History Narrative    Not on file     Social Determinants of Health     Financial Resource Strain: Not on file   Food Insecurity: Not on file   Transportation Needs: Not on file   Physical Activity: Not on file   Stress: Not on file   Social Connections: Not on file   Intimate Partner Violence: Not on file   Housing Stability: Not on file       Allergies as of 03/28/2023    (No Known Allergies)          Current medications as of today   Current Outpatient Medications   Medication Sig Dispense Refill    promethazine-codeine (PHENERGAN-CODEINE) 6.25-10 MG/5ML Syrup TAKE 5 ML BY MOUTH FOUR TIMES DAILY FOR UP TO 24 DAYS AS NEEDED FOR COUGH      potassium chloride ER (KLOR-CON) 10 MEQ tablet TAKE 1 TABLET BY MOUTH THREE TIMES DAILY 270 Tablet 0    albuterol 108 (90 Base) MCG/ACT Aero Soln inhalation aerosol INHALE 2 PUFFS BY MOUTH EVERY 6 HOURS AS NEEDED 360 Each 3    dexamethasone (DECADRON) 1 MG Tab Take 1 Tablet by mouth 1 time a day as needed (for midnight suppression test). 2 Tablet 0    montelukast (SINGULAIR) 10 MG Tab Take 1 Tablet by mouth every evening. 30 Tablet 11    vitamin D2, Ergocalciferol, (DRISDOL) 1.25 MG (62001 UT) Cap capsule Take 1 Capsule by mouth every 7 days. 12 Capsule 1    hydrochlorothiazide (MICROZIDE) 12.5 MG capsule Take 1 Capsule by mouth every day. 90 Capsule 3    Budeson-Glycopyrrol-Formoterol (BREZTRI AEROSPHERE) 160-9-4.8 MCG/ACT Aerosol Inhale 2 Puffs 2 times a day. 11.8 g 0    Budeson-Glycopyrrol-Formoterol (BREZTRI AEROSPHERE) 160-9-4.8 MCG/ACT Aerosol Inhale 2 Puffs 2 (two) times a day. 10.7 g 11    ipratropium-albuterol (DUONEB) 0.5-2.5 (3) MG/3ML nebulizer solution USE 1 VIAL IN NEBULIZER 4 TIMES DAILY - And As Needed 120 mL 11    BLACK COHOSH PO Take  by mouth.      ibuprofen (MOTRIN) 200 MG Tab Take 200 mg by mouth every 6 hours as needed.      hydroxyurea (HYDREA) 500 MG  Cap Take  by mouth every day.      QUICKVUE AT-HOME COVID-19 TEST Kit TEST AS DIRECTED TODAY (Patient not taking: Reported on 3/28/2023)      methylPREDNISolone (MEDROL DOSEPAK) 4 MG Tablet Therapy Pack Take as directed. (Patient not taking: Reported on 3/28/2023) 21 Tablet 0    polyethylene glycol-electrolytes (NULYTELY) 420 GM solution  (Patient not taking: Reported on 10/10/2022)      Fluticasone-Umeclidin-Vilant (TRELEGY ELLIPTA) 100-62.5-25 MCG/INH AEROSOL POWDER, BREATH ACTIVATED inhalation INHALE 1 PUFF BY MOUTH EVERY DAY. RINSE MOUTH AFTER USE (Patient not taking: Reported on 10/10/2022) 3 Each 3     No current facility-administered medications for this visit.         Physical Exam:   Gen:           Alert and oriented, No apparent distress. Mood and affect appropriate, normal interaction with provider.  Eyes:          sclere white, conjunctive moist.  Hearing:     Grossly intact.  Dentition:    Upper dentures, poor lower dentition   oropharynx:   Tongue normal, posterior pharynx without erythema or exudate.  Neck:        Supple, trachea midline, no masses.  Respiratory Effort: No intercostal retractions or use of accessory muscles.   Lung Auscultation:     Decreased bilaterally; no rales, rhonchi or wheezing.  CV:            Regular rate and rhythm. No edema. No murmurs, rubs or gallops.  Digits, Nails, Ext: No clubbing, cyanosis, petechiae, or nodes.   Skin:        No rashes, lesions or ulcers noted on exposed skin surfaces.                     Assessment:  1. COPD with asthma ([facility])  Overnight Oximetry    Referral to Pulmonary Rehab    Budeson-Glycopyrrol-Formoterol (ZootcardZTRI AEROSPHERE) 160-9-4.8 MCG/ACT Aerosol    PULMONARY FUNCTION TESTS -Test requested: Complete Pulmonary Function Test      2. Morbid obesity with BMI of 40.0-44.9, adult ([facility])        3. Chronic insomnia        4. Gastroesophageal reflux disease, unspecified whether esophagitis present          Immunizations:    Flu: Declined  Pneumovax  23: Deferred  Prevnar 13: 12/29/2015  SARS CoV2 vaccine: Declined      Plan:  72 year old female here to follow up on COPD with asthma.     COPD with asthma: Patient was reminded to use spacer with sample of Breztri provided, continue good oral care.  Currently reports mild oral dryness.  Trial use of guaifenesin to help clear secretions.  Referral to pulmonary rehab.    Chronic insomnia: Discussed trial of extended release melatonin.  Patient finds 3 AM a very peaceful time to be awake, consider alternate sleeping schedule.  Obtain overnight oximetry to assure adequate nighttime oxygen saturations.    GERD: Patient is using baking soda, follow-up with primary if persistent issues as this may be contributing to cough.    Morbid obesity: Discussed impact central adiposity on pulmonary function, encouraged increased activity, monitor nutritional intake.    Follow-up in 6 months with PFT.      This dictation was created using voice recognition software. The accuracy of the dictation is limited to the abilities of the software. I expect there may be some errors of grammar and possibly content.

## 2023-03-28 NOTE — PATIENT INSTRUCTIONS
1-reminded to use spacer with Breztri  2-continue good oral care  3-mild oral dryness noted   4-chronic insomnia   -finds 3 am very peaceful time to be awake   -consider melatonin extended release formula   -alternate sleeping schedule  5-uses baking soda for reflux  6-will order overnight oximetry to assess for adequate night time/sleep time oxygen levels  7-referral to pulmonary rehab  8-sample Breztri  9-guaifenesin (generic for mucinex) to help clear secretions   10-follow up in 6 months

## 2023-04-10 ENCOUNTER — HOSPITAL ENCOUNTER (OUTPATIENT)
Facility: MEDICAL CENTER | Age: 73
End: 2023-04-10
Attending: INTERNAL MEDICINE
Payer: MEDICARE

## 2023-04-10 PROCEDURE — 80053 COMPREHEN METABOLIC PANEL: CPT

## 2023-04-10 PROCEDURE — 84550 ASSAY OF BLOOD/URIC ACID: CPT

## 2023-04-11 LAB
ALBUMIN SERPL BCP-MCNC: 4.2 G/DL (ref 3.2–4.9)
ALBUMIN/GLOB SERPL: 2.1 G/DL
ALP SERPL-CCNC: 52 U/L (ref 30–99)
ALT SERPL-CCNC: 12 U/L (ref 2–50)
ANION GAP SERPL CALC-SCNC: 14 MMOL/L (ref 7–16)
AST SERPL-CCNC: 17 U/L (ref 12–45)
BILIRUB SERPL-MCNC: 0.4 MG/DL (ref 0.1–1.5)
BUN SERPL-MCNC: 20 MG/DL (ref 8–22)
CALCIUM ALBUM COR SERPL-MCNC: 8.9 MG/DL (ref 8.5–10.5)
CALCIUM SERPL-MCNC: 9.1 MG/DL (ref 8.5–10.5)
CHLORIDE SERPL-SCNC: 107 MMOL/L (ref 96–112)
CO2 SERPL-SCNC: 24 MMOL/L (ref 20–33)
CREAT SERPL-MCNC: 0.82 MG/DL (ref 0.5–1.4)
GFR SERPLBLD CREATININE-BSD FMLA CKD-EPI: 75 ML/MIN/1.73 M 2
GLOBULIN SER CALC-MCNC: 2 G/DL (ref 1.9–3.5)
GLUCOSE SERPL-MCNC: 100 MG/DL (ref 65–99)
POTASSIUM SERPL-SCNC: 4.1 MMOL/L (ref 3.6–5.5)
PROT SERPL-MCNC: 6.2 G/DL (ref 6–8.2)
SODIUM SERPL-SCNC: 145 MMOL/L (ref 135–145)
URATE SERPL-MCNC: 4.7 MG/DL (ref 1.9–8.2)

## 2023-04-12 ENCOUNTER — HOSPITAL ENCOUNTER (OUTPATIENT)
Dept: RADIOLOGY | Facility: MEDICAL CENTER | Age: 73
End: 2023-04-12
Attending: INTERNAL MEDICINE
Payer: MEDICARE

## 2023-04-12 DIAGNOSIS — D45 CHRONIC ERYTHREMIA IN REMISSION (HCC): ICD-10-CM

## 2023-04-12 DIAGNOSIS — I82.4Y2 DEEP VEIN THROMBOSIS (DVT) OF PROXIMAL VEIN OF LEFT LOWER EXTREMITY, UNSPECIFIED CHRONICITY (HCC): ICD-10-CM

## 2023-04-12 PROCEDURE — 93971 EXTREMITY STUDY: CPT | Mod: LT

## 2023-04-14 ENCOUNTER — TELEPHONE (OUTPATIENT)
Dept: ENDOCRINOLOGY | Facility: MEDICAL CENTER | Age: 73
End: 2023-04-14
Payer: MEDICARE

## 2023-04-14 NOTE — TELEPHONE ENCOUNTER
Call[facility] Rodrig[facility] to return his phone call about this patient  He wanted to know if the work up for adrenal was ordered and I confirmed with him that it was.

## 2023-05-02 ENCOUNTER — TELEPHONE (OUTPATIENT)
Dept: ENDOCRINOLOGY | Facility: MEDICAL CENTER | Age: 73
End: 2023-05-02
Payer: MEDICARE

## 2023-05-03 NOTE — TELEPHONE ENCOUNTER
Patient wanted sooner appointment. Called patient, left message to offer 5/8/2023 at 10:50AM per Mathew.     If patient calls back, please reschedule her for above appointment with Mathew.

## 2023-05-08 ENCOUNTER — APPOINTMENT (OUTPATIENT)
Dept: ENDOCRINOLOGY | Facility: MEDICAL CENTER | Age: 73
End: 2023-05-08
Payer: MEDICARE

## 2023-05-26 DIAGNOSIS — D35.00 ADRENAL ADENOMA, UNSPECIFIED LATERALITY: ICD-10-CM

## 2023-05-30 ENCOUNTER — APPOINTMENT (OUTPATIENT)
Dept: SLEEP MEDICINE | Facility: MEDICAL CENTER | Age: 73
End: 2023-05-30
Attending: PHYSICIAN ASSISTANT
Payer: MEDICARE

## 2023-06-09 ENCOUNTER — OFFICE VISIT (OUTPATIENT)
Dept: ENDOCRINOLOGY | Facility: MEDICAL CENTER | Age: 73
End: 2023-06-09
Payer: MEDICARE

## 2023-06-09 VITALS
HEART RATE: 101 BPM | BODY MASS INDEX: 39.7 KG/M2 | SYSTOLIC BLOOD PRESSURE: 112 MMHG | DIASTOLIC BLOOD PRESSURE: 68 MMHG | WEIGHT: 202.2 LBS | OXYGEN SATURATION: 95 % | HEIGHT: 60 IN

## 2023-06-09 DIAGNOSIS — E55.9 VITAMIN D DEFICIENCY: ICD-10-CM

## 2023-06-09 DIAGNOSIS — E87.6 HYPOKALEMIA: ICD-10-CM

## 2023-06-09 DIAGNOSIS — I10 HYPERTENSION, ESSENTIAL: ICD-10-CM

## 2023-06-09 DIAGNOSIS — E27.8 ADRENAL INCIDENTALOMA (HCC): ICD-10-CM

## 2023-06-09 DIAGNOSIS — E66.9 OBESITY (BMI 35.0-39.9 WITHOUT COMORBIDITY): ICD-10-CM

## 2023-06-09 DIAGNOSIS — R53.83 OTHER FATIGUE: ICD-10-CM

## 2023-06-09 PROCEDURE — 3078F DIAST BP <80 MM HG: CPT

## 2023-06-09 PROCEDURE — 99214 OFFICE O/P EST MOD 30 MIN: CPT

## 2023-06-09 PROCEDURE — 3074F SYST BP LT 130 MM HG: CPT

## 2023-06-09 PROCEDURE — 99211 OFF/OP EST MAY X REQ PHY/QHP: CPT

## 2023-06-09 RX ORDER — DEXAMETHASONE 1 MG
1 TABLET ORAL
Qty: 1 TABLET | Refills: 0 | Status: SHIPPED | OUTPATIENT
Start: 2023-06-09 | End: 2023-08-25

## 2023-06-09 RX ORDER — DEXAMETHASONE 1 MG
1 TABLET ORAL
Qty: 2 TABLET | Refills: 0 | Status: SHIPPED | OUTPATIENT
Start: 2023-06-09 | End: 2023-06-09

## 2023-06-09 ASSESSMENT — FIBROSIS 4 INDEX: FIB4 SCORE: 1.42

## 2023-06-09 NOTE — PROGRESS NOTES
New Patient Consult Note for Endocrinology  Referred by: Corine Alas P.A.-C.    Reason for consult: Initial consultation for the following conditions    HPI:  Jina Barnard is a 72 y.o. old patient who is seeing us today for care.    This is a pleasant patient and I appreciate the opportunity to participate in the care of this patient.       1.  Adrenal Incidentaloma:  A CT scan done on 4/13/2022 for evaluation of pelvic mass showed a 0.18cm L adrenal nodule and a 0.13 cm R adrenal nodule   Reports weight gain, brain fogginess, fatigue,   Denies purple streahc marks, facial phetora,      Latest Reference Range & Units 02/18/23 06:30   Creatinine, Random Urine 500 - 1400 mg/d  500 - 1400 mg/d 1120  1120   Collection Length Hrs  Hrs 24  24   Total Volume mL  mL 2800  2800   Vma Urine 0.0 - 7.0 mg/d 3.9   VMA Urine mg/L mg/L 1.4   VMA Urine mg/g CRT 0 - 6 mg/gCR 4   Epinephrine Urine Catecholamin 1 - 14 ug/d 6   Epinephrine, Ur ug/g CRT 0 - 20 ug/g CRT 5   Epinephrine, Ur per volume ug/L 2   Norepinephine Urine Catecholam 14 - 120 ug/d 36   Norepinephrine, Ur ug/g CRT 0 - 45 ug/g CRT 32   Norepinephrine, Ur per volume ug/L 13   Dopamine Urine Catecholamine F 71 - 485 ug/d 179   Dopamine, Ur ug/g CRT 0 - 250 ug/g    Dopamine, Ur per volume ug/L 64   Catecholamines UF Interp  See Note   Creatinine Urine mg/dL  mg/dL 40  40      Latest Reference Range & Units 02/15/23 08:34   IGF1 23 - 221 ng/mL 147   IGF-1 Z Score Calculation  1.0   Prolactin 2.80 - 26.00 ng/mL 11.00       DST, pt took 2mg instead of 1mg of her decadron   No dexamethasone level drawn by lab   Latest Reference Range & Units 02/15/23 08:34   Cortisol 0.0 - 23.0 ug/dL 3.1      Latest Reference Range & Units 02/15/23 08:34   Acth 7.2 - 63.3 pg/mL <1.5 (L)         2. Hypertension:  Currently taking HCTZ 12.5 MG DAILY-does not take her medication consistently   BP today 112/68       Latest Reference Range & Units 04/10/23 15:08   GFR (CKD-EPI)  >60 mL/min/1.73 m 2 75      Latest Reference Range & Units 02/15/23 17:36   Renin Activity ng/mL/hr <0.1      Latest Reference Range & Units 02/15/23 08:34   Aldos Serum ng/dL 12.3       3. Hypokalemia:  This is followed by PCP  Currently taking Klor-con 10 Meq tid     Latest Reference Range & Units 04/10/23 15:08   Potassium 3.6 - 5.5 mmol/L 4.1     4.  Other fatigue:  Reports fatigue for the past few months     Latest Reference Range & Units 02/15/23 08:34   TSH 0.380 - 5.330 uIU/mL 1.970   Free T-4 0.93 - 1.70 ng/dL 1.25   T3,Free 2.00 - 4.40 pg/mL 2.67      Latest Reference Range & Units 02/15/23 08:34   Microsomal -Tpo- Abs 0.0 - 9.0 IU/mL <9.0   Anti-Thyroglobulin 0.0 - 4.0 IU/mL <0.9     5.  Vitamin D deficiency:  Currently not taking any supplementation   Latest Reference Range & Units 02/15/23 08:34   25-Hydroxy   Vitamin D 25 30 - 100 ng/mL 33     6.  Obesity:  BMI is 37.77   Latest Reference Range & Units 02/15/23 08:34   25-Hydroxy   Vitamin D 25 30 - 100 ng/mL 33     ROS:   Constitutional: No change in weight , No fatigue, No night sweats.  HEENT: No Headache.  Eyes:  No blurred vision, No visual changes.  Cardiac: No chest pain, No palpitations.  Resp: No shortness of breath, No cough,   Gastro: No nausea or vomiting, No diarrhea.  Neuro: Denies numbness or tinging in bilateral feet or taking every  Freeto, and no loss of sensation.  Endo: No heat or cold intolerance.  : No polyuria, No polydipsia, No chronic UTI's.  Lower extremities: No lower leg edema bilateral.  All other systems were reviewed and were negative.    Past Medical History:  Patient Active Problem List    Diagnosis Date Noted    Morbid obesity ([facility]) 10/05/2022    Moderate persistent asthma 10/01/2022    Diverticulitis 04/21/2022    Dyslipidemia 01/14/2021    Impaired fasting glucose 01/14/2021    COPD with asthma ([facility]) 11/19/2020    Obesity (BMI 35.0-39.9 without comorbidity) ([facility]) 05/22/2020    Intramural leiomyoma of uterus  12/24/2019    Hypokalemia 06/12/2019    Moderate episode of recurrent major depressive disorder ([facility]) 10/29/2018    Stress at home 03/27/2018    Marijuana use 07/19/2017    Polycythemia vera(238.4) 07/19/2017    Refused pneumococcal vaccination 10/10/2016    Refused influenza vaccine 10/10/2016    Thrombocytosis 06/09/2016    Essential hypertension 06/09/2016    Vitamin D deficiency disease 06/09/2016       Past Surgical History:  Past Surgical History:   Procedure Laterality Date    TUBAL COAGULATION LAPAROSCOPIC BILATERAL         Allergies:  Patient has no known allergies.    Social History:  Social History     Socioeconomic History    Marital status: Single     Spouse name: Not on file    Number of children: Not on file    Years of education: Not on file    Highest education level: Not on file   Occupational History    Not on file   Tobacco Use    Smoking status: Never    Smokeless tobacco: Never   Vaping Use    Vaping Use: Never used   Substance and Sexual Activity    Alcohol use: No    Drug use: Yes     Types: Marijuana     Comment: daily    Sexual activity: Never     Partners: Male     Birth control/protection: Post-Menopausal   Other Topics Concern    Not on file   Social History Narrative    Not on file     Social Determinants of Health     Financial Resource Strain: Not on file   Food Insecurity: Not on file   Transportation Needs: Not on file   Physical Activity: Not on file   Stress: Not on file   Social Connections: Not on file   Intimate Partner Violence: Not on file   Housing Stability: Not on file       Family History:  Family History   Problem Relation Age of Onset    Alcohol/Drug Father         alcohol    Other Father         malnutrition    Psychiatric Illness Father     Other Mother         blood clot from surgery    Hypertension Mother     Diabetes Mother         ?    Psychiatric Illness Sister     Other Sister         malnutrition    No Known Problems Maternal Grandmother     No Known Problems  Maternal Grandfather     No Known Problems Paternal Grandmother     No Known Problems Paternal Grandfather        Medications:    Current Outpatient Medications:     QUICKVUE AT-HOME COVID-19 TEST Kit, TEST AS DIRECTED TODAY (Patient not taking: Reported on 3/28/2023), Disp: , Rfl:     promethazine-codeine (PHENERGAN-CODEINE) 6.25-10 MG/5ML Syrup, TAKE 5 ML BY MOUTH FOUR TIMES DAILY FOR UP TO 24 DAYS AS NEEDED FOR COUGH, Disp: , Rfl:     Budeson-Glycopyrrol-Formoterol (BREZTRI AEROSPHERE) 160-9-4.8 MCG/ACT Aerosol, Inhale 2 Inhalation. 2 times a day., Disp: 23.6 g, Rfl: 0    potassium chloride ER (KLOR-CON) 10 MEQ tablet, TAKE 1 TABLET BY MOUTH THREE TIMES DAILY, Disp: 270 Tablet, Rfl: 0    albuterol 108 (90 Base) MCG/ACT Aero Soln inhalation aerosol, INHALE 2 PUFFS BY MOUTH EVERY 6 HOURS AS NEEDED, Disp: 360 Each, Rfl: 3    dexamethasone (DECADRON) 1 MG Tab, Take 1 Tablet by mouth 1 time a day as needed (for midnight suppression test)., Disp: 2 Tablet, Rfl: 0    montelukast (SINGULAIR) 10 MG Tab, Take 1 Tablet by mouth every evening., Disp: 30 Tablet, Rfl: 11    vitamin D2, Ergocalciferol, (DRISDOL) 1.25 MG (63765 UT) Cap capsule, Take 1 Capsule by mouth every 7 days., Disp: 12 Capsule, Rfl: 1    hydrochlorothiazide (MICROZIDE) 12.5 MG capsule, Take 1 Capsule by mouth every day., Disp: 90 Capsule, Rfl: 3    Budeson-Glycopyrrol-Formoterol (BREZTRI AEROSPHERE) 160-9-4.8 MCG/ACT Aerosol, Inhale 2 Puffs 2 times a day., Disp: 11.8 g, Rfl: 0    methylPREDNISolone (MEDROL DOSEPAK) 4 MG Tablet Therapy Pack, Take as directed. (Patient not taking: Reported on 3/28/2023), Disp: 21 Tablet, Rfl: 0    Budeson-Glycopyrrol-Formoterol (BREZTRI AEROSPHERE) 160-9-4.8 MCG/ACT Aerosol, Inhale 2 Puffs 2 (two) times a day., Disp: 10.7 g, Rfl: 11    ipratropium-albuterol (DUONEB) 0.5-2.5 (3) MG/3ML nebulizer solution, USE 1 VIAL IN NEBULIZER 4 TIMES DAILY - And As Needed, Disp: 120 mL, Rfl: 11    polyethylene glycol-electrolytes  (NULYTELY) 420 GM solution, , Disp: , Rfl:     BLACK COHOSH PO, Take  by mouth., Disp: , Rfl:     ibuprofen (MOTRIN) 200 MG Tab, Take 200 mg by mouth every 6 hours as needed., Disp: , Rfl:     hydroxyurea (HYDREA) 500 MG Cap, Take  by mouth every day., Disp: , Rfl:       Physical Examination:   Vital signs: /68 (BP Location: Left arm, Patient Position: Sitting)   Pulse (!) 101   Ht 1.524 m (5')   Wt 91.7 kg (202 lb 3.2 oz)   SpO2 95%   BMI 39.49 kg/m²   General: No distress, cooperative, well dressed and well nourished.   Eyes: No scleral icterus or discharge, No hyposphagma  ENMT: Normal on external inspection of nose, lips, No nasal drainage   Neck: No abnormal masses on inspection  Resp: Normal effort, Bilateral clear to auscultation, No wheezing, No rales  CVS: Regular rate and rhythm, S1 S2 normal, No murmur. No gallop  Extremities: No edema bilateral extremities  Neuro: Alert and oriented  Skin: No rash, No Ulcers  Psych: Normal mood and affect    Assessment and Plan:  1. Adrenal incidentaloma ([facility])  Based on size of adrenal nodules and Resolved with catecholamines and methamphetamine-neg for pheochromocytoma  Patient took 2 mg of p.o. Decadron plus dexamethasone level was not drawn by lab  We will repeat DST  Instructions for the Dexamethasone Suppression Test-Printed information given  Take 1mg of the dexamethasone pill (or 1 pill)    at or near midnight on   BEFORE YOU TAKE THE PILL MAKE SURE THE LAB IS OPEN AND THAT YOU CAN GO FOR THE 8 AM TEST  Go to the lab the following morning at 8 am and get you blood drawn.   - Miscellaneous Test; Future  - CORTISOL - AM  - dexamethasone (DECADRON) 1 MG Tab; Take 1 Tablet by mouth 1 time a day as needed (DST test).  Dispense: 1 Tablet; Refill: 0    2. Hypertension, essential  Stable  Negative for primary aldosteronism based on renin and aldosterone levels     3. Hypokalemia  Stable  Follow up PCP    4. Other fatigue  Stable thyroid hormone, within range  antibody levels for Hashimoto's disease  Last thyroid ultrasound 2021 showed a homogeneous gland with no thyroid nodules  Patient requested another thyroid ultrasound  - US-THYROID; Future    5. Vitamin D deficiency  Stable    6. Obesity (BMI 35.0-39.9 without comorbidity)  Unstable  Lifestyle modifications discussed      Disposition: Make an appointment to follow-up in 3 months  Do DST as soon as possible      Thank you kindly for allowing me to participate in the diabetes care plan for this patient.    Mathew Austin A.P.R.N.   06/09/23      CC:   Corine Alas P.A.-C.

## 2023-07-21 ENCOUNTER — HOSPITAL ENCOUNTER (OUTPATIENT)
Dept: LAB | Facility: MEDICAL CENTER | Age: 73
End: 2023-07-21
Payer: MEDICARE

## 2023-07-21 DIAGNOSIS — E27.8 ADRENAL INCIDENTALOMA (HCC): ICD-10-CM

## 2023-07-21 DIAGNOSIS — D35.00 ADRENAL ADENOMA, UNSPECIFIED LATERALITY: ICD-10-CM

## 2023-07-21 LAB
CORTIS SERPL-MCNC: 2.7 UG/DL (ref 0–23)
DHEA-S SERPL-MCNC: 30.8 UG/DL (ref 9.4–246)

## 2023-07-21 PROCEDURE — 82024 ASSAY OF ACTH: CPT

## 2023-07-21 PROCEDURE — 82627 DEHYDROEPIANDROSTERONE: CPT

## 2023-07-21 PROCEDURE — 80299 QUANTITATIVE ASSAY DRUG: CPT

## 2023-07-21 PROCEDURE — 82533 TOTAL CORTISOL: CPT

## 2023-07-21 PROCEDURE — 36415 COLL VENOUS BLD VENIPUNCTURE: CPT

## 2023-07-23 LAB — ACTH PLAS-MCNC: 4.6 PG/ML (ref 7.2–63.3)

## 2023-07-26 LAB — TEST NAME 95000: NORMAL

## 2023-07-27 ENCOUNTER — HOSPITAL ENCOUNTER (OUTPATIENT)
Dept: RADIOLOGY | Facility: MEDICAL CENTER | Age: 73
End: 2023-07-27
Payer: MEDICARE

## 2023-07-27 DIAGNOSIS — R53.83 OTHER FATIGUE: ICD-10-CM

## 2023-07-27 PROCEDURE — 76536 US EXAM OF HEAD AND NECK: CPT

## 2023-07-28 ENCOUNTER — TELEPHONE (OUTPATIENT)
Dept: ENDOCRINOLOGY | Facility: MEDICAL CENTER | Age: 73
End: 2023-07-28

## 2023-07-28 NOTE — TELEPHONE ENCOUNTER
7/28 [phone] patient called in this morning extremely worried about the test she just got done, she said she wanted to see you as soon as possible so I offered her an opening today but she couldn't come so shes scheduled for your next opening on the 25th of AUG, but she would like a call from either you or the MA to see if there is anything she needs to do about this lab. She seems very frantic and just needs some reassurance

## 2023-08-25 ENCOUNTER — OFFICE VISIT (OUTPATIENT)
Dept: ENDOCRINOLOGY | Facility: MEDICAL CENTER | Age: 73
End: 2023-08-25
Payer: MEDICARE

## 2023-08-25 VITALS
HEIGHT: 60 IN | WEIGHT: 202.8 LBS | HEART RATE: 87 BPM | DIASTOLIC BLOOD PRESSURE: 88 MMHG | OXYGEN SATURATION: 94 % | BODY MASS INDEX: 39.82 KG/M2 | SYSTOLIC BLOOD PRESSURE: 132 MMHG

## 2023-08-25 DIAGNOSIS — I10 ESSENTIAL HYPERTENSION: ICD-10-CM

## 2023-08-25 DIAGNOSIS — E27.8 ADRENAL INCIDENTALOMA (HCC): ICD-10-CM

## 2023-08-25 DIAGNOSIS — E66.9 OBESITY (BMI 35.0-39.9 WITHOUT COMORBIDITY): ICD-10-CM

## 2023-08-25 DIAGNOSIS — E55.9 VITAMIN D DEFICIENCY: ICD-10-CM

## 2023-08-25 DIAGNOSIS — R53.83 OTHER FATIGUE: ICD-10-CM

## 2023-08-25 DIAGNOSIS — E87.6 HYPOKALEMIA: ICD-10-CM

## 2023-08-25 PROCEDURE — 99211 OFF/OP EST MAY X REQ PHY/QHP: CPT

## 2023-08-25 PROCEDURE — 3079F DIAST BP 80-89 MM HG: CPT

## 2023-08-25 PROCEDURE — 3075F SYST BP GE 130 - 139MM HG: CPT

## 2023-08-25 PROCEDURE — 99215 OFFICE O/P EST HI 40 MIN: CPT

## 2023-08-25 ASSESSMENT — FIBROSIS 4 INDEX: FIB4 SCORE: 1.42

## 2023-08-25 NOTE — PROGRESS NOTES
New Patient Consult Note for Endocrinology  Referred by: Corine Alas P.A.-C.    Reason for consult: Initial consultation for the following conditions    HPI:  Jina Barnard is a 72 y.o. old patient who is seeing us today for care.    This is a pleasant patient and I appreciate the opportunity to participate in the care of this patient.   Negative for pheochromocytoma    1.  Adrenal Incidentaloma:  A CT scan done on 4/13/2022 for evaluation of pelvic mass showed a 0.18cm L adrenal nodule and a 0.13 cm R adrenal nodule     Reports weight gain, brain fogginess, fatigue,  Denies purple streahc marks, facial phetora,      Latest Reference Range & Units 02/18/23 06:30   Creatinine, Random Urine 500 - 1400 mg/d  500 - 1400 mg/d 1120  1120   Collection Length Hrs  Hrs 24  24   Total Volume mL  mL 2800  2800   Vma Urine 0.0 - 7.0 mg/d 3.9   VMA Urine mg/L mg/L 1.4   VMA Urine mg/g CRT 0 - 6 mg/gCR 4   Epinephrine Urine Catecholamin 1 - 14 ug/d 6   Epinephrine, Ur ug/g CRT 0 - 20 ug/g CRT 5   Epinephrine, Ur per volume ug/L 2   Norepinephine Urine Catecholam 14 - 120 ug/d 36   Norepinephrine, Ur ug/g CRT 0 - 45 ug/g CRT 32   Norepinephrine, Ur per volume ug/L 13   Dopamine Urine Catecholamine F 71 - 485 ug/d 179   Dopamine, Ur ug/g CRT 0 - 250 ug/g    Dopamine, Ur per volume ug/L 64   Catecholamines UF Interp  See Note   Creatinine Urine mg/dL  mg/dL 40  40      Latest Reference Range & Units 02/15/23 08:34   IGF1 23 - 221 ng/mL 147   IGF-1 Z Score Calculation  1.0   Prolactin 2.80 - 26.00 ng/mL 11.00       DST, pt took 1 mg decadron    Latest Reference Range & Units 07/21/23 08:42   Cortisol 0.0 - 23.0 ug/dL 2.7            Latest Reference Range & Units 07/21/23 08:42   Dhea-S 9.4 - 246.0 ug/dL 30.8      Latest Reference Range & Units 02/15/23 17:36   Renin Activity ng/mL/hr <0.1      Latest Reference Range & Units 02/15/23 08:34   Aldos Serum ng/dL 12.3      Latest Reference Range & Units 02/15/23 08:34    Acth 7.2 - 63.3 pg/mL <1.5 (L)      Latest Reference Range & Units 07/21/23 08:46   Acth 7.2 - 63.3 pg/mL 4.6 (L)       2. Hypertension:  Currently taking HCTZ 12.5 MG DAILY-does not take her medication consistently   BP today 132/88       Latest Reference Range & Units 04/10/23 15:08   GFR (CKD-EPI) >60 mL/min/1.73 m 2 75      Latest Reference Range & Units 02/15/23 17:36   Renin Activity ng/mL/hr <0.1      Latest Reference Range & Units 02/15/23 08:34   Aldos Serum ng/dL 12.3       3. Hypokalemia:  This is followed by PCP  Currently taking Klor-con 10 Meq tid     Latest Reference Range & Units 04/10/23 15:08   Potassium 3.6 - 5.5 mmol/L 4.1     4.  Other fatigue:  Reports fatigue for the past few months     Latest Reference Range & Units 02/15/23 08:34   TSH 0.380 - 5.330 uIU/mL 1.970   Free T-4 0.93 - 1.70 ng/dL 1.25   T3,Free 2.00 - 4.40 pg/mL 2.67      Latest Reference Range & Units 02/15/23 08:34   Microsomal -Tpo- Abs 0.0 - 9.0 IU/mL <9.0   Anti-Thyroglobulin 0.0 - 4.0 IU/mL <0.9     5.  Vitamin D deficiency:  Currently not taking any supplementation   Latest Reference Range & Units 02/15/23 08:34   25-Hydroxy   Vitamin D 25 30 - 100 ng/mL 33     6.  Obesity:  BMI is 37.77    ROS:   Constitutional: No change in weight , No fatigue, No night sweats.  HEENT: No Headache.  Eyes:  No blurred vision, No visual changes.  Cardiac: No chest pain, No palpitations.  Resp: No shortness of breath, No cough,   Gastro: No nausea or vomiting, No diarrhea.  Neuro: Denies numbness or tinging in bilateral feet or taking every  Freeto, and no loss of sensation.  Endo: No heat or cold intolerance.  : No polyuria, No polydipsia, No chronic UTI's.  Lower extremities: No lower leg edema bilateral.  All other systems were reviewed and were negative.    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    Obesity (BMI 35.0-39.9 without comorbidity) ([facility]) 05/22/2020    Intramural leiomyoma of uterus 12/24/2019    Hypokalemia 06/12/2019    Moderate episode of recurrent major depressive disorder ([facility]) 10/29/2018    Stress at home 03/27/2018    Marijuana use 07/19/2017    Polycythemia vera(238.4) 07/19/2017    Refused pneumococcal vaccination 10/10/2016    Refused influenza vaccine 10/10/2016    Thrombocytosis 06/09/2016    Essential hypertension 06/09/2016    Vitamin D deficiency disease 06/09/2016       Past Surgical History:  Past Surgical History:   Procedure Laterality Date    TUBAL COAGULATION LAPAROSCOPIC BILATERAL         Allergies:  Patient has no known allergies.    Social History:  Social History     Socioeconomic History    Marital status: Single     Spouse name: Not on file    Number of children: Not on file    Years of education: Not on file    Highest education level: Not on file   Occupational History    Not on file   Tobacco Use    Smoking status: Never    Smokeless tobacco: Never   Vaping Use    Vaping Use: Never used   Substance and Sexual Activity    Alcohol use: No    Drug use: Yes     Types: Marijuana     Comment: daily    Sexual activity: Never     Partners: Male     Birth control/protection: Post-Menopausal   Other Topics Concern    Not on file   Social History Narrative    Not on file     Social Determinants of Health     Financial Resource Strain: Not on file   Food Insecurity: Not on file   Transportation Needs: Not on file   Physical Activity: Not on file   Stress: Not on file   Social Connections: Not on file   Intimate Partner Violence: Not on file   Housing Stability: Not on file       Family History:  Family History   Problem Relation Age of Onset    Alcohol/Drug Father         alcohol    Other Father         malnutrition    Psychiatric Illness Father     Other Mother         blood  clot from surgery    Hypertension Mother     Diabetes Mother         ?    Psychiatric Illness Sister     Other Sister         malnutrition    No Known Problems Maternal Grandmother     No Known Problems Maternal Grandfather     No Known Problems Paternal Grandmother     No Known Problems Paternal Grandfather        Medications:    Current Outpatient Medications:     potassium chloride ER (KLOR-CON) 10 MEQ tablet, TAKE 1 TABLET BY MOUTH THREE TIMES DAILY, Disp: 270 Tablet, Rfl: 0    dexamethasone (DECADRON) 1 MG Tab, Take 1 Tablet by mouth 1 time a day as needed (DST test)., Disp: 1 Tablet, Rfl: 0    albuterol 108 (90 Base) MCG/ACT Aero Soln inhalation aerosol, INHALE 2 PUFFS BY MOUTH EVERY 6 HOURS AS NEEDED, Disp: 360 Each, Rfl: 3    montelukast (SINGULAIR) 10 MG Tab, Take 1 Tablet by mouth every evening., Disp: 30 Tablet, Rfl: 11    vitamin D2, Ergocalciferol, (DRISDOL) 1.25 MG (43299 UT) Cap capsule, Take 1 Capsule by mouth every 7 days., Disp: 12 Capsule, Rfl: 1    hydrochlorothiazide (MICROZIDE) 12.5 MG capsule, Take 1 Capsule by mouth every day., Disp: 90 Capsule, Rfl: 3    methylPREDNISolone (MEDROL DOSEPAK) 4 MG Tablet Therapy Pack, Take as directed. (Patient not taking: Reported on 3/28/2023), Disp: 21 Tablet, Rfl: 0    Budeson-Glycopyrrol-Formoterol (BREZTRI AEROSPHERE) 160-9-4.8 MCG/ACT Aerosol, Inhale 2 Puffs 2 (two) times a day., Disp: 10.7 g, Rfl: 11    ipratropium-albuterol (DUONEB) 0.5-2.5 (3) MG/3ML nebulizer solution, USE 1 VIAL IN NEBULIZER 4 TIMES DAILY - And As Needed, Disp: 120 mL, Rfl: 11    polyethylene glycol-electrolytes (NULYTELY) 420 GM solution, , Disp: , Rfl:     BLACK COHOSH PO, Take  by mouth., Disp: , Rfl:     ibuprofen (MOTRIN) 200 MG Tab, Take 200 mg by mouth every 6 hours as needed., Disp: , Rfl:     hydroxyurea (HYDREA) 500 MG Cap, Take  by mouth every day., Disp: , Rfl:       Physical Examination:   Vital signs: /88 (BP Location: Left arm, Patient Position: Sitting, BP Cuff  Size: Large adult)   Pulse 87   Ht 1.524 m (5')   Wt 92 kg (202 lb 12.8 oz)   SpO2 94%   BMI 39.61 kg/m²   General: No distress, cooperative, well dressed and well nourished.   Psych: Normal mood and affect    Assessment and Plan:  1. Adrenal incidentaloma (HCC)  Unstable  Negative for pheochromocytoma  Negative for primary aldosteronism  Patient did a DST a few weeks ago but no dexamethasone level was drawn  DST on 7/21/2023 showed a cortisol level at 2.7 but low dexamethasone level  Discussed with patient that this could be 2 that she is a rapid metabolizer, nevertheless I have to accurately evaluate for hypercortisolism secondary to adrenal nodules  I discussed with patient that we either repeat a DST with high Decadron dose or midnight saliva cortisol  Patient would like to go 3 midnight saliva cortisol, we discussed how and when to take this test, rationale      - SALIVARY CORTISOL,MS    2. Essential hypertension  - Stable  -Continue regimen-see HPI      3. Hypokalemia  Stable  Follow up PCP    4. Other fatigue  Unstable  Please see diagnoses #1    5. Vitamin D deficiency  - Stable  -Continue regimen-see HPI      6. Obesity (BMI 35.0-39.9 without comorbidity)  Unstable  - Exercise for least 150 minutes/week  - Stable hydration  - Maintain a healthy diet, minimal carbohydrate, portion control       Disposition: Make an appointment to follow-up in 3 months  Do midnight saliva cortisol as soon as possible       Thank you kindly for allowing me to participate in the diabetes care plan for this patient.  I spent 41 minutes on this visit between precharting, discussing diagnosis with patient, treatment plan    YISEL Dobson   08/25/23        CC:   Corine Alas P.A.-C.

## 2023-09-11 ENCOUNTER — TELEPHONE (OUTPATIENT)
Dept: MEDICAL GROUP | Facility: MEDICAL CENTER | Age: 73
End: 2023-09-11
Payer: MEDICARE

## 2023-09-11 NOTE — TELEPHONE ENCOUNTER
Patient called and is requesting a referral to a specialist for her foot. She has a lot of edema and is heavy.

## 2023-09-14 ENCOUNTER — APPOINTMENT (OUTPATIENT)
Dept: ENDOCRINOLOGY | Facility: MEDICAL CENTER | Age: 73
End: 2023-09-14
Payer: MEDICARE

## 2023-09-29 ENCOUNTER — HOSPITAL ENCOUNTER (OUTPATIENT)
Dept: RADIOLOGY | Facility: MEDICAL CENTER | Age: 73
End: 2023-09-29
Attending: STUDENT IN AN ORGANIZED HEALTH CARE EDUCATION/TRAINING PROGRAM
Payer: MEDICARE

## 2023-09-29 ENCOUNTER — OFFICE VISIT (OUTPATIENT)
Dept: MEDICAL GROUP | Facility: MEDICAL CENTER | Age: 73
End: 2023-09-29
Payer: MEDICARE

## 2023-09-29 VITALS
TEMPERATURE: 97.1 F | SYSTOLIC BLOOD PRESSURE: 118 MMHG | HEART RATE: 69 BPM | RESPIRATION RATE: 16 BRPM | OXYGEN SATURATION: 95 % | BODY MASS INDEX: 40.25 KG/M2 | WEIGHT: 205 LBS | HEIGHT: 60 IN | DIASTOLIC BLOOD PRESSURE: 66 MMHG

## 2023-09-29 DIAGNOSIS — R60.0 BILATERAL LEG EDEMA: ICD-10-CM

## 2023-09-29 PROCEDURE — 99214 OFFICE O/P EST MOD 30 MIN: CPT | Performed by: STUDENT IN AN ORGANIZED HEALTH CARE EDUCATION/TRAINING PROGRAM

## 2023-09-29 PROCEDURE — 3078F DIAST BP <80 MM HG: CPT | Performed by: STUDENT IN AN ORGANIZED HEALTH CARE EDUCATION/TRAINING PROGRAM

## 2023-09-29 PROCEDURE — 3074F SYST BP LT 130 MM HG: CPT | Performed by: STUDENT IN AN ORGANIZED HEALTH CARE EDUCATION/TRAINING PROGRAM

## 2023-09-29 PROCEDURE — 93971 EXTREMITY STUDY: CPT | Mod: LT

## 2023-09-29 ASSESSMENT — PATIENT HEALTH QUESTIONNAIRE - PHQ9
7. TROUBLE CONCENTRATING ON THINGS, SUCH AS READING THE NEWSPAPER OR WATCHING TELEVISION: NOT AT ALL
SUM OF ALL RESPONSES TO PHQ QUESTIONS 1-9: 0
1. LITTLE INTEREST OR PLEASURE IN DOING THINGS: NOT AT ALL
SUM OF ALL RESPONSES TO PHQ9 QUESTIONS 1 AND 2: 0
6. FEELING BAD ABOUT YOURSELF - OR THAT YOU ARE A FAILURE OR HAVE LET YOURSELF OR YOUR FAMILY DOWN: NOT AL ALL
9. THOUGHTS THAT YOU WOULD BE BETTER OFF DEAD, OR OF HURTING YOURSELF: NOT AT ALL
4. FEELING TIRED OR HAVING LITTLE ENERGY: NOT AT ALL
3. TROUBLE FALLING OR STAYING ASLEEP OR SLEEPING TOO MUCH: NOT AT ALL
8. MOVING OR SPEAKING SO SLOWLY THAT OTHER PEOPLE COULD HAVE NOTICED. OR THE OPPOSITE, BEING SO FIGETY OR RESTLESS THAT YOU HAVE BEEN MOVING AROUND A LOT MORE THAN USUAL: NOT AT ALL
2. FEELING DOWN, DEPRESSED, IRRITABLE, OR HOPELESS: NOT AT ALL
5. POOR APPETITE OR OVEREATING: NOT AT ALL

## 2023-09-29 ASSESSMENT — FIBROSIS 4 INDEX: FIB4 SCORE: 1.42

## 2023-09-29 NOTE — PROGRESS NOTES
Subjective:     Chief Complaint   Patient presents with    Follow-Up     Edema in both foot has move up to knee. More on L knee, x 6 weeks. Discourage and embarrassed that pt has to use wheelchair          HPI:   Jina presents today with     Edema  Patient presents today for concern about edema in left leg.  Patient notes that edema just started approximately 6 weeks ago, no history of edema.  Patient notes that prior to 6 weeks ago her legs did not have this much swelling.  Patient notes swelling does not improve overnight.  Patient notes that she is chronically short of breath but feels increasingly short of breath.  Patient notes new onset of pain in left knee & leg.  Patient with no previous blood clots, patient son gets blood clots, unclear diagnosis.  Patient notes that she does not get very much movement.  Patient also notes that she often sits with her legs dangling.  Patient denies chest pain.        ROS:  Gen: no fevers/chills  Pulm: no sob, no cough  CV: no chest pain, no palpitations  GI: no nausea/vomiting, no diarrhea        Objective:     Exam:  /66   Pulse 69   Temp 36.2 °C (97.1 °F) (Temporal)   Resp 16   Ht 1.524 m (5')   Wt 93 kg (205 lb)   SpO2 95%   BMI 40.04 kg/m²  Body mass index is 40.04 kg/m².    Gen: Alert and oriented, No apparent distress.  Neck: Neck is supple without lymphadenopathy.  Lungs: Normal effort, CTA bilaterally, no wheezes, rhonchi, or rales  CV: Regular rate and rhythm. No murmurs, rubs, or gallops.  Ext: No clubbing, cyanosis, edema.    Assessment & Plan:     72 y.o. female with the following -     1. Bilateral leg edema  Acute, stable.  Patient presents today for worsening bilateral lower leg edema left greater than right.  Patient's left leg significantly swollen compared to right.  Patient notes no previous history of edema.  Patient notes that edema does not improve with sleeping.  Patient notes that pain is progressing, significant pain in left leg.  No  redness or warmth.  Discussed with patient differential including lymphedema, PAD, heart failure, DVT.  Reviewed signs and symptoms that warrant emergent evaluation in the ED.  We will get stat ultrasound to rule out DVT, follow-up with echocardiogram.  Referral to physical therapy for lymphedema and advised to wear compression stockings, elevate legs, increase exercise.  - Referral to Physical Therapy  - US-EXTREMITY VENOUS LOWER UNILAT LEFT; Future  - EC-ECHOCARDIOGRAM COMPLETE W/O CONT; Future       No follow-ups on file.    Please note that this dictation was created using voice recognition software. I have made every reasonable attempt to correct obvious errors, but I expect that there are errors of grammar and possibly content that I did not discover before finalizing the note.

## 2023-10-04 DIAGNOSIS — J45.40 MODERATE PERSISTENT ASTHMA, UNSPECIFIED WHETHER COMPLICATED: ICD-10-CM

## 2023-10-04 NOTE — TELEPHONE ENCOUNTER
Have we ever prescribed this med? Yes.  If yes, what date? 09/28/22    Last OV: 03/28/23 Bea    Next OV: 10/09/23 Pedro Pablo    DX: asthma    Medications: Breztri

## 2023-10-09 ENCOUNTER — TELEPHONE (OUTPATIENT)
Dept: SLEEP MEDICINE | Facility: MEDICAL CENTER | Age: 73
End: 2023-10-09
Payer: MEDICARE

## 2023-10-09 ENCOUNTER — APPOINTMENT (OUTPATIENT)
Dept: SLEEP MEDICINE | Facility: MEDICAL CENTER | Age: 73
End: 2023-10-09
Attending: NURSE PRACTITIONER
Payer: MEDICARE

## 2023-10-09 DIAGNOSIS — J45.40 MODERATE PERSISTENT ASTHMA, UNSPECIFIED WHETHER COMPLICATED: ICD-10-CM

## 2023-10-09 NOTE — PROGRESS NOTES
Pulmonary Clinic Note    Date of Visit: 10/10/2023     Chief Complaint:  Chief Complaint   Patient presents with    Follow-Up     COPD with Asthma. Last seen 03/28/23    Results     PFT 06/15/23, OPO-0     HPI:   Jina Barnard is a very pleasant 72 y.o. year old female never smoker but significant marijuana use, with a PMHx of COPD asthma overlap, HTN, marijuana use, intramural leiomyoma uterus, polycythemia vera who presented to the Pulmonary Clinic for a regular follow up. Last seen in the office on 3/28/2023 with MADAY Barragan.     Patient is followed by the pulmonary office for COPD asthma overlap.  PFTs in 2022 show an FVC of 1.79 L or 69%, FEV1 1.19 L or 59%, FEV1/FVC 66%, %, %, DLCO 85% predicted, without positive bronchodilator response.  PFTs done at [facility] in June 2023 shows an FVC of 1.87 L or 74%, FEV1 1.23 L or 64%, FEV1/FVC 65.5, %, TLC 1 1%, DLCO 91% predicted.  CT chest in 2022 shows tree-in-bud GGO nodules throughout both lungs and several sub-3 mm micronodules.  Patient is currently on Trelegy 100, Singulair, DuoNebs, and albuterol as needed.    Interval events:   10/10/2023-   Patient states that she has been out of [facility] for the last month.  She did receive samples yesterday.  She also reports that she would like to get off of Singulair because she does not feel that it is helping her.  She uses DuoNebs once at night and albuterol inhaler 3 times a day.  She continues to smoke marijuana 1.5 joints per day.  Symptomatically, she states she is short of breath with mMRC of 4, has a cough with clear thick sputum and wheezing.  She was unable to schedule her overnight oxygen test.      Exacerbations this year:  0    Current medication regimen: Trelegy 100, Singulair, DuoNeb, and albuterol as needed    Oxygen use:  None    MMRC Grade:  4- Breathless with ambulating around house or ADLs      Past Medical History:   Diagnosis Date    Hypertension     Thrombocytosis       Past Surgical History:   Procedure Laterality Date    TUBAL COAGULATION LAPAROSCOPIC BILATERAL       Social History     Socioeconomic History    Marital status: Single     Spouse name: Not on file    Number of children: Not on file    Years of education: Not on file    Highest education level: Not on file   Occupational History    Not on file   Tobacco Use    Smoking status: Never    Smokeless tobacco: Never   Vaping Use    Vaping Use: Never used   Substance and Sexual Activity    Alcohol use: No    Drug use: Yes     Types: Marijuana     Comment: daily    Sexual activity: Never     Partners: Male     Birth control/protection: Post-Menopausal   Other Topics Concern    Not on file   Social History Narrative    Not on file     Social Determinants of Health     Financial Resource Strain: Not on file   Food Insecurity: Not on file   Transportation Needs: Not on file   Physical Activity: Not on file   Stress: Not on file   Social Connections: Not on file   Intimate Partner Violence: Not on file   Housing Stability: Not on file        Family History   Problem Relation Age of Onset    Alcohol/Drug Father         alcohol    Other Father         malnutrition    Psychiatric Illness Father     Other Mother         blood clot from surgery    Hypertension Mother     Diabetes Mother         ?    Psychiatric Illness Sister     Other Sister         malnutrition    No Known Problems Maternal Grandmother     No Known Problems Maternal Grandfather     No Known Problems Paternal Grandmother     No Known Problems Paternal Grandfather      Current Outpatient Medications on File Prior to Visit   Medication Sig Dispense Refill    potassium chloride ER (KLOR-CON) 10 MEQ tablet TAKE 1 TABLET BY MOUTH THREE TIMES DAILY 270 Tablet 0    albuterol 108 (90 Base) MCG/ACT Aero Soln inhalation aerosol INHALE 2 PUFFS BY MOUTH EVERY 6 HOURS AS NEEDED 360 Each 3    hydrochlorothiazide (MICROZIDE) 12.5 MG capsule Take 1 Capsule by mouth every day. 90  Capsule 3    ipratropium-albuterol (DUONEB) 0.5-2.5 (3) MG/3ML nebulizer solution USE 1 VIAL IN NEBULIZER 4 TIMES DAILY - And As Needed 120 mL 11    BLACK COHOSH PO Take  by mouth.      ibuprofen (MOTRIN) 200 MG Tab Take 200 mg by mouth every 6 hours as needed.      hydroxyurea (HYDREA) 500 MG Cap Take  by mouth every day.      vitamin D2, Ergocalciferol, (DRISDOL) 1.25 MG (63340 UT) Cap capsule TAKE 1 CAPSULE BY MOUTH EVERY 7 DAYS (Patient not taking: Reported on 10/10/2023) 12 Capsule 0    montelukast (SINGULAIR) 10 MG Tab Take 1 Tablet by mouth every evening. (Patient not taking: Reported on 10/10/2023) 30 Tablet 11    methylPREDNISolone (MEDROL DOSEPAK) 4 MG Tablet Therapy Pack Take as directed. (Patient not taking: Reported on 3/28/2023) 21 Tablet 0    polyethylene glycol-electrolytes (NULYTELY) 420 GM solution  (Patient not taking: Reported on 10/10/2022)       No current facility-administered medications on file prior to visit.     Allergies: Patient has no known allergies.    ROS:   Review of Systems   Constitutional:  Negative for chills, diaphoresis, fever and malaise/fatigue.   HENT:  Negative for congestion and sinus pain.    Respiratory:  Positive for cough, sputum production (clear, thick), shortness of breath and wheezing. Negative for hemoptysis.    Cardiovascular:  Negative for chest pain, palpitations and leg swelling.   Gastrointestinal:  Negative for diarrhea, heartburn, nausea and vomiting.   Musculoskeletal:  Negative for falls and myalgias.   Neurological:  Negative for dizziness, weakness and headaches.     Vitals:  /76 (BP Location: Right arm, Patient Position: Sitting, BP Cuff Size: Large adult)   Pulse 82   Ht 1.524 m (5')   Wt 90.7 kg (200 lb)   SpO2 95%     Physical Exam  Constitutional:       General: She is not in acute distress.     Appearance: Normal appearance. She is not ill-appearing, toxic-appearing or diaphoretic.   Cardiovascular:      Rate and Rhythm: Normal rate and  regular rhythm.      Heart sounds: No murmur heard.     No friction rub. No gallop.   Pulmonary:      Effort: No respiratory distress.      Breath sounds: Normal breath sounds. No stridor. No wheezing, rhonchi or rales.   Musculoskeletal:         General: No swelling.      Right lower leg: No edema.      Left lower leg: No edema.   Skin:     General: Skin is warm.   Neurological:      General: No focal deficit present.      Mental Status: She is alert and oriented to person, place, and time.   Psychiatric:         Mood and Affect: Mood normal.         Behavior: Behavior normal.         Thought Content: Thought content normal.         Judgment: Judgment normal.         Laboratory Data:  PFTs at [facility]: (Date: 7/1/2022)-        CT Chest: (Date: 10/13/2022)-  Impression:  1.  Several scattered tree-in-bud groundglass nodules throughout both lungs, likely sequela of infectious/inflammatory bronchiolitis. No findings to suggest pneumonia.  2.  Mild pneumobilia.  3.  Colonic diverticulosis.      Assessment and Plan:    Problem List Items Addressed This Visit       Asthma-COPD overlap syndrome     PFTs in 2022 show an FVC of 1.79 L or 69%, FEV1 1.19 L or 59%, FEV1/FVC 66%, %, %, DLCO 85% predicted, without positive bronchodilator response.  PFTs done at [facility] in June 2023 shows an FVC of 1.87 L or 74%, FEV1 1.23 L or 64%, FEV1/FVC 65.5, %, TLC 1 1%, DLCO 91% predicted.  Patient is currently on Trelegy 100, Singulair, DuoNebs, and albuterol as needed.  Symptomatically, patient has had increase in symptoms due to not having her Breztri refilled.  She has not had any exacerbations this year.  Gold group B.  -- Continue Breztri  -- Patient will do a trial off of Singulair to see if symptoms remain stable.  I did advise the patient if she had a recurrence of symptoms that she needs to restart the Singulair  -- Continue DuoNebs and albuterol as needed  -- Encouraged marijuana smoking cessation  --  Advised patient to increase exercise as tolerated  -- Advised patient to remain vaccinated cephalad to pneumococcal         Relevant Medications    Budeson-Glycopyrrol-Formoterol (BREZTRI AEROSPHERE) 160-9-4.8 MCG/ACT Aerosol    Other Relevant Orders    Overnight Oximetry    Marijuana use     Patient is currently smoking 1.5 joints per day.   -- Encouraged smoking cessation          Diagnostic studies have been reviewed with the patient.    Return in about 6 months (around 4/10/2024), or if symptoms worsen or fail to improve, for COPD, with Jeny.     This note was generated using voice recognition software which has a chance of producing errors of grammar and possibly content.  I have made every reasonable attempt to find and correct any obvious errors, but it should be expected that some may not be found prior to finalization of this note.    Time spent in record review prior to patient arrival, reviewing results, and in face-to-face encounter totaled 31 min.  __________  DOMINGA Estrella  Pulmonary Medicine  [facility]

## 2023-10-09 NOTE — TELEPHONE ENCOUNTER
Jina presented in clinic today for an appointment with LETY Rowland to find that it has been canceled due to provider out sick. She states she has been without Budeson-Glycopyrrol-Formoterol (NetConstatZTRI AEROSPHERE) 160-9-4.8 MCG/ACT Aerosol [[id number]]  For 3 weeks and would like to  a sample today if possible. She is rescheduled for a visit with APRYL Fermin tomorrow, but is experiencing shortness of breath and feels like she cannot breathe and it is scary for her, would prefer to not wait another day to get samples if possible.

## 2023-10-10 ENCOUNTER — OFFICE VISIT (OUTPATIENT)
Dept: SLEEP MEDICINE | Facility: MEDICAL CENTER | Age: 73
End: 2023-10-10
Payer: MEDICARE

## 2023-10-10 VITALS
HEART RATE: 82 BPM | DIASTOLIC BLOOD PRESSURE: 76 MMHG | WEIGHT: 200 LBS | OXYGEN SATURATION: 95 % | SYSTOLIC BLOOD PRESSURE: 126 MMHG | BODY MASS INDEX: 39.27 KG/M2 | HEIGHT: 60 IN

## 2023-10-10 DIAGNOSIS — J44.89 COPD WITH ASTHMA (HCC): ICD-10-CM

## 2023-10-10 DIAGNOSIS — F12.90 MARIJUANA USE: ICD-10-CM

## 2023-10-10 DIAGNOSIS — J44.89 ASTHMA-COPD OVERLAP SYNDROME (HCC): ICD-10-CM

## 2023-10-10 PROCEDURE — 3074F SYST BP LT 130 MM HG: CPT

## 2023-10-10 PROCEDURE — 99213 OFFICE O/P EST LOW 20 MIN: CPT

## 2023-10-10 PROCEDURE — 99214 OFFICE O/P EST MOD 30 MIN: CPT

## 2023-10-10 PROCEDURE — 3078F DIAST BP <80 MM HG: CPT

## 2023-10-10 ASSESSMENT — ENCOUNTER SYMPTOMS
HEADACHES: 0
HEMOPTYSIS: 0
PALPITATIONS: 0
COUGH: 1
DIARRHEA: 0
SHORTNESS OF BREATH: 1
DIZZINESS: 0
HEARTBURN: 0
FALLS: 0
VOMITING: 0
DIAPHORESIS: 0
NAUSEA: 0
SPUTUM PRODUCTION: 1
MYALGIAS: 0
SINUS PAIN: 0
CHILLS: 0
FEVER: 0
WHEEZING: 1
WEAKNESS: 0

## 2023-10-10 ASSESSMENT — FIBROSIS 4 INDEX: FIB4 SCORE: 1.42

## 2023-10-10 NOTE — ASSESSMENT & PLAN NOTE
PFTs in 2022 show an FVC of 1.79 L or 69%, FEV1 1.19 L or 59%, FEV1/FVC 66%, %, %, DLCO 85% predicted, without positive bronchodilator response.  PFTs done at [facility] in June 2023 shows an FVC of 1.87 L or 74%, FEV1 1.23 L or 64%, FEV1/FVC 65.5, %, TLC 1 1%, DLCO 91% predicted.  Patient is currently on Trelegy 100, Singulair, DuoNebs, and albuterol as needed.  Symptomatically, patient has had increase in symptoms due to not having her Breztri refilled.  She has not had any exacerbations this year.  Gold group B.  -- Continue Breztri  -- Patient will do a trial off of Singulair to see if symptoms remain stable.  I did advise the patient if she had a recurrence of symptoms that she needs to restart the Singulair  -- Continue DuoNebs and albuterol as needed  -- Encouraged marijuana smoking cessation  -- Advised patient to increase exercise as tolerated  -- Advised patient to remain vaccinated cephalad to pneumococcal

## 2023-10-14 ENCOUNTER — HOSPITAL ENCOUNTER (OUTPATIENT)
Facility: MEDICAL CENTER | Age: 73
End: 2023-10-14
Payer: MEDICARE

## 2023-10-14 PROCEDURE — 82533 TOTAL CORTISOL: CPT

## 2023-10-15 ENCOUNTER — HOSPITAL ENCOUNTER (OUTPATIENT)
Facility: MEDICAL CENTER | Age: 73
End: 2023-10-15
Payer: MEDICARE

## 2023-10-15 PROCEDURE — 82533 TOTAL CORTISOL: CPT

## 2023-10-16 ENCOUNTER — HOSPITAL ENCOUNTER (OUTPATIENT)
Facility: MEDICAL CENTER | Age: 73
End: 2023-10-16
Payer: MEDICARE

## 2023-10-16 PROCEDURE — 82533 TOTAL CORTISOL: CPT

## 2023-10-19 LAB — CORTIS SAL-MCNC: 0.17 UG/DL

## 2023-10-20 LAB
CORTIS SAL-MCNC: 0.07 UG/DL
CORTIS SAL-MCNC: 0.08 UG/DL

## 2023-10-25 ENCOUNTER — TELEPHONE (OUTPATIENT)
Dept: MEDICAL GROUP | Facility: MEDICAL CENTER | Age: 73
End: 2023-10-25

## 2023-10-25 ENCOUNTER — HOSPITAL ENCOUNTER (OUTPATIENT)
Dept: CARDIOLOGY | Facility: MEDICAL CENTER | Age: 73
End: 2023-10-25
Attending: STUDENT IN AN ORGANIZED HEALTH CARE EDUCATION/TRAINING PROGRAM
Payer: MEDICARE

## 2023-10-25 DIAGNOSIS — R60.0 BILATERAL LEG EDEMA: ICD-10-CM

## 2023-10-25 LAB — LV EJECT FRACT  99904: 55

## 2023-10-25 PROCEDURE — 93306 TTE W/DOPPLER COMPLETE: CPT | Mod: 26 | Performed by: INTERNAL MEDICINE

## 2023-10-25 PROCEDURE — 93306 TTE W/DOPPLER COMPLETE: CPT

## 2023-10-25 NOTE — TELEPHONE ENCOUNTER
Caller Name: Jina Barnard   Call Back Number: [phone]       Pt called and LVM stating that she did her echocardiogram. Pt unsure if she need to come in for an appointment. Pt does not use her [employer]

## 2023-10-26 ENCOUNTER — OFFICE VISIT (OUTPATIENT)
Dept: ENDOCRINOLOGY | Facility: MEDICAL CENTER | Age: 73
End: 2023-10-26
Payer: MEDICARE

## 2023-10-26 VITALS
HEIGHT: 60 IN | HEART RATE: 96 BPM | SYSTOLIC BLOOD PRESSURE: 138 MMHG | BODY MASS INDEX: 39.89 KG/M2 | OXYGEN SATURATION: 95 % | DIASTOLIC BLOOD PRESSURE: 74 MMHG | WEIGHT: 203.2 LBS

## 2023-10-26 DIAGNOSIS — R53.83 OTHER FATIGUE: ICD-10-CM

## 2023-10-26 DIAGNOSIS — R73.03 PREDIABETES: ICD-10-CM

## 2023-10-26 DIAGNOSIS — I34.0 SEVERE MITRAL REGURGITATION: ICD-10-CM

## 2023-10-26 DIAGNOSIS — I10 ESSENTIAL HYPERTENSION: ICD-10-CM

## 2023-10-26 DIAGNOSIS — E87.6 HYPOKALEMIA: ICD-10-CM

## 2023-10-26 DIAGNOSIS — E27.8 ADRENAL INCIDENTALOMA (HCC): ICD-10-CM

## 2023-10-26 DIAGNOSIS — E55.9 VITAMIN D DEFICIENCY: ICD-10-CM

## 2023-10-26 DIAGNOSIS — E24.9 HYPERCORTISOLISM (HCC): ICD-10-CM

## 2023-10-26 DIAGNOSIS — E66.9 OBESITY (BMI 35.0-39.9 WITHOUT COMORBIDITY): ICD-10-CM

## 2023-10-26 PROCEDURE — 3075F SYST BP GE 130 - 139MM HG: CPT

## 2023-10-26 PROCEDURE — 3078F DIAST BP <80 MM HG: CPT

## 2023-10-26 PROCEDURE — 99214 OFFICE O/P EST MOD 30 MIN: CPT

## 2023-10-26 PROCEDURE — 99211 OFF/OP EST MAY X REQ PHY/QHP: CPT

## 2023-10-26 RX ORDER — DEXAMETHASONE 4 MG/1
8 TABLET ORAL ONCE
Qty: 2 TABLET | Refills: 0 | Status: SHIPPED | OUTPATIENT
Start: 2023-10-26 | End: 2023-10-26

## 2023-10-26 ASSESSMENT — FIBROSIS 4 INDEX: FIB4 SCORE: 1.42

## 2023-10-26 NOTE — PROGRESS NOTES
New Patient Consult Note for Endocrinology  Referred by: Corine Alas P.A.-C.    Reason for consult: Initial consultation for the following conditions    HPI:  Jina Barnard is a 72 y.o. old patient who is seeing us today for care.    This is a pleasant patient and I appreciate the opportunity to participate in the care of this patient.   Negative for pheochromocytoma    1.  Adrenal Incidentaloma:  A CT scan done on 4/13/2022 for evaluation of pelvic mass showed a 0.18cm L adrenal nodule and a 0.13 cm R adrenal nodule     Reports weight gain, brain fogginess, fatigue,  Denies purple streahc marks, facial phetora,     Patient did a DST a few weeks ago but no dexamethasone level was drawn  DST on 7/21/2023 showed a cortisol level at 2.7 but low dexamethasone level     Latest Reference Range & Units 10/14/23 23:00 10/15/23 23:00 10/16/23 23:00   Cortisol, Saliva ug/dL 0.078 0.072 0.173      Latest Reference Range & Units 02/18/23 06:30   Creatinine, Random Urine 500 - 1400 mg/d  500 - 1400 mg/d 1120  1120   Collection Length Hrs  Hrs 24  24   Total Volume mL  mL 2800  2800   Vma Urine 0.0 - 7.0 mg/d 3.9   VMA Urine mg/L mg/L 1.4   VMA Urine mg/g CRT 0 - 6 mg/gCR 4   Epinephrine Urine Catecholamin 1 - 14 ug/d 6   Epinephrine, Ur ug/g CRT 0 - 20 ug/g CRT 5   Epinephrine, Ur per volume ug/L 2   Norepinephine Urine Catecholam 14 - 120 ug/d 36   Norepinephrine, Ur ug/g CRT 0 - 45 ug/g CRT 32   Norepinephrine, Ur per volume ug/L 13   Dopamine Urine Catecholamine F 71 - 485 ug/d 179   Dopamine, Ur ug/g CRT 0 - 250 ug/g    Dopamine, Ur per volume ug/L 64   Catecholamines UF Interp  See Note   Creatinine Urine mg/dL  mg/dL 40  40      Latest Reference Range & Units 02/15/23 08:34   IGF1 23 - 221 ng/mL 147   IGF-1 Z Score Calculation  1.0   Prolactin 2.80 - 26.00 ng/mL 11.00       DST, pt took 1 mg decadron    Latest Reference Range & Units 07/21/23 08:42   Cortisol 0.0 - 23.0 ug/dL 2.7            Latest Reference  Range & Units 07/21/23 08:42   Dhea-S 9.4 - 246.0 ug/dL 30.8      Latest Reference Range & Units 02/15/23 17:36   Renin Activity ng/mL/hr <0.1      Latest Reference Range & Units 02/15/23 08:34   Aldos Serum ng/dL 12.3      Latest Reference Range & Units 02/15/23 08:34   Acth 7.2 - 63.3 pg/mL <1.5 (L)      Latest Reference Range & Units 07/21/23 08:46   Acth 7.2 - 63.3 pg/mL 4.6 (L)       2. Hypertension:  Currently taking HCTZ 12.5 MG DAILY-does not take her medication consistently   BP today 130/74       Latest Reference Range & Units 04/10/23 15:08   GFR (CKD-EPI) >60 mL/min/1.73 m 2 75      Latest Reference Range & Units 02/15/23 17:36   Renin Activity ng/mL/hr <0.1      Latest Reference Range & Units 02/15/23 08:34   Aldos Serum ng/dL 12.3       3. Hypokalemia:  This is followed by PCP  Currently taking Klor-con 10 Meq tid     Latest Reference Range & Units 04/10/23 15:08   Potassium 3.6 - 5.5 mmol/L 4.1     4.  Other fatigue:  Reports fatigue for the past few months     Latest Reference Range & Units 02/15/23 08:34   TSH 0.380 - 5.330 uIU/mL 1.970   Free T-4 0.93 - 1.70 ng/dL 1.25   T3,Free 2.00 - 4.40 pg/mL 2.67      Latest Reference Range & Units 02/15/23 08:34   Microsomal -Tpo- Abs 0.0 - 9.0 IU/mL <9.0   Anti-Thyroglobulin 0.0 - 4.0 IU/mL <0.9     5.  Vitamin D deficiency:  Currently not taking any supplementation   Latest Reference Range & Units 02/15/23 08:34   25-Hydroxy   Vitamin D 25 30 - 100 ng/mL 33     6.  Obesity:  BMI is 37.77  10/10/2023   1106 10/26/2023   1347 Most Recent Value     Weight: 90.7 kg (200 lb) 92.2 kg (203 lb 3.2 oz) 92.2 kg (203 lb 3.2 oz)  as of 10/26/2023    Body Mass Index:   39.68 kg/m² Abnormal    1.524 m (5')  as of 10/26/2023   92.2 kg (203 lb 3.2 oz)  as of 10/26/2023      7. Prediabetes:   Latest Reference Range & Units 04/26/19 06:14 12/24/19 09:53 05/21/20 06:17 09/29/21 07:33   Glycohemoglobin 4.0 - 5.6 % 6.3 (H) 5.2 5.8 (H) 5.6         ROS:   Constitutional: No  change in weight , No fatigue, No night sweats.  HEENT: No Headache.  Eyes:  No blurred vision, No visual changes.  Cardiac: No chest pain, No palpitations.  Resp: No shortness of breath, No cough,   Gastro: No nausea or vomiting, No diarrhea.  Neuro: Denies numbness or tinging in bilateral feet or taking every  Freeto, and no loss of sensation.  Endo: No heat or cold intolerance.  : No polyuria, No polydipsia, No chronic UTI's.  Lower extremities: No lower leg edema bilateral.  All other systems were reviewed and were negative.    Past Medical History:  Patient Active Problem List    Diagnosis Date Noted    Morbid obesity (HCC) 10/05/2022    Moderate persistent asthma 10/01/2022    Diverticulitis 04/21/2022    Dyslipidemia 01/14/2021    Impaired fasting glucose 01/14/2021    Asthma-COPD overlap syndrome 11/19/2020    Obesity (BMI 35.0-39.9 without comorbidity) ([facility]) 05/22/2020    Intramural leiomyoma of uterus 12/24/2019    Hypokalemia 06/12/2019    Moderate episode of recurrent major depressive disorder ([facility]) 10/29/2018    Stress at home 03/27/2018    Marijuana use 07/19/2017    Polycythemia vera(238.4) 07/19/2017    Refused pneumococcal vaccination 10/10/2016    Refused influenza vaccine 10/10/2016    Thrombocytosis 06/09/2016    Essential hypertension 06/09/2016    Vitamin D deficiency disease 06/09/2016       Past Surgical History:  Past Surgical History:   Procedure Laterality Date    TUBAL COAGULATION LAPAROSCOPIC BILATERAL         Allergies:  Patient has no known allergies.    Social History:  Social History     Socioeconomic History    Marital status: Single     Spouse name: Not on file    Number of children: Not on file    Years of education: Not on file    Highest education level: Not on file   Occupational History    Not on file   Tobacco Use    Smoking status: Never    Smokeless tobacco: Never   Vaping Use    Vaping Use: Never used   Substance and Sexual Activity    Alcohol use: No    Drug use: Yes      Types: Marijuana     Comment: daily    Sexual activity: Never     Partners: Male     Birth control/protection: Post-Menopausal   Other Topics Concern    Not on file   Social History Narrative    Not on file     Social Determinants of Health     Financial Resource Strain: Not on file   Food Insecurity: Not on file   Transportation Needs: Not on file   Physical Activity: Not on file   Stress: Not on file   Social Connections: Not on file   Intimate Partner Violence: Not on file   Housing Stability: Not on file       Family History:  Family History   Problem Relation Age of Onset    Alcohol/Drug Father         alcohol    Other Father         malnutrition    Psychiatric Illness Father     Other Mother         blood clot from surgery    Hypertension Mother     Diabetes Mother         ?    Psychiatric Illness Sister     Other Sister         malnutrition    No Known Problems Maternal Grandmother     No Known Problems Maternal Grandfather     No Known Problems Paternal Grandmother     No Known Problems Paternal Grandfather        Medications:    Current Outpatient Medications:     Budeson-Glycopyrrol-Formoterol (BREZTRI AEROSPHERE) 160-9-4.8 MCG/ACT Aerosol, Inhale 2 Puffs 2 (two) times a day., Disp: 32.1 g, Rfl: 3    vitamin D2, Ergocalciferol, (DRISDOL) 1.25 MG (42076 UT) Cap capsule, TAKE 1 CAPSULE BY MOUTH EVERY 7 DAYS (Patient not taking: Reported on 10/10/2023), Disp: 12 Capsule, Rfl: 0    potassium chloride ER (KLOR-CON) 10 MEQ tablet, TAKE 1 TABLET BY MOUTH THREE TIMES DAILY, Disp: 270 Tablet, Rfl: 0    albuterol 108 (90 Base) MCG/ACT Aero Soln inhalation aerosol, INHALE 2 PUFFS BY MOUTH EVERY 6 HOURS AS NEEDED, Disp: 360 Each, Rfl: 3    montelukast (SINGULAIR) 10 MG Tab, Take 1 Tablet by mouth every evening. (Patient not taking: Reported on 10/10/2023), Disp: 30 Tablet, Rfl: 11    hydrochlorothiazide (MICROZIDE) 12.5 MG capsule, Take 1 Capsule by mouth every day., Disp: 90 Capsule, Rfl: 3    methylPREDNISolone  (MEDROL DOSEPAK) 4 MG Tablet Therapy Pack, Take as directed. (Patient not taking: Reported on 3/28/2023), Disp: 21 Tablet, Rfl: 0    ipratropium-albuterol (DUONEB) 0.5-2.5 (3) MG/3ML nebulizer solution, USE 1 VIAL IN NEBULIZER 4 TIMES DAILY - And As Needed, Disp: 120 mL, Rfl: 11    polyethylene glycol-electrolytes (NULYTELY) 420 GM solution, , Disp: , Rfl:     BLACK COHOSH PO, Take  by mouth., Disp: , Rfl:     ibuprofen (MOTRIN) 200 MG Tab, Take 200 mg by mouth every 6 hours as needed., Disp: , Rfl:     hydroxyurea (HYDREA) 500 MG Cap, Take  by mouth every day., Disp: , Rfl:       Physical Examination:   Vital signs: Pulse 96   Ht 1.524 m (5')   Wt 92.2 kg (203 lb 3.2 oz)   SpO2 95%   BMI 39.68 kg/m²   General: No distress, cooperative, well dressed and well nourished.   Psych: Normal mood and affect    Assessment and Plan:  1. Adrenal incidentaloma (HCC)  Unstable -please see diagnosis #8  Neg for primary aldosteronism  Neg for pheochromocytoma   Left adrenal mass measured 0.18 cm  Right adrenal mass measuring 0.13 cm  Fist DST showed a dexamethasone level of 126, with a cortisol level at 2.7-probably due to the patient is a high metabolizer  Saliva cortisol x3 showed negative x2, positive x1 equals 0.173  - HEMOGLOBIN A1C; Future    2. Essential hypertension  - Stable  -Continue regimen-see HPI    3. Hypokalemia  Stable  Follow up PCP    4. Other fatigue  Unstable  Please see diagnoses #8    5. Vitamin D deficiency  - Stable  -Continue regimen-see HPI    6. Obesity (BMI 35.0-39.9 without comorbidity)  Unstable  Lifestyle modifications discussed    7. Prediabetes  Stable  I would evaluate levels  - HEMOGLOBIN A1C; Future    8. Hypercortisolism (HCC)  Unstable  Please see diagnoses #1  High dose DST ordered-  Instructions for the Dexamethasone Suppression Test  Take 8mg of the dexamethasone pill (or 2 pill)    at or near midnight on   BEFORE YOU TAKE THE PILL MAKE SURE THE LAB IS OPEN AND THAT YOU CAN GO FOR THE  8 AM TEST  Go to the lab the following morning at 8 am and get you blood drawn.   - dexamethasone (DECADRON) 4 MG Tab; Take 2 Tablets by mouth one time for 1 dose.  Dispense: 2 Tablet; Refill: 0        - CORTISOL - AM  - Miscellaneous Test; Future      I will calculate HANNA IR  - INSULIN FASTING; Future  - Comp Metabolic Panel; Future        Disposition: Make an appointment to follow-up in November 16  Do blood work as soon as possible as indicated today      Thank you kindly for allowing me to participate in the diabetes care plan for this patient.      Mathew Austin A.P.R.N.   10/26/23          CC:   Corine Alas P.A.-C.

## 2023-10-30 ENCOUNTER — APPOINTMENT (OUTPATIENT)
Dept: CARDIOLOGY | Facility: MEDICAL CENTER | Age: 73
End: 2023-10-30
Attending: STUDENT IN AN ORGANIZED HEALTH CARE EDUCATION/TRAINING PROGRAM
Payer: MEDICARE

## 2023-10-30 ENCOUNTER — TELEPHONE (OUTPATIENT)
Dept: ENDOCRINOLOGY | Facility: MEDICAL CENTER | Age: 73
End: 2023-10-30
Payer: MEDICARE

## 2023-10-30 NOTE — TELEPHONE ENCOUNTER
Patient message: Is there a time frame when she should do the Cortisol test. Advised patient to do the test at least 1-2 weeks before the appt to have results for the appt. Patient was already aware to take the decadron tablet between 11 and midnight and to do labs first thing in the morning.

## 2023-10-31 ENCOUNTER — TELEPHONE (OUTPATIENT)
Dept: HEALTH INFORMATION MANAGEMENT | Facility: OTHER | Age: 73
End: 2023-10-31
Payer: MEDICARE

## 2023-11-01 ENCOUNTER — TELEPHONE (OUTPATIENT)
Dept: CARDIOLOGY | Facility: MEDICAL CENTER | Age: 73
End: 2023-11-01
Payer: MEDICARE

## 2023-11-01 NOTE — TELEPHONE ENCOUNTER
Referral from: Corine Alas PAC for mod-sev MR    Patient called on 11/1/2023. LVM for patient to call back and discuss.    First attempt

## 2023-11-06 ENCOUNTER — HOSPITAL ENCOUNTER (OUTPATIENT)
Dept: LAB | Facility: MEDICAL CENTER | Age: 73
End: 2023-11-06
Payer: MEDICARE

## 2023-11-06 DIAGNOSIS — E24.9 HYPERCORTISOLISM (HCC): ICD-10-CM

## 2023-11-06 LAB — CORTIS SERPL-MCNC: 2.8 UG/DL (ref 0–23)

## 2023-11-06 PROCEDURE — 36415 COLL VENOUS BLD VENIPUNCTURE: CPT

## 2023-11-06 PROCEDURE — 82533 TOTAL CORTISOL: CPT

## 2023-11-06 PROCEDURE — 80299 QUANTITATIVE ASSAY DRUG: CPT

## 2023-11-06 NOTE — TELEPHONE ENCOUNTER
Caller: Jina Barnard     Topic/issue: Patient is retuning call and wanting to get scheduled with structural   She is very upset that she has not received a call. Patient is very concerned her feet are extremely swollen, I did advise patient to reach out to her pcp or go to er. She refuses to go to er she wants a Dr     Callback Number: [phone]    Thank You   Monica DAVIDSON

## 2023-11-07 NOTE — TELEPHONE ENCOUNTER
Called patient and scheduled consult with Dr. Easton for tomorrow. She states her swelling has improved since yesterday. She states when she gets stressed, her symptoms are worsened (SOB and swelling).

## 2023-11-08 ENCOUNTER — OFFICE VISIT (OUTPATIENT)
Dept: CARDIOLOGY | Facility: MEDICAL CENTER | Age: 73
End: 2023-11-08
Attending: INTERNAL MEDICINE
Payer: MEDICARE

## 2023-11-08 ENCOUNTER — PATIENT MESSAGE (OUTPATIENT)
Dept: CARDIOLOGY | Facility: MEDICAL CENTER | Age: 73
End: 2023-11-08

## 2023-11-08 VITALS
HEART RATE: 80 BPM | SYSTOLIC BLOOD PRESSURE: 136 MMHG | DIASTOLIC BLOOD PRESSURE: 88 MMHG | RESPIRATION RATE: 16 BRPM | HEIGHT: 60 IN | BODY MASS INDEX: 40.44 KG/M2 | OXYGEN SATURATION: 93 % | WEIGHT: 206 LBS

## 2023-11-08 DIAGNOSIS — I50.33 ACUTE ON CHRONIC DIASTOLIC CONGESTIVE HEART FAILURE (HCC): ICD-10-CM

## 2023-11-08 DIAGNOSIS — I10 ESSENTIAL HYPERTENSION: ICD-10-CM

## 2023-11-08 DIAGNOSIS — I34.0 NONRHEUMATIC MITRAL VALVE REGURGITATION: ICD-10-CM

## 2023-11-08 PROCEDURE — 99204 OFFICE O/P NEW MOD 45 MIN: CPT | Performed by: INTERNAL MEDICINE

## 2023-11-08 PROCEDURE — 99213 OFFICE O/P EST LOW 20 MIN: CPT | Performed by: INTERNAL MEDICINE

## 2023-11-08 PROCEDURE — 93005 ELECTROCARDIOGRAM TRACING: CPT | Performed by: INTERNAL MEDICINE

## 2023-11-08 PROCEDURE — 3075F SYST BP GE 130 - 139MM HG: CPT | Performed by: INTERNAL MEDICINE

## 2023-11-08 PROCEDURE — 3079F DIAST BP 80-89 MM HG: CPT | Performed by: INTERNAL MEDICINE

## 2023-11-08 RX ORDER — CARVEDILOL 6.25 MG/1
6.25 TABLET ORAL 2 TIMES DAILY WITH MEALS
Qty: 60 TABLET | Refills: 11 | Status: SHIPPED | OUTPATIENT
Start: 2023-11-08

## 2023-11-08 RX ORDER — DAPAGLIFLOZIN 10 MG/1
10 TABLET, FILM COATED ORAL DAILY
Qty: 30 TABLET | Refills: 11 | Status: SHIPPED | OUTPATIENT
Start: 2023-11-08

## 2023-11-08 RX ORDER — TORSEMIDE 20 MG/1
20 TABLET ORAL DAILY
Qty: 30 TABLET | Refills: 3 | Status: SHIPPED | OUTPATIENT
Start: 2023-11-08 | End: 2024-03-07

## 2023-11-08 ASSESSMENT — FIBROSIS 4 INDEX: FIB4 SCORE: 1.42

## 2023-11-08 NOTE — PROGRESS NOTES
Interventional cardiology Initial Consultation Note      Chief Complaint: Mitral regurgitation    Jina Barnard is a 72 y.o. female  patient presented today for consultation regarding mitral regurgitation.  For the last few months she has been having significant lower extremity edema, dyspnea on exertion.  Work-up with echocardiogram showed severe mitral regurgitation.        Past Medical History:   Diagnosis Date    Hypertension     Thrombocytosis              Current Outpatient Medications   Medication Sig Dispense Refill    torsemide (DEMADEX) 20 MG Tab Take 1 Tablet by mouth every day. 30 Tablet 3    carvedilol (COREG) 6.25 MG Tab Take 1 Tablet by mouth 2 times a day with meals. 60 Tablet 11    dapagliflozin propanediol (FARXIGA) 10 MG Tab Take 1 Tablet by mouth every day. 30 Tablet 11    Budeson-Glycopyrrol-Formoterol (BREZTRI AEROSPHERE) 160-9-4.8 MCG/ACT Aerosol Inhale 2 Puffs 2 (two) times a day. 32.1 g 3    vitamin D2, Ergocalciferol, (DRISDOL) 1.25 MG (60033 UT) Cap capsule TAKE 1 CAPSULE BY MOUTH EVERY 7 DAYS 12 Capsule 0    potassium chloride ER (KLOR-CON) 10 MEQ tablet TAKE 1 TABLET BY MOUTH THREE TIMES DAILY 270 Tablet 0    albuterol 108 (90 Base) MCG/ACT Aero Soln inhalation aerosol INHALE 2 PUFFS BY MOUTH EVERY 6 HOURS AS NEEDED 360 Each 3    montelukast (SINGULAIR) 10 MG Tab Take 1 Tablet by mouth every evening. 30 Tablet 11    ipratropium-albuterol (DUONEB) 0.5-2.5 (3) MG/3ML nebulizer solution USE 1 VIAL IN NEBULIZER 4 TIMES DAILY - And As Needed 120 mL 11    BLACK COHOSH PO Take  by mouth.      ibuprofen (MOTRIN) 200 MG Tab Take 200 mg by mouth every 6 hours as needed.      hydroxyurea (HYDREA) 500 MG Cap Take  by mouth every day.      methylPREDNISolone (MEDROL DOSEPAK) 4 MG Tablet Therapy Pack Take as directed. (Patient not taking: Reported on 3/28/2023) 21 Tablet 0    polyethylene glycol-electrolytes (NULYTELY) 420 GM solution  (Patient not taking: Reported on 10/10/2022)       No  current facility-administered medications for this visit.             Physical Exam:  Ambulatory Vitals  /88 (BP Location: Left arm, Patient Position: Sitting, BP Cuff Size: Adult)   Pulse 80   Resp 16   Ht 1.524 m (5')   Wt 93.4 kg (206 lb)   SpO2 93%    Oxygen Therapy:  Pulse Oximetry: 93 %  BP Readings from Last 4 Encounters:   11/08/23 136/88   10/26/23 138/74   10/10/23 126/76   09/29/23 118/66       Weight/BMI: Body mass index is 40.23 kg/m².  Wt Readings from Last 4 Encounters:   11/08/23 93.4 kg (206 lb)   10/26/23 92.2 kg (203 lb 3.2 oz)   10/10/23 90.7 kg (200 lb)   09/29/23 93 kg (205 lb)           General: Well appearing and in no apparent distress  Neck: carotid bruits absent  Lungs: respiratory sounds  normal  Heart: Regular rhythm,  No palpable thrills on palpation, murmurs present, no rubs,   Lower extremity edema absent.     EKG artifact, sinus rhythm.  Echocardiogram reviewed 10/25/2023, independently interpreted calcified mitral valve, mild mitral stenosis, severe mitral regurgitation, normal LV function, RV function    Medical Decision Making:  Problem List Items Addressed This Visit       Essential hypertension    Relevant Medications    torsemide (DEMADEX) 20 MG Tab    carvedilol (COREG) 6.25 MG Tab    Other Relevant Orders    EKG (Completed)    Acute on chronic diastolic congestive heart failure (HCC)    Relevant Medications    torsemide (DEMADEX) 20 MG Tab    carvedilol (COREG) 6.25 MG Tab    dapagliflozin propanediol (FARXIGA) 10 MG Tab     Other Visit Diagnoses       Nonrheumatic mitral valve regurgitation        Relevant Medications    torsemide (DEMADEX) 20 MG Tab    carvedilol (COREG) 6.25 MG Tab    Other Relevant Orders    EC-ECHOCARDIOGRAM COMPLETE W/O CONT          She has acute on chronic diastolic heart failure.  She eats a diet consist of high salt.  Counseled on low-salt diet.  We will start low-dose beta-blocker, diuretics, Farxiga.  We will repeat echocardiogram in 3  months for follow-up.  If mitral regurgitation still persists, she will need to be evaluated for surgical mitral valve replacement, not a good candidate for transcatheter mitral valve repair given calcified valve with baseline mitral stenosis.      Return in about 3 months (around 2/8/2024).    This note was dictated using Dragon speech recognition software.    Hung ALFONSO  Interventional cardiologist  [facility], [address]  [address] [address]  Phone: [phone]  Fax: [phone]

## 2023-11-09 ENCOUNTER — TELEPHONE (OUTPATIENT)
Dept: CARDIOLOGY | Facility: MEDICAL CENTER | Age: 73
End: 2023-11-09
Payer: MEDICARE

## 2023-11-09 DIAGNOSIS — I34.0 NONRHEUMATIC MITRAL VALVE REGURGITATION: ICD-10-CM

## 2023-11-09 DIAGNOSIS — R06.09 DYSPNEA ON EXERTION: ICD-10-CM

## 2023-11-09 DIAGNOSIS — I50.33 ACUTE ON CHRONIC DIASTOLIC CONGESTIVE HEART FAILURE (HCC): ICD-10-CM

## 2023-11-09 NOTE — TELEPHONE ENCOUNTER
Patient seen by Dr. Easton in valve clinic yesterday and started on HF medications. Juana ORR recommends 1mo FU with her with BNP/BMP for evaluation.     LVM for patient to call back and discuss. Labs ordered.

## 2023-11-10 NOTE — TELEPHONE ENCOUNTER
Called patient and advised of DOMINGA's recommendations. Scheduled FU with [facility]ana ORR and sent patient a Mems-IDt message with the appt details and reminder to have labs drawn prior.

## 2023-11-14 ENCOUNTER — APPOINTMENT (OUTPATIENT)
Dept: PHYSICAL THERAPY | Facility: REHABILITATION | Age: 73
End: 2023-11-14
Attending: STUDENT IN AN ORGANIZED HEALTH CARE EDUCATION/TRAINING PROGRAM
Payer: MEDICARE

## 2023-11-14 DIAGNOSIS — J44.89 ASTHMA-COPD OVERLAP SYNDROME (HCC): ICD-10-CM

## 2023-11-14 LAB
EKG IMPRESSION: NORMAL
TEST NAME 95000: NORMAL

## 2023-11-14 PROCEDURE — 93010 ELECTROCARDIOGRAM REPORT: CPT | Performed by: INTERNAL MEDICINE

## 2023-11-14 NOTE — TELEPHONE ENCOUNTER
VOICEMAIL: 11/13/23  1. Caller Name: Jina                      Call Back Number: [phone] (home)      2. Message: Pt called and lm, requesting a RF on her Breztri    3. Patient approves office to leave a detailed voicemail/MyChart message: N/A      11/14/23:  Rx was refilled on 10/10/23 90 day supply with 3 additional refills.    Called pt, VM is full, unable to lm. Sent pt mychart message.

## 2023-11-16 ENCOUNTER — APPOINTMENT (OUTPATIENT)
Dept: ENDOCRINOLOGY | Facility: MEDICAL CENTER | Age: 73
End: 2023-11-16
Payer: MEDICARE

## 2023-11-16 NOTE — PROGRESS NOTES
New Patient Consult Note for Endocrinology  Referred by: Corine Alas P.A.-C.    Reason for consult: Initial consultation for the following conditions    HPI:  Jina Barnard is a 72 y.o. old patient who is seeing us today for care.    This is a pleasant patient and I appreciate the opportunity to participate in the care of this patient.   Negative for pheochromocytoma    1.  Adrenal Incidentaloma:  A CT scan done on 4/13/2022 for evaluation of pelvic mass showed a 0.18cm L adrenal nodule and a 0.13 cm R adrenal nodule     Reports weight gain, brain fogginess, fatigue,  Denies purple streahc marks, facial phetora,     Patient did a DST a few weeks ago but no dexamethasone level was drawn  DST on 7/21/2023 showed a cortisol level at 2.7 but low dexamethasone level    Fist DST showed a dexamethasone level of 126, with a cortisol level at 2.7-probably due to the patient is a high metabolizer  Saliva cortisol x3 showed negative x2, positive x1 equals 0.173    DST, pt took 1 mg decadron    Latest Reference Range & Units 07/21/23 08:42   Cortisol 0.0 - 23.0 ug/dL 2.7          Latest Reference Range & Units 10/14/23 23:00 10/15/23 23:00 10/16/23 23:00   Cortisol, Saliva ug/dL 0.078 0.072 0.173       High dose DST showed:   Latest Reference Range & Units 11/06/23 07:53   Cortisol 0.0 - 23.0 ug/dL 2.8           I ordered a fasting insulin and CMP to evaluate Ruth-IR but this tests were not done.      Latest Reference Range & Units 02/18/23 06:30   Creatinine, Random Urine 500 - 1400 mg/d  500 - 1400 mg/d 1120  1120   Collection Length Hrs  Hrs 24  24   Total Volume mL  mL 2800  2800   Vma Urine 0.0 - 7.0 mg/d 3.9   VMA Urine mg/L mg/L 1.4   VMA Urine mg/g CRT 0 - 6 mg/gCR 4   Epinephrine Urine Catecholamin 1 - 14 ug/d 6   Epinephrine, Ur ug/g CRT 0 - 20 ug/g CRT 5   Epinephrine, Ur per volume ug/L 2   Norepinephine Urine Catecholam 14 - 120 ug/d 36   Norepinephrine, Ur ug/g CRT 0 - 45 ug/g CRT 32   Norepinephrine, Ur  per volume ug/L 13   Dopamine Urine Catecholamine F 71 - 485 ug/d 179   Dopamine, Ur ug/g CRT 0 - 250 ug/g    Dopamine, Ur per volume ug/L 64   Catecholamines UF Interp  See Note   Creatinine Urine mg/dL  mg/dL 40  40      Latest Reference Range & Units 02/15/23 08:34   IGF1 23 - 221 ng/mL 147   IGF-1 Z Score Calculation  1.0   Prolactin 2.80 - 26.00 ng/mL 11.00              Latest Reference Range & Units 07/21/23 08:42   Dhea-S 9.4 - 246.0 ug/dL 30.8      Latest Reference Range & Units 02/15/23 17:36   Renin Activity ng/mL/hr <0.1      Latest Reference Range & Units 02/15/23 08:34   Aldos Serum ng/dL 12.3      Latest Reference Range & Units 02/15/23 08:34   Acth 7.2 - 63.3 pg/mL <1.5 (L)      Latest Reference Range & Units 07/21/23 08:46   Acth 7.2 - 63.3 pg/mL 4.6 (L)       2. Hypertension:  Currently taking HCTZ 12.5 MG DAILY-does not take her medication consistently   BP today      Latest Reference Range & Units 04/10/23 15:08   GFR (CKD-EPI) >60 mL/min/1.73 m 2 75      Latest Reference Range & Units 02/15/23 17:36   Renin Activity ng/mL/hr <0.1      Latest Reference Range & Units 02/15/23 08:34   Aldos Serum ng/dL 12.3       3. Hypokalemia:  This is followed by PCP  Currently taking Klor-con 10 Meq tid     Latest Reference Range & Units 04/10/23 15:08   Potassium 3.6 - 5.5 mmol/L 4.1     4.  Other fatigue:  Reports fatigue for the past few months     Latest Reference Range & Units 02/15/23 08:34   TSH 0.380 - 5.330 uIU/mL 1.970   Free T-4 0.93 - 1.70 ng/dL 1.25   T3,Free 2.00 - 4.40 pg/mL 2.67      Latest Reference Range & Units 02/15/23 08:34   Microsomal -Tpo- Abs 0.0 - 9.0 IU/mL <9.0   Anti-Thyroglobulin 0.0 - 4.0 IU/mL <0.9     5.  Vitamin D deficiency:  Currently not taking any supplementation   Latest Reference Range & Units 02/15/23 08:34   25-Hydroxy   Vitamin D 25 30 - 100 ng/mL 33     6.  Obesity:  BMI is 37.77  10/10/2023   1106 10/26/2023   1347 Most Recent Value     Weight: 90.7 kg (200 lb)  92.2 kg (203 lb 3.2 oz) 92.2 kg (203 lb 3.2 oz)  as of 10/26/2023    Body Mass Index:   39.68 kg/m² Abnormal    1.524 m (5')  as of 10/26/2023   92.2 kg (203 lb 3.2 oz)  as of 10/26/2023      7. Prediabetes:   Latest Reference Range & Units 04/26/19 06:14 12/24/19 09:53 05/21/20 06:17 09/29/21 07:33   Glycohemoglobin 4.0 - 5.6 % 6.3 (H) 5.2 5.8 (H) 5.6         ROS:   Constitutional: No change in weight , No fatigue, No night sweats.  HEENT: No Headache.  Eyes:  No blurred vision, No visual changes.  Cardiac: No chest pain, No palpitations.  Resp: No shortness of breath, No cough,   Gastro: No nausea or vomiting, No diarrhea.  Neuro: Denies numbness or tinging in bilateral feet or taking every  Freeto, and no loss of sensation.  Endo: No heat or cold intolerance.  : No polyuria, No polydipsia, No chronic UTI's.  Lower extremities: No lower leg edema bilateral.  All other systems were reviewed and were negative.    Past Medical History:  Patient Active Problem List    Diagnosis Date Noted    Acute on chronic diastolic congestive heart failure (HCC) 11/08/2023    Morbid obesity (HCC) 10/05/2022    Moderate persistent asthma 10/01/2022    Diverticulitis 04/21/2022    Dyslipidemia 01/14/2021    Impaired fasting glucose 01/14/2021    Asthma-COPD overlap syndrome 11/19/2020    Obesity (BMI 35.0-39.9 without comorbidity) ([facility]) 05/22/2020    Intramural leiomyoma of uterus 12/24/2019    Hypokalemia 06/12/2019    Moderate episode of recurrent major depressive disorder (HCC) 10/29/2018    Stress at home 03/27/2018    Marijuana use 07/19/2017    Polycythemia vera(238.4) 07/19/2017    Refused pneumococcal vaccination 10/10/2016    Refused influenza vaccine 10/10/2016    Thrombocytosis 06/09/2016    Essential hypertension 06/09/2016    Vitamin D deficiency disease 06/09/2016       Past Surgical History:  Past Surgical History:   Procedure Laterality Date    TUBAL COAGULATION LAPAROSCOPIC BILATERAL         Allergies:  Patient has  no known allergies.    Social History:  Social History     Socioeconomic History    Marital status: Single     Spouse name: Not on file    Number of children: Not on file    Years of education: Not on file    Highest education level: Not on file   Occupational History    Not on file   Tobacco Use    Smoking status: Never    Smokeless tobacco: Never   Vaping Use    Vaping Use: Never used   Substance and Sexual Activity    Alcohol use: No    Drug use: Yes     Types: Marijuana     Comment: daily    Sexual activity: Never     Partners: Male     Birth control/protection: Post-Menopausal   Other Topics Concern    Not on file   Social History Narrative    Not on file     Social Determinants of Health     Financial Resource Strain: Not on file   Food Insecurity: Not on file   Transportation Needs: Not on file   Physical Activity: Not on file   Stress: Not on file   Social Connections: Not on file   Intimate Partner Violence: Not on file   Housing Stability: Not on file       Family History:  Family History   Problem Relation Age of Onset    Alcohol/Drug Father         alcohol    Other Father         malnutrition    Psychiatric Illness Father     Other Mother         blood clot from surgery    Hypertension Mother     Diabetes Mother         ?    Psychiatric Illness Sister     Other Sister         malnutrition    No Known Problems Maternal Grandmother     No Known Problems Maternal Grandfather     No Known Problems Paternal Grandmother     No Known Problems Paternal Grandfather        Medications:    Current Outpatient Medications:     torsemide (DEMADEX) 20 MG Tab, Take 1 Tablet by mouth every day., Disp: 30 Tablet, Rfl: 3    carvedilol (COREG) 6.25 MG Tab, Take 1 Tablet by mouth 2 times a day with meals., Disp: 60 Tablet, Rfl: 11    dapagliflozin propanediol (FARXIGA) 10 MG Tab, Take 1 Tablet by mouth every day., Disp: 30 Tablet, Rfl: 11    Budeson-Glycopyrrol-Formoterol (BREZTRI AEROSPHERE) 160-9-4.8 MCG/ACT Aerosol,  Inhale 2 Puffs 2 (two) times a day., Disp: 32.1 g, Rfl: 3    vitamin D2, Ergocalciferol, (DRISDOL) 1.25 MG (77160 UT) Cap capsule, TAKE 1 CAPSULE BY MOUTH EVERY 7 DAYS, Disp: 12 Capsule, Rfl: 0    potassium chloride ER (KLOR-CON) 10 MEQ tablet, TAKE 1 TABLET BY MOUTH THREE TIMES DAILY, Disp: 270 Tablet, Rfl: 0    albuterol 108 (90 Base) MCG/ACT Aero Soln inhalation aerosol, INHALE 2 PUFFS BY MOUTH EVERY 6 HOURS AS NEEDED, Disp: 360 Each, Rfl: 3    montelukast (SINGULAIR) 10 MG Tab, Take 1 Tablet by mouth every evening., Disp: 30 Tablet, Rfl: 11    methylPREDNISolone (MEDROL DOSEPAK) 4 MG Tablet Therapy Pack, Take as directed. (Patient not taking: Reported on 3/28/2023), Disp: 21 Tablet, Rfl: 0    ipratropium-albuterol (DUONEB) 0.5-2.5 (3) MG/3ML nebulizer solution, USE 1 VIAL IN NEBULIZER 4 TIMES DAILY - And As Needed, Disp: 120 mL, Rfl: 11    polyethylene glycol-electrolytes (NULYTELY) 420 GM solution, , Disp: , Rfl:     BLACK COHOSH PO, Take  by mouth., Disp: , Rfl:     ibuprofen (MOTRIN) 200 MG Tab, Take 200 mg by mouth every 6 hours as needed., Disp: , Rfl:     hydroxyurea (HYDREA) 500 MG Cap, Take  by mouth every day., Disp: , Rfl:       Physical Examination:   Vital signs: There were no vitals taken for this visit.  General: No distress, cooperative, well dressed and well nourished.   Psych: Normal mood and affect    Assessment and Plan:  1. Adrenal incidentaloma (HCC)  Unstable -please see diagnosis #8  Neg for primary aldosteronism  Neg for pheochromocytoma   Left adrenal mass measured 0.18 cm  Right adrenal mass measuring 0.13 cm  Fist DST showed a dexamethasone level of 126, with a cortisol level at 2.7-probably due to the patient is a high metabolizer  Saliva cortisol x3 showed negative x2, positive x1 equals 0.173  - HEMOGLOBIN A1C; Future    2. Essential hypertension  - Stable  -Continue regimen-see HPI    3. Hypokalemia  Stable  Follow up PCP    4. Other fatigue  Unstable  Please see diagnoses #8    5.  Vitamin D deficiency  - Stable  -Continue regimen-see HPI    6. Obesity (BMI 35.0-39.9 without comorbidity)  Unstable  Lifestyle modifications discussed    7. Prediabetes  Stable  I would evaluate levels  - HEMOGLOBIN A1C; Future    8. Hypercortisolism (HCC)  Unstable  Please see diagnoses #1  High dose DST ordered-  Instructions for the Dexamethasone Suppression Test  Take 8mg of the dexamethasone pill (or 2 pill)    at or near midnight on   BEFORE YOU TAKE THE PILL MAKE SURE THE LAB IS OPEN AND THAT YOU CAN GO FOR THE 8 AM TEST  Go to the lab the following morning at 8 am and get you blood drawn.   - dexamethasone (DECADRON) 4 MG Tab; Take 2 Tablets by mouth one time for 1 dose.  Dispense: 2 Tablet; Refill: 0        - CORTISOL - AM  - Miscellaneous Test; Future      I will calculate HANNA IR  - INSULIN FASTING; Future  - Comp Metabolic Panel; Future        Disposition: Make an appointment to follow-up in November 16  Do blood work as soon as possible as indicated today      Thank you kindly for allowing me to participate in the diabetes care plan for this patient.      SURENDRA DobsonRSaturninoN.   11/16/23            CC:   Corine Alas P.A.-C.

## 2023-11-20 ENCOUNTER — TELEPHONE (OUTPATIENT)
Dept: CARDIOLOGY | Facility: MEDICAL CENTER | Age: 73
End: 2023-11-20
Payer: MEDICARE

## 2023-11-20 NOTE — TELEPHONE ENCOUNTER
AK       Caller: Jina Barnard     Topic/issue: Patient states that she is much better and the swelling Improved significantly however she is still having fluid in her lungs and is wondering if she can double up on her water pill? Please advise     Callback Number: [phone]    Thank You   Monica DAVIDSON

## 2023-11-20 NOTE — TELEPHONE ENCOUNTER
Phone Number Called: [phone]    Call outcome: Did not leave a detailed message. Requested patient to call back.    Message: Left message that this RN was returning phone call to discuss doubling diuretic medication.

## 2023-11-21 ENCOUNTER — APPOINTMENT (OUTPATIENT)
Dept: PHYSICAL THERAPY | Facility: REHABILITATION | Age: 73
End: 2023-11-21
Attending: STUDENT IN AN ORGANIZED HEALTH CARE EDUCATION/TRAINING PROGRAM
Payer: MEDICARE

## 2023-11-28 ENCOUNTER — APPOINTMENT (OUTPATIENT)
Dept: PHYSICAL THERAPY | Facility: REHABILITATION | Age: 73
End: 2023-11-28
Attending: STUDENT IN AN ORGANIZED HEALTH CARE EDUCATION/TRAINING PROGRAM
Payer: MEDICARE

## 2023-11-28 ENCOUNTER — TELEPHONE (OUTPATIENT)
Dept: CARDIOLOGY | Facility: MEDICAL CENTER | Age: 73
End: 2023-11-28
Payer: MEDICARE

## 2023-11-28 NOTE — TELEPHONE ENCOUNTER
Phone Number Called: [phone]    Call outcome: Spoke to patient regarding message below.    Message: Spoke to patient about swelling and fluid in lungs. Patient unable to provide BP or HR readings. Patient denies chest pain. Patient reports some shortness of breathe but reports that it has been going on for awhile. Patient reports that she is feeling a lot better but was wondering if there is another medication to add on to help remove some more fluid from her lungs as she is noticing a difference but is still have some trouble with the SOB. Patient reports that swelling in her legs is improving as. RN advised that she would reach out to SC for recommendations. All questions answered at this time. Advised to call back with any further questions or concerns.     To SC: please advise, thank you!

## 2023-11-28 NOTE — TELEPHONE ENCOUNTER
CARMINA Chung.  You20 minutes ago (11:01 AM)     Carvedilol won't help with her fluid overload. Recommend follow up in clinic to discuss her symptoms if progressing to decide on further treatment. SC     Phone Number Called: [phone]    Call outcome: Left detailed message for patient. Informed to call back with any additional questions.    Message: Left message with patient that at this time SC does not want to make any medication adjustments. Patient has appt. With SC 12/11. Advised to call back if any further questions.

## 2023-11-28 NOTE — TELEPHONE ENCOUNTER
AK          Caller: Jina Barnard      Topic/issue: Patient was calling about the swelling in her leg going down and she was still saying that she had some fluid in her lungs and she was not out of her carvedilol (COREG) 6.25 MG Tab medication which has helped but she wanted to know if she could take 3 instead of 2 daily to help with the fluid in her lungs and she was asking for a call back    Callback Number: [phone]        Thank you    -Wayne CANO

## 2023-12-04 RX ORDER — POTASSIUM CHLORIDE 750 MG/1
TABLET, FILM COATED, EXTENDED RELEASE ORAL
Qty: 270 TABLET | Refills: 0 | Status: SHIPPED | OUTPATIENT
Start: 2023-12-04

## 2023-12-05 ENCOUNTER — APPOINTMENT (OUTPATIENT)
Dept: PHYSICAL THERAPY | Facility: REHABILITATION | Age: 73
End: 2023-12-05
Attending: STUDENT IN AN ORGANIZED HEALTH CARE EDUCATION/TRAINING PROGRAM
Payer: MEDICARE

## 2023-12-09 ENCOUNTER — HOSPITAL ENCOUNTER (OUTPATIENT)
Dept: LAB | Facility: MEDICAL CENTER | Age: 73
End: 2023-12-09
Attending: NURSE PRACTITIONER
Payer: MEDICARE

## 2023-12-09 ENCOUNTER — HOSPITAL ENCOUNTER (OUTPATIENT)
Dept: LAB | Facility: MEDICAL CENTER | Age: 73
End: 2023-12-09
Payer: MEDICARE

## 2023-12-09 DIAGNOSIS — E27.8 ADRENAL INCIDENTALOMA (HCC): ICD-10-CM

## 2023-12-09 DIAGNOSIS — R06.09 DYSPNEA ON EXERTION: ICD-10-CM

## 2023-12-09 DIAGNOSIS — E24.9 HYPERCORTISOLISM (HCC): ICD-10-CM

## 2023-12-09 DIAGNOSIS — I50.33 ACUTE ON CHRONIC DIASTOLIC CONGESTIVE HEART FAILURE (HCC): ICD-10-CM

## 2023-12-09 DIAGNOSIS — R73.03 PREDIABETES: ICD-10-CM

## 2023-12-09 DIAGNOSIS — I34.0 NONRHEUMATIC MITRAL VALVE REGURGITATION: ICD-10-CM

## 2023-12-09 LAB
ANION GAP SERPL CALC-SCNC: 11 MMOL/L (ref 7–16)
BUN SERPL-MCNC: 21 MG/DL (ref 8–22)
CALCIUM SERPL-MCNC: 9.5 MG/DL (ref 8.5–10.5)
CHLORIDE SERPL-SCNC: 100 MMOL/L (ref 96–112)
CO2 SERPL-SCNC: 29 MMOL/L (ref 20–33)
CORTIS SERPL-MCNC: 13.5 UG/DL (ref 0–23)
CREAT SERPL-MCNC: 0.91 MG/DL (ref 0.5–1.4)
EST. AVERAGE GLUCOSE BLD GHB EST-MCNC: 134 MG/DL
GFR SERPLBLD CREATININE-BSD FMLA CKD-EPI: 66 ML/MIN/1.73 M 2
GLUCOSE SERPL-MCNC: 101 MG/DL (ref 65–99)
HBA1C MFR BLD: 6.3 % (ref 4–5.6)
NT-PROBNP SERPL IA-MCNC: 665 PG/ML (ref 0–125)
POTASSIUM SERPL-SCNC: 3.9 MMOL/L (ref 3.6–5.5)
SODIUM SERPL-SCNC: 140 MMOL/L (ref 135–145)

## 2023-12-09 PROCEDURE — 80048 BASIC METABOLIC PNL TOTAL CA: CPT

## 2023-12-09 PROCEDURE — 83036 HEMOGLOBIN GLYCOSYLATED A1C: CPT | Mod: GA

## 2023-12-09 PROCEDURE — 80053 COMPREHEN METABOLIC PANEL: CPT

## 2023-12-09 PROCEDURE — 83525 ASSAY OF INSULIN: CPT

## 2023-12-09 PROCEDURE — 83880 ASSAY OF NATRIURETIC PEPTIDE: CPT

## 2023-12-09 PROCEDURE — 82533 TOTAL CORTISOL: CPT

## 2023-12-09 PROCEDURE — 36415 COLL VENOUS BLD VENIPUNCTURE: CPT | Mod: GA

## 2023-12-10 LAB
ALBUMIN SERPL BCP-MCNC: 4.3 G/DL (ref 3.2–4.9)
ALBUMIN/GLOB SERPL: 1.7 G/DL
ALP SERPL-CCNC: 58 U/L (ref 30–99)
ALT SERPL-CCNC: 9 U/L (ref 2–50)
ANION GAP SERPL CALC-SCNC: 17 MMOL/L (ref 7–16)
AST SERPL-CCNC: 16 U/L (ref 12–45)
BILIRUB SERPL-MCNC: 0.4 MG/DL (ref 0.1–1.5)
BUN SERPL-MCNC: 21 MG/DL (ref 8–22)
CALCIUM ALBUM COR SERPL-MCNC: 9.3 MG/DL (ref 8.5–10.5)
CALCIUM SERPL-MCNC: 9.5 MG/DL (ref 8.5–10.5)
CHLORIDE SERPL-SCNC: 98 MMOL/L (ref 96–112)
CO2 SERPL-SCNC: 24 MMOL/L (ref 20–33)
CREAT SERPL-MCNC: 0.94 MG/DL (ref 0.5–1.4)
GFR SERPLBLD CREATININE-BSD FMLA CKD-EPI: 64 ML/MIN/1.73 M 2
GLOBULIN SER CALC-MCNC: 2.5 G/DL (ref 1.9–3.5)
GLUCOSE SERPL-MCNC: 94 MG/DL (ref 65–99)
POTASSIUM SERPL-SCNC: 4 MMOL/L (ref 3.6–5.5)
PROT SERPL-MCNC: 6.8 G/DL (ref 6–8.2)
SODIUM SERPL-SCNC: 139 MMOL/L (ref 135–145)

## 2023-12-11 ENCOUNTER — APPOINTMENT (OUTPATIENT)
Dept: CARDIOLOGY | Facility: MEDICAL CENTER | Age: 73
End: 2023-12-11
Attending: NURSE PRACTITIONER
Payer: MEDICARE

## 2023-12-12 ENCOUNTER — APPOINTMENT (OUTPATIENT)
Dept: PHYSICAL THERAPY | Facility: REHABILITATION | Age: 73
End: 2023-12-12
Attending: STUDENT IN AN ORGANIZED HEALTH CARE EDUCATION/TRAINING PROGRAM
Payer: MEDICARE

## 2023-12-12 ENCOUNTER — TELEPHONE (OUTPATIENT)
Dept: CARDIOLOGY | Facility: MEDICAL CENTER | Age: 73
End: 2023-12-12
Payer: MEDICARE

## 2023-12-12 LAB — INSULIN P FAST SERPL-ACNC: 8 UIU/ML (ref 3–25)

## 2023-12-12 NOTE — TELEPHONE ENCOUNTER
Called patient to notify her that SC recommendation was to come for a FU in 1 mo.     Arabella Nova R.N.         11/9/23 12:59 PM  Note     Patient seen by Dr. Easton in valve clinic yesterday and started on HF medications. [facility] ORR recommends 1mo FU with her with BNP/BMP for evaluation.      LVM for patient to call back and discuss. Labs ordered.         Scheduled pt with AK since no availability with SC

## 2023-12-12 NOTE — TELEPHONE ENCOUNTER
AK    Caller: Jina Barnard    Topic/issue: Patient called ti see if she needs to rescheduled her appointment with Shanique Willingham or if she is okay to wait and see AK in February,.    Please advise.,    Callback Number: [phone] (home)     Thank you,     Kerry SANTANA

## 2023-12-19 ENCOUNTER — APPOINTMENT (OUTPATIENT)
Dept: PHYSICAL THERAPY | Facility: REHABILITATION | Age: 73
End: 2023-12-19
Attending: STUDENT IN AN ORGANIZED HEALTH CARE EDUCATION/TRAINING PROGRAM
Payer: MEDICARE

## 2023-12-20 ENCOUNTER — OFFICE VISIT (OUTPATIENT)
Dept: CARDIOLOGY | Facility: MEDICAL CENTER | Age: 73
End: 2023-12-20
Attending: INTERNAL MEDICINE
Payer: MEDICARE

## 2023-12-20 VITALS
WEIGHT: 207 LBS | SYSTOLIC BLOOD PRESSURE: 126 MMHG | HEIGHT: 60 IN | OXYGEN SATURATION: 94 % | HEART RATE: 72 BPM | RESPIRATION RATE: 16 BRPM | BODY MASS INDEX: 40.64 KG/M2 | DIASTOLIC BLOOD PRESSURE: 72 MMHG

## 2023-12-20 DIAGNOSIS — E66.01 MORBID OBESITY (HCC): ICD-10-CM

## 2023-12-20 DIAGNOSIS — I34.0 NONRHEUMATIC MITRAL VALVE REGURGITATION: ICD-10-CM

## 2023-12-20 DIAGNOSIS — J44.89 ASTHMA-COPD OVERLAP SYNDROME (HCC): ICD-10-CM

## 2023-12-20 PROCEDURE — 3074F SYST BP LT 130 MM HG: CPT | Performed by: INTERNAL MEDICINE

## 2023-12-20 PROCEDURE — 99213 OFFICE O/P EST LOW 20 MIN: CPT | Performed by: INTERNAL MEDICINE

## 2023-12-20 PROCEDURE — 99214 OFFICE O/P EST MOD 30 MIN: CPT | Performed by: INTERNAL MEDICINE

## 2023-12-20 PROCEDURE — 3078F DIAST BP <80 MM HG: CPT | Performed by: INTERNAL MEDICINE

## 2023-12-20 RX ORDER — PREDNISONE 20 MG/1
40 TABLET ORAL DAILY
Qty: 10 TABLET | Refills: 0 | Status: SHIPPED | OUTPATIENT
Start: 2023-12-20 | End: 2024-01-17

## 2023-12-20 RX ORDER — DOXYCYCLINE 100 MG/1
100 CAPSULE ORAL 2 TIMES DAILY
Qty: 10 CAPSULE | Refills: 0 | Status: SHIPPED | OUTPATIENT
Start: 2023-12-20 | End: 2024-01-09

## 2023-12-20 ASSESSMENT — FIBROSIS 4 INDEX: FIB4 SCORE: 1.54

## 2023-12-20 NOTE — PROGRESS NOTES
Interventional cardiology follow-up consultation Note      Chief Complaint: Mitral regurgitation, shortness of breath    Jina Barnard is a 72 y.o. female  patient presented for follow-up.  She was seen by me recently for mitral regurgitation, initiated on diuretics.  Her edema is significantly better, now she is able to walk again.  But she has severe shortness of breath.        Past Medical History:   Diagnosis Date    Hypertension     Thrombocytosis              Current Outpatient Medications   Medication Sig Dispense Refill    predniSONE (DELTASONE) 20 MG Tab Take 2 Tablets by mouth every day. 10 Tablet 0    doxycycline (MONODOX) 100 MG capsule Take 1 Capsule by mouth 2 times a day. 10 Capsule 0    potassium chloride ER (KLOR-CON) 10 MEQ tablet TAKE 1 TABLET BY MOUTH THREE TIMES DAILY 270 Tablet 0    torsemide (DEMADEX) 20 MG Tab Take 1 Tablet by mouth every day. 30 Tablet 3    carvedilol (COREG) 6.25 MG Tab Take 1 Tablet by mouth 2 times a day with meals. 60 Tablet 11    dapagliflozin propanediol (FARXIGA) 10 MG Tab Take 1 Tablet by mouth every day. 30 Tablet 11    Budeson-Glycopyrrol-Formoterol (BREZTRI AEROSPHERE) 160-9-4.8 MCG/ACT Aerosol Inhale 2 Puffs 2 (two) times a day. 32.1 g 3    vitamin D2, Ergocalciferol, (DRISDOL) 1.25 MG (12830 UT) Cap capsule TAKE 1 CAPSULE BY MOUTH EVERY 7 DAYS 12 Capsule 0    albuterol 108 (90 Base) MCG/ACT Aero Soln inhalation aerosol INHALE 2 PUFFS BY MOUTH EVERY 6 HOURS AS NEEDED 360 Each 3    montelukast (SINGULAIR) 10 MG Tab Take 1 Tablet by mouth every evening. 30 Tablet 11    ipratropium-albuterol (DUONEB) 0.5-2.5 (3) MG/3ML nebulizer solution USE 1 VIAL IN NEBULIZER 4 TIMES DAILY - And As Needed 120 mL 11    BLACK COHOSH PO Take  by mouth.      ibuprofen (MOTRIN) 200 MG Tab Take 200 mg by mouth every 6 hours as needed.      hydroxyurea (HYDREA) 500 MG Cap Take  by mouth every day.       No current facility-administered medications for this visit.              Physical Exam:  Ambulatory Vitals  /72 (BP Location: Left arm, Patient Position: Sitting, BP Cuff Size: Adult)   Pulse 72   Resp 16   Ht 1.524 m (5')   Wt 93.9 kg (207 lb)   SpO2 94%    Oxygen Therapy:  Pulse Oximetry: 94 %  BP Readings from Last 4 Encounters:   12/20/23 126/72   11/08/23 136/88   10/26/23 138/74   10/10/23 126/76       Weight/BMI: Body mass index is 40.43 kg/m².  Wt Readings from Last 4 Encounters:   12/20/23 93.9 kg (207 lb)   11/08/23 93.4 kg (206 lb)   10/26/23 92.2 kg (203 lb 3.2 oz)   10/10/23 90.7 kg (200 lb)           General: Well appearing and in no apparent distress  Neck: carotid bruits absent  Lungs: respiratory sounds  -wheezing  Heart: Regular rhythm,  No palpable thrills on palpation, murmurs present, no rubs,   Lower extremity edema absent.     EKG artifact, sinus rhythm.  Echocardiogram reviewed 10/25/2023, independently interpreted calcified mitral valve, mild mitral stenosis, severe mitral regurgitation, normal LV function, RV function    Medical Decision Making:  Problem List Items Addressed This Visit       Asthma-COPD overlap syndrome    Relevant Medications    predniSONE (DELTASONE) 20 MG Tab    Morbid obesity (HCC)    Nonrheumatic mitral valve regurgitation       She came to clinic as an urgent visit today due to shortness of breath.  On exam she has significant wheezing, her edema is better, no crackles heard.  I think it is mostly pulmonary, recommend course of antibiotic, prednisone.  Advised to stop smoking marijuana.    If she feels better with this treatment, we will continue with the plan to repeat echocardiogram in February, follow-up . if she continues to feel significant shortness of breath, we can proceed with a CELESTE, coronary angiogram to further evaluate her mitral regurgitation/stenosis      This note was dictated using Dragon speech recognition software.    Hung ALFONSO  Interventional cardiologist  [facility]  [address] [address]  Phone: [phone]  Fax: [phone]

## 2023-12-26 ENCOUNTER — APPOINTMENT (OUTPATIENT)
Dept: PHYSICAL THERAPY | Facility: REHABILITATION | Age: 73
End: 2023-12-26
Attending: STUDENT IN AN ORGANIZED HEALTH CARE EDUCATION/TRAINING PROGRAM
Payer: MEDICARE

## 2024-01-02 ENCOUNTER — APPOINTMENT (OUTPATIENT)
Dept: PHYSICAL THERAPY | Facility: REHABILITATION | Age: 74
End: 2024-01-02
Attending: STUDENT IN AN ORGANIZED HEALTH CARE EDUCATION/TRAINING PROGRAM
Payer: MEDICARE

## 2024-01-04 ENCOUNTER — TELEPHONE (OUTPATIENT)
Dept: CARDIOLOGY | Facility: MEDICAL CENTER | Age: 74
End: 2024-01-04
Payer: MEDICARE

## 2024-01-04 NOTE — TELEPHONE ENCOUNTER
CARMINA Chung.  You1 minute ago (10:17 AM)     OK to call patient and go over symptoms with obtain recent vitals. OK to order CELESTE and cath to evaluate her symptoms and valvular disease further. If she wants to make apt to discuss these first, that is fine. She can see anyone in care team to order and discuss. SC   ---------------------------------------------------------------    Called pt and informed her of recommendations. Pt would like to s/w a provider before proceeding w/ tests, pt scheduled w/ SC on 1/9/24. Reviewed ED precautions.

## 2024-01-04 NOTE — TELEPHONE ENCOUNTER
AK  Caller: Jina Barnard     Topic/issue: Patient would like to see about new medications to help clear her lungs as these previous two are not working as well as she hoped. The medications are doxycycline (MONODOX) 100 MG capsule and   predniSONE (DELTASONE) 20 MG Tab . Patient would like a call back to discuss this as she is experiencing shortness of breath. If patient doesn't answer, please leave a message and she will call back.    Callback Number: [phone]      Thank you,  Indiog TRIVEDI

## 2024-01-05 PROBLEM — E66.01 MORBID OBESITY (HCC): Status: RESOLVED | Noted: 2022-10-05 | Resolved: 2024-01-05

## 2024-01-05 RX ORDER — DEXAMETHASONE 4 MG/1
TABLET ORAL
COMMUNITY
Start: 2023-10-26

## 2024-01-08 ENCOUNTER — DOCUMENTATION (OUTPATIENT)
Dept: HEALTH INFORMATION MANAGEMENT | Facility: OTHER | Age: 74
End: 2024-01-08
Payer: MEDICARE

## 2024-01-09 ENCOUNTER — TELEPHONE (OUTPATIENT)
Dept: CARDIOLOGY | Facility: MEDICAL CENTER | Age: 74
End: 2024-01-09

## 2024-01-09 ENCOUNTER — OFFICE VISIT (OUTPATIENT)
Dept: CARDIOLOGY | Facility: MEDICAL CENTER | Age: 74
End: 2024-01-09
Attending: NURSE PRACTITIONER
Payer: MEDICARE

## 2024-01-09 ENCOUNTER — APPOINTMENT (OUTPATIENT)
Dept: PHYSICAL THERAPY | Facility: REHABILITATION | Age: 74
End: 2024-01-09
Attending: STUDENT IN AN ORGANIZED HEALTH CARE EDUCATION/TRAINING PROGRAM
Payer: MEDICARE

## 2024-01-09 VITALS
DIASTOLIC BLOOD PRESSURE: 80 MMHG | BODY MASS INDEX: 40.25 KG/M2 | SYSTOLIC BLOOD PRESSURE: 136 MMHG | WEIGHT: 205 LBS | HEART RATE: 69 BPM | OXYGEN SATURATION: 96 % | HEIGHT: 60 IN

## 2024-01-09 DIAGNOSIS — I10 ESSENTIAL HYPERTENSION: ICD-10-CM

## 2024-01-09 DIAGNOSIS — I50.33 ACUTE ON CHRONIC DIASTOLIC CONGESTIVE HEART FAILURE (HCC): ICD-10-CM

## 2024-01-09 DIAGNOSIS — E66.9 OBESITY (BMI 35.0-39.9 WITHOUT COMORBIDITY): ICD-10-CM

## 2024-01-09 DIAGNOSIS — E78.5 DYSLIPIDEMIA: ICD-10-CM

## 2024-01-09 DIAGNOSIS — I34.0 NONRHEUMATIC MITRAL VALVE REGURGITATION: ICD-10-CM

## 2024-01-09 PROCEDURE — 3075F SYST BP GE 130 - 139MM HG: CPT | Performed by: NURSE PRACTITIONER

## 2024-01-09 PROCEDURE — 3079F DIAST BP 80-89 MM HG: CPT | Performed by: NURSE PRACTITIONER

## 2024-01-09 PROCEDURE — 99214 OFFICE O/P EST MOD 30 MIN: CPT | Performed by: NURSE PRACTITIONER

## 2024-01-09 PROCEDURE — 99213 OFFICE O/P EST LOW 20 MIN: CPT | Performed by: NURSE PRACTITIONER

## 2024-01-09 ASSESSMENT — ENCOUNTER SYMPTOMS
SHORTNESS OF BREATH: 1
PALPITATIONS: 0
MYALGIAS: 0
ORTHOPNEA: 0
FEVER: 0
COUGH: 0
ABDOMINAL PAIN: 0
CLAUDICATION: 0
DIZZINESS: 0
PND: 0

## 2024-01-09 ASSESSMENT — FIBROSIS 4 INDEX: FIB4 SCORE: 1.56

## 2024-01-09 NOTE — PROGRESS NOTES
Chief Complaint   Patient presents with    Hypertension    Dyslipidemia    Other     F/v dx: Acute on chronic diastolic congestive heart failure (HCC)     Subjective     Jina Barnard is a 73 y.o. female who presents today for review of symptoms and to schedule CELESTE and cardiac cath for severe MR work up.    She is a patient of Dr. Easton in our office. Hx of HTN, obesity, severe MR with acute on chronic heart failure, and HLD.    She presents today with some progressing symptoms of shortness of breath and fatigue at rest and exertion.    Her leg swelling has improved on diuretic therapy.    She is here with her son Albert.    He has no chest pain, dizziness/lightheadedness, or palpitations.    Past Medical History:   Diagnosis Date    Hypertension     Thrombocytosis      Past Surgical History:   Procedure Laterality Date    TUBAL COAGULATION LAPAROSCOPIC BILATERAL       Family History   Problem Relation Age of Onset    Alcohol/Drug Father         alcohol    Other Father         malnutrition    Psychiatric Illness Father     Other Mother         blood clot from surgery    Hypertension Mother     Diabetes Mother         ?    Psychiatric Illness Sister     Other Sister         malnutrition    No Known Problems Maternal Grandmother     No Known Problems Maternal Grandfather     No Known Problems Paternal Grandmother     No Known Problems Paternal Grandfather      Social History     Socioeconomic History    Marital status: Single     Spouse name: Not on file    Number of children: Not on file    Years of education: Not on file    Highest education level: Not on file   Occupational History    Not on file   Tobacco Use    Smoking status: Never    Smokeless tobacco: Never   Vaping Use    Vaping Use: Never used   Substance and Sexual Activity    Alcohol use: No    Drug use: Yes     Types: Marijuana     Comment: daily    Sexual activity: Never     Partners: Male     Birth control/protection: Post-Menopausal   Other  Topics Concern    Not on file   Social History Narrative    Not on file     Social Determinants of Health     Financial Resource Strain: Not on file   Food Insecurity: Not on file   Transportation Needs: Not on file   Physical Activity: Not on file   Stress: Not on file   Social Connections: Not on file   Intimate Partner Violence: Not on file   Housing Stability: Not on file     No Known Allergies  Outpatient Encounter Medications as of 1/9/2024   Medication Sig Dispense Refill    dexamethasone (DECADRON) 4 MG Tab TAKE 2 TABLETS BY MOUTH ONCE TIME FOR 1 DOSE      potassium chloride ER (KLOR-CON) 10 MEQ tablet TAKE 1 TABLET BY MOUTH THREE TIMES DAILY 270 Tablet 0    carvedilol (COREG) 6.25 MG Tab Take 1 Tablet by mouth 2 times a day with meals. 60 Tablet 11    dapagliflozin propanediol (FARXIGA) 10 MG Tab Take 1 Tablet by mouth every day. 30 Tablet 11    Budeson-Glycopyrrol-Formoterol (BREZTRI AEROSPHERE) 160-9-4.8 MCG/ACT Aerosol Inhale 2 Puffs 2 (two) times a day. 32.1 g 3    albuterol 108 (90 Base) MCG/ACT Aero Soln inhalation aerosol INHALE 2 PUFFS BY MOUTH EVERY 6 HOURS AS NEEDED 360 Each 3    ipratropium-albuterol (DUONEB) 0.5-2.5 (3) MG/3ML nebulizer solution USE 1 VIAL IN NEBULIZER 4 TIMES DAILY - And As Needed 120 mL 11    BLACK COHOSH PO Take  by mouth.      ibuprofen (MOTRIN) 200 MG Tab Take 200 mg by mouth every 6 hours as needed.      predniSONE (DELTASONE) 20 MG Tab Take 2 Tablets by mouth every day. 10 Tablet 0    [DISCONTINUED] doxycycline (MONODOX) 100 MG capsule Take 1 Capsule by mouth 2 times a day. (Patient not taking: Reported on 1/9/2024) 10 Capsule 0    torsemide (DEMADEX) 20 MG Tab Take 1 Tablet by mouth every day. 30 Tablet 3    [DISCONTINUED] vitamin D2, Ergocalciferol, (DRISDOL) 1.25 MG (30667 UT) Cap capsule TAKE 1 CAPSULE BY MOUTH EVERY 7 DAYS (Patient not taking: Reported on 1/9/2024) 12 Capsule 0    [DISCONTINUED] montelukast (SINGULAIR) 10 MG Tab Take 1 Tablet by mouth every evening.  (Patient not taking: Reported on 1/9/2024) 30 Tablet 11    [DISCONTINUED] hydroxyurea (HYDREA) 500 MG Cap Take  by mouth every day. (Patient not taking: Reported on 1/9/2024)       No facility-administered encounter medications on file as of 1/9/2024.     Review of Systems   Constitutional:  Positive for malaise/fatigue. Negative for fever.   Respiratory:  Positive for shortness of breath. Negative for cough.    Cardiovascular:  Negative for chest pain, palpitations, orthopnea, claudication, leg swelling and PND.   Gastrointestinal:  Negative for abdominal pain.   Musculoskeletal:  Negative for myalgias.   Neurological:  Negative for dizziness.              Objective     /80 (BP Location: Left arm, Patient Position: Sitting, BP Cuff Size: Adult)   Pulse 69   Ht 1.524 m (5')   Wt 93 kg (205 lb)   SpO2 96%   BMI 40.04 kg/m²     Physical Exam  Vitals and nursing note reviewed.   Constitutional:       Appearance: Normal appearance. She is well-developed. She is obese.   HENT:      Head: Normocephalic and atraumatic.   Neck:      Vascular: No JVD.   Cardiovascular:      Rate and Rhythm: Normal rate and regular rhythm.      Pulses: Normal pulses.      Heart sounds: Normal heart sounds.   Pulmonary:      Effort: Pulmonary effort is normal.      Breath sounds: Normal breath sounds.   Musculoskeletal:         General: Normal range of motion.      Right lower leg: Edema present.      Left lower leg: Edema present.      Comments: Mild LE Edema   Skin:     General: Skin is warm and dry.      Capillary Refill: Capillary refill takes less than 2 seconds.   Neurological:      General: No focal deficit present.      Mental Status: She is alert and oriented to person, place, and time. Mental status is at baseline.   Psychiatric:         Mood and Affect: Mood normal.         Behavior: Behavior normal.         Thought Content: Thought content normal.         Judgment: Judgment normal.                Assessment & Plan     1.  Obesity (BMI 35.0-39.9 without comorbidity) (HCC)        2. Nonrheumatic mitral valve regurgitation  CL-LEFT HEART CATHETERIZATION WITH POSSIBLE INTERVENTION    EC-CELESTE W/O CONT      3. Essential hypertension        4. Dyslipidemia        5. Acute on chronic diastolic congestive heart failure (HCC)  CL-LEFT HEART CATHETERIZATION WITH POSSIBLE INTERVENTION    EC-CELESTE W/O CONT        Medical Decision Making: Today's Assessment/Status/Plan:      1. Severe MR with heart failure  -symptoms progressing  -wants to proceed with CELESTE and cath for pre-mitral procedure work up  -OK to double up torsemide and potassium for 3 days to see if symptoms improve  -follow weight and BP daily  - to call for procedure scheduling  -discussed risks/benefits to both procedures  -post imaging appt in February to review with Dr. Easton    2. Obesity with HTN and HLD  -follow labs  -work on weight loss post procedures with diet and exercise    Patient is to follow up with Dr. Easton in 1 month with CELESTE and cardiac cath.

## 2024-01-09 NOTE — TELEPHONE ENCOUNTER
Jeffrey Goncalves,      Patient was informed you will be reaching out to schedule: Left Heart Cath/EC CELESTE. Ordered per SC, Thank you.

## 2024-01-09 NOTE — PATIENT INSTRUCTIONS
OK to take extra torsemide and potassium at lunch for a 3 day course to see if this improves your breathing more in the meantime.    We will schedule you for coronary angiogram and CELESTE.

## 2024-01-10 ENCOUNTER — TELEPHONE (OUTPATIENT)
Dept: CARDIOLOGY | Facility: MEDICAL CENTER | Age: 74
End: 2024-01-10
Payer: MEDICARE

## 2024-01-10 NOTE — TELEPHONE ENCOUNTER
Patient is scheduled on 2-1-24 for a LHC w/poss w/CELESTE w/anesthesia with . No meds to stop and patient to check in at 9:30 for an 11:30 procedure. H&P was done on 1-9-24 by Juana Jenkins admgi to call patient.

## 2024-01-12 ENCOUNTER — APPOINTMENT (OUTPATIENT)
Dept: ADMISSIONS | Facility: MEDICAL CENTER | Age: 74
End: 2024-01-12
Attending: INTERNAL MEDICINE
Payer: MEDICARE

## 2024-01-17 ENCOUNTER — PRE-ADMISSION TESTING (OUTPATIENT)
Dept: ADMISSIONS | Facility: MEDICAL CENTER | Age: 74
End: 2024-01-17
Attending: NURSE PRACTITIONER
Payer: MEDICARE

## 2024-01-17 RX ORDER — HYDROXYUREA 500 MG/1
CAPSULE ORAL DAILY
COMMUNITY

## 2024-01-29 ENCOUNTER — PRE-ADMISSION TESTING (OUTPATIENT)
Dept: ADMISSIONS | Facility: MEDICAL CENTER | Age: 74
End: 2024-01-29
Attending: INTERNAL MEDICINE
Payer: MEDICARE

## 2024-01-29 DIAGNOSIS — Z01.810 PRE-OPERATIVE CARDIOVASCULAR EXAMINATION: ICD-10-CM

## 2024-01-29 DIAGNOSIS — Z01.812 PRE-OPERATIVE LABORATORY EXAMINATION: ICD-10-CM

## 2024-01-29 LAB
ALBUMIN SERPL BCP-MCNC: 4.2 G/DL (ref 3.2–4.9)
ALBUMIN/GLOB SERPL: 1.8 G/DL
ALP SERPL-CCNC: 54 U/L (ref 30–99)
ALT SERPL-CCNC: 5 U/L (ref 2–50)
ANION GAP SERPL CALC-SCNC: 13 MMOL/L (ref 7–16)
APTT PPP: 30.4 SEC (ref 24.7–36)
AST SERPL-CCNC: 14 U/L (ref 12–45)
BILIRUB SERPL-MCNC: 0.5 MG/DL (ref 0.1–1.5)
BUN SERPL-MCNC: 16 MG/DL (ref 8–22)
CALCIUM ALBUM COR SERPL-MCNC: 9.3 MG/DL (ref 8.5–10.5)
CALCIUM SERPL-MCNC: 9.5 MG/DL (ref 8.5–10.5)
CHLORIDE SERPL-SCNC: 103 MMOL/L (ref 96–112)
CO2 SERPL-SCNC: 26 MMOL/L (ref 20–33)
CREAT SERPL-MCNC: 1.03 MG/DL (ref 0.5–1.4)
EKG IMPRESSION: NORMAL
ERYTHROCYTE [DISTWIDTH] IN BLOOD BY AUTOMATED COUNT: 52.4 FL (ref 35.9–50)
GFR SERPLBLD CREATININE-BSD FMLA CKD-EPI: 57 ML/MIN/1.73 M 2
GLOBULIN SER CALC-MCNC: 2.4 G/DL (ref 1.9–3.5)
GLUCOSE SERPL-MCNC: 102 MG/DL (ref 65–99)
HCT VFR BLD AUTO: 44.1 % (ref 37–47)
HGB BLD-MCNC: 15.6 G/DL (ref 12–16)
INR PPP: 0.94 (ref 0.87–1.13)
MCH RBC QN AUTO: 38 PG (ref 27–33)
MCHC RBC AUTO-ENTMCNC: 35.4 G/DL (ref 32.2–35.5)
MCV RBC AUTO: 107.3 FL (ref 81.4–97.8)
PLATELET # BLD AUTO: 326 K/UL (ref 164–446)
PMV BLD AUTO: 9.8 FL (ref 9–12.9)
POTASSIUM SERPL-SCNC: 4 MMOL/L (ref 3.6–5.5)
PROT SERPL-MCNC: 6.6 G/DL (ref 6–8.2)
PROTHROMBIN TIME: 12.7 SEC (ref 12–14.6)
RBC # BLD AUTO: 4.11 M/UL (ref 4.2–5.4)
SODIUM SERPL-SCNC: 142 MMOL/L (ref 135–145)
WBC # BLD AUTO: 8.2 K/UL (ref 4.8–10.8)

## 2024-01-29 PROCEDURE — 93005 ELECTROCARDIOGRAM TRACING: CPT

## 2024-01-29 PROCEDURE — 36415 COLL VENOUS BLD VENIPUNCTURE: CPT

## 2024-01-29 PROCEDURE — 85027 COMPLETE CBC AUTOMATED: CPT

## 2024-01-29 PROCEDURE — 85730 THROMBOPLASTIN TIME PARTIAL: CPT

## 2024-01-29 PROCEDURE — 80053 COMPREHEN METABOLIC PANEL: CPT

## 2024-01-29 PROCEDURE — 85610 PROTHROMBIN TIME: CPT

## 2024-01-29 PROCEDURE — 93010 ELECTROCARDIOGRAM REPORT: CPT | Performed by: INTERNAL MEDICINE

## 2024-02-01 ENCOUNTER — ANESTHESIA EVENT (OUTPATIENT)
Dept: CARDIOLOGY | Facility: MEDICAL CENTER | Age: 74
End: 2024-02-01
Payer: MEDICARE

## 2024-02-01 ENCOUNTER — APPOINTMENT (OUTPATIENT)
Dept: CARDIOLOGY | Facility: MEDICAL CENTER | Age: 74
End: 2024-02-01
Attending: NURSE PRACTITIONER
Payer: MEDICARE

## 2024-02-01 ENCOUNTER — ANESTHESIA (OUTPATIENT)
Dept: CARDIOLOGY | Facility: MEDICAL CENTER | Age: 74
End: 2024-02-01
Payer: MEDICARE

## 2024-02-01 ENCOUNTER — HOSPITAL ENCOUNTER (OUTPATIENT)
Facility: MEDICAL CENTER | Age: 74
End: 2024-02-01
Attending: INTERNAL MEDICINE | Admitting: INTERNAL MEDICINE
Payer: MEDICARE

## 2024-02-01 VITALS
BODY MASS INDEX: 38.42 KG/M2 | DIASTOLIC BLOOD PRESSURE: 89 MMHG | OXYGEN SATURATION: 93 % | SYSTOLIC BLOOD PRESSURE: 146 MMHG | HEIGHT: 61 IN | TEMPERATURE: 97.3 F | HEART RATE: 68 BPM | RESPIRATION RATE: 14 BRPM | WEIGHT: 203.48 LBS

## 2024-02-01 DIAGNOSIS — I50.33 ACUTE ON CHRONIC DIASTOLIC CONGESTIVE HEART FAILURE (HCC): ICD-10-CM

## 2024-02-01 DIAGNOSIS — I34.0 NONRHEUMATIC MITRAL VALVE REGURGITATION: ICD-10-CM

## 2024-02-01 LAB
GLUCOSE BLD STRIP.AUTO-MCNC: 109 MG/DL (ref 65–99)
LV EJECT FRACT  99904: 65

## 2024-02-01 PROCEDURE — 700111 HCHG RX REV CODE 636 W/ 250 OVERRIDE (IP): Performed by: ANESTHESIOLOGY

## 2024-02-01 PROCEDURE — 700101 HCHG RX REV CODE 250: Performed by: ANESTHESIOLOGY

## 2024-02-01 PROCEDURE — 700105 HCHG RX REV CODE 258: Performed by: INTERNAL MEDICINE

## 2024-02-01 PROCEDURE — 160036 HCHG PACU - EA ADDL 30 MINS PHASE I

## 2024-02-01 PROCEDURE — 93458 L HRT ARTERY/VENTRICLE ANGIO: CPT | Mod: 26 | Performed by: INTERNAL MEDICINE

## 2024-02-01 PROCEDURE — 93325 DOPPLER ECHO COLOR FLOW MAPG: CPT

## 2024-02-01 PROCEDURE — 160046 HCHG PACU - 1ST 60 MINS PHASE II

## 2024-02-01 PROCEDURE — 700101 HCHG RX REV CODE 250

## 2024-02-01 PROCEDURE — C1769 GUIDE WIRE: HCPCS

## 2024-02-01 PROCEDURE — 700111 HCHG RX REV CODE 636 W/ 250 OVERRIDE (IP)

## 2024-02-01 PROCEDURE — 160035 HCHG PACU - 1ST 60 MINS PHASE I

## 2024-02-01 PROCEDURE — 160002 HCHG RECOVERY MINUTES (STAT)

## 2024-02-01 PROCEDURE — 700117 HCHG RX CONTRAST REV CODE 255: Performed by: INTERNAL MEDICINE

## 2024-02-01 PROCEDURE — 82962 GLUCOSE BLOOD TEST: CPT

## 2024-02-01 RX ORDER — LIDOCAINE HYDROCHLORIDE 20 MG/ML
INJECTION, SOLUTION EPIDURAL; INFILTRATION; INTRACAUDAL; PERINEURAL PRN
Status: DISCONTINUED | OUTPATIENT
Start: 2024-02-01 | End: 2024-02-01 | Stop reason: SURG

## 2024-02-01 RX ORDER — DEXAMETHASONE SODIUM PHOSPHATE 4 MG/ML
INJECTION, SOLUTION INTRA-ARTICULAR; INTRALESIONAL; INTRAMUSCULAR; INTRAVENOUS; SOFT TISSUE PRN
Status: DISCONTINUED | OUTPATIENT
Start: 2024-02-01 | End: 2024-02-01 | Stop reason: SURG

## 2024-02-01 RX ORDER — SODIUM CHLORIDE, SODIUM LACTATE, POTASSIUM CHLORIDE, CALCIUM CHLORIDE 600; 310; 30; 20 MG/100ML; MG/100ML; MG/100ML; MG/100ML
INJECTION, SOLUTION INTRAVENOUS CONTINUOUS
Status: ACTIVE | OUTPATIENT
Start: 2024-02-01 | End: 2024-02-01

## 2024-02-01 RX ORDER — HYDROMORPHONE HYDROCHLORIDE 1 MG/ML
0.2 INJECTION, SOLUTION INTRAMUSCULAR; INTRAVENOUS; SUBCUTANEOUS
Status: DISCONTINUED | OUTPATIENT
Start: 2024-02-01 | End: 2024-02-01 | Stop reason: HOSPADM

## 2024-02-01 RX ORDER — MIDAZOLAM HYDROCHLORIDE 1 MG/ML
INJECTION INTRAMUSCULAR; INTRAVENOUS
Status: DISCONTINUED
Start: 2024-02-01 | End: 2024-02-01 | Stop reason: HOSPADM

## 2024-02-01 RX ORDER — LIDOCAINE HYDROCHLORIDE 40 MG/ML
SOLUTION TOPICAL
Status: COMPLETED
Start: 2024-02-01 | End: 2024-02-01

## 2024-02-01 RX ORDER — FUROSEMIDE 10 MG/ML
20 INJECTION INTRAMUSCULAR; INTRAVENOUS ONCE
Status: COMPLETED | OUTPATIENT
Start: 2024-02-01 | End: 2024-02-01

## 2024-02-01 RX ORDER — EPHEDRINE SULFATE 50 MG/ML
INJECTION, SOLUTION INTRAVENOUS PRN
Status: DISCONTINUED | OUTPATIENT
Start: 2024-02-01 | End: 2024-02-01 | Stop reason: SURG

## 2024-02-01 RX ORDER — OXYCODONE HCL 5 MG/5 ML
5 SOLUTION, ORAL ORAL
Status: DISCONTINUED | OUTPATIENT
Start: 2024-02-01 | End: 2024-02-01 | Stop reason: HOSPADM

## 2024-02-01 RX ORDER — ONDANSETRON 2 MG/ML
INJECTION INTRAMUSCULAR; INTRAVENOUS PRN
Status: DISCONTINUED | OUTPATIENT
Start: 2024-02-01 | End: 2024-02-01 | Stop reason: SURG

## 2024-02-01 RX ORDER — VERAPAMIL HYDROCHLORIDE 2.5 MG/ML
INJECTION, SOLUTION INTRAVENOUS
Status: COMPLETED
Start: 2024-02-01 | End: 2024-02-01

## 2024-02-01 RX ORDER — LIDOCAINE HYDROCHLORIDE 20 MG/ML
INJECTION, SOLUTION INFILTRATION; PERINEURAL
Status: COMPLETED
Start: 2024-02-01 | End: 2024-02-01

## 2024-02-01 RX ORDER — SODIUM CHLORIDE, SODIUM LACTATE, POTASSIUM CHLORIDE, CALCIUM CHLORIDE 600; 310; 30; 20 MG/100ML; MG/100ML; MG/100ML; MG/100ML
INJECTION, SOLUTION INTRAVENOUS CONTINUOUS
Status: DISCONTINUED | OUTPATIENT
Start: 2024-02-01 | End: 2024-02-01 | Stop reason: HOSPADM

## 2024-02-01 RX ORDER — HYDROMORPHONE HYDROCHLORIDE 1 MG/ML
0.1 INJECTION, SOLUTION INTRAMUSCULAR; INTRAVENOUS; SUBCUTANEOUS
Status: DISCONTINUED | OUTPATIENT
Start: 2024-02-01 | End: 2024-02-01 | Stop reason: HOSPADM

## 2024-02-01 RX ORDER — LIDOCAINE HYDROCHLORIDE 40 MG/ML
SOLUTION TOPICAL PRN
Status: DISCONTINUED | OUTPATIENT
Start: 2024-02-01 | End: 2024-02-01 | Stop reason: SURG

## 2024-02-01 RX ORDER — HALOPERIDOL 5 MG/ML
1 INJECTION INTRAMUSCULAR
Status: DISCONTINUED | OUTPATIENT
Start: 2024-02-01 | End: 2024-02-01 | Stop reason: HOSPADM

## 2024-02-01 RX ORDER — HEPARIN SODIUM 1000 [USP'U]/ML
INJECTION, SOLUTION INTRAVENOUS; SUBCUTANEOUS
Status: COMPLETED
Start: 2024-02-01 | End: 2024-02-01

## 2024-02-01 RX ORDER — OXYCODONE HCL 5 MG/5 ML
10 SOLUTION, ORAL ORAL
Status: DISCONTINUED | OUTPATIENT
Start: 2024-02-01 | End: 2024-02-01 | Stop reason: HOSPADM

## 2024-02-01 RX ORDER — HYDROMORPHONE HYDROCHLORIDE 1 MG/ML
0.4 INJECTION, SOLUTION INTRAMUSCULAR; INTRAVENOUS; SUBCUTANEOUS
Status: DISCONTINUED | OUTPATIENT
Start: 2024-02-01 | End: 2024-02-01 | Stop reason: HOSPADM

## 2024-02-01 RX ORDER — EPHEDRINE SULFATE 50 MG/ML
5 INJECTION, SOLUTION INTRAVENOUS
Status: DISCONTINUED | OUTPATIENT
Start: 2024-02-01 | End: 2024-02-01 | Stop reason: HOSPADM

## 2024-02-01 RX ORDER — ONDANSETRON 2 MG/ML
4 INJECTION INTRAMUSCULAR; INTRAVENOUS
Status: DISCONTINUED | OUTPATIENT
Start: 2024-02-01 | End: 2024-02-01 | Stop reason: HOSPADM

## 2024-02-01 RX ORDER — HYDRALAZINE HYDROCHLORIDE 20 MG/ML
5 INJECTION INTRAMUSCULAR; INTRAVENOUS
Status: DISCONTINUED | OUTPATIENT
Start: 2024-02-01 | End: 2024-02-01 | Stop reason: HOSPADM

## 2024-02-01 RX ORDER — HEPARIN SODIUM 200 [USP'U]/100ML
INJECTION, SOLUTION INTRAVENOUS
Status: COMPLETED
Start: 2024-02-01 | End: 2024-02-01

## 2024-02-01 RX ADMIN — PROPOFOL 50 MG: 10 INJECTION, EMULSION INTRAVENOUS at 13:43

## 2024-02-01 RX ADMIN — FUROSEMIDE 20 MG: 10 INJECTION INTRAMUSCULAR; INTRAVENOUS at 14:58

## 2024-02-01 RX ADMIN — HEPARIN SODIUM: 1000 INJECTION, SOLUTION INTRAVENOUS; SUBCUTANEOUS at 13:16

## 2024-02-01 RX ADMIN — SUGAMMADEX 200 MG: 100 INJECTION, SOLUTION INTRAVENOUS at 14:07

## 2024-02-01 RX ADMIN — EPHEDRINE SULFATE 10 MG: 50 INJECTION, SOLUTION INTRAVENOUS at 13:57

## 2024-02-01 RX ADMIN — EPHEDRINE SULFATE 10 MG: 50 INJECTION, SOLUTION INTRAVENOUS at 14:02

## 2024-02-01 RX ADMIN — VERAPAMIL HYDROCHLORIDE 5 MG: 2.5 INJECTION, SOLUTION INTRAVENOUS at 13:17

## 2024-02-01 RX ADMIN — ROCURONIUM BROMIDE 50 MG: 10 INJECTION, SOLUTION INTRAVENOUS at 13:21

## 2024-02-01 RX ADMIN — PROPOFOL 150 MG: 10 INJECTION, EMULSION INTRAVENOUS at 13:21

## 2024-02-01 RX ADMIN — LIDOCAINE HYDROCHLORIDE 4 ML: 40 SOLUTION TOPICAL at 13:21

## 2024-02-01 RX ADMIN — ALBUTEROL SULFATE 2.5 MG: 2.5 SOLUTION RESPIRATORY (INHALATION) at 14:58

## 2024-02-01 RX ADMIN — PROPOFOL 100 MG: 10 INJECTION, EMULSION INTRAVENOUS at 13:37

## 2024-02-01 RX ADMIN — HEPARIN SODIUM 2000 UNITS: 200 INJECTION, SOLUTION INTRAVENOUS at 14:07

## 2024-02-01 RX ADMIN — ONDANSETRON 4 MG: 2 INJECTION INTRAMUSCULAR; INTRAVENOUS at 14:05

## 2024-02-01 RX ADMIN — LIDOCAINE HYDROCHLORIDE: 20 INJECTION, SOLUTION INFILTRATION; PERINEURAL at 13:17

## 2024-02-01 RX ADMIN — NITROGLYCERIN 10 ML: 20 INJECTION INTRAVENOUS at 13:17

## 2024-02-01 RX ADMIN — DEXAMETHASONE SODIUM PHOSPHATE 4 MG: 4 INJECTION INTRA-ARTICULAR; INTRALESIONAL; INTRAMUSCULAR; INTRAVENOUS; SOFT TISSUE at 13:24

## 2024-02-01 RX ADMIN — LIDOCAINE HYDROCHLORIDE 80 MG: 20 INJECTION, SOLUTION EPIDURAL; INFILTRATION; INTRACAUDAL at 13:21

## 2024-02-01 RX ADMIN — SODIUM CHLORIDE, POTASSIUM CHLORIDE, SODIUM LACTATE AND CALCIUM CHLORIDE: 600; 310; 30; 20 INJECTION, SOLUTION INTRAVENOUS at 10:18

## 2024-02-01 RX ADMIN — IOHEXOL 20 ML: 350 INJECTION, SOLUTION INTRAVENOUS at 14:07

## 2024-02-01 ASSESSMENT — PAIN DESCRIPTION - PAIN TYPE
TYPE: SURGICAL PAIN

## 2024-02-01 ASSESSMENT — PAIN SCALES - GENERAL: PAIN_LEVEL: 0

## 2024-02-01 ASSESSMENT — FIBROSIS 4 INDEX: FIB4 SCORE: 1.42

## 2024-02-01 NOTE — ANESTHESIA POSTPROCEDURE EVALUATION
Patient: Jina Barnard    Procedure Summary       Date: 02/01/24 Room / Location: [facility] Cath [facility]    Anesthesia Start: 1257 Anesthesia Stop: 1429    Procedure: CL-LEFT HEART CATHETERIZATION WITH POSSIBLE INTERVENTION Diagnosis:       Nonrheumatic mitral valve regurgitation      Acute on chronic diastolic congestive heart failure (HCC)      (See Associated Dx)    Scheduled Providers: Hung Easton M.D.; Angel Luis Resendez M.D. Responsible Provider: Angel Luis Resendez M.D.    Anesthesia Type: MAC ASA Status: 3            Final Anesthesia Type: MAC  Last vitals  BP   Blood Pressure : 125/82    Temp   36.3 °C (97.3 °F)    Pulse   76   Resp   18    SpO2   99 %      Anesthesia Post Evaluation    Patient location during evaluation: PACU  Patient participation: complete - patient participated  Level of consciousness: awake  Pain score: 0    Airway patency: patent  Anesthetic complications: no  Cardiovascular status: adequate  Respiratory status: acceptable  Hydration status: stable    PONV: controlled          No notable events documented.     Nurse Pain Score: 0 (NPRS)

## 2024-02-01 NOTE — ANESTHESIA PROCEDURE NOTES
CELESTE    Date/Time: 2/1/2024 1:23 PM    Performed by: Angel Luis Resendez M.D.  Authorized by: Angel Luis Resendez M.D.    Start Time:2/1/2024 1:23 PM  Preanesthetic Checklist: patient identified, IV checked, site marked, risks and benefits discussed, surgical consent, monitors and equipment checked, pre-op evaluation and timeout performed    Indication for CELESTE: diagnostic   Patient Location: OR  Intubated: Yes  Bite Block: Yes  Heart Visualized: Yes  Insertion: atraumatic    **See FULL CELESTE report in patient's chart via CV Syn[facility]**

## 2024-02-01 NOTE — ANESTHESIA PROCEDURE NOTES
Airway    Date/Time: 2/1/2024 1:21 PM    Performed by: Angel Luis Resendez M.D.  Authorized by: Angel Luis Resendez M.D.    Location:  OR  Urgency:  Elective  Difficult Airway: No    Indications for Airway Management:  Anesthesia      Spontaneous Ventilation: absent    Sedation Level:  Deep  Preoxygenated: Yes    Patient Position:  Sniffing  Final Airway Type:  Endotracheal airway  Final Endotracheal Airway:  ETT  Cuffed: Yes    Technique Used for Successful ETT Placement:  Direct laryngoscopy  Devices/Methods Used in Placement:  Intubating stylet    Insertion Site:  Oral  Blade Type:  Singh  Laryngoscope Blade/Videolaryngoscope Blade Size:  2  ETT Size (mm):  6.5  Measured from:  Teeth  ETT to Teeth (cm):  20  Placement Verified by: auscultation and capnometry    Cormack-Lehane Classification:  Grade I - full view of glottis  Number of Attempts at Approach:  1

## 2024-02-01 NOTE — OR NURSING
1425 Patient arrived to PACU. Report received from anesthesia and OR RN. Patient on 6L of oxygen via mask. Placed on monitor. Patients surgical site to rt radial with TR band, CDI .     1445 Patients son updated on recovery.    1500 Albuterol and Lasix given. Patient tolerating PO.     1525 Patient up to bathroom to void x2.     1530 Released air from TR band 3cc increments.     1615 TR band removed and occlusive dressing placed by Jero RN. Site CDI, soft. Dr. Easton at bedside and states patient clear to discharge, follow up scheduled. Patient discharged with son. Discharge education provided and questions answered. PIV removed. All belongings gathered and sent with patient.

## 2024-02-01 NOTE — PROCEDURES
Cardiac Catheterization Laboratory Procedure Note    DATE: 2/1/2024    : Rosalino House MD, MPH    PROCEDURES PERFORMED:  Left heart catheterization  Coronary angiography  US guided right radial arterial access    INDICATIONS:  Mitral regurgitation / stenosis, preop    CONSENT:  The complete alternatives, risks, and benefits of the procedure were explained to the patient. Informed consent was obtained prior to the procedure.    MEDICATIONS:  Lidocaine  Nitroglycerin  Verapamil  Heparin      CONTRAST: Omnipaque 20 cc    RADIATION DOSE (Air Kerma): 104 mGy    FLUOROSCOPY TIME: 1.8 minutes    ACCESS:  6-Malawian Glidesheath in the right radial artery    ESTIMATED BLOOD LOSS: < 10 cc    COMPLICATIONS: None    PROCEDURE IN DETAIL:  The patient was brought to the cardiac catheterization laboratory in the fasting state. Intubated and sedated by anesthesia. CELESTE done by anesthesia.    The skin over the right wrist was prepped and draped in the usual sterile fashion. Lidocaine infiltration was used to anesthetize the tissue over the right radial artery. Using the micropuncture technique, a 6-Malawian Glidesheath was inserted in the right radial artery. A 4-Fr JL4 catheter was then advanced into the ascending aorta over a J wire. This catheter was then used to engage the ostium of the left coronary artery and cineangiograms were obtained in multiple projections for complete evaluation of the left coronary system. This catheter was then exchanged over a J wire to a 4-Fr JR4 diagnostic catheter that was used to engage the ostium of the right coronary artery and cineangiograms were obtained in multiple projections for complete evaluation of the right coronary system. Following completion of coronary angiography, all wires, catheters, and sheaths were removed.  A TR band was placed over the  wrist using the patent hemostasis technique.     HEMODYNAMICS:  Aortic pressure: 90/60 mmHg  LVEDP: 17 mmHg  No significant aortic  gradient on pullback    CORONARY ANGIOGRAPHY:  The left main coronary artery : Large in caliber vessel with mild CAD, bifurcates to LAD and left circumflex  The left anterior descending coronary artery : Large in caliber transapical vessel, calcified.  Gives rise to a large in caliber first diagonal branch with nonobstructive CAD.  Small D2 and medium caliber D3.  There is severe 80% mid LAD stenosis around the takeoff of D3.  The left circumflex coronary artery : Large in caliber calcified vessel with severe proximal/mid stenoses.  Gives rise to small high OM1.  The right coronary artery  : Large in caliber vessel with proximal , fills via left-to-right collaterals.    IMPRESSION:  1.  Severe multivessel CAD with left-to-right collaterals  2.  Heavily calcified mitral valve on fluoroscopy  3.  Mildly elevated resting LVEDP 17 mmHg with no significant transaortic gradient on pullback    RECOMMENDATIONS:  Outpatient CT surgical consultation for CABG and mitral valve replacement consideration.  TR band protocol    NOTIFICATION:  The patient's son was called and notified of the results.    Referring Cardiologist - Dr. Easton - was notified.    Rosalino House MD, MPH [facility]  Interventional Cardiologist  [facility]   of Clinical Internal Medicine - [facility] [place]

## 2024-02-01 NOTE — ANESTHESIA TIME REPORT
Anesthesia Start and Stop Event Times       Date Time Event    2/1/2024 1238 Ready for Procedure     1257 Anesthesia Start     1429 Anesthesia Stop          Responsible Staff  02/01/24      Name Role Begin End    Angel Luis Resendez M.D. Anesth 1257 1429          Overtime Reason:  no overtime (within assigned shift)    Comments:

## 2024-02-01 NOTE — ANESTHESIA PREPROCEDURE EVALUATION
Date/Time: 02/01/24 1200    Scheduled providers: Hung Easton M.D.; Angel Luis Resendez M.D.    Procedure: CL-LEFT HEART CATHETERIZATION WITH POSSIBLE INTERVENTION    Diagnosis:       Nonrheumatic mitral valve regurgitation [I34.0]      Acute on chronic diastolic congestive heart failure (HCC) [I50.33]    Indications: See Associated Dx    Location: [facility] - Cath Lab - [facility]            Relevant Problems   PULMONARY   (positive) Asthma-COPD overlap syndrome   (positive) Moderate persistent asthma      CARDIAC   (positive) Acute on chronic diastolic congestive heart failure (HCC)   (positive) Essential hypertension   (positive) Nonrheumatic mitral valve regurgitation      Other   (positive) Polycythemia vera(238.4)       Physical Exam    Airway   Mallampati: III  TM distance: >3 FB  Neck ROM: full       Cardiovascular   Rhythm: regular  Rate: normal  (+) murmur, peripheral edema     Dental - normal exam  (+) lower dentures           Pulmonary   (+) decreased breath sounds     Abdominal   (+) obese     Neurological - normal exam                   Anesthesia Plan    ASA 3 (Severe MR, mod MS)       Plan - general       Airway plan will be ETT  CELESTE Planned        Induction: intravenous    Postoperative Plan: Postoperative administration of opioids is intended.    Pertinent diagnostic labs and testing reviewed    Informed Consent:    Anesthetic plan and risks discussed with patient.    Use of blood products discussed with: patient whom consented to blood products.

## 2024-02-02 NOTE — DISCHARGE INSTRUCTIONS
Radial Catherization Discharge Instructions      Do not subject hand/arm to any forceful movements for 24 hours    i.e. supporting weight when rising from the chair or bed.   Do not drive a car for 24 hours  You may remove the dressing tomorrow  You may shower on the day following your procedure.  Do not take a tub bath or submerge the puncture site in water for 3 days following the procedure.  No Lifting more than 3-5 pounds with affected wrist for 5 days  Follow up with Dr. Easton  2-4 weeks.  Increase fluids for 2 days post procedure.  Continue all previous medications unless otherwise instructed.    If bleeding should occur following discharge:  Sit down and apply firm pressure to site with your fingers for 10 minutes  If the bleeding stops, continue to sit quietly, keeping your wrist straight for 2 hours.  Notify physician as soon as possible ( [phone])  If bleeding does not stop after 10 minutes, or if there is a large amount of bleeding or spurting, call 911 immediately.  Do not drive yourself to the hospital.    If any questions arise, call your provider.  If your provider is not available, please feel free to call the Surgical Center at [phone].    MEDICATIONS: Resume taking daily medication.  Take prescribed pain medication with food.  If no medication is prescribed, you may take non-aspirin pain medication if needed.  PAIN MEDICATION CAN BE VERY CONSTIPATING.  Take a stool softener or laxative such as senokot, pericolace, or milk of magnesia if needed.      What to Expect Post Anesthesia    Rest and take it easy for the first 24 hours.  A responsible adult is recommended to remain with you during that time.  It is normal to feel sleepy.  We encourage you to not do anything that requires balance, judgment or coordination.    FOR 24 HOURS DO NOT:  Drive, operate machinery or run household appliances.  Drink beer or alcoholic beverages.  Make important decisions or sign legal  documents.    To avoid nausea, slowly advance diet as tolerated, avoiding spicy or greasy foods for the first day.  Add more substantial food to your diet according to your provider's instructions.  Babies can be fed formula or breast milk as soon as they are hungry.  INCREASE FLUIDS AND FIBER TO AVOID CONSTIPATION.    MILD FLU-LIKE SYMPTOMS ARE NORMAL.  YOU MAY EXPERIENCE GENERALIZED MUSCLE ACHES, THROAT IRRITATION, HEADACHE AND/OR SOME NAUSEA.

## 2024-02-05 ENCOUNTER — TELEPHONE (OUTPATIENT)
Dept: CARDIOLOGY | Facility: MEDICAL CENTER | Age: 74
End: 2024-02-05
Payer: MEDICARE

## 2024-02-05 NOTE — TELEPHONE ENCOUNTER
"Dr. Easton referred patient to Dr. Washburn for consideration of CABG+MVR. CTS notified of referral, consult date pending.    Called patient to review POC. Notified her of results of CELESTE/cath. She is insistent that she keep her TTE appt as scheduled 2/8. I tried to explain to her that she just had a CELESTE done 2/1 showing mod-sev MR, but she states she \"has seen improvement\" in her breathing since then and wants to know if the echo reflects that. Advised it is very soon post CELESTE to show any changes, but per patient request, left TTE appt. Advised patient she is being referred to CTS Dr. Washburn and she verbalizes understanding. Cancelled FU with Dr. Easton 2/13.    "

## 2024-02-08 ENCOUNTER — HOSPITAL ENCOUNTER (OUTPATIENT)
Dept: CARDIOLOGY | Facility: MEDICAL CENTER | Age: 74
End: 2024-02-08
Attending: INTERNAL MEDICINE
Payer: MEDICARE

## 2024-02-08 DIAGNOSIS — I34.0 NONRHEUMATIC MITRAL VALVE REGURGITATION: ICD-10-CM

## 2024-02-08 PROCEDURE — 93306 TTE W/DOPPLER COMPLETE: CPT

## 2024-02-09 ENCOUNTER — TELEPHONE (OUTPATIENT)
Dept: CARDIOLOGY | Facility: MEDICAL CENTER | Age: 74
End: 2024-02-09
Payer: MEDICARE

## 2024-02-09 NOTE — TELEPHONE ENCOUNTER
SC    Caller: Jina Barnard    Topic/issue: MEDICAL ADVICE    Jina would like to know the results of her ECHO that she had completed yesterday 2/8. She would also like for the results to be released to [facility] for her review. Please advise.    Thank you,  Panda CANTRELL    Callback Number: [phone]

## 2024-02-09 NOTE — TELEPHONE ENCOUNTER
Herminio Torres M.D.  You13 minutes ago (3:38 PM)     BN  This will need to be read by Dr. Easton, the ordering physician. ADR cannot read other physician's echos until after 72 hours.     You  Herminio Torres M.D.2 hours ago (1:49 PM)       To ADR as LARA

## 2024-02-12 LAB
LV EJECT FRACT  99904: 65
LV EJECT FRACT MOD 2C 99903: 60.31
LV EJECT FRACT MOD 4C 99902: 69.98
LV EJECT FRACT MOD BP 99901: 63.78

## 2024-02-12 PROCEDURE — 93306 TTE W/DOPPLER COMPLETE: CPT | Mod: 26 | Performed by: INTERNAL MEDICINE

## 2024-02-12 NOTE — TELEPHONE ENCOUNTER
YISEL Chung  You53 minutes ago (2:55 PM)     Shows severe MR. Follow up with valve coordinators and ariel on plan of care. SC     Phone Number Called: [phone]    Call outcome: Left detailed message for patient. Informed to call back with any additional questions.    Message: Left message with patient with echo results. Advised to call back with any further questions or concerns.

## 2024-02-12 NOTE — TELEPHONE ENCOUNTER
To SC: please review echo and advise; it looks like patient just had CELESTE done and wanted to keep TTE appointment (see telephone encounter with Arabella on 2/5/24 for further detail); patient also has f/u with Dr. Washburn 2/21

## 2024-02-12 NOTE — TELEPHONE ENCOUNTER
SC    Caller: Jina Barnard    Topic/issue: Patient called to follow up on her echo results and would like a callback. Patient said she is okay with a detailed voicemail.    Please advise.     Callback Number: [phone]    Thank you,    Kerry SANTANA

## 2024-02-13 ENCOUNTER — APPOINTMENT (OUTPATIENT)
Dept: CARDIOLOGY | Facility: MEDICAL CENTER | Age: 74
End: 2024-02-13
Attending: INTERNAL MEDICINE
Payer: MEDICARE

## 2024-02-15 NOTE — PROGRESS NOTES
REFERRING PHYSICIAN: Hung Easton MD    CONSULTING PHYSICIAN: Salina Washburn MD, FACS    CHIEF COMPLAINT: Shortness of breath    HISTORY OF PRESENT ILLNESS: The patient is a 73 y.o. female with history of hypertension, hyperlipidemia, COPD, polycythemia, marijuana use, asthma inhaler, coronary artery disease and severe mitral regurgitation. Today, she states she has been having shortness of breath and had productive cough for the last year. She feels that this has been better since February. She denies, dizziness, lower extremity edema, chest pain, or orthopnea. She is not very active due to shortness of breath but can walk block before having to stop and rest.    PAST MEDICAL HISTORY:   Active Ambulatory Problems     Diagnosis Date Noted    Thrombocytosis 06/09/2016    Essential hypertension 06/09/2016    Vitamin D deficiency disease 06/09/2016    Marijuana use 07/19/2017    Polycythemia vera(238.4) 07/19/2017    Stress at home 03/27/2018    Moderate episode of recurrent major depressive disorder (HCC) 10/29/2018    Hypokalemia 06/12/2019    Intramural leiomyoma of uterus 12/24/2019    Obesity (BMI 35.0-39.9 without comorbidity) (HCC) 05/22/2020    Asthma-COPD overlap syndrome 11/19/2020    Dyslipidemia 01/14/2021    Impaired fasting glucose 01/14/2021    Diverticulitis 04/21/2022    Moderate persistent asthma 10/01/2022    Acute on chronic diastolic congestive heart failure (HCC) 11/08/2023    Nonrheumatic mitral valve regurgitation 12/20/2023     Resolved Ambulatory Problems     Diagnosis Date Noted    Polycythemia vera (HCC) 10/10/2016    Liver lesion 10/10/2016    Refused pneumococcal vaccination 10/10/2016    Refused influenza vaccine 10/10/2016    BMI 39.0-39.9,adult 10/10/2016    Obesity (BMI 30-39.9) 07/19/2017    Morbid obesity (HCC) 10/05/2022     Past Medical History:   Diagnosis Date    Bowel habit changes     Breath shortness     COPD (chronic obstructive pulmonary disease) (HCC)      "Diverticulosis     Heart murmur     Heart valve disease     Hypertension     Urinary incontinence      PAST SURGICAL HISTORY:   Past Surgical History:   Procedure Laterality Date    COLONOSCOPY      TUBAL COAGULATION LAPAROSCOPIC BILATERAL       ALLERGIES: No Known Allergies     CURRENT MEDICATIONS:   Current Outpatient Medications:     hydroxyurea (HYDREA) 500 MG Cap, Take  by mouth every day., Disp: , Rfl:     dexamethasone (DECADRON) 4 MG Tab, TAKE 2 TABLETS BY MOUTH ONCE TIME FOR 1 DOSE, Disp: , Rfl:     potassium chloride ER (KLOR-CON) 10 MEQ tablet, TAKE 1 TABLET BY MOUTH THREE TIMES DAILY, Disp: 270 Tablet, Rfl: 0    torsemide (DEMADEX) 20 MG Tab, Take 1 Tablet by mouth every day., Disp: 30 Tablet, Rfl: 3    carvedilol (COREG) 6.25 MG Tab, Take 1 Tablet by mouth 2 times a day with meals., Disp: 60 Tablet, Rfl: 11    dapagliflozin propanediol (FARXIGA) 10 MG Tab, Take 1 Tablet by mouth every day. (Patient taking differently: Take 10 mg by mouth every day. Indications: \"leaky valve\"), Disp: 30 Tablet, Rfl: 11    Budeson-Glycopyrrol-Formoterol (BREZTRI AEROSPHERE) 160-9-4.8 MCG/ACT Aerosol, Inhale 2 Puffs 2 (two) times a day., Disp: 32.1 g, Rfl: 3    albuterol 108 (90 Base) MCG/ACT Aero Soln inhalation aerosol, INHALE 2 PUFFS BY MOUTH EVERY 6 HOURS AS NEEDED, Disp: 360 Each, Rfl: 3    ipratropium-albuterol (DUONEB) 0.5-2.5 (3) MG/3ML nebulizer solution, USE 1 VIAL IN NEBULIZER 4 TIMES DAILY - And As Needed, Disp: 120 mL, Rfl: 11    BLACK COHOSH PO, Take  by mouth as needed. OTC supplement, Disp: , Rfl:     ibuprofen (MOTRIN) 200 MG Tab, Take 200 mg by mouth every 6 hours as needed., Disp: , Rfl:     FAMILY HISTORY:   Family History   Problem Relation Age of Onset    Alcohol/Drug Father         alcohol    Other Father         malnutrition    Psychiatric Illness Father     Other Mother         blood clot from surgery    Hypertension Mother     Diabetes Mother         ?    Psychiatric Illness Sister     Other " "Sister         malnutrition    No Known Problems Maternal Grandmother     No Known Problems Maternal Grandfather     No Known Problems Paternal Grandmother     No Known Problems Paternal Grandfather      SOCIAL HISTORY:   Social History     Socioeconomic History    Marital status: Single     Spouse name: Not on file    Number of children: Not on file    Years of education: Not on file    Highest education level: Not on file   Occupational History    Not on file   Tobacco Use    Smoking status: Never    Smokeless tobacco: Never   Vaping Use    Vaping Use: Never used   Substance and Sexual Activity    Alcohol use: No    Drug use: Yes     Types: Marijuana     Comment: daily- midnight 1/31/24    Sexual activity: Never     Partners: Male     Birth control/protection: Post-Menopausal   Other Topics Concern    Not on file   Social History Narrative    Not on file     Social Determinants of Health     Financial Resource Strain: Not on file   Food Insecurity: Not on file   Transportation Needs: Not on file   Physical Activity: Not on file   Stress: Not on file   Social Connections: Not on file   Intimate Partner Violence: Not on file   Housing Stability: Not on file     REVIEW OF SYSTEMS:  Review of Systems   Constitutional: Negative.    HENT:  Positive for hearing loss.    Eyes: Negative.    Respiratory:  Positive for cough, sputum production and shortness of breath.    Cardiovascular: Negative.    Gastrointestinal:  Positive for abdominal pain and blood in stool.   Genitourinary: Negative.    Musculoskeletal: Negative.    Skin: Negative.    Neurological: Negative.    Endo/Heme/Allergies: Negative.    Psychiatric/Behavioral:  The patient is nervous/anxious.      PHYSICAL EXAMINATION:    /68 (BP Location: Left arm, Patient Position: Sitting, BP Cuff Size: Small adult)   Pulse 63   Temp 36.6 °C (97.8 °F) (Temporal)   Ht 1.549 m (5' 1\")   Wt 89.8 kg (198 lb)   SpO2 93%   BMI 37.41 kg/m²      Physical " Exam  Constitutional:       General: She is not in acute distress.     Appearance: She is obese.      Comments: Frail   HENT:      Head: Normocephalic.   Eyes:      Pupils: Pupils are equal, round, and reactive to light.   Cardiovascular:      Rate and Rhythm: Normal rate and regular rhythm.      Heart sounds: Murmur heard.      Systolic murmur is present with a grade of 3/6.      No gallop.   Pulmonary:      Effort: Pulmonary effort is normal. No respiratory distress.      Breath sounds: Normal breath sounds. No wheezing or rales.   Abdominal:      General: Bowel sounds are normal. There is no distension.      Palpations: Abdomen is soft.      Tenderness: There is no abdominal tenderness.   Musculoskeletal:         General: Normal range of motion.      Cervical back: Neck supple.   Skin:     General: Skin is warm and dry.   Neurological:      Mental Status: She is alert and oriented to person, place, and time.   Psychiatric:         Mood and Affect: Mood and affect normal.         Cognition and Memory: Memory normal.         Judgment: Judgment normal.     LABS REVIEWED:  Lab Results   Component Value Date/Time    SODIUM 142 01/29/2024 09:29 AM    POTASSIUM 4.0 01/29/2024 09:29 AM    CHLORIDE 103 01/29/2024 09:29 AM    CO2 26 01/29/2024 09:29 AM    GLUCOSE 102 (H) 01/29/2024 09:29 AM    BUN 16 01/29/2024 09:29 AM    CREATININE 1.03 01/29/2024 09:29 AM      Lab Results   Component Value Date/Time    PROTHROMBTM 12.7 01/29/2024 09:29 AM    INR 0.94 01/29/2024 09:29 AM      Lab Results   Component Value Date/Time    WBC 8.2 01/29/2024 09:29 AM    RBC 4.11 (L) 01/29/2024 09:29 AM    HEMOGLOBIN 15.6 01/29/2024 09:29 AM    HEMATOCRIT 44.1 01/29/2024 09:29 AM    .3 (H) 01/29/2024 09:29 AM    MCH 38.0 (H) 01/29/2024 09:29 AM    MCHC 35.4 01/29/2024 09:29 AM    MPV 9.8 01/29/2024 09:29 AM    NEUTSPOLYS 46.40 03/10/2023 05:15 PM    LYMPHOCYTES 38.90 03/10/2023 05:15 PM    MONOCYTES 10.60 03/10/2023 05:15 PM     EOSINOPHILS 3.30 03/10/2023 05:15 PM    BASOPHILS 0.40 03/10/2023 05:15 PM    ANISOCYTOSIS 3+ (A) 03/30/2022 07:22 AM     IMAGING REVIEWED AND INTERPRETED:    TRANSESOPHAGEAL ECHOCARDIOGRAM [facility] 2/1/2024:   Normal biventricular systolic function, LVEF 65%.    There is calcific degeneration of the mitral valve and subvalvular   apparatus with a tethered posterior leaflet.  There is moderate-to-  severe mitral regurgitation with two posteriorly directed jets at the   anterolateral and posteromedial commisures.  Moderate mitral stenosis   with mean gradient 6 mmHg at HR 80 bpm.    CARDIAC CATHETERIZATION [facility] 2/1/2024:  CORONARY ANGIOGRAPHY:  The left main coronary artery : Large in caliber vessel with mild CAD, bifurcates to LAD and left circumflex  The left anterior descending coronary artery : Large in caliber transapical vessel, calcified.  Gives rise to a large in caliber first diagonal branch with nonobstructive CAD.  Small D2 and medium caliber D3.  There is severe 80% mid LAD stenosis around the takeoff of D3.  The left circumflex coronary artery : Large in caliber calcified vessel with severe proximal/mid stenoses.  Gives rise to small high OM1.  The right coronary artery  : Large in caliber vessel with proximal , fills via left-to-right collaterals.  Impression:  1.  Severe multivessel CAD with left-to-right collaterals  2.  Heavily calcified mitral valve on fluoroscopy  3.  Mildly elevated resting LVEDP 17 mmHg with no significant transaortic gradient on pullback      IMPRESSION:  Severe symptomatic mitral regurgitation, mild to moderate mitral stenosis, mitral annular calcifications, coronary artery disease, hypertension, hyperlipidemia, COPD, polycythemia    PLAN:  I do not recommend mitral valve replacement and coronary artery bypass grafting due to excessive operative risk.  I estimate this to be greater than 15%.  The STS mortality risk score is 10.2% and the morbidity and mortality risk score is 32%.  The scores were discussed with patient.  A potential alternative, besides medical management, may be percutaneous coronary intervention, possibly followed by an attempt at MitraClip placement.  The procedure, its risks, benefits, potential complications and alternative treatments were discussed with the patient in detail.    Findings and recommendations have been discussed with the patient’s cardiologist, Hung Easton MD.  Thank you for this very challenging consultation and participation in the patient’s care.  I will keep you apprised of all future developments.    Sincerely,    Salina Washburn MD, FACS

## 2024-02-21 ENCOUNTER — OFFICE VISIT (OUTPATIENT)
Dept: CARDIOTHORACIC SURGERY | Facility: MEDICAL CENTER | Age: 74
End: 2024-02-21
Payer: MEDICARE

## 2024-02-21 VITALS
BODY MASS INDEX: 37.38 KG/M2 | WEIGHT: 198 LBS | HEART RATE: 63 BPM | HEIGHT: 61 IN | SYSTOLIC BLOOD PRESSURE: 110 MMHG | OXYGEN SATURATION: 93 % | TEMPERATURE: 97.8 F | DIASTOLIC BLOOD PRESSURE: 68 MMHG

## 2024-02-21 DIAGNOSIS — I34.0 SEVERE MITRAL REGURGITATION: ICD-10-CM

## 2024-02-21 DIAGNOSIS — I05.0 MITRAL VALVE STENOSIS, UNSPECIFIED ETIOLOGY: ICD-10-CM

## 2024-02-21 DIAGNOSIS — I25.119 CORONARY ARTERY DISEASE INVOLVING NATIVE CORONARY ARTERY OF NATIVE HEART WITH ANGINA PECTORIS (HCC): ICD-10-CM

## 2024-02-21 PROCEDURE — 3078F DIAST BP <80 MM HG: CPT | Performed by: THORACIC SURGERY (CARDIOTHORACIC VASCULAR SURGERY)

## 2024-02-21 PROCEDURE — 99205 OFFICE O/P NEW HI 60 MIN: CPT | Performed by: THORACIC SURGERY (CARDIOTHORACIC VASCULAR SURGERY)

## 2024-02-21 PROCEDURE — 3074F SYST BP LT 130 MM HG: CPT | Performed by: THORACIC SURGERY (CARDIOTHORACIC VASCULAR SURGERY)

## 2024-02-21 ASSESSMENT — ENCOUNTER SYMPTOMS
SPUTUM PRODUCTION: 1
NEUROLOGICAL NEGATIVE: 1
SHORTNESS OF BREATH: 1
ABDOMINAL PAIN: 1
BLOOD IN STOOL: 1
NERVOUS/ANXIOUS: 1
COUGH: 1
CONSTITUTIONAL NEGATIVE: 1
MUSCULOSKELETAL NEGATIVE: 1
CARDIOVASCULAR NEGATIVE: 1
EYES NEGATIVE: 1

## 2024-02-21 ASSESSMENT — FIBROSIS 4 INDEX: FIB4 SCORE: 1.42

## 2024-02-23 ENCOUNTER — TELEPHONE (OUTPATIENT)
Dept: CARDIOLOGY | Facility: MEDICAL CENTER | Age: 74
End: 2024-02-23
Payer: MEDICARE

## 2024-02-23 NOTE — TELEPHONE ENCOUNTER
Per Dr. Washburn, patient not a candidate for CABG+MVR. She will need staged PCI and consultation with Dr. Easton to discuss MitraClip.     Called and spoke to patient.  Discussed recommended follow up. Patient scheduled to see Dr. Easton on 2/29/2024. She would like to discuss staged PCI at that time.

## 2024-02-29 ENCOUNTER — OFFICE VISIT (OUTPATIENT)
Dept: CARDIOLOGY | Facility: MEDICAL CENTER | Age: 74
End: 2024-02-29
Attending: INTERNAL MEDICINE
Payer: MEDICARE

## 2024-02-29 ENCOUNTER — DOCUMENTATION (OUTPATIENT)
Dept: CARDIOLOGY | Facility: MEDICAL CENTER | Age: 74
End: 2024-02-29
Payer: MEDICARE

## 2024-02-29 ENCOUNTER — DOCUMENTATION (OUTPATIENT)
Dept: CARDIOLOGY | Facility: MEDICAL CENTER | Age: 74
End: 2024-02-29

## 2024-02-29 ENCOUNTER — TELEPHONE (OUTPATIENT)
Dept: CARDIOLOGY | Facility: MEDICAL CENTER | Age: 74
End: 2024-02-29

## 2024-02-29 VITALS
RESPIRATION RATE: 16 BRPM | HEIGHT: 61 IN | SYSTOLIC BLOOD PRESSURE: 124 MMHG | WEIGHT: 198 LBS | BODY MASS INDEX: 37.38 KG/M2 | HEART RATE: 68 BPM | OXYGEN SATURATION: 97 % | DIASTOLIC BLOOD PRESSURE: 60 MMHG

## 2024-02-29 DIAGNOSIS — I25.83 CORONARY ARTERY DISEASE DUE TO LIPID RICH PLAQUE: ICD-10-CM

## 2024-02-29 DIAGNOSIS — I25.10 CORONARY ARTERY DISEASE DUE TO LIPID RICH PLAQUE: ICD-10-CM

## 2024-02-29 DIAGNOSIS — I34.0 NONRHEUMATIC MITRAL VALVE REGURGITATION: ICD-10-CM

## 2024-02-29 PROCEDURE — 3078F DIAST BP <80 MM HG: CPT | Performed by: INTERNAL MEDICINE

## 2024-02-29 PROCEDURE — 99214 OFFICE O/P EST MOD 30 MIN: CPT | Performed by: INTERNAL MEDICINE

## 2024-02-29 PROCEDURE — 99213 OFFICE O/P EST LOW 20 MIN: CPT | Performed by: INTERNAL MEDICINE

## 2024-02-29 PROCEDURE — 3074F SYST BP LT 130 MM HG: CPT | Performed by: INTERNAL MEDICINE

## 2024-02-29 RX ORDER — ASPIRIN 81 MG/1
81 TABLET ORAL DAILY
Qty: 90 TABLET | Refills: 3 | Status: SHIPPED | OUTPATIENT
Start: 2024-02-29

## 2024-02-29 RX ORDER — ROSUVASTATIN CALCIUM 10 MG/1
10 TABLET, COATED ORAL EVERY EVENING
Qty: 90 TABLET | Refills: 3 | Status: SHIPPED | OUTPATIENT
Start: 2024-02-29

## 2024-02-29 ASSESSMENT — PATIENT HEALTH QUESTIONNAIRE - PHQ9: CLINICAL INTERPRETATION OF PHQ2 SCORE: 0

## 2024-02-29 ASSESSMENT — FIBROSIS 4 INDEX: FIB4 SCORE: 1.42

## 2024-02-29 NOTE — PROGRESS NOTES
Valve Program Consultation: 2/29/2024 for tentative Mitral SKYLER 3/19/2024    Mental Status Assessment:  Appearance: normal  Behavior: normal, pleasant  Mood/Affect: normal  Thought process/content: normal  Cognition: normal  Functional ability: very limited d/t HF/CAD symptoms. Patient states she is mostly bedridden. She uses a wheelchair PRN for long distances. Today she is walking unassisted but appears unsteady and needs to take breaks to rest.   Dental Concerns: patient denies s/s of active oral infection  Further mental assessment needed: no    Post-op Plan of Care:  Support systems: son Albert present during consultation and participating in discussion  Patient understands discharge plan: yes  DME: wheelchair PRN (for long distances)  Home health warranted prior to procedure: no  Social concerns for discharge: none  PCP alerted of social concerns: n/a  POLST: none  Advance directive: none, provided patient with AD packet  Post-op goal: improve stamina/energy. It's been about a year since patient has been able to active without the wheelchair for long distances.   Medication instructions: hold vitamins/supplements x7 days, hold ibuprofen x5 days, hold Farxiga x4 days, hold torsemide AM of procedure    Concerns prior to procedure: sev multivessel disease (staged PCI planned for 3/4), daily marijuana smoker, frailty/deconditioning     Met with patient during New Mitral SKYLER consult.     All patient's questions and concerns were addressed during this visit. They understood pre-operative and post-operative plan of care.    Reviewed patient Mitral SKYLER education packet explaining that information is provided regarding preparation for Mitral SKYLER the night prior, what to expect during the hospital stay, average LOS, and what to look out for post Mitral SKYLER discharge. Explained that patient will require SBE prophylactic antibiotic prior to any dental treatment post Mitral SKYLER. Advised patient not to receive any new  vaccinations within 1 week pre- and 1 week post-procedure.     Explained that patient should not eat or drink anything past midnight the day of the procedure. Encouraged patient to wear something clean and comfortable, easy to get on/off to check in Tuesday morning, time TBD. Patient may have friends and family in the pre-operational area until patient is taken to the operating room, at which time family and friends will be asked to wait on floor 1 [facility] surgical waiting area. On completion of Mitral SKYLER, heart team will update family. Once update is given, there is some time before family/friends may visit patient in their assigned room. Length of stay on average is one night, however patient may stay longer depending on specific needs at that time. Patient given printed instructions sheet with all above stated instructions. Patient states understanding of all material and education presented today and has no further questions at this time. Encouraged patient again, to contact me with any questions or concerns during this work-up process. Patient states understanding.

## 2024-02-29 NOTE — TELEPHONE ENCOUNTER
Patient is scheduled on 3-4-24 for a [facility] w/PCI on 3-4-24 with . Patient was told to hold Farxiga AM day of procedure and to check in at 9:30 for an 11:30 procedure. H&P was done on 2-29-24 by Dr. Easton. Pre admit to call patient.

## 2024-02-29 NOTE — PROGRESS NOTES
"      Interventional cardiology Follow-up Consultation Note        CC: Follow-up mitral regurgitation, coronary artery disease    Patient ID/HPI:   73-year-old female patient with hypertension, congestive heart failure, severe mitral regurgitation, coronary artery disease here for follow-up.  She feels tired, decreased exercise tolerance.  Significant dyspnea on exertion.  She was referred for surgical bypass, mitral valve replacement but deemed high risk.  Patient also does not want to proceed with surgical procedure.  She is here to discuss percutaneous options.        Past Medical History:   Diagnosis Date    Bowel habit changes     Lower dentures    Breath shortness     COPD (chronic obstructive pulmonary disease) (HCC)     Patient suspects her SOB is r/t to her leako valve and that she does not have COPD    Diverticulosis     Heart murmur     Heart valve disease     \"leaky valve\"; Followed by Dr. Reid    Hypertension     Thrombocytosis     Urinary incontinence     Urgency, frequency, leakage         Current Outpatient Medications   Medication Sig Dispense Refill    hydroxyurea (HYDREA) 500 MG Cap Take  by mouth every day.      dexamethasone (DECADRON) 4 MG Tab TAKE 2 TABLETS BY MOUTH ONCE TIME FOR 1 DOSE      potassium chloride ER (KLOR-CON) 10 MEQ tablet TAKE 1 TABLET BY MOUTH THREE TIMES DAILY 270 Tablet 0    torsemide (DEMADEX) 20 MG Tab Take 1 Tablet by mouth every day. 30 Tablet 3    carvedilol (COREG) 6.25 MG Tab Take 1 Tablet by mouth 2 times a day with meals. 60 Tablet 11    dapagliflozin propanediol (FARXIGA) 10 MG Tab Take 1 Tablet by mouth every day. (Patient taking differently: Take 10 mg by mouth every day. Indications: \"leaky valve\") 30 Tablet 11    Budeson-Glycopyrrol-Formoterol (BREZTRI AEROSPHERE) 160-9-4.8 MCG/ACT Aerosol Inhale 2 Puffs 2 (two) times a day. 32.1 g 3    albuterol 108 (90 Base) MCG/ACT Aero Soln inhalation aerosol INHALE 2 PUFFS BY MOUTH EVERY 6 HOURS AS NEEDED 360 Each 3    " "ipratropium-albuterol (DUONEB) 0.5-2.5 (3) MG/3ML nebulizer solution USE 1 VIAL IN NEBULIZER 4 TIMES DAILY - And As Needed 120 mL 11    BLACK COHOSH PO Take  by mouth as needed. OTC supplement      ibuprofen (MOTRIN) 200 MG Tab Take 200 mg by mouth every 6 hours as needed.      rosuvastatin (CRESTOR) 10 MG Tab Take 1 Tablet by mouth every evening. 90 Tablet 3    aspirin 81 MG EC tablet Take 1 Tablet by mouth every day. 90 Tablet 3     No current facility-administered medications for this visit.         Physical Exam:  Ambulatory Vitals  /60 (BP Location: Left arm, Patient Position: Sitting, BP Cuff Size: Adult)   Pulse 68   Resp 16   Ht 1.549 m (5' 1\")   Wt 89.8 kg (198 lb)   SpO2 97%    Oxygen Therapy:  Pulse Oximetry: 97 %  BP Readings from Last 4 Encounters:   02/29/24 124/60   02/21/24 110/68   02/01/24 (!) 146/89   01/09/24 136/80       Weight/BMI: Body mass index is 37.41 kg/m².  Wt Readings from Last 4 Encounters:   02/29/24 89.8 kg (198 lb)   02/21/24 89.8 kg (198 lb)   02/01/24 92.3 kg (203 lb 7.8 oz)   01/09/24 93 kg (205 lb)       General: Well appearing and in no apparent distress  Neck: JVP absent, carotid bruits absent  Lungs: respiratory sounds  normal, additional breath sounds absent  Heart: Regular rhythm,   No palpable thrills on palpation, murmurs present, no rubs,   Lower extremity edema absent.     HEMODYNAMICS:  Aortic pressure: 90/60 mmHg  LVEDP: 17 mmHg  No significant aortic gradient on pullback     CORONARY ANGIOGRAPHY:  The left main coronary artery : Large in caliber vessel with mild CAD, bifurcates to LAD and left circumflex  The left anterior descending coronary artery : Large in caliber transapical vessel, calcified.  Gives rise to a large in caliber first diagonal branch with nonobstructive CAD.  Small D2 and medium caliber D3.  There is severe 80% mid LAD stenosis around the takeoff of D3.  The left circumflex coronary artery : Large in caliber calcified vessel with severe " proximal/mid stenoses.  Gives rise to small high OM1.  The right coronary artery  : Large in caliber vessel with proximal , fills via left-to-right collaterals.     IMPRESSION:  1.  Severe multivessel CAD with left-to-right collaterals  2.  Heavily calcified mitral valve on fluoroscopy  3.  Mildly elevated resting LVEDP 17 mmHg with no significant transaortic gradient on pullback        Medical Decision Making:  Problem List Items Addressed This Visit       Nonrheumatic mitral valve regurgitation     Other Visit Diagnoses       Coronary artery disease due to lipid rich plaque        Relevant Orders    CL-LEFT HEART CATHETERIZATION WITH POSSIBLE INTERVENTION          She has severe coronary artery disease, dyspnea on exertion, likely multifactorial could be anginal equivalent.  NYHA IIIC. She is high risk for open heart surgery, she does not want to proceed with open heart surgery.  Will schedule PCI of left circumflex artery, possibly left anterior descending artery.  Regarding mitral valve, she has small valve with with a baseline gradient about 4.  Mitral valve clip may not be feasible but given her high surgical risk, ongoing symptoms we will give it an attempt to see if her gradients will be acceptable with a clip.  Risk benefits of both procedures discussed in detail, patient understands, agrees to proceed.    Hung ALFONSO  Interventional cardiologist  [facility], [address]  [address] [address]  Phone: [phone]  Fax: [phone]

## 2024-02-29 NOTE — TELEPHONE ENCOUNTER
----- Message from Arabella Nova R.N. sent at 2/29/2024 10:13 AM PST -----  Regarding: Staged PCI  Dr. Easton ordered this patient a cath with staged PCI. Can you please call her and schedule her for Monday with AK? He said he would talk to Heath about it being his 5th case. Thanks!

## 2024-02-29 NOTE — PROGRESS NOTES
Valve Program Functional Assessment:     KCCQ12   1a) Showering/bathin  1b) Walking 1 block on ground: 1  1c) Hurrying or joggin  2) Swellin  3) Fatigue: 2  4) Shortness of breath: 5  5) Sleep sitting up: 2  6) Limited enjoyment of life: 2  7) Spend the rest of your life with HF: 1  8a) Hobbies, recreational activities:1  8b) Working or doing household chores:1  8c) Visiting family or friends: 16     Strength   1) _6_____ kg  2) _2_____ kg  3) _2_____ kg  AVG:__3.33____    THAO ADLs  Patient independently preforms...   - Bathing: Yes   - Dressing: Yes   - Toileting: Yes   - Transferring: Yes   - Continence:  No  - Feeding: Yes  Total Score: _5_/6    Living Situation  Patient lives: alone    Mobility Aids   Patient uses: cane, walker, wheelchair      FRAILTY SCORE: _1_/ 3

## 2024-03-01 NOTE — OR NURSING
Called to pre-admit patient for upcoming surgery. No answer. Left general NPO and check-in location on voicemail. Also left call back and contact info.

## 2024-03-01 NOTE — TELEPHONE ENCOUNTER
Spoke to patients son Albert and gave him all information for procedure on Monday. Was not able to get a hold of patient.

## 2024-03-04 ENCOUNTER — APPOINTMENT (OUTPATIENT)
Dept: CARDIOLOGY | Facility: MEDICAL CENTER | Age: 74
End: 2024-03-04
Attending: INTERNAL MEDICINE
Payer: MEDICARE

## 2024-03-04 ENCOUNTER — HOSPITAL ENCOUNTER (OUTPATIENT)
Facility: MEDICAL CENTER | Age: 74
End: 2024-03-05
Attending: INTERNAL MEDICINE | Admitting: HOSPITALIST
Payer: MEDICARE

## 2024-03-04 DIAGNOSIS — I34.0 NONRHEUMATIC MITRAL VALVE REGURGITATION: ICD-10-CM

## 2024-03-04 DIAGNOSIS — I50.33 ACUTE ON CHRONIC DIASTOLIC CONGESTIVE HEART FAILURE (HCC): ICD-10-CM

## 2024-03-04 DIAGNOSIS — I25.10 CORONARY ARTERY DISEASE DUE TO LIPID RICH PLAQUE: ICD-10-CM

## 2024-03-04 DIAGNOSIS — I25.83 CORONARY ARTERY DISEASE DUE TO LIPID RICH PLAQUE: ICD-10-CM

## 2024-03-04 PROBLEM — S30.1XXA GROIN HEMATOMA, INITIAL ENCOUNTER: Status: ACTIVE | Noted: 2024-03-04

## 2024-03-04 PROBLEM — F41.9 ANXIETY: Status: ACTIVE | Noted: 2024-03-04

## 2024-03-04 LAB
ALBUMIN SERPL BCP-MCNC: 4.1 G/DL (ref 3.2–4.9)
ALBUMIN/GLOB SERPL: 1.8 G/DL
ALP SERPL-CCNC: 49 U/L (ref 30–99)
ALT SERPL-CCNC: 9 U/L (ref 2–50)
ANION GAP SERPL CALC-SCNC: 13 MMOL/L (ref 7–16)
APTT PPP: 30.7 SEC (ref 24.7–36)
AST SERPL-CCNC: 13 U/L (ref 12–45)
BILIRUB SERPL-MCNC: 0.5 MG/DL (ref 0.1–1.5)
BUN SERPL-MCNC: 24 MG/DL (ref 8–22)
CALCIUM ALBUM COR SERPL-MCNC: 9 MG/DL (ref 8.5–10.5)
CALCIUM SERPL-MCNC: 9.1 MG/DL (ref 8.5–10.5)
CHLORIDE SERPL-SCNC: 96 MMOL/L (ref 96–112)
CO2 SERPL-SCNC: 29 MMOL/L (ref 20–33)
CREAT SERPL-MCNC: 0.96 MG/DL (ref 0.5–1.4)
EKG IMPRESSION: NORMAL
EKG IMPRESSION: NORMAL
ERYTHROCYTE [DISTWIDTH] IN BLOOD BY AUTOMATED COUNT: 49.4 FL (ref 35.9–50)
GFR SERPLBLD CREATININE-BSD FMLA CKD-EPI: 62 ML/MIN/1.73 M 2
GLOBULIN SER CALC-MCNC: 2.3 G/DL (ref 1.9–3.5)
GLUCOSE SERPL-MCNC: 121 MG/DL (ref 65–99)
HCT VFR BLD AUTO: 41.2 % (ref 37–47)
HCT VFR BLD AUTO: 44.3 % (ref 37–47)
HGB BLD-MCNC: 14.5 G/DL (ref 12–16)
HGB BLD-MCNC: 16 G/DL (ref 12–16)
INR PPP: 0.91 (ref 0.87–1.13)
MAGNESIUM SERPL-MCNC: 2.4 MG/DL (ref 1.5–2.5)
MCH RBC QN AUTO: 38 PG (ref 27–33)
MCHC RBC AUTO-ENTMCNC: 36.1 G/DL (ref 32.2–35.5)
MCV RBC AUTO: 105.2 FL (ref 81.4–97.8)
PHOSPHATE SERPL-MCNC: 3 MG/DL (ref 2.5–4.5)
PLATELET # BLD AUTO: 279 K/UL (ref 164–446)
PMV BLD AUTO: 9.7 FL (ref 9–12.9)
POTASSIUM SERPL-SCNC: 3.2 MMOL/L (ref 3.6–5.5)
PROT SERPL-MCNC: 6.4 G/DL (ref 6–8.2)
PROTHROMBIN TIME: 12.3 SEC (ref 12–14.6)
RBC # BLD AUTO: 4.21 M/UL (ref 4.2–5.4)
SODIUM SERPL-SCNC: 138 MMOL/L (ref 135–145)
WBC # BLD AUTO: 6.7 K/UL (ref 4.8–10.8)

## 2024-03-04 PROCEDURE — 85018 HEMOGLOBIN: CPT | Mod: XU

## 2024-03-04 PROCEDURE — 99152 MOD SED SAME PHYS/QHP 5/>YRS: CPT | Performed by: INTERNAL MEDICINE

## 2024-03-04 PROCEDURE — 700102 HCHG RX REV CODE 250 W/ 637 OVERRIDE(OP): Mod: UD

## 2024-03-04 PROCEDURE — A9270 NON-COVERED ITEM OR SERVICE: HCPCS | Mod: JZ,UD

## 2024-03-04 PROCEDURE — 85610 PROTHROMBIN TIME: CPT

## 2024-03-04 PROCEDURE — 93005 ELECTROCARDIOGRAM TRACING: CPT | Performed by: INTERNAL MEDICINE

## 2024-03-04 PROCEDURE — 700102 HCHG RX REV CODE 250 W/ 637 OVERRIDE(OP): Mod: JZ,UD

## 2024-03-04 PROCEDURE — 700101 HCHG RX REV CODE 250: Mod: UD

## 2024-03-04 PROCEDURE — 99223 1ST HOSP IP/OBS HIGH 75: CPT | Performed by: HOSPITALIST

## 2024-03-04 PROCEDURE — 99153 MOD SED SAME PHYS/QHP EA: CPT

## 2024-03-04 PROCEDURE — 85014 HEMATOCRIT: CPT | Mod: XU

## 2024-03-04 PROCEDURE — 92920 PRQ TRLUML C ANGIOP 1ART&/BR: CPT | Mod: LC | Performed by: INTERNAL MEDICINE

## 2024-03-04 PROCEDURE — 85027 COMPLETE CBC AUTOMATED: CPT

## 2024-03-04 PROCEDURE — 80053 COMPREHEN METABOLIC PANEL: CPT

## 2024-03-04 PROCEDURE — A9270 NON-COVERED ITEM OR SERVICE: HCPCS | Mod: UD | Performed by: HOSPITALIST

## 2024-03-04 PROCEDURE — 160002 HCHG RECOVERY MINUTES (STAT)

## 2024-03-04 PROCEDURE — A9270 NON-COVERED ITEM OR SERVICE: HCPCS | Mod: UD | Performed by: INTERNAL MEDICINE

## 2024-03-04 PROCEDURE — 93010 ELECTROCARDIOGRAM REPORT: CPT | Performed by: INTERNAL MEDICINE

## 2024-03-04 PROCEDURE — 700111 HCHG RX REV CODE 636 W/ 250 OVERRIDE (IP): Mod: UD

## 2024-03-04 PROCEDURE — 85730 THROMBOPLASTIN TIME PARTIAL: CPT

## 2024-03-04 PROCEDURE — 83735 ASSAY OF MAGNESIUM: CPT

## 2024-03-04 PROCEDURE — 93010 ELECTROCARDIOGRAM REPORT: CPT | Mod: 77 | Performed by: INTERNAL MEDICINE

## 2024-03-04 PROCEDURE — G0378 HOSPITAL OBSERVATION PER HR: HCPCS

## 2024-03-04 PROCEDURE — 96375 TX/PRO/DX INJ NEW DRUG ADDON: CPT | Mod: XU

## 2024-03-04 PROCEDURE — 700102 HCHG RX REV CODE 250 W/ 637 OVERRIDE(OP): Mod: UD | Performed by: INTERNAL MEDICINE

## 2024-03-04 PROCEDURE — A9270 NON-COVERED ITEM OR SERVICE: HCPCS | Mod: UD

## 2024-03-04 PROCEDURE — 160036 HCHG PACU - EA ADDL 30 MINS PHASE I

## 2024-03-04 PROCEDURE — 700117 HCHG RX CONTRAST REV CODE 255: Mod: UD | Performed by: INTERNAL MEDICINE

## 2024-03-04 PROCEDURE — 84100 ASSAY OF PHOSPHORUS: CPT

## 2024-03-04 PROCEDURE — 700102 HCHG RX REV CODE 250 W/ 637 OVERRIDE(OP): Mod: UD | Performed by: HOSPITALIST

## 2024-03-04 PROCEDURE — 160035 HCHG PACU - 1ST 60 MINS PHASE I

## 2024-03-04 RX ORDER — DIAZEPAM 2 MG/1
2 TABLET ORAL EVERY 6 HOURS PRN
Status: DISCONTINUED | OUTPATIENT
Start: 2024-03-04 | End: 2024-03-05 | Stop reason: HOSPADM

## 2024-03-04 RX ORDER — MIDAZOLAM HYDROCHLORIDE 1 MG/ML
INJECTION INTRAMUSCULAR; INTRAVENOUS
Status: COMPLETED
Start: 2024-03-04 | End: 2024-03-04

## 2024-03-04 RX ORDER — CLOPIDOGREL 300 MG/1
600 TABLET, FILM COATED ORAL ONCE
Status: DISCONTINUED | OUTPATIENT
Start: 2024-03-04 | End: 2024-03-04

## 2024-03-04 RX ORDER — CLOPIDOGREL 300 MG/1
TABLET, FILM COATED ORAL
Status: COMPLETED
Start: 2024-03-04 | End: 2024-03-04

## 2024-03-04 RX ORDER — CARVEDILOL 3.12 MG/1
6.25 TABLET ORAL 2 TIMES DAILY WITH MEALS
Status: DISCONTINUED | OUTPATIENT
Start: 2024-03-04 | End: 2024-03-05 | Stop reason: HOSPADM

## 2024-03-04 RX ORDER — ASPIRIN 81 MG/1
81 TABLET ORAL DAILY
Status: DISCONTINUED | OUTPATIENT
Start: 2024-03-05 | End: 2024-03-05 | Stop reason: HOSPADM

## 2024-03-04 RX ORDER — DIPHENHYDRAMINE HYDROCHLORIDE 50 MG/ML
INJECTION INTRAMUSCULAR; INTRAVENOUS
Status: COMPLETED
Start: 2024-03-04 | End: 2024-03-04

## 2024-03-04 RX ORDER — POTASSIUM CHLORIDE 20 MEQ/1
20 TABLET, EXTENDED RELEASE ORAL ONCE
Status: COMPLETED | OUTPATIENT
Start: 2024-03-04 | End: 2024-03-04

## 2024-03-04 RX ORDER — CLOPIDOGREL BISULFATE 75 MG/1
75 TABLET ORAL DAILY
Qty: 90 TABLET | Refills: 3 | Status: SHIPPED | OUTPATIENT
Start: 2024-03-04

## 2024-03-04 RX ORDER — HEPARIN SODIUM 1000 [USP'U]/ML
INJECTION, SOLUTION INTRAVENOUS; SUBCUTANEOUS
Status: COMPLETED
Start: 2024-03-04 | End: 2024-03-04

## 2024-03-04 RX ORDER — DIPHENHYDRAMINE HYDROCHLORIDE 50 MG/ML
25 INJECTION INTRAMUSCULAR; INTRAVENOUS ONCE
Status: COMPLETED | OUTPATIENT
Start: 2024-03-04 | End: 2024-03-04

## 2024-03-04 RX ORDER — TORSEMIDE 20 MG/1
20 TABLET ORAL DAILY
Status: DISCONTINUED | OUTPATIENT
Start: 2024-03-04 | End: 2024-03-05 | Stop reason: HOSPADM

## 2024-03-04 RX ORDER — PHENYLEPHRINE HCL IN 0.9% NACL 0.5 MG/5ML
SYRINGE (ML) INTRAVENOUS
Status: COMPLETED
Start: 2024-03-04 | End: 2024-03-04

## 2024-03-04 RX ORDER — ACETAMINOPHEN 325 MG/1
650 TABLET ORAL EVERY 6 HOURS PRN
Status: DISCONTINUED | OUTPATIENT
Start: 2024-03-04 | End: 2024-03-04

## 2024-03-04 RX ORDER — POLYETHYLENE GLYCOL 3350 17 G/17G
1 POWDER, FOR SOLUTION ORAL
Status: DISCONTINUED | OUTPATIENT
Start: 2024-03-04 | End: 2024-03-05 | Stop reason: HOSPADM

## 2024-03-04 RX ORDER — HYDRALAZINE HYDROCHLORIDE 20 MG/ML
10 INJECTION INTRAMUSCULAR; INTRAVENOUS ONCE
Status: DISCONTINUED | OUTPATIENT
Start: 2024-03-04 | End: 2024-03-05 | Stop reason: HOSPADM

## 2024-03-04 RX ORDER — ASPIRIN 81 MG/1
81 TABLET ORAL DAILY
Status: DISCONTINUED | OUTPATIENT
Start: 2024-03-04 | End: 2024-03-04

## 2024-03-04 RX ORDER — POTASSIUM CHLORIDE 20 MEQ/1
TABLET, EXTENDED RELEASE ORAL
Status: COMPLETED
Start: 2024-03-04 | End: 2024-03-04

## 2024-03-04 RX ORDER — DAPAGLIFLOZIN 10 MG/1
10 TABLET, FILM COATED ORAL DAILY
Status: DISCONTINUED | OUTPATIENT
Start: 2024-03-04 | End: 2024-03-05 | Stop reason: HOSPADM

## 2024-03-04 RX ORDER — ROSUVASTATIN CALCIUM 10 MG/1
10 TABLET, COATED ORAL EVERY EVENING
Status: DISCONTINUED | OUTPATIENT
Start: 2024-03-04 | End: 2024-03-05 | Stop reason: HOSPADM

## 2024-03-04 RX ORDER — AMOXICILLIN 250 MG
2 CAPSULE ORAL EVERY EVENING
Status: DISCONTINUED | OUTPATIENT
Start: 2024-03-04 | End: 2024-03-05 | Stop reason: HOSPADM

## 2024-03-04 RX ORDER — ALBUTEROL SULFATE 90 UG/1
2 AEROSOL, METERED RESPIRATORY (INHALATION)
Status: DISCONTINUED | OUTPATIENT
Start: 2024-03-04 | End: 2024-03-05 | Stop reason: HOSPADM

## 2024-03-04 RX ORDER — HEPARIN SODIUM 200 [USP'U]/100ML
INJECTION, SOLUTION INTRAVENOUS
Status: COMPLETED
Start: 2024-03-04 | End: 2024-03-04

## 2024-03-04 RX ORDER — CLOPIDOGREL BISULFATE 75 MG/1
75 TABLET ORAL DAILY
Status: DISCONTINUED | OUTPATIENT
Start: 2024-03-05 | End: 2024-03-05 | Stop reason: HOSPADM

## 2024-03-04 RX ORDER — POTASSIUM CHLORIDE 750 MG/1
10 TABLET, FILM COATED, EXTENDED RELEASE ORAL 3 TIMES DAILY
Status: DISCONTINUED | OUTPATIENT
Start: 2024-03-04 | End: 2024-03-05 | Stop reason: HOSPADM

## 2024-03-04 RX ORDER — HYDRALAZINE HYDROCHLORIDE 20 MG/ML
10 INJECTION INTRAMUSCULAR; INTRAVENOUS EVERY 4 HOURS PRN
Status: DISCONTINUED | OUTPATIENT
Start: 2024-03-04 | End: 2024-03-05 | Stop reason: HOSPADM

## 2024-03-04 RX ORDER — IBUPROFEN 800 MG/1
400 TABLET ORAL ONCE
Status: COMPLETED | OUTPATIENT
Start: 2024-03-04 | End: 2024-03-04

## 2024-03-04 RX ORDER — ASPIRIN 81 MG/1
TABLET, CHEWABLE ORAL
Status: COMPLETED
Start: 2024-03-04 | End: 2024-03-04

## 2024-03-04 RX ORDER — SODIUM CHLORIDE 9 MG/ML
INJECTION, SOLUTION INTRAVENOUS ONCE
Status: DISCONTINUED | OUTPATIENT
Start: 2024-03-04 | End: 2024-03-05 | Stop reason: HOSPADM

## 2024-03-04 RX ORDER — HYDROXYUREA 500 MG/1
500 CAPSULE ORAL DAILY
Status: DISCONTINUED | OUTPATIENT
Start: 2024-03-04 | End: 2024-03-05 | Stop reason: HOSPADM

## 2024-03-04 RX ORDER — LIDOCAINE HYDROCHLORIDE 20 MG/ML
INJECTION, SOLUTION INFILTRATION; PERINEURAL
Status: COMPLETED
Start: 2024-03-04 | End: 2024-03-04

## 2024-03-04 RX ORDER — ACETAMINOPHEN 325 MG/1
650 TABLET ORAL EVERY 4 HOURS PRN
Status: DISCONTINUED | OUTPATIENT
Start: 2024-03-04 | End: 2024-03-05 | Stop reason: HOSPADM

## 2024-03-04 RX ADMIN — DIPHENHYDRAMINE HYDROCHLORIDE 25 MG: 50 INJECTION, SOLUTION INTRAMUSCULAR; INTRAVENOUS at 22:40

## 2024-03-04 RX ADMIN — IBUPROFEN 400 MG: 800 TABLET, FILM COATED ORAL at 14:24

## 2024-03-04 RX ADMIN — NITROGLYCERIN 10 ML: 20 INJECTION INTRAVENOUS at 12:33

## 2024-03-04 RX ADMIN — HEPARIN SODIUM: 1000 INJECTION, SOLUTION INTRAVENOUS; SUBCUTANEOUS at 13:02

## 2024-03-04 RX ADMIN — MIDAZOLAM HYDROCHLORIDE 2 MG: 2 INJECTION, SOLUTION INTRAMUSCULAR; INTRAVENOUS at 13:20

## 2024-03-04 RX ADMIN — MIDAZOLAM HYDROCHLORIDE 2 MG: 2 INJECTION, SOLUTION INTRAMUSCULAR; INTRAVENOUS at 13:06

## 2024-03-04 RX ADMIN — CLOPIDOGREL BISULFATE 600 MG: 300 TABLET, FILM COATED ORAL at 13:28

## 2024-03-04 RX ADMIN — DIPHENHYDRAMINE HYDROCHLORIDE 50 MG: 50 INJECTION, SOLUTION INTRAMUSCULAR; INTRAVENOUS at 13:20

## 2024-03-04 RX ADMIN — DAPAGLIFLOZIN 10 MG: 10 TABLET, FILM COATED ORAL at 19:36

## 2024-03-04 RX ADMIN — DOCUSATE SODIUM 50 MG AND SENNOSIDES 8.6 MG 2 TABLET: 8.6; 5 TABLET, FILM COATED ORAL at 19:41

## 2024-03-04 RX ADMIN — IOHEXOL 55 ML: 350 INJECTION, SOLUTION INTRAVENOUS at 13:24

## 2024-03-04 RX ADMIN — FENTANYL CITRATE 50 MCG: 50 INJECTION, SOLUTION INTRAMUSCULAR; INTRAVENOUS at 13:21

## 2024-03-04 RX ADMIN — TORSEMIDE 20 MG: 20 TABLET ORAL at 19:37

## 2024-03-04 RX ADMIN — FENTANYL CITRATE 100 MCG: 50 INJECTION, SOLUTION INTRAMUSCULAR; INTRAVENOUS at 13:06

## 2024-03-04 RX ADMIN — POTASSIUM CHLORIDE 20 MEQ: 1500 TABLET, EXTENDED RELEASE ORAL at 16:52

## 2024-03-04 RX ADMIN — POTASSIUM CHLORIDE 10 MEQ: 1500 TABLET, EXTENDED RELEASE ORAL at 12:36

## 2024-03-04 RX ADMIN — HEPARIN SODIUM: 1000 INJECTION, SOLUTION INTRAVENOUS; SUBCUTANEOUS at 12:31

## 2024-03-04 RX ADMIN — ACETAMINOPHEN 650 MG: 325 TABLET, FILM COATED ORAL at 16:28

## 2024-03-04 RX ADMIN — HYDROXYUREA 500 MG: 500 CAPSULE ORAL at 19:36

## 2024-03-04 RX ADMIN — ASPIRIN 81 MG 324 MG: 81 TABLET ORAL at 12:33

## 2024-03-04 RX ADMIN — ROSUVASTATIN 10 MG: 10 TABLET, FILM COATED ORAL at 19:36

## 2024-03-04 RX ADMIN — LIDOCAINE HYDROCHLORIDE: 20 INJECTION, SOLUTION INFILTRATION; PERINEURAL at 12:31

## 2024-03-04 RX ADMIN — HEPARIN SODIUM 2000 UNITS: 200 INJECTION, SOLUTION INTRAVENOUS at 12:30

## 2024-03-04 RX ADMIN — POTASSIUM CHLORIDE 10 MEQ: 750 TABLET, EXTENDED RELEASE ORAL at 19:36

## 2024-03-04 ASSESSMENT — ENCOUNTER SYMPTOMS
CLAUDICATION: 0
DOUBLE VISION: 0
DEPRESSION: 0
WHEEZING: 0
BLURRED VISION: 0
VOMITING: 0
COUGH: 0
BACK PAIN: 0
NERVOUS/ANXIOUS: 1
PALPITATIONS: 0
PND: 0
BRUISES/BLEEDS EASILY: 0
MYALGIAS: 0
DIZZINESS: 0
HEADACHES: 0
CHILLS: 0
HEARTBURN: 0
HEMOPTYSIS: 0
FEVER: 0
NAUSEA: 0

## 2024-03-04 ASSESSMENT — LIFESTYLE VARIABLES
HAVE YOU EVER FELT YOU SHOULD CUT DOWN ON YOUR DRINKING: NO
DOES PATIENT WANT TO STOP DRINKING: NO
HAVE PEOPLE ANNOYED YOU BY CRITICIZING YOUR DRINKING: NO
TOTAL SCORE: 0
EVER HAD A DRINK FIRST THING IN THE MORNING TO STEADY YOUR NERVES TO GET RID OF A HANGOVER: NO
HOW MANY TIMES IN THE PAST YEAR HAVE YOU HAD 5 OR MORE DRINKS IN A DAY: 0
EVER FELT BAD OR GUILTY ABOUT YOUR DRINKING: NO
ON A TYPICAL DAY WHEN YOU DRINK ALCOHOL HOW MANY DRINKS DO YOU HAVE: 0
AVERAGE NUMBER OF DAYS PER WEEK YOU HAVE A DRINK CONTAINING ALCOHOL: 0
ALCOHOL_USE: NO
TOTAL SCORE: 0
CONSUMPTION TOTAL: NEGATIVE
TOTAL SCORE: 0

## 2024-03-04 ASSESSMENT — COPD QUESTIONNAIRES
COPD SCREENING SCORE: 4
DO YOU EVER COUGH UP ANY MUCUS OR PHLEGM?: NO/ONLY WITH OCCASIONAL COLDS OR INFECTIONS
DURING THE PAST 4 WEEKS HOW MUCH DID YOU FEEL SHORT OF BREATH: NONE/LITTLE OF THE TIME
HAVE YOU SMOKED AT LEAST 100 CIGARETTES IN YOUR ENTIRE LIFE: YES

## 2024-03-04 ASSESSMENT — PAIN DESCRIPTION - PAIN TYPE: TYPE: SURGICAL PAIN;ACUTE PAIN

## 2024-03-04 ASSESSMENT — PATIENT HEALTH QUESTIONNAIRE - PHQ9
SUM OF ALL RESPONSES TO PHQ9 QUESTIONS 1 AND 2: 0
2. FEELING DOWN, DEPRESSED, IRRITABLE, OR HOPELESS: NOT AT ALL
1. LITTLE INTEREST OR PLEASURE IN DOING THINGS: NOT AT ALL

## 2024-03-04 ASSESSMENT — FIBROSIS 4 INDEX: FIB4 SCORE: 1.42

## 2024-03-04 NOTE — PROCEDURES
"Cardiac Catheterization and Percutaneous Intervention Procedure Report    3/4/2024      Primary Care Provider: Corine Alas P.A.-C.    Indication for procedure: Multivessel coronary artery disease, shortness of breath-anginal equivalent, congestive heart failure, severe mitral regurgitation    Procedures:  Successful angioplasty of mid circumflex artery    Final diagnosis:   Diffuse multivessel disease, successful PTCA of mid circumflex artery.    Recommendations: Guideline directed medical therapy and risk factor management      Procedure details:  Jina Barnard was brought to the cardiac catheterization lab where the right groin was prepped and draped in the usual manner for cardiac catheterization.  Timeout was performed.  Right common femoral artery cannulated using 7 [place] sheath.  A 7 [place] EBU 3.5 guide catheter was used to engage left main.  Patient underwent successful PTCA of mid circumflex artery.  After the procedure, guide catheter, run-through wire was removed, hemostasis was achieved using 8 [place] Angio-Seal.  Patient was quite restless with sedation, but got comfortable finally when we transfer her to bed.      Interventional Procedure:     Indication for PCI:  Congestive heart failure, shortness of breath, anginal equivalent, despite medical therapy, severe multivessel coronary artery disease.    Patient has diffuse multivessel coronary artery disease, surgical turndown.  After much consideration, I decided to fix the most narrowest lesion mid circumflex artery.  She has residual moderate stenosis in the proximal circumflex, ramus branch, left anterior descending artery but excellent antegrade flow.    Pre: 95 %, 10 mm length, PRINCE 3 flow  Post: 10%, PRINCE 3 flow    Lesion complexity  High  Severe calcification No  Bifurcation  No    Guide catheter: EBU 3.5 was advanced to the ostia of the left coronary artery.    Guide wire: A 0.014\" mm  Runthrough was advanced into the artery and crossed " the lesion.    Balloon pre-dilatation: 2.25 by 12 mm Emerge inflated to 12 ZAIRA to pre-dilate the lesion.    Excellent result after angioplasty.  The entire circumflex artery is diseased, proximal portion is tortuous, calcified with diffuse 60% stenosis.  It was difficult to advance equipment, unable to advance the stent past proximal circumflex artery.  Patient was quite anxious, restless on the table despite sedation.  Given these difficulties, stent was not placed.  PRINCE-3 flow into circumflex artery, residual stenosis less than 10% at the lesion.    Anticoagulant: Heparin  Antiplatelet: Clopidogrel  EBL <25 cc  Complications: none  Specimens: none  Contrast: 55 cc  Fluorotime : see cath lab flowsheet      Sedation: I supervised moderate sedation over a trained independent observer.    Sedation start time: 12:45  End time: 13:19      Electronically signed by   Hung Easton M.D., NATY  Interventional cardiologist  3/4/2024  1:30 PM

## 2024-03-04 NOTE — PROGRESS NOTES
1341: Pt arrived from cath lab post L heart cath. Pt is awake; restless; reports some pain up and down R leg. R groin sight is CDI. Cardiac rhythm appears to be SR.     1415: Dr. Easton to bedside; assessed pt; okayed ibuprofen for pain.     1420: Pt medicated for leg pain per MAR.Restless; moving R leg despite education and immobilizer.    1500: Bleeding noted; manual pressure held for 20 minutes.     1530: Updated DOMINGA Overton; to bedside.     1557:  [place] OR[facility]  back to bedside,orders received.     1622: Marmolejo placed per order. R groin sight has some bruising and firmness; manual pressure held again for 20 minutes. Sand bag and knee immobilizer in place. Plan to admit pt to Hospitalist services.     1650:Dr. Connor to bedside. RN updated pt's son on POC.     1758: Report to MAURA Malcolm.     1802: Pt to T208 via [place] with RN. Groin sight assessed with MAURA Malcolm. Sight is bruised but soft; dressing is CDI; pedal pulse is present. RN updated pt's son.

## 2024-03-05 VITALS
OXYGEN SATURATION: 94 % | HEART RATE: 78 BPM | SYSTOLIC BLOOD PRESSURE: 124 MMHG | HEIGHT: 62 IN | RESPIRATION RATE: 18 BRPM | TEMPERATURE: 98.7 F | WEIGHT: 200.4 LBS | BODY MASS INDEX: 36.88 KG/M2 | DIASTOLIC BLOOD PRESSURE: 83 MMHG

## 2024-03-05 DIAGNOSIS — I34.0 NONRHEUMATIC MITRAL VALVE REGURGITATION: ICD-10-CM

## 2024-03-05 LAB
ANION GAP SERPL CALC-SCNC: 13 MMOL/L (ref 7–16)
BUN SERPL-MCNC: 17 MG/DL (ref 8–22)
CALCIUM SERPL-MCNC: 9 MG/DL (ref 8.5–10.5)
CHLORIDE SERPL-SCNC: 98 MMOL/L (ref 96–112)
CO2 SERPL-SCNC: 28 MMOL/L (ref 20–33)
CREAT SERPL-MCNC: 0.76 MG/DL (ref 0.5–1.4)
ERYTHROCYTE [DISTWIDTH] IN BLOOD BY AUTOMATED COUNT: 48.3 FL (ref 35.9–50)
GFR SERPLBLD CREATININE-BSD FMLA CKD-EPI: 82 ML/MIN/1.73 M 2
GLUCOSE SERPL-MCNC: 168 MG/DL (ref 65–99)
HCT VFR BLD AUTO: 38.8 % (ref 37–47)
HCT VFR BLD AUTO: 39.2 % (ref 37–47)
HGB BLD-MCNC: 14.1 G/DL (ref 12–16)
HGB BLD-MCNC: 14.3 G/DL (ref 12–16)
MAGNESIUM SERPL-MCNC: 2 MG/DL (ref 1.5–2.5)
MCH RBC QN AUTO: 37.6 PG (ref 27–33)
MCHC RBC AUTO-ENTMCNC: 36.3 G/DL (ref 32.2–35.5)
MCV RBC AUTO: 103.5 FL (ref 81.4–97.8)
PLATELET # BLD AUTO: 259 K/UL (ref 164–446)
PMV BLD AUTO: 10 FL (ref 9–12.9)
POTASSIUM SERPL-SCNC: 3 MMOL/L (ref 3.6–5.5)
RBC # BLD AUTO: 3.75 M/UL (ref 4.2–5.4)
SODIUM SERPL-SCNC: 139 MMOL/L (ref 135–145)
WBC # BLD AUTO: 6.7 K/UL (ref 4.8–10.8)

## 2024-03-05 PROCEDURE — A9270 NON-COVERED ITEM OR SERVICE: HCPCS | Mod: JZ,UD | Performed by: STUDENT IN AN ORGANIZED HEALTH CARE EDUCATION/TRAINING PROGRAM

## 2024-03-05 PROCEDURE — A9270 NON-COVERED ITEM OR SERVICE: HCPCS | Mod: UD | Performed by: HOSPITALIST

## 2024-03-05 PROCEDURE — 700111 HCHG RX REV CODE 636 W/ 250 OVERRIDE (IP): Mod: UD | Performed by: STUDENT IN AN ORGANIZED HEALTH CARE EDUCATION/TRAINING PROGRAM

## 2024-03-05 PROCEDURE — G0378 HOSPITAL OBSERVATION PER HR: HCPCS

## 2024-03-05 PROCEDURE — 700102 HCHG RX REV CODE 250 W/ 637 OVERRIDE(OP): Mod: UD | Performed by: INTERNAL MEDICINE

## 2024-03-05 PROCEDURE — 85027 COMPLETE CBC AUTOMATED: CPT

## 2024-03-05 PROCEDURE — 83735 ASSAY OF MAGNESIUM: CPT

## 2024-03-05 PROCEDURE — 99239 HOSP IP/OBS DSCHRG MGMT >30: CPT | Performed by: STUDENT IN AN ORGANIZED HEALTH CARE EDUCATION/TRAINING PROGRAM

## 2024-03-05 PROCEDURE — 80048 BASIC METABOLIC PNL TOTAL CA: CPT

## 2024-03-05 PROCEDURE — 85018 HEMOGLOBIN: CPT | Mod: XU

## 2024-03-05 PROCEDURE — A9270 NON-COVERED ITEM OR SERVICE: HCPCS | Mod: UD | Performed by: INTERNAL MEDICINE

## 2024-03-05 PROCEDURE — 700102 HCHG RX REV CODE 250 W/ 637 OVERRIDE(OP): Mod: JZ,UD | Performed by: STUDENT IN AN ORGANIZED HEALTH CARE EDUCATION/TRAINING PROGRAM

## 2024-03-05 PROCEDURE — 306015 LOCK STAT FOLEY: Performed by: HOSPITALIST

## 2024-03-05 PROCEDURE — 96365 THER/PROPH/DIAG IV INF INIT: CPT

## 2024-03-05 PROCEDURE — 700102 HCHG RX REV CODE 250 W/ 637 OVERRIDE(OP): Mod: UD | Performed by: HOSPITALIST

## 2024-03-05 PROCEDURE — 85014 HEMATOCRIT: CPT | Mod: XU

## 2024-03-05 RX ORDER — POTASSIUM CHLORIDE 7.45 MG/ML
10 INJECTION INTRAVENOUS
Status: DISCONTINUED | OUTPATIENT
Start: 2024-03-05 | End: 2024-03-05

## 2024-03-05 RX ORDER — POTASSIUM CHLORIDE 20 MEQ/1
20 TABLET, EXTENDED RELEASE ORAL ONCE
Status: COMPLETED | OUTPATIENT
Start: 2024-03-05 | End: 2024-03-05

## 2024-03-05 RX ADMIN — POTASSIUM CHLORIDE 10 MEQ: 7.46 INJECTION, SOLUTION INTRAVENOUS at 08:01

## 2024-03-05 RX ADMIN — TORSEMIDE 20 MG: 20 TABLET ORAL at 05:49

## 2024-03-05 RX ADMIN — POTASSIUM CHLORIDE 20 MEQ: 1500 TABLET, EXTENDED RELEASE ORAL at 09:22

## 2024-03-05 RX ADMIN — CARVEDILOL 6.25 MG: 3.12 TABLET, FILM COATED ORAL at 09:23

## 2024-03-05 RX ADMIN — FLUTICASONE FUROATE, UMECLIDINIUM BROMIDE AND VILANTEROL TRIFENATATE 1 PUFF: 200; 62.5; 25 POWDER RESPIRATORY (INHALATION) at 05:49

## 2024-03-05 RX ADMIN — ALBUTEROL SULFATE 2 PUFF: 90 AEROSOL, METERED RESPIRATORY (INHALATION) at 01:19

## 2024-03-05 RX ADMIN — ASPIRIN 81 MG: 81 TABLET, COATED ORAL at 05:48

## 2024-03-05 RX ADMIN — DAPAGLIFLOZIN 10 MG: 10 TABLET, FILM COATED ORAL at 05:49

## 2024-03-05 RX ADMIN — CLOPIDOGREL BISULFATE 75 MG: 75 TABLET ORAL at 05:49

## 2024-03-05 RX ADMIN — POTASSIUM CHLORIDE 10 MEQ: 750 TABLET, EXTENDED RELEASE ORAL at 05:49

## 2024-03-05 ASSESSMENT — ENCOUNTER SYMPTOMS
COUGH: 0
ABDOMINAL PAIN: 0
WHEEZING: 0
PALPITATIONS: 0
NERVOUS/ANXIOUS: 1
LIGHT-HEADEDNESS: 0
NUMBNESS: 0
NAUSEA: 1
SHORTNESS OF BREATH: 0
IRREGULAR HEARTBEAT: 0
DIARRHEA: 1

## 2024-03-05 NOTE — ASSESSMENT & PLAN NOTE
Post cardiac cath right groin  Supportive treatment with pressure  Repeated hemoglobin drop to 14  Continue monitoring hemoglobin every 8 hours  Continue telemetry

## 2024-03-05 NOTE — PROGRESS NOTES
Cardiology update:    Jina is a 74 yo female here for a scheduled cardiac catheterization and percutaneous intervention procedure by Dr Easton. She underwent a successful angioplasty of mid circumflex artery today on 3/4/2024.  I was called to PPU to assess the patient.  Since the arrival to PPU after cath lab, patient has been restless, complaining of cramps in her legs, having the involuntary/voluntary movements of her right leg.  Patient states she cannot stop moving her leg, reports pain in her groin and hip area on the right side.  Patient's neuro status is intact.     She was assessed earlier by Dr. Easton and she received ibuprofen 400 mg per his orders at that time.  It did relieve some of the pain.     On my assessment, patient has a hematoma on her right femoral cath site and bruising.  Direct manual pressure has been held for 15-20 minutes prior to my arrival and the hematoma appears to be soft.  I identified a femoral pulse.  Pedal pulse was assessed by a Doppler.  Patient denies pain in her back, no evidence of obvious hematoma extending to the back.  Direct manual pressure was continuously applied.     I ordered a stat hemoglobin level.      Patient is hypertensive with systolic blood pressure in the 180s.  Sinus rhythm in the 90s. Patient feels flushed, diaphoretic. ECG is negative for acute ST changes.     Additionally, patient reports inability to void.  Due to the restlessness and a compromise to the right groin site, I ordered an indwelling catheter placement.  500 ml out immediately    Patient received potassium 10 mEq after Cath Lab.  Potassium level 3.2.  I ordered an additional dose of potassium 20 mEq to help with the restless leg symptoms.  Patient takes potassium 10 mEq every 8 hours at home.     Blood pressure started to improve on its own after the Marmolejo catheter placement without PRN antihypertensives used.     I notified Dr. Easton of the status.  Dr. Easton requests to  admit the patient overnight and to consult the hospitalist services as the primary service to help with hypertension, restlessness, urinary retention.     I spoke to Dr Connor who will kindly help us with the admission.        Questions answered; nursing and patient declines further needs/concerns at this time. For any questions or issues after 5PM please contact the listed ON CALL cardiologist not the admitting provider or interventional backup doctor.

## 2024-03-05 NOTE — PROGRESS NOTES
4 Eyes Skin Assessment Completed by MAURA Vilchis and MAURA Grimm.    Head WDL  Ears WDL  Nose WDL  Mouth WDL  Neck WDL  Breast/Chest WDL  Shoulder Blades WDL  Spine WDL  (R) Arm/Elbow/Hand Bruising  (L) Arm/Elbow/Hand Bruising  Abdomen WDL  Groin Blanching, Bruising, and Incision  Scrotum/Coccyx/Buttocks WDL  (R) Leg Bruising and Incision  (L) Leg Bruising  (R) Heel/Foot/Toe Edema  (L) Heel/Foot/Toe Edema          Devices In Places Tele Box, Pulse Ox, and Marmolejo      Interventions In Place Pillows    Possible Skin Injury No    Pictures Uploaded Into Epic N/A  Wound Consult Placed N/A  RN Wound Prevention Protocol Ordered No

## 2024-03-05 NOTE — PROGRESS NOTES
Pt arrived to unit via stretcher at 1820. Pt oriented to room, unit, and plan of care. All questions answered at this time. Call light within reach; fall precautions in place.     Right groin site assessed with PPU RN. Large bruised hematoma. Soft to palpation.

## 2024-03-05 NOTE — ASSESSMENT & PLAN NOTE
Patient admitted for elective cardiac catheter s/p stent placement to mid circumflex  Patient on aspirin and Plavix  Continue telemetry

## 2024-03-05 NOTE — DISCHARGE INSTRUCTIONS
Angiogram, Angioplasty, or Stent Placement  Care After  One of the following procedures was done today.  ANGIOGRAM:  A catheter was placed through the blood vessel in your groin, contrast was injected into the vessels, and pictures were taken.  ANGIOPLASTY:  A catheter was placed through the blood vessel in your groin and directed to an area of blocked blood flow. A balloon, and possibly a metal stent were used to open the blockage. If no other blockages are present below this area, your symptoms should improve. If blockages are present below this area, surgery may still be necessary.  STENT:  A catheter was placed in your groin through which a metal mesh tube was placed in a narrowed part of the artery to facilitate blood flow.  You were given intravenous sedation. These medications are rapidly cleared from your bloodstream. You may feel some discomfort at the insertion site after the local anesthetic wears off. This discomfort should gradually improve over the next several days.  Only take over-the-counter or prescription medicines for pain, discomfort, or fever as directed by your caregiver.  Complications are very uncommon after this procedure. Go to the nearest emergency department if you develop any of the following symptoms:  Worsening pain.  Bleeding.  Swelling at the puncture site.  Lightheadedness.  Dizziness or fainting.  Fever or chills.  If oozing, bleeding, or a lump appears at the puncture site, apply firm pressure directly to the site steadily for 15 minutes and go to the emergency department.  Keep the skin around the insertion site dry. You may take showers after 24 hours. If the area does get wet, dry the skin completely. Avoid baths until the skin puncture site heals, usually 5 to 7 days.  Development of redness, increased soreness, or swelling may be signs of a skin infection. Contact your physician.  Rest for the remainder of the day and avoid any heavy lifting (more than 10 pounds or 4.5  kg). Do not operate heavy machinery, drive, or make legal decisions for the first 24 hours after the procedure. Have a responsible person drive you home.  You may resume your usual diet after the procedure. Avoid alcoholic beverages for 24 hours after the procedure.  Document Released: 12/18/2006 Document Revised: 03/11/2013 Document Reviewed: 10/17/2007  [employer]® Patient Information ©2014 [employer].

## 2024-03-05 NOTE — PROGRESS NOTES
Pt AxO 4, c/o 6/10 pain, declines interventions. Dressing clean dry and intact on surgical site, no bleeding noted. Call light within reach, no further needs at this time, medications given see MAR.

## 2024-03-05 NOTE — CARE PLAN
The patient is Watcher - Medium risk of patient condition declining or worsening    Shift Goals  Clinical Goals: Monitor labs, pain control, indwelling catheter removal  Patient Goals: sleep  Family Goals: HANNAH    Progress made toward(s) clinical / shift goals:    Problem: Knowledge Deficit - Standard  Goal: Patient and family/care givers will demonstrate understanding of plan of care, disease process/condition, diagnostic tests and medications  Outcome: Progressing     Problem: Neuro Status  Goal: Neuro status will remain stable or improve  Outcome: Progressing     Problem: Early Mobilization - Post Surgery  Goal: Early mobilization post surgery  Outcome: Progressing     Problem: Bowel Elimination - Post Surgical  Goal: Patient will resume regular bowel sounds and function with no discomfort or distention  Outcome: Progressing       Patient is not progressing towards the following goals:

## 2024-03-05 NOTE — PROGRESS NOTES
Cardiology Follow-up Note    Name:   Jina Mcneil     YOB: 1950  Age:   73 y.o.  female   MRN:   [id number]        Attending Provider: Andrew    Chief Complaint:  here for a scheduled cardiac catheterization and percutaneous intervention procedure by Dr Easton.     Outpatient Cardiologist:  Dr. Easton    History of Present Illness  Jina Barnard is 73 y.o. female with prior medical history significant for hypertension, congestive heart failure, severe mitral regurgitation, coronary artery disease who was admitted on 3/4/2024 after a scheduled cardiac catheterization and percutaneous intervention procedure for overnight observation.     She underwent a successful angioplasty of mid circumflex artery on 3/4/2024.  After the procedure, patient developed restlessness, cramps in her legs, hypertension, urinary retention.  Patient developed a right femoral cath site hematoma.  Patient was admitted for overnight observation.     Interim Events:  Right groin hematoma is soft, large bruising on right groin extending to the hip area.  Right leg is warm, pulses are present.  Patient reports tenderness but no major pain to the area.  Dressing is clean dry intact.   Patient reports nausea, diarrhea, no vomiting.     - Personal Telemetry interpretation: Sinus rhythm with frequent PACs  - Vitals: Systolic blood pressure 120s 130s, heart rate 60s 80s  - Labs reviewed: Hypokalemia 3.0, received supplemental potassium by the primary team    Review of Systems   Constitutional: Positive for malaise/fatigue.   Cardiovascular:  Negative for chest pain, irregular heartbeat, leg swelling and palpitations.   Respiratory:  Negative for cough, shortness of breath and wheezing.    Gastrointestinal:  Positive for diarrhea and nausea. Negative for abdominal pain.   Genitourinary:  Negative for hematuria.   Neurological:  Negative for light-headedness and numbness.   Psychiatric/Behavioral:  The patient is  "nervous/anxious.         Medical History  Past Medical History:   Diagnosis Date    Bowel habit changes     Lower dentures    Breath shortness     COPD (chronic obstructive pulmonary disease) ([facility]ly [facility])     Patient suspects her SOB is r/t to her leako valve and that she does not have COPD    Diverticulosis     Heart murmur     Heart valve disease     \"leaky valve\"; Followed by Dr. Reid    Hypertension     Thrombocytosis     Urinary incontinence     Urgency, frequency, leakage         Family History   Problem Relation Age of Onset    Alcohol/Drug Father         alcohol    Other Father         malnutrition    Psychiatric Illness Father     Other Mother         blood clot from surgery    Hypertension Mother     Diabetes Mother         ?    Psychiatric Illness Sister     Other Sister         malnutrition    No Known Problems Maternal Grandmother     No Known Problems Maternal Grandfather     No Known Problems Paternal Grandmother     No Known Problems Paternal Grandfather          Social History     Tobacco Use    Smoking status: Never    Smokeless tobacco: Never   Vaping Use    Vaping Use: Never used   Substance Use Topics    Alcohol use: No    Drug use: Yes     Types: Marijuana     Comment: daily- midnight 1/31/24         No Known Allergies      Medications   Scheduled Medications   Medication Dose Frequency    NS   Once    aspirin  81 mg DAILY    clopidogrel  75 mg DAILY    hydrALAZINE  10 mg Once    carvedilol  6.25 mg BID WITH MEALS    dapagliflozin propanediol  10 mg DAILY    hydroxyurea  500 mg DAILY    potassium chloride ER  10 mEq TID    rosuvastatin  10 mg Q EVENING    torsemide  20 mg DAILY    senna-docusate  2 Tablet Q EVENING    fluticasone-umeclidinium-vilanterol  1 Puff DAILY           Physical Exam    Body mass index is 37.25 kg/m².     /83   Pulse 78   Temp 37.1 °C (98.7 °F) (Temporal)   Resp 18   Ht 1.562 m (5' 1.5\")   Wt 90.9 kg (200 lb 6.4 oz)   SpO2 94%      Vitals:    03/04/24 1940 " 03/04/24 2335 03/05/24 0313 03/05/24 0725   BP: 110/74 121/87 132/76 124/83   Pulse: 71 82  78   Resp:  18 18 18   Temp:  36.6 °C (97.9 °F) 36.7 °C (98 °F) 37.1 °C (98.7 °F)   TempSrc:  Temporal Temporal Temporal   SpO2:  96%  94%   Weight:       Height:            Oxygen Therapy:  Pulse Oximetry: 94 %, O2 (LPM): 0, O2 Delivery Device: None - Room Air    BP Readings from Last 4 Encounters:   03/05/24 124/83   02/29/24 124/60   02/21/24 110/68   02/01/24 (!) 146/89       Wt Readings from Last 4 Encounters:   03/04/24 90.9 kg (200 lb 6.4 oz)   02/29/24 89.8 kg (198 lb)   02/21/24 89.8 kg (198 lb)   02/01/24 92.3 kg (203 lb 7.8 oz)         Physical Exam  Constitutional:       Appearance: She is obese. She is ill-appearing.   HENT:      Head: Normocephalic.      Mouth/Throat:      Mouth: Mucous membranes are moist.   Cardiovascular:      Rate and Rhythm: Normal rate and regular rhythm.      Pulses: Normal pulses.      Heart sounds: Murmur heard.      No friction rub. No gallop.   Pulmonary:      Effort: Pulmonary effort is normal.      Breath sounds: No wheezing or rhonchi.   Abdominal:      General: There is no distension.      Palpations: Abdomen is soft.   Genitourinary:     Comments: Indwelling catheter removed, patient voids on her own without evidence of retention  Musculoskeletal:      Right lower leg: No edema.      Left lower leg: No edema.   Skin:     General: Skin is warm and dry.      Findings: Bruising present.      Comments: Large bruising over right groin area.  Hematoma is soft.  Dressing clean dry intact   Neurological:      Mental Status: She is alert and oriented to person, place, and time.   Psychiatric:         Mood and Affect: Mood normal.         Thought Content: Thought content normal.         Judgment: Judgment normal.               Labs (personally reviewed):     Estimated Creatinine Clearance: 67.5 mL/min (by C-G formula based on SCr of 0.76 mg/dL).    No results found for:  "\"BNPBTYPENAT\"  Recent Labs     03/04/24  1010 03/05/24  0107   CREATININE 0.96 0.76   BUN 24* 17   POTASSIUM 3.2* 3.0*   SODIUM 138 139   CALCIUM 9.1 9.0   MAGNESIUM 2.4 2.0   CO2 29 28   ALBUMIN 4.1  --      Recent Labs     03/04/24  1010 03/05/24  0107   GLUCOSE 121* 168*     Recent Labs     03/04/24  1010   ASTSGOT 13   ALTSGPT 9   ALKPHOSPHAT 49   INR 0.91     Recent Labs     03/04/24  1010 03/04/24  1613 03/05/24  0107 03/05/24  0856   WBC 6.7  --  6.7  --    HEMOGLOBIN 16.0 14.5 14.1 14.3   PLATELETCT 279  --  259  --      No results for input(s): \"TROPONINT\", \"NTPROBNP\", \"HBA1C\" in the last 72 hours.  Lab Results   Component Value Date/Time    CHOLSTRLTOT 216 (H) 09/29/2021 07:33 AM     (H) 09/29/2021 07:33 AM    HDL 62 09/29/2021 07:33 AM    TRIGLYCERIDE 152 (H) 09/29/2021 07:33 AM       Cardiac Imaging and Procedures Review      EKG   On 3/4/2024, demonstrates sinus rhythm with PVCs  Echo 2/8/2024  Echocardiography Laboratory     CONCLUSIONS  Compared to the prior study on 02/01/2024, mitral regurgitation appears   severe.  The ejection fraction is measured to be 64% by Rome's biplane.  Severe mitral regurgitation.    Stress Test N/A    [facility] 3/4/2024:  Indication for procedure: Multivessel coronary artery disease, shortness of breath-anginal equivalent, congestive heart failure, severe mitral regurgitation     Procedures:  Successful angioplasty of mid circumflex artery     Final diagnosis:   Diffuse multivessel disease, successful PTCA of mid circumflex artery.     Recommendations: Guideline directed medical therapy and risk factor management      Principal Problem:    Groin hematoma, initial encounter (POA: Yes)  Active Problems:    Essential hypertension (POA: Yes)    Marijuana use (POA: Yes)    Obesity (BMI 35.0-39.9 without comorbidity) (HCC) (POA: Yes)    Moderate persistent asthma (POA: Yes)    Coronary artery disease involving native coronary artery (POA: Yes)    Anxiety (POA: " Yes)  Resolved Problems:    * No resolved hospital problems. *      Assessment and Medical Decision Making:    Severe mitral regurgitation  Patient of  with cardiology.   Arranged for the heart structural team to reach out to the patient regarding the upcoming procedure.     coronary artery disease  -Status post successful angioplasty of mid circumflex artery 3/4/2024.  Patient was admitted for overnight observation.   -Continue aspirin 81 mg and clopidogrel 75 mg daily    3. Right groin hematoma   Patient developed right groin hematoma after Cath Lab on 3/14/2024.  Hematoma was treated with direct pressure in the postprocedural unit. Right groin appears soft today with large bruising over her groin area extending to the right hip.  CMS intact, palpable pulses.  Patient denies pain, reports tenderness to the area.    Hemoglobin has been trended by the primary team. Hgb 14 (a drop from 16 preprocedure)   -Continue aspirin and clopidogrel.     Patient can discharge from the cardiology standpoint today with a close follow-up with the structural heart team.     Future Appointments   Date Time Provider Department Center   4/10/2024  9:00 AM YISEL Mckeon [facility] None        Discussed with Dr. Morales  Please contact me with any questions.  Thank you for allowing us to participate in the care of this patient.    Please note this dictation was created using voice recognition software.  I have made every reasonable attempt to correct obvious errors, but there may be errors of grammar and possibly content that I did not discover before finalizing the note.    CARMINA Logan.  [facility]  [phone]

## 2024-03-05 NOTE — H&P
Hospital Medicine History & Physical Note    Date of Service  3/4/2024    Primary Care Physician  Corine Alas P.A.-C.    Consultants  Cardiology    Code Status  Full Code    Chief Complaint  No chief complaint on file.      History of Presenting Illness  Jina Barnard is a 73 y.o. female who presented 3/4/2024 with past medical history of, coronary artery disease, hypertension, thrombocytosis, is coming today for elective cardiac cath, patient went for procedure she is status post stent placement to mid circumflex, postprocedure patient was PPU and she developed restless leg and she was hypertensive, patient developed right groin hematoma may be related to her restless leg, I talked with cardiology team and they were asking to admit patient for observation, her hemoglobin dropped from 16-14, patient when I saw her she was more calm down she is alert oriented follows commands she denies any chest pain shortness of breath, I discussed with patient regarding findings on physical evaluation and blood work and the recommendation to stay for overnight monitoring, will continue monitoring her hemoglobin, I have added low-dose diazepam for anxiety, patient will be able to start diet, I have ordered IV hydralazine due to her uncontrolled hypertension and I am monitoring for side effects include but not limited to hypotension, I discussed with PPU staff, cardiology staff, all questions been answered.        Review of Systems  Review of Systems   Constitutional:  Negative for chills and fever.   Eyes:  Negative for blurred vision and double vision.   Respiratory:  Negative for cough, hemoptysis and wheezing.    Cardiovascular:  Negative for chest pain, palpitations, claudication, leg swelling and PND.   Gastrointestinal:  Negative for heartburn, nausea and vomiting.   Genitourinary:  Negative for hematuria and urgency.   Musculoskeletal:  Negative for back pain and myalgias.   Skin:  Negative for rash.   Neurological:   Negative for dizziness and headaches.        Restless leg   Endo/Heme/Allergies:  Does not bruise/bleed easily.   Psychiatric/Behavioral:  Negative for depression. The patient is nervous/anxious.        Past Medical History   has a past medical history of Bowel habit changes, Breath shortness, COPD (chronic obstructive pulmonary disease) (HCC), Diverticulosis, Heart murmur, Heart valve disease, Hypertension, Thrombocytosis, and Urinary incontinence.    Surgical History   has a past surgical history that includes tubal coagulation laparoscopic bilateral and colonoscopy.     Family History  family history includes Alcohol/Drug in her father; Diabetes in her mother; Hypertension in her mother; No Known Problems in her maternal grandfather, maternal grandmother, paternal grandfather, and paternal grandmother; Other in her father, mother, and sister; Psychiatric Illness in her father and sister.       Social History   reports that she has never smoked. She has never used smokeless tobacco. She reports current drug use. Drug: Marijuana. She reports that she does not drink alcohol.    Allergies  No Known Allergies    Medications  Prior to Admission Medications   Prescriptions Last Dose Informant Patient Reported? Taking?   BLACK COHOSH PO 3/4/2024 Patient Yes Yes   Sig: Take  by mouth as needed. OTC supplement   Budeson-Glycopyrrol-Formoterol (BREZTRI AEROSPHERE) 160-9-4.8 MCG/ACT Aerosol 3/4/2024 Patient No Yes   Sig: Inhale 2 Puffs 2 (two) times a day.   albuterol 108 (90 Base) MCG/ACT Aero Soln inhalation aerosol 3/3/2024 Patient No Yes   Sig: INHALE 2 PUFFS BY MOUTH EVERY 6 HOURS AS NEEDED   aspirin 81 MG EC tablet   No No   Sig: Take 1 Tablet by mouth every day.   carvedilol (COREG) 6.25 MG Tab 3/4/2024 at 000 Patient No Yes   Sig: Take 1 Tablet by mouth 2 times a day with meals.   dapagliflozin propanediol (FARXIGA) 10 MG Tab 3/2/2024 Patient No No   Sig: Take 1 Tablet by mouth every day.   Patient taking  "differently: Take 10 mg by mouth every day. Indications: \"leaky valve\"   dexamethasone (DECADRON) 4 MG Tab  Patient Yes No   Sig: TAKE 2 TABLETS BY MOUTH ONCE TIME FOR 1 DOSE   hydroxyurea (HYDREA) 500 MG Cap 3/3/2024 Patient Yes Yes   Sig: Take  by mouth every day.   ibuprofen (MOTRIN) 200 MG Tab 3/4/2024 Patient Yes Yes   Sig: Take 200 mg by mouth every 6 hours as needed.   ipratropium-albuterol (DUONEB) 0.5-2.5 (3) MG/3ML nebulizer solution 3/2/2024 Patient No No   Sig: USE 1 VIAL IN NEBULIZER 4 TIMES DAILY - And As Needed   potassium chloride ER (KLOR-CON) 10 MEQ tablet 3/4/2024 Patient No Yes   Sig: TAKE 1 TABLET BY MOUTH THREE TIMES DAILY   rosuvastatin (CRESTOR) 10 MG Tab   No No   Sig: Take 1 Tablet by mouth every evening.   torsemide (DEMADEX) 20 MG Tab  Patient No No   Sig: Take 1 Tablet by mouth every day.      Facility-Administered Medications: None       Physical Exam  Temp:  [36.6 °C (97.8 °F)] 36.6 °C (97.8 °F)  Pulse:  [64-98] 88  Resp:  [16-18] 16  BP: (102-180)/() 102/70  SpO2:  [92 %-98 %] 95 %  Blood Pressure : 102/70   Temperature: 36.6 °C (97.8 °F)   Pulse: 88   Respiration: 16   Pulse Oximetry: 95 %       Physical Exam  Vitals and nursing note reviewed.   Constitutional:       Appearance: Normal appearance.   HENT:      Head: Normocephalic.   Eyes:      General: No scleral icterus.        Right eye: No discharge.         Left eye: No discharge.      Conjunctiva/sclera: Conjunctivae normal.   Cardiovascular:      Rate and Rhythm: Normal rate and regular rhythm.      Pulses: Normal pulses.      Heart sounds: Normal heart sounds.   Abdominal:      General: Bowel sounds are normal. There is no distension.      Tenderness: There is no abdominal tenderness. There is no guarding.      Comments: Right groin hematoma sandbag in place   Musculoskeletal:         General: Normal range of motion.      Cervical back: Normal range of motion and neck supple.      Right lower leg: No edema.      Left " "lower leg: No edema.   Skin:     General: Skin is warm and dry.      Capillary Refill: Capillary refill takes less than 2 seconds.      Coloration: Skin is not jaundiced.   Neurological:      General: No focal deficit present.      Mental Status: She is alert and oriented to person, place, and time.      Cranial Nerves: No cranial nerve deficit.   Psychiatric:         Mood and Affect: Mood normal.         Behavior: Behavior normal.         Laboratory:  Recent Labs     03/04/24  1010 03/04/24  1613   WBC 6.7  --    RBC 4.21  --    HEMOGLOBIN 16.0 14.5   HEMATOCRIT 44.3 41.2   .2*  --    MCH 38.0*  --    MCHC 36.1*  --    RDW 49.4  --    PLATELETCT 279  --    MPV 9.7  --      Recent Labs     03/04/24  1010   SODIUM 138   POTASSIUM 3.2*   CHLORIDE 96   CO2 29   GLUCOSE 121*   BUN 24*   CREATININE 0.96   CALCIUM 9.1     Recent Labs     03/04/24  1010   ALTSGPT 9   ASTSGOT 13   ALKPHOSPHAT 49   TBILIRUBIN 0.5   GLUCOSE 121*     Recent Labs     03/04/24  1010   APTT 30.7   INR 0.91     No results for input(s): \"NTPROBNP\" in the last 72 hours.      No results for input(s): \"TROPONINT\" in the last 72 hours.    Imaging:  CL-LEFT HEART CATHETERIZATION W/STAGED PERCUTANEOUS CORONARY INTERVENTION    (Results Pending)       EKG:  My impression is: EKG my reading sinus rhythm no acute ischemic changes    Assessment/Plan:  Justification for Admission Status  I anticipate this patient is appropriate for observation status at this time because will require close monitoring monitoring H&H, telemetry.        * Groin hematoma, initial encounter- (present on admission)  Assessment & Plan  Post cardiac cath right groin  Supportive treatment with pressure  Repeated hemoglobin drop to 14  Continue monitoring hemoglobin every 8 hours  Continue telemetry      Anxiety- (present on admission)  Assessment & Plan  Low-dose diazepam as needed    Coronary artery disease involving native coronary artery- (present on admission)  Assessment & " Plan  Patient admitted for elective cardiac catheter s/p stent placement to mid circumflex  Patient on aspirin and Plavix  Continue telemetry    Moderate persistent asthma- (present on admission)  Assessment & Plan  Continue RT per protocol  Not in acute exacerbation    Obesity (BMI 35.0-39.9 without comorbidity) ([facility])- (present on admission)  Assessment & Plan  Needs to lose weight    Marijuana use- (present on admission)  Assessment & Plan  Patient smokes marijuana at home for anxiety    Essential hypertension- (present on admission)  Assessment & Plan  Patient heart rate uncontrolled hypertension after procedure  Patient restarted on Coreg, I have ordered IV hydralazine and I am monitoring for side effects include but not limited to hypotension        VTE prophylaxis: SCDs/TEDs      I have reviewed patient's CBC  I have reviewed patient's CMP  I have reviewed and interpreted patient's EKG    I have discussed with cardiology  I have discussed with nurse staff  I have reviewed patient's current and old records  I have placed order to admit patient to CDU with telemetry  I have started patient on IV hydralazine for hypertensive urgency monitoring for side effects include but not limited to hypotension  I have ordered labs for in am incluiding CBC, CMP.   I have ordered H&H every 8 hours

## 2024-03-05 NOTE — PROGRESS NOTES
Monitor summary per monitor tech report:    Sinus Rhythm, rate 76-95, 1st degree  Ectopy: PVCs, PACs  .21/.07/.39

## 2024-03-05 NOTE — ASSESSMENT & PLAN NOTE
Patient heart rate uncontrolled hypertension after procedure  Patient restarted on Coreg, I have ordered IV hydralazine and I am monitoring for side effects include but not limited to hypotension

## 2024-03-06 ENCOUNTER — HOSPITAL ENCOUNTER (OUTPATIENT)
Facility: MEDICAL CENTER | Age: 74
End: 2024-03-06
Attending: INTERNAL MEDICINE | Admitting: INTERNAL MEDICINE
Payer: MEDICARE

## 2024-03-06 DIAGNOSIS — I34.0 SEVERE MITRAL REGURGITATION: ICD-10-CM

## 2024-03-06 DIAGNOSIS — I34.0 NONRHEUMATIC MITRAL VALVE REGURGITATION: ICD-10-CM

## 2024-03-06 DIAGNOSIS — Z00.6 EXAMINATION OF PARTICIPANT IN CLINICAL TRIAL: ICD-10-CM

## 2024-03-06 NOTE — DISCHARGE SUMMARY
"Discharge Summary    CHIEF COMPLAINT ON ADMISSION  Right groin hematoma      Reason for Admission  Atherosclerotic heart disease      Admission Date  3/4/2024    CODE STATUS  FULL    HPI & HOSPITAL COURSE  Per Dr. Connor's HPI dated 3/4/2024:  \"Jina Barnard is a 73 y.o. female who presented 3/4/2024 with past medical history of, coronary artery disease, hypertension, thrombocytosis, is coming today for elective cardiac cath, patient went for procedure she is status post stent placement to mid circumflex, postprocedure patient was PPU and she developed restless leg and she was hypertensive, patient developed right groin hematoma may be related to her restless leg, I talked with cardiology team and they were asking to admit patient for observation, her hemoglobin dropped from 16-14, patient when I saw her she was more calm down she is alert oriented follows commands she denies any chest pain shortness of breath, I discussed with patient regarding findings on physical evaluation and blood work and the recommendation to stay for overnight monitoring, will continue monitoring her hemoglobin, I have added low-dose diazepam for anxiety, patient will be able to start diet, I have ordered IV hydralazine due to her uncontrolled hypertension and I am monitoring for side effects include but not limited to hypotension, I discussed with PPU staff, cardiology staff, all questions been answered.\"    Hospital course under my care: afebrile and vitals wnl. BP improved. Exam at bedside was notable for significant bruising of right groin (PA student Yue present as cheparone). Labs and imagings findings discussed with patient. H&H stable. Potassium was aggressively supplemented. Discussed with cardiology about DAPT - recommend continuation at this point. Pt will be referred for valve clinic. At this point with adequately controlled pain and tolerating IP diet, deemed stable for DC with a close outpatient follow up. "     Therefore, she is discharged in fair and stable condition to home with close outpatient follow-up.    The patient recovered much more quickly than anticipated on admission.    Discharge Date  3/5/2024    FOLLOW UP ITEMS POST DISCHARGE  N/A    DISCHARGE DIAGNOSES  Principal Problem:    Groin hematoma, initial encounter (POA: Yes)  Active Problems:    Essential hypertension (POA: Yes)    Marijuana use (POA: Yes)    Obesity (BMI 35.0-39.9 without comorbidity) (HCC) (POA: Yes)    Moderate persistent asthma (POA: Yes)    Coronary artery disease involving native coronary artery (POA: Yes)    Anxiety (POA: Yes)  Resolved Problems:    * No resolved hospital problems. *      FOLLOW UP  Future Appointments   Date Time Provider Department Center   4/10/2024  9:00 AM YISEL Mckeon [facility] None     Corine Alas P.A.-C.  [address]  [address]  [facility] [address]  [phone]    Schedule an appointment as soon as possible for a visit in 1 week(s)      Hung Easton M.D.  [address]  [address]  [place] [address]  [phone]    Schedule an appointment as soon as possible for a visit in 1 week(s)        MEDICATIONS ON DISCHARGE     Medication List        START taking these medications        Instructions   clopidogrel 75 MG Tabs  Commonly known as: Plavix   Take 1 Tablet by mouth every day.  Dose: 75 mg            CONTINUE taking these medications        Instructions   albuterol 108 (90 Base) MCG/ACT Aers inhalation aerosol   Doctor's comments: Please provide 90 day supply with 3 refills  INHALE 2 PUFFS BY MOUTH EVERY 6 HOURS AS NEEDED     aspirin 81 MG EC tablet   Take 1 Tablet by mouth every day.  Dose: 81 mg     BLACK COHOSH PO   Take  by mouth as needed. OTC supplement     Budeson-Glycopyrrol-Formoterol 160-9-4.8 MCG/ACT Aero  Commonly known as: Breztri Aerosphere   Doctor's comments: 3 month supply x 1 year  Inhale 2 Puffs 2 (two) times a day.  Dose: 2 Puff     carvedilol 6.25 MG Tabs  Commonly  known as: Coreg   Take 1 Tablet by mouth 2 times a day with meals.  Dose: 6.25 mg     dapagliflozin propanediol 10 MG Tabs  Commonly known as: Farxiga   Take 1 Tablet by mouth every day.  Dose: 10 mg     dexamethasone 4 MG Tabs  Commonly known as: Decadron   TAKE 2 TABLETS BY MOUTH ONCE TIME FOR 1 DOSE     hydroxyurea 500 MG Caps  Commonly known as: Hydrea   Take  by mouth every day.     ibuprofen 200 MG Tabs  Commonly known as: Motrin   Take 200 mg by mouth every 6 hours as needed.  Dose: 200 mg     ipratropium-albuterol 0.5-2.5 (3) MG/3ML nebulizer solution  Commonly known as: Duoneb   USE 1 VIAL IN NEBULIZER 4 TIMES DAILY - And As Needed     potassium chloride ER 10 MEQ tablet  Commonly known as: Klor-Con   TAKE 1 TABLET BY MOUTH THREE TIMES DAILY     rosuvastatin 10 MG Tabs  Commonly known as: Crestor   Take 1 Tablet by mouth every evening.  Dose: 10 mg     torsemide 20 MG Tabs  Commonly known as: Demadex   Take 1 Tablet by mouth every day.  Dose: 20 mg              Allergies  No Known Allergies    DIET  Cardiac      ACTIVITY  As tolerated.  Weight bearing as tolerated    CONSULTATIONS  Cardiology    PROCEDURES  [facility]    LABORATORY  Lab Results   Component Value Date    SODIUM 139 03/05/2024    POTASSIUM 3.0 (L) 03/05/2024    CHLORIDE 98 03/05/2024    CO2 28 03/05/2024    GLUCOSE 168 (H) 03/05/2024    BUN 17 03/05/2024    CREATININE 0.76 03/05/2024        Lab Results   Component Value Date    WBC 6.7 03/05/2024    HEMOGLOBIN 14.3 03/05/2024    HEMATOCRIT 39.2 03/05/2024    PLATELETCT 259 03/05/2024        Total time of the discharge process exceeds 36 minutes.

## 2024-03-07 ENCOUNTER — TELEPHONE (OUTPATIENT)
Dept: HEALTH INFORMATION MANAGEMENT | Facility: OTHER | Age: 74
End: 2024-03-07
Payer: MEDICARE

## 2024-03-07 RX ORDER — TORSEMIDE 20 MG/1
20 TABLET ORAL DAILY
Qty: 90 TABLET | Refills: 3 | Status: SHIPPED | OUTPATIENT
Start: 2024-03-07

## 2024-03-12 ENCOUNTER — DOCUMENTATION (OUTPATIENT)
Dept: CARDIOLOGY | Facility: MEDICAL CENTER | Age: 74
End: 2024-03-12
Payer: MEDICARE

## 2024-03-12 NOTE — PROGRESS NOTES
[facility] Mitral SKYLER review: Suitable for Implant: Yes  Any Additional Comments: SMR with mixed etiology. Wide central jet. Leaflet adequate to grasp. Mitral leaflets appear clippable.   Recommend a 17mm NTW with the posiblility of instead choosing a 17mm NT. Request MVA with baseline CELESTE. MV gradient measured at 4mmHg and 6 mmHg with a HR of 88bpm. MAC and TR noted. Discuss clipping strategety with implant team prior to implant.

## 2024-03-13 ENCOUNTER — TELEPHONE (OUTPATIENT)
Dept: CARDIOLOGY | Facility: MEDICAL CENTER | Age: 74
End: 2024-03-13
Payer: MEDICARE

## 2024-03-13 ENCOUNTER — APPOINTMENT (OUTPATIENT)
Dept: ADMISSIONS | Facility: MEDICAL CENTER | Age: 74
End: 2024-03-13
Attending: INTERNAL MEDICINE
Payer: MEDICARE

## 2024-03-13 NOTE — TELEPHONE ENCOUNTER
Left another message for patient to call back and discuss.     Called and spoke with patient's son Albert.     Albert notified of procedure date/time and check in process.     All questions were answered. Albert has direct extension for any further questions/concerns prior to the procedure.    Assisted in transferring Albert to pre-admit [phone] to schedule pre-admit appt.

## 2024-03-13 NOTE — TELEPHONE ENCOUNTER
Valve Conference Plan of Care: 3/13/2024 for Mitral SKYLER 3/19/2024           Mitral SKYLER Candidate: yes  General Anesthesia or MAC: GA   Clearance needed prior to procedure: no    Check in time of 0930 3/19/2024.     Pre-op appointment completed: no    NPO after midnight. Hold vitamins/supplements x7 days, hold ibuprofen x5 days, hold Farxiga x4 days, no marijuana use x48hrs, hold torsemide AM of procedure.

## 2024-03-14 ENCOUNTER — APPOINTMENT (OUTPATIENT)
Dept: CARDIOLOGY | Facility: MEDICAL CENTER | Age: 74
End: 2024-03-14
Payer: MEDICARE

## 2024-03-14 ENCOUNTER — TELEPHONE (OUTPATIENT)
Dept: CARDIOLOGY | Facility: MEDICAL CENTER | Age: 74
End: 2024-03-14
Payer: MEDICARE

## 2024-03-14 DIAGNOSIS — I34.0 NONRHEUMATIC MITRAL VALVE REGURGITATION: ICD-10-CM

## 2024-03-14 NOTE — TELEPHONE ENCOUNTER
Thank you for the opportunity to participate in your patient's care.     Your patient is scheduled for mitral valve transcatheter [id number] repair (Mitral SKYLER) on Tuesday, March 19, 2024.    Sincerely,    [facility] Team    Arabella Lawler, SON, RN, Structural Heart Program Nurse Coordinator ([phone])  PEDRO Dorado, RN, Structural Heart Program Nurse Coordinator ([phone])

## 2024-03-15 ENCOUNTER — TELEPHONE (OUTPATIENT)
Dept: CARDIOLOGY | Facility: MEDICAL CENTER | Age: 74
End: 2024-03-15
Payer: MEDICARE

## 2024-03-15 NOTE — TELEPHONE ENCOUNTER
Received VM from patient that she wants to reschedule her Mitral SKYLER on 3/19 because she still has large diffuse bruising in her groin from her cath and wants that cleared up prior to proceeding.     Called and spoke with patient's son Albert. He states the patient told him she didn't want to proceed until she's done healing. I advised him I would reach out to her to assess her bruising and answer her questions/concerns.    Called patient to assess bruising. She states she has large flat diffuse bruising in her groin. She states the color is lightening up, but she prefers to completely heal before proceeding with Mitral SKYLER. Educated her that her bruising may take a long time to heal d/t dual anti platelet therapy, and she verbalizes understanding. She would prefer to cancel her procedure at this time. Advised her we would reach out when we could reschedule her, possibly 4/2. Patient verbalizes understanding.

## 2024-03-26 ENCOUNTER — APPOINTMENT (OUTPATIENT)
Dept: CARDIOLOGY | Facility: MEDICAL CENTER | Age: 74
End: 2024-03-26
Payer: MEDICARE

## 2024-03-28 NOTE — TELEPHONE ENCOUNTER
"Called and spoke with patient's son Albert to inform of new tentative Mitral SKYLER appt 4/30/2024. Inquired about how patient's bruising is doing, and Albert states it's \"clearing up a lot.\" He is agreeable to the new plan for 4/30. Advised Albert I would call 4/24 to let them know the check in time/review pre-procedure instructions and Albert verbalizes understanding. Encouraged Albert to reach out to me in the meantime if they have any questions or concerns.   "

## 2024-04-10 ENCOUNTER — OFFICE VISIT (OUTPATIENT)
Dept: SLEEP MEDICINE | Facility: MEDICAL CENTER | Age: 74
End: 2024-04-10
Payer: MEDICARE

## 2024-04-10 VITALS
SYSTOLIC BLOOD PRESSURE: 122 MMHG | DIASTOLIC BLOOD PRESSURE: 70 MMHG | OXYGEN SATURATION: 95 % | HEART RATE: 59 BPM | HEIGHT: 62 IN | BODY MASS INDEX: 36.25 KG/M2 | WEIGHT: 197 LBS

## 2024-04-10 DIAGNOSIS — I34.0 NONRHEUMATIC MITRAL VALVE REGURGITATION: ICD-10-CM

## 2024-04-10 DIAGNOSIS — J44.89 ASTHMA-COPD OVERLAP SYNDROME (HCC): ICD-10-CM

## 2024-04-10 DIAGNOSIS — R06.02 SHORTNESS OF BREATH: ICD-10-CM

## 2024-04-10 DIAGNOSIS — J45.40 MODERATE PERSISTENT ASTHMA, UNSPECIFIED WHETHER COMPLICATED: ICD-10-CM

## 2024-04-10 DIAGNOSIS — F12.90 MARIJUANA USE: ICD-10-CM

## 2024-04-10 PROCEDURE — 3074F SYST BP LT 130 MM HG: CPT

## 2024-04-10 PROCEDURE — 99213 OFFICE O/P EST LOW 20 MIN: CPT

## 2024-04-10 PROCEDURE — 3078F DIAST BP <80 MM HG: CPT

## 2024-04-10 RX ORDER — IPRATROPIUM BROMIDE AND ALBUTEROL SULFATE 2.5; .5 MG/3ML; MG/3ML
3 SOLUTION RESPIRATORY (INHALATION) EVERY 4 HOURS PRN
Qty: 120 ML | Refills: 11 | Status: SHIPPED | OUTPATIENT
Start: 2024-04-10

## 2024-04-10 RX ORDER — ALBUTEROL SULFATE 90 UG/1
AEROSOL, METERED RESPIRATORY (INHALATION)
Qty: 18 G | Refills: 11 | Status: SHIPPED | OUTPATIENT
Start: 2024-04-10 | End: 2024-04-10

## 2024-04-10 RX ORDER — ALBUTEROL SULFATE 90 UG/1
AEROSOL, METERED RESPIRATORY (INHALATION)
Qty: 54 G | Refills: 3 | Status: SHIPPED | OUTPATIENT
Start: 2024-04-10

## 2024-04-10 ASSESSMENT — ENCOUNTER SYMPTOMS
HEMOPTYSIS: 0
VOMITING: 0
SPUTUM PRODUCTION: 0
DIARRHEA: 0
NAUSEA: 0
PALPITATIONS: 0
CHILLS: 0
COUGH: 1
SHORTNESS OF BREATH: 1
MYALGIAS: 0
HEADACHES: 0
DIZZINESS: 0
FEVER: 0
FALLS: 0
DIAPHORESIS: 0
WEAKNESS: 0
WHEEZING: 0
HEARTBURN: 0
SINUS PAIN: 0

## 2024-04-10 ASSESSMENT — FIBROSIS 4 INDEX: FIB4 SCORE: 1.238095238095238095

## 2024-04-10 NOTE — PROGRESS NOTES
Pulmonary Clinic Note    Date of Visit: 4/10/2024     Chief Complaint:  Chief Complaint   Patient presents with    Follow-Up     Last seen 10/10/23 DOMINGA Cheung     HPI:   Jina Barnard is a very pleasant 72 y.o. year old female never smoker but significant marijuana use, with a PMHx of COPD asthma overlap, CAD, s/p stent placement HTN, marijuana use, intramural leiomyoma uterus, polycythemia vera who presented to the Pulmonary Clinic for a regular follow up. Last seen in the office on 10/10/2023 with myself.     Patient is followed by the pulmonary office for COPD asthma overlap.  PFTs in 2022 show an FVC of 1.79 L or 69%, FEV1 1.19 L or 59%, FEV1/FVC 66%, %, %, DLCO 85% predicted, without positive bronchodilator response.  PFTs done at [place] in June 2023 shows an FVC of 1.87 L or 74%, FEV1 1.23 L or 64%, FEV1/FVC 65.5, %, TLC 1 1%, DLCO 91% predicted.  CT chest in 2022 shows tree-in-bud GGO nodules throughout both lungs and several sub-3 mm micronodules.  Patient is currently on Trelegy 100, Singulair, DuoNebs, and albuterol as needed.  Patient underwent PCI in March with stent placement to mid circumflex.    Interval events:   10/10/2023-   Patient states that she has been out of Breztri for the last month.  She did receive samples yesterday.  She also reports that she would like to get off of Singulair because she does not feel that it is helping her.  She uses DuoNebs once at night and albuterol inhaler 3 times a day.  She continues to smoke marijuana 1.5 joints per day.  Symptomatically, she states she is short of breath with mMRC of 4, has a cough with clear thick sputum and wheezing.  She was unable to schedule her overnight oxygen test.      4/10/2024-   Patient reports that she is more wheezy due to running low on her DuoNeb and albuterol.  She continues to use and benefit from Breztri.  Symptomatically, she is short of breath with mMRC of 4, which she attributes to her  "baseline mitral regurgitation.  She will also have an occasional dry cough and wheezing.  Patient reports that she is feeling better after her stent placement.  She is also scheduled to have a mitral valve replacement with cardiology at the end of the month.    Exacerbations this year:  0    Current medication regimen: Trelegy 100, DuoNeb, and albuterol as needed    Oxygen use:  None    MMRC Grade: 4- Breathless with ambulating around house or ADLs      Past Medical History:   Diagnosis Date    Bowel habit changes     Lower dentures    Breath shortness     COPD (chronic obstructive pulmonary disease) ([facility])     Patient suspects her SOB is r/t to her leako valve and that she does not have COPD    Diverticulosis     Heart murmur     Heart valve disease     \"leaky valve\"; Followed by Dr. Reid    Hypertension     Thrombocytosis     Urinary incontinence     Urgency, frequency, leakage     Past Surgical History:   Procedure Laterality Date    COLONOSCOPY      TUBAL COAGULATION LAPAROSCOPIC BILATERAL       Social History     Socioeconomic History    Marital status: Single     Spouse name: Not on file    Number of children: Not on file    Years of education: Not on file    Highest education level: Not on file   Occupational History    Not on file   Tobacco Use    Smoking status: Never    Smokeless tobacco: Never   Vaping Use    Vaping Use: Never used   Substance and Sexual Activity    Alcohol use: No    Drug use: Yes     Types: Marijuana     Comment: daily- midnight 1/31/24    Sexual activity: Never     Partners: Male     Birth control/protection: Post-Menopausal   Other Topics Concern    Not on file   Social History Narrative    Not on file     Social Determinants of Health     Financial Resource Strain: Not on file   Food Insecurity: Not on file   Transportation Needs: Not on file   Physical Activity: Not on file   Stress: Not on file   Social Connections: Not on file   Intimate Partner Violence: Not on file   Housing " "Stability: Not on file        Family History   Problem Relation Age of Onset    Alcohol/Drug Father         alcohol    Other Father         malnutrition    Psychiatric Illness Father     Other Mother         blood clot from surgery    Hypertension Mother     Diabetes Mother         ?    Psychiatric Illness Sister     Other Sister         malnutrition    No Known Problems Maternal Grandmother     No Known Problems Maternal Grandfather     No Known Problems Paternal Grandmother     No Known Problems Paternal Grandfather      Current Outpatient Medications on File Prior to Visit   Medication Sig Dispense Refill    torsemide (DEMADEX) 20 MG Tab TAKE 1 TABLET BY MOUTH EVERY DAY 90 Tablet 3    clopidogrel (PLAVIX) 75 MG Tab Take 1 Tablet by mouth every day. 90 Tablet 3    rosuvastatin (CRESTOR) 10 MG Tab Take 1 Tablet by mouth every evening. 90 Tablet 3    aspirin 81 MG EC tablet Take 1 Tablet by mouth every day. 90 Tablet 3    hydroxyurea (HYDREA) 500 MG Cap Take  by mouth every day.      dexamethasone (DECADRON) 4 MG Tab TAKE 2 TABLETS BY MOUTH ONCE TIME FOR 1 DOSE      potassium chloride ER (KLOR-CON) 10 MEQ tablet TAKE 1 TABLET BY MOUTH THREE TIMES DAILY 270 Tablet 0    carvedilol (COREG) 6.25 MG Tab Take 1 Tablet by mouth 2 times a day with meals. 60 Tablet 11    dapagliflozin propanediol (FARXIGA) 10 MG Tab Take 1 Tablet by mouth every day. (Patient taking differently: Take 10 mg by mouth every day. Indications: \"leaky valve\") 30 Tablet 11    Budeson-Glycopyrrol-Formoterol (BREZTRI AEROSPHERE) 160-9-4.8 MCG/ACT Aerosol Inhale 2 Puffs 2 (two) times a day. 32.1 g 3    ibuprofen (MOTRIN) 200 MG Tab Take 200 mg by mouth every 6 hours as needed.      BLACK COHOSH PO Take  by mouth as needed. OTC supplement       No current facility-administered medications on file prior to visit.     Allergies: Patient has no known allergies.    ROS:   Review of Systems   Constitutional:  Negative for chills, diaphoresis, fever and " "malaise/fatigue.   HENT:  Negative for congestion and sinus pain.    Respiratory:  Positive for cough (dry) and shortness of breath. Negative for hemoptysis, sputum production and wheezing.    Cardiovascular:  Negative for chest pain, palpitations and leg swelling.   Gastrointestinal:  Negative for diarrhea, heartburn, nausea and vomiting.   Musculoskeletal:  Negative for falls and myalgias.   Neurological:  Negative for dizziness, weakness and headaches.     Vitals:  /70 (BP Location: Right arm, Patient Position: Sitting, BP Cuff Size: Adult)   Pulse (!) 59   Ht 1.575 m (5' 2\")   Wt 89.4 kg (197 lb)   SpO2 95%     Physical Exam  Constitutional:       General: She is not in acute distress.     Appearance: Normal appearance. She is not ill-appearing, toxic-appearing or diaphoretic.   Cardiovascular:      Rate and Rhythm: Normal rate and regular rhythm.      Heart sounds: No murmur heard.     No friction rub. No gallop.   Pulmonary:      Effort: No respiratory distress.      Breath sounds: No stridor. Wheezing present. No rhonchi or rales.   Musculoskeletal:         General: No swelling.      Right lower leg: No edema.      Left lower leg: No edema.   Skin:     General: Skin is warm.   Neurological:      General: No focal deficit present.      Mental Status: She is alert and oriented to person, place, and time.   Psychiatric:         Mood and Affect: Mood normal.         Behavior: Behavior normal.         Thought Content: Thought content normal.         Judgment: Judgment normal.         Laboratory Data:  PFTs at [facility]: (Date: 7/1/2022)-        CT Chest: (Date: 10/13/2022)-  Impression:  1.  Several scattered tree-in-bud groundglass nodules throughout both lungs, likely sequela of infectious/inflammatory bronchiolitis. No findings to suggest pneumonia.  2.  Mild pneumobilia.  3.  Colonic diverticulosis.      Assessment and Plan:    Problem List Items Addressed This Visit          Pulmonary/Sleep " Medicine Problems    Asthma-COPD overlap syndrome (HCC)     PFTs in 2022 show an FVC of 1.79 L or 69%, FEV1 1.19 L or 59%, FEV1/FVC 66%, %, %, DLCO 85% predicted, without positive bronchodilator response.  PFTs done at [place] in June 2023 shows an FVC of 1.87 L or 74%, FEV1 1.23 L or 64%, FEV1/FVC 65.5, %, TLC 1 1%, DLCO 91% predicted.  Patient is currently on Trelegy 100, Singulair, DuoNebs, and albuterol as needed.  Symptomatically, patient has shortness of breath and wheezing, which she attributes to mitral valve regurgitation as well as not having enough the DuoNebs and albuterol.  She has not had any exacerbations this year.  Gold group B.  -- Continue Breztri  -- Continue DuoNebs and albuterol as needed  -- Encouraged marijuana smoking cessation  -- Advised patient to increase exercise as tolerated  -- Advised patient to remain vaccinated against influenza and pneumococcal         Relevant Medications    ipratropium-albuterol (DUONEB) 0.5-2.5 (3) MG/3ML nebulizer solution    albuterol 108 (90 Base) MCG/ACT Aero Soln inhalation aerosol       Other    Marijuana use     Patient continues to smoke less than a gram a day.  --Advised smoking cessation         Nonrheumatic mitral valve regurgitation     Patient scheduled to have a replacement at the end of the month with cardiology.  I do think this will help with the shortness of breath.  --Continue to follow-up with cardiology.         Moderate persistent asthma    Relevant Medications    ipratropium-albuterol (DUONEB) 0.5-2.5 (3) MG/3ML nebulizer solution    albuterol 108 (90 Base) MCG/ACT Aero Soln inhalation aerosol     Other Visit Diagnoses       Shortness of breath        Relevant Medications    ipratropium-albuterol (DUONEB) 0.5-2.5 (3) MG/3ML nebulizer solution    albuterol 108 (90 Base) MCG/ACT Aero Soln inhalation aerosol          Diagnostic studies have been reviewed with the patient.    Return in about 4 months (around 8/10/2024),  or if symptoms worsen or fail to improve, for ASTHMA, with Jeny.     This note was generated using voice recognition software which has a chance of producing errors of grammar and possibly content.  I have made every reasonable attempt to find and correct any obvious errors, but it should be expected that some may not be found prior to finalization of this note.    Time spent in record review prior to patient arrival, reviewing results, and in face-to-face encounter totaled 27 min.  __________  DOMINGA Estrella  Pulmonary Medicine  [facility]

## 2024-04-10 NOTE — ASSESSMENT & PLAN NOTE
Patient scheduled to have a replacement at the end of the month with cardiology.  I do think this will help with the shortness of breath.  --Continue to follow-up with cardiology.

## 2024-04-10 NOTE — ASSESSMENT & PLAN NOTE
PFTs in 2022 show an FVC of 1.79 L or 69%, FEV1 1.19 L or 59%, FEV1/FVC 66%, %, %, DLCO 85% predicted, without positive bronchodilator response.  PFTs done at [place] in June 2023 shows an FVC of 1.87 L or 74%, FEV1 1.23 L or 64%, FEV1/FVC 65.5, %, TLC 1 1%, DLCO 91% predicted.  Patient is currently on Trelegy 100, Singulair, DuoNebs, and albuterol as needed.  Symptomatically, patient has shortness of breath and wheezing, which she attributes to mitral valve regurgitation as well as not having enough the DuoNebs and albuterol.  She has not had any exacerbations this year.  Gold group B.  -- Continue Breztri  -- Continue DuoNebs and albuterol as needed  -- Encouraged marijuana smoking cessation  -- Advised patient to increase exercise as tolerated  -- Advised patient to remain vaccinated against influenza and pneumococcal

## 2024-04-17 DIAGNOSIS — Z00.6 EXAMINATION OF PARTICIPANT IN CLINICAL TRIAL: ICD-10-CM

## 2024-04-17 DIAGNOSIS — I34.0 SEVERE MITRAL REGURGITATION: ICD-10-CM

## 2024-04-17 DIAGNOSIS — I34.0 NONRHEUMATIC MITRAL VALVE REGURGITATION: ICD-10-CM

## 2024-04-24 ENCOUNTER — TELEPHONE (OUTPATIENT)
Dept: CARDIOLOGY | Facility: MEDICAL CENTER | Age: 74
End: 2024-04-24
Payer: MEDICARE

## 2024-04-24 ENCOUNTER — PRE-ADMISSION TESTING (OUTPATIENT)
Dept: ADMISSIONS | Facility: MEDICAL CENTER | Age: 74
DRG: 267 | End: 2024-04-24
Attending: INTERNAL MEDICINE
Payer: MEDICARE

## 2024-04-24 NOTE — TELEPHONE ENCOUNTER
Valve Conference Plan of Care: 4/24/2024 for Mitral SKYLER 4/30/2024           Mitral SKYLER Candidate: yes  General Anesthesia or MAC: GA with CELESTE  Unit: telemetry  Clearance needed prior to procedure: no    Check in time of 0530 4/30/2024.     Pre-op appointment completed: scheduled 4/25/2024    NPO after midnight. Hold vitamins/supplements x7 days, hold ibuprofen x5 days, hold Farxiga x4 days, no marijuana use x48hrs, hold torsemide AM of procedure.     Patient notified of procedure date/time and check in process.     All questions were answered. Patient has direct extension for any further questions/concerns prior to the procedure.    Patient requested I also call her son Albert to notify of plan and pre-procedure instructions. Called and spoke with Albert, he verbalizes understanding.

## 2024-04-24 NOTE — OR NURSING
Pre-Admit RN tele appointment completed with pt and pt's son, Albert. Instructed pt to follow all pre procedure medication instructions provided by cardiology. Pt and pt's son verbalized understanding.

## 2024-04-25 ENCOUNTER — DOCUMENTATION (OUTPATIENT)
Dept: CARDIOLOGY | Facility: MEDICAL CENTER | Age: 74
End: 2024-04-25
Payer: MEDICARE

## 2024-04-25 ENCOUNTER — PRE-ADMISSION TESTING (OUTPATIENT)
Dept: ADMISSIONS | Facility: MEDICAL CENTER | Age: 74
DRG: 267 | End: 2024-04-25
Attending: INTERNAL MEDICINE
Payer: MEDICARE

## 2024-04-25 ENCOUNTER — HOSPITAL ENCOUNTER (OUTPATIENT)
Dept: RADIOLOGY | Facility: MEDICAL CENTER | Age: 74
End: 2024-04-25
Attending: INTERNAL MEDICINE | Admitting: INTERNAL MEDICINE
Payer: MEDICARE

## 2024-04-25 DIAGNOSIS — Z01.811 PRE-OPERATIVE RESPIRATORY EXAMINATION: ICD-10-CM

## 2024-04-25 DIAGNOSIS — I34.0 NONRHEUMATIC MITRAL VALVE REGURGITATION: ICD-10-CM

## 2024-04-25 DIAGNOSIS — Z01.810 PRE-OPERATIVE CARDIOVASCULAR EXAMINATION: ICD-10-CM

## 2024-04-25 DIAGNOSIS — Z01.812 PRE-OPERATIVE LABORATORY EXAMINATION: ICD-10-CM

## 2024-04-25 LAB
ABO GROUP BLD: NORMAL
ALBUMIN SERPL BCP-MCNC: 4.2 G/DL (ref 3.2–4.9)
ALBUMIN/GLOB SERPL: 1.6 G/DL
ALP SERPL-CCNC: 60 U/L (ref 30–99)
ALT SERPL-CCNC: <5 U/L (ref 2–50)
ANION GAP SERPL CALC-SCNC: 12 MMOL/L (ref 7–16)
APTT PPP: 31.7 SEC (ref 24.7–36)
AST SERPL-CCNC: 6 U/L (ref 12–45)
BASOPHILS # BLD AUTO: 0.4 % (ref 0–1.8)
BASOPHILS # BLD: 0.03 K/UL (ref 0–0.12)
BILIRUB SERPL-MCNC: 0.4 MG/DL (ref 0.1–1.5)
BLD GP AB SCN SERPL QL: NORMAL
BUN SERPL-MCNC: 27 MG/DL (ref 8–22)
CALCIUM ALBUM COR SERPL-MCNC: 9.7 MG/DL (ref 8.5–10.5)
CALCIUM SERPL-MCNC: 9.9 MG/DL (ref 8.5–10.5)
CHLORIDE SERPL-SCNC: 96 MMOL/L (ref 96–112)
CO2 SERPL-SCNC: 30 MMOL/L (ref 20–33)
CREAT SERPL-MCNC: 1.01 MG/DL (ref 0.5–1.4)
EOSINOPHIL # BLD AUTO: 0.27 K/UL (ref 0–0.51)
EOSINOPHIL NFR BLD: 3.7 % (ref 0–6.9)
ERYTHROCYTE [DISTWIDTH] IN BLOOD BY AUTOMATED COUNT: 55.8 FL (ref 35.9–50)
GFR SERPLBLD CREATININE-BSD FMLA CKD-EPI: 58 ML/MIN/1.73 M 2
GLOBULIN SER CALC-MCNC: 2.6 G/DL (ref 1.9–3.5)
GLUCOSE SERPL-MCNC: 101 MG/DL (ref 65–99)
HCT VFR BLD AUTO: 45 % (ref 37–47)
HGB BLD-MCNC: 15.1 G/DL (ref 12–16)
IMM GRANULOCYTES # BLD AUTO: 0.03 K/UL (ref 0–0.11)
IMM GRANULOCYTES NFR BLD AUTO: 0.4 % (ref 0–0.9)
INR PPP: 0.99 (ref 0.87–1.13)
LYMPHOCYTES # BLD AUTO: 2.02 K/UL (ref 1–4.8)
LYMPHOCYTES NFR BLD: 27.4 % (ref 22–41)
MCH RBC QN AUTO: 35.5 PG (ref 27–33)
MCHC RBC AUTO-ENTMCNC: 33.6 G/DL (ref 32.2–35.5)
MCV RBC AUTO: 105.9 FL (ref 81.4–97.8)
MONOCYTES # BLD AUTO: 0.6 K/UL (ref 0–0.85)
MONOCYTES NFR BLD AUTO: 8.1 % (ref 0–13.4)
NEUTROPHILS # BLD AUTO: 4.42 K/UL (ref 1.82–7.42)
NEUTROPHILS NFR BLD: 60 % (ref 44–72)
NRBC # BLD AUTO: 0 K/UL
NRBC BLD-RTO: 0 /100 WBC (ref 0–0.2)
NT-PROBNP SERPL IA-MCNC: 297 PG/ML (ref 0–125)
PLATELET # BLD AUTO: 294 K/UL (ref 164–446)
PMV BLD AUTO: 10.1 FL (ref 9–12.9)
POTASSIUM SERPL-SCNC: 3.5 MMOL/L (ref 3.6–5.5)
PROT SERPL-MCNC: 6.8 G/DL (ref 6–8.2)
PROTHROMBIN TIME: 13.2 SEC (ref 12–14.6)
RBC # BLD AUTO: 4.25 M/UL (ref 4.2–5.4)
RH BLD: NORMAL
SODIUM SERPL-SCNC: 138 MMOL/L (ref 135–145)
WBC # BLD AUTO: 7.4 K/UL (ref 4.8–10.8)

## 2024-04-25 PROCEDURE — 71046 X-RAY EXAM CHEST 2 VIEWS: CPT

## 2024-04-25 NOTE — PROGRESS NOTES
Thank you for the opportunity to participate in your patient's care.     Your patient is scheduled for mitral valve transcatheter [id number] repair (Mitral SKYLER) on 4/30/2024. Please note, this was rescheduled from prior date 3/19/2024.    Sincerely,    [facility] Structural [facility] Team    PEDRO Cerda, RN, Structural Heart Program Nurse Coordinator ([phone])  PEDRO Dorado, RN, Structural Heart Program Nurse Coordinator ([phone])

## 2024-04-30 ENCOUNTER — ANESTHESIA (OUTPATIENT)
Dept: SURGERY | Facility: MEDICAL CENTER | Age: 74
End: 2024-04-30
Payer: MEDICARE

## 2024-04-30 ENCOUNTER — ANESTHESIA EVENT (OUTPATIENT)
Dept: SURGERY | Facility: MEDICAL CENTER | Age: 74
End: 2024-04-30
Payer: MEDICARE

## 2024-04-30 ENCOUNTER — APPOINTMENT (OUTPATIENT)
Dept: CARDIOLOGY | Facility: MEDICAL CENTER | Age: 74
DRG: 267 | End: 2024-04-30
Attending: INTERNAL MEDICINE
Payer: MEDICARE

## 2024-04-30 ENCOUNTER — HOSPITAL ENCOUNTER (INPATIENT)
Facility: MEDICAL CENTER | Age: 74
LOS: 1 days | DRG: 267 | End: 2024-04-30
Attending: INTERNAL MEDICINE | Admitting: INTERNAL MEDICINE
Payer: MEDICARE

## 2024-04-30 VITALS
RESPIRATION RATE: 18 BRPM | SYSTOLIC BLOOD PRESSURE: 126 MMHG | DIASTOLIC BLOOD PRESSURE: 85 MMHG | TEMPERATURE: 97.3 F | BODY MASS INDEX: 36 KG/M2 | OXYGEN SATURATION: 91 % | HEIGHT: 61 IN | WEIGHT: 190.7 LBS | HEART RATE: 68 BPM

## 2024-04-30 DIAGNOSIS — Z95.818 S/P MITRAL VALVE CLIP IMPLANTATION: ICD-10-CM

## 2024-04-30 DIAGNOSIS — Z98.890 S/P MITRAL VALVE CLIP IMPLANTATION: ICD-10-CM

## 2024-04-30 PROBLEM — S30.1XXA GROIN HEMATOMA, INITIAL ENCOUNTER: Status: RESOLVED | Noted: 2024-03-04 | Resolved: 2024-04-30

## 2024-04-30 PROBLEM — R73.01 IMPAIRED FASTING GLUCOSE: Status: RESOLVED | Noted: 2021-01-14 | Resolved: 2024-04-30

## 2024-04-30 PROBLEM — J44.89 ASTHMA-COPD OVERLAP SYNDROME (HCC): Status: RESOLVED | Noted: 2020-11-19 | Resolved: 2024-04-30

## 2024-04-30 LAB
ABO + RH BLD: NORMAL
ACT BLD: 320 SEC (ref 74–137)
LV EJECT FRACT  99904: 55

## 2024-04-30 PROCEDURE — 700101 HCHG RX REV CODE 250: Performed by: ANESTHESIOLOGY

## 2024-04-30 PROCEDURE — C1760 CLOSURE DEV, VASC: HCPCS | Performed by: INTERNAL MEDICINE

## 2024-04-30 PROCEDURE — 160048 HCHG OR STATISTICAL LEVEL 1-5: Performed by: INTERNAL MEDICINE

## 2024-04-30 PROCEDURE — 700105 HCHG RX REV CODE 258: Performed by: INTERNAL MEDICINE

## 2024-04-30 PROCEDURE — 33418 REPAIR TCAT MITRAL VALVE: CPT | Mod: 62,52 | Performed by: INTERNAL MEDICINE

## 2024-04-30 PROCEDURE — 160009 HCHG ANES TIME/MIN: Performed by: INTERNAL MEDICINE

## 2024-04-30 PROCEDURE — 160035 HCHG PACU - 1ST 60 MINS PHASE I: Performed by: INTERNAL MEDICINE

## 2024-04-30 PROCEDURE — 02UG3JZ SUPPLEMENT MITRAL VALVE WITH SYNTHETIC SUBSTITUTE, PERCUTANEOUS APPROACH: ICD-10-PCS | Performed by: THORACIC SURGERY (CARDIOTHORACIC VASCULAR SURGERY)

## 2024-04-30 PROCEDURE — C1751 CATH, INF, PER/CENT/MIDLINE: HCPCS | Performed by: INTERNAL MEDICINE

## 2024-04-30 PROCEDURE — 160002 HCHG RECOVERY MINUTES (STAT): Performed by: INTERNAL MEDICINE

## 2024-04-30 PROCEDURE — C1894 INTRO/SHEATH, NON-LASER: HCPCS | Performed by: INTERNAL MEDICINE

## 2024-04-30 PROCEDURE — 160046 HCHG PACU - 1ST 60 MINS PHASE II: Performed by: INTERNAL MEDICINE

## 2024-04-30 PROCEDURE — C1893 INTRO/SHEATH, FIXED,NON-PEEL: HCPCS | Performed by: INTERNAL MEDICINE

## 2024-04-30 PROCEDURE — 160025 RECOVERY II MINUTES (STATS): Performed by: INTERNAL MEDICINE

## 2024-04-30 PROCEDURE — 700101 HCHG RX REV CODE 250: Performed by: INTERNAL MEDICINE

## 2024-04-30 PROCEDURE — 700111 HCHG RX REV CODE 636 W/ 250 OVERRIDE (IP): Performed by: ANESTHESIOLOGY

## 2024-04-30 PROCEDURE — B245ZZ4 ULTRASONOGRAPHY OF LEFT HEART, TRANSESOPHAGEAL: ICD-10-PCS | Performed by: THORACIC SURGERY (CARDIOTHORACIC VASCULAR SURGERY)

## 2024-04-30 PROCEDURE — 160042 HCHG SURGERY MINUTES - EA ADDL 1 MIN LEVEL 5: Performed by: INTERNAL MEDICINE

## 2024-04-30 PROCEDURE — 33418 REPAIR TCAT MITRAL VALVE: CPT | Mod: 62,52 | Performed by: THORACIC SURGERY (CARDIOTHORACIC VASCULAR SURGERY)

## 2024-04-30 PROCEDURE — 700111 HCHG RX REV CODE 636 W/ 250 OVERRIDE (IP): Performed by: INTERNAL MEDICINE

## 2024-04-30 PROCEDURE — 160031 HCHG SURGERY MINUTES - 1ST 30 MINS LEVEL 5: Performed by: INTERNAL MEDICINE

## 2024-04-30 RX ORDER — AMOXICILLIN 250 MG
1 CAPSULE ORAL
Status: CANCELLED | OUTPATIENT
Start: 2024-04-30

## 2024-04-30 RX ORDER — ONDANSETRON 2 MG/ML
4 INJECTION INTRAMUSCULAR; INTRAVENOUS EVERY 4 HOURS PRN
Status: CANCELLED | OUTPATIENT
Start: 2024-04-30

## 2024-04-30 RX ORDER — CARVEDILOL 6.25 MG/1
6.25 TABLET ORAL 2 TIMES DAILY WITH MEALS
Status: CANCELLED | OUTPATIENT
Start: 2024-04-30

## 2024-04-30 RX ORDER — LIDOCAINE HYDROCHLORIDE 40 MG/ML
SOLUTION TOPICAL PRN
Status: DISCONTINUED | OUTPATIENT
Start: 2024-04-30 | End: 2024-04-30 | Stop reason: SURG

## 2024-04-30 RX ORDER — DIPHENHYDRAMINE HYDROCHLORIDE 50 MG/ML
12.5 INJECTION INTRAMUSCULAR; INTRAVENOUS
Status: DISCONTINUED | OUTPATIENT
Start: 2024-04-30 | End: 2024-04-30 | Stop reason: HOSPADM

## 2024-04-30 RX ORDER — HYDRALAZINE HYDROCHLORIDE 20 MG/ML
10 INJECTION INTRAMUSCULAR; INTRAVENOUS
Status: CANCELLED | OUTPATIENT
Start: 2024-04-30

## 2024-04-30 RX ORDER — TORSEMIDE 20 MG/1
20 TABLET ORAL DAILY
Status: CANCELLED | OUTPATIENT
Start: 2024-04-30

## 2024-04-30 RX ORDER — CLOPIDOGREL BISULFATE 75 MG/1
75 TABLET ORAL DAILY
Status: CANCELLED | OUTPATIENT
Start: 2024-04-30

## 2024-04-30 RX ORDER — POTASSIUM CHLORIDE 7.45 MG/ML
10 INJECTION INTRAVENOUS ONCE
Qty: 100 ML | Refills: 0 | Status: DISCONTINUED | OUTPATIENT
Start: 2024-04-30 | End: 2024-04-30

## 2024-04-30 RX ORDER — POTASSIUM CHLORIDE 750 MG/1
10 TABLET, FILM COATED, EXTENDED RELEASE ORAL 3 TIMES DAILY
Status: CANCELLED | OUTPATIENT
Start: 2024-04-30

## 2024-04-30 RX ORDER — ENEMA 19; 7 G/133ML; G/133ML
1 ENEMA RECTAL
Status: CANCELLED | OUTPATIENT
Start: 2024-04-30

## 2024-04-30 RX ORDER — POTASSIUM CHLORIDE 7.45 MG/ML
10 INJECTION INTRAVENOUS ONCE
Status: DISCONTINUED | OUTPATIENT
Start: 2024-04-30 | End: 2024-04-30

## 2024-04-30 RX ORDER — DAPAGLIFLOZIN 10 MG/1
10 TABLET, FILM COATED ORAL DAILY
Status: CANCELLED | OUTPATIENT
Start: 2024-04-30

## 2024-04-30 RX ORDER — ACETAMINOPHEN 325 MG/1
650 TABLET ORAL EVERY 6 HOURS PRN
Status: CANCELLED | OUTPATIENT
Start: 2024-04-30

## 2024-04-30 RX ORDER — DIPHENHYDRAMINE HCL 25 MG
25 TABLET ORAL NIGHTLY PRN
Status: CANCELLED | OUTPATIENT
Start: 2024-04-30

## 2024-04-30 RX ORDER — HYDROMORPHONE HYDROCHLORIDE 1 MG/ML
0.4 INJECTION, SOLUTION INTRAMUSCULAR; INTRAVENOUS; SUBCUTANEOUS
Status: DISCONTINUED | OUTPATIENT
Start: 2024-04-30 | End: 2024-04-30 | Stop reason: HOSPADM

## 2024-04-30 RX ORDER — POLYETHYLENE GLYCOL 3350 17 G/17G
1 POWDER, FOR SOLUTION ORAL 2 TIMES DAILY PRN
Status: CANCELLED | OUTPATIENT
Start: 2024-04-30

## 2024-04-30 RX ORDER — DIPHENHYDRAMINE HYDROCHLORIDE 50 MG/ML
25 INJECTION INTRAMUSCULAR; INTRAVENOUS EVERY 6 HOURS PRN
Status: CANCELLED | OUTPATIENT
Start: 2024-04-30

## 2024-04-30 RX ORDER — OXYCODONE HCL 5 MG/5 ML
10 SOLUTION, ORAL ORAL
Status: DISCONTINUED | OUTPATIENT
Start: 2024-04-30 | End: 2024-04-30 | Stop reason: HOSPADM

## 2024-04-30 RX ORDER — POTASSIUM CHLORIDE 7.45 MG/ML
10 INJECTION INTRAVENOUS ONCE
Status: DISCONTINUED | OUTPATIENT
Start: 2024-04-30 | End: 2024-04-30 | Stop reason: HOSPADM

## 2024-04-30 RX ORDER — SODIUM CHLORIDE, SODIUM LACTATE, POTASSIUM CHLORIDE, CALCIUM CHLORIDE 600; 310; 30; 20 MG/100ML; MG/100ML; MG/100ML; MG/100ML
INJECTION, SOLUTION INTRAVENOUS CONTINUOUS
Status: DISCONTINUED | OUTPATIENT
Start: 2024-04-30 | End: 2024-04-30

## 2024-04-30 RX ORDER — OXYCODONE HCL 5 MG/5 ML
5 SOLUTION, ORAL ORAL
Status: DISCONTINUED | OUTPATIENT
Start: 2024-04-30 | End: 2024-04-30 | Stop reason: HOSPADM

## 2024-04-30 RX ORDER — AMOXICILLIN 250 MG
1 CAPSULE ORAL NIGHTLY
Status: CANCELLED | OUTPATIENT
Start: 2024-04-30

## 2024-04-30 RX ORDER — ONDANSETRON 2 MG/ML
INJECTION INTRAMUSCULAR; INTRAVENOUS PRN
Status: DISCONTINUED | OUTPATIENT
Start: 2024-04-30 | End: 2024-04-30 | Stop reason: SURG

## 2024-04-30 RX ORDER — IPRATROPIUM BROMIDE AND ALBUTEROL SULFATE 2.5; .5 MG/3ML; MG/3ML
3 SOLUTION RESPIRATORY (INHALATION) EVERY 4 HOURS PRN
Status: CANCELLED | OUTPATIENT
Start: 2024-04-30

## 2024-04-30 RX ORDER — SODIUM CHLORIDE, SODIUM LACTATE, POTASSIUM CHLORIDE, CALCIUM CHLORIDE 600; 310; 30; 20 MG/100ML; MG/100ML; MG/100ML; MG/100ML
INJECTION, SOLUTION INTRAVENOUS CONTINUOUS
Status: DISCONTINUED | OUTPATIENT
Start: 2024-04-30 | End: 2024-04-30 | Stop reason: HOSPADM

## 2024-04-30 RX ORDER — LIDOCAINE HYDROCHLORIDE 20 MG/ML
INJECTION, SOLUTION INFILTRATION; PERINEURAL
Status: DISCONTINUED | OUTPATIENT
Start: 2024-04-30 | End: 2024-04-30 | Stop reason: HOSPADM

## 2024-04-30 RX ORDER — CEFAZOLIN SODIUM 1 G/3ML
INJECTION, POWDER, FOR SOLUTION INTRAMUSCULAR; INTRAVENOUS PRN
Status: DISCONTINUED | OUTPATIENT
Start: 2024-04-30 | End: 2024-04-30 | Stop reason: SURG

## 2024-04-30 RX ORDER — ONDANSETRON 2 MG/ML
4 INJECTION INTRAMUSCULAR; INTRAVENOUS
Status: DISCONTINUED | OUTPATIENT
Start: 2024-04-30 | End: 2024-04-30 | Stop reason: HOSPADM

## 2024-04-30 RX ORDER — HEPARIN SODIUM,PORCINE 1000/ML
VIAL (ML) INJECTION PRN
Status: DISCONTINUED | OUTPATIENT
Start: 2024-04-30 | End: 2024-04-30 | Stop reason: SURG

## 2024-04-30 RX ORDER — BUPIVACAINE HYDROCHLORIDE 5 MG/ML
INJECTION, SOLUTION EPIDURAL; INTRACAUDAL
Status: DISCONTINUED | OUTPATIENT
Start: 2024-04-30 | End: 2024-04-30 | Stop reason: HOSPADM

## 2024-04-30 RX ORDER — SODIUM CHLORIDE 9 MG/ML
INJECTION, SOLUTION INTRAVENOUS CONTINUOUS
Status: CANCELLED | OUTPATIENT
Start: 2024-04-30 | End: 2024-04-30

## 2024-04-30 RX ORDER — DOCUSATE SODIUM 100 MG/1
100 CAPSULE, LIQUID FILLED ORAL 2 TIMES DAILY
Status: CANCELLED | OUTPATIENT
Start: 2024-04-30

## 2024-04-30 RX ORDER — HYDROMORPHONE HYDROCHLORIDE 1 MG/ML
0.2 INJECTION, SOLUTION INTRAMUSCULAR; INTRAVENOUS; SUBCUTANEOUS
Status: DISCONTINUED | OUTPATIENT
Start: 2024-04-30 | End: 2024-04-30 | Stop reason: HOSPADM

## 2024-04-30 RX ORDER — ALBUTEROL SULFATE 90 UG/1
2 AEROSOL, METERED RESPIRATORY (INHALATION) EVERY 4 HOURS PRN
Status: CANCELLED | OUTPATIENT
Start: 2024-04-30

## 2024-04-30 RX ORDER — BISACODYL 10 MG
10 SUPPOSITORY, RECTAL RECTAL
Status: CANCELLED | OUTPATIENT
Start: 2024-04-30

## 2024-04-30 RX ORDER — HYDROMORPHONE HYDROCHLORIDE 1 MG/ML
0.1 INJECTION, SOLUTION INTRAMUSCULAR; INTRAVENOUS; SUBCUTANEOUS
Status: DISCONTINUED | OUTPATIENT
Start: 2024-04-30 | End: 2024-04-30 | Stop reason: HOSPADM

## 2024-04-30 RX ORDER — DEXAMETHASONE SODIUM PHOSPHATE 4 MG/ML
INJECTION, SOLUTION INTRA-ARTICULAR; INTRALESIONAL; INTRAMUSCULAR; INTRAVENOUS; SOFT TISSUE PRN
Status: DISCONTINUED | OUTPATIENT
Start: 2024-04-30 | End: 2024-04-30 | Stop reason: SURG

## 2024-04-30 RX ORDER — ASPIRIN 81 MG/1
81 TABLET ORAL DAILY
Status: CANCELLED | OUTPATIENT
Start: 2024-04-30

## 2024-04-30 RX ADMIN — HEPARIN SODIUM 1000 UNITS: 1000 INJECTION, SOLUTION INTRAVENOUS; SUBCUTANEOUS at 08:06

## 2024-04-30 RX ADMIN — PROPOFOL 150 MG: 10 INJECTION, EMULSION INTRAVENOUS at 07:44

## 2024-04-30 RX ADMIN — DEXAMETHASONE SODIUM PHOSPHATE 4 MG: 4 INJECTION INTRA-ARTICULAR; INTRALESIONAL; INTRAMUSCULAR; INTRAVENOUS; SOFT TISSUE at 08:23

## 2024-04-30 RX ADMIN — CEFAZOLIN 2 G: 1 INJECTION, POWDER, FOR SOLUTION INTRAMUSCULAR; INTRAVENOUS at 07:51

## 2024-04-30 RX ADMIN — ONDANSETRON 4 MG: 2 INJECTION INTRAMUSCULAR; INTRAVENOUS at 08:23

## 2024-04-30 RX ADMIN — LIDOCAINE HYDROCHLORIDE 4 ML: 40 SOLUTION TOPICAL at 07:45

## 2024-04-30 RX ADMIN — SUGAMMADEX 200 MG: 100 INJECTION, SOLUTION INTRAVENOUS at 08:59

## 2024-04-30 RX ADMIN — SODIUM CHLORIDE, POTASSIUM CHLORIDE, SODIUM LACTATE AND CALCIUM CHLORIDE: 600; 310; 30; 20 INJECTION, SOLUTION INTRAVENOUS at 07:38

## 2024-04-30 RX ADMIN — ROCURONIUM BROMIDE 50 MG: 10 INJECTION, SOLUTION INTRAVENOUS at 07:44

## 2024-04-30 ASSESSMENT — PAIN DESCRIPTION - PAIN TYPE: TYPE: ACUTE PAIN

## 2024-04-30 ASSESSMENT — FIBROSIS 4 INDEX
FIB4 SCORE: 0.71
FIB4 SCORE: 0.71

## 2024-04-30 NOTE — OR SURGEON
OPERATIVE REPORT      Operation date: 4/30/2024    Referring physician: Hung Easton MD     PreOp Diagnosis: Severe symptomatic mitral regurgitation, mild to moderate mitral stenosis, mitral annular calcifications, coronary artery disease, hypertension, hyperlipidemia, COPD, polycythemia     PostOp Diagnosis: Severe symptomatic mitral regurgitation, mild to moderate mitral stenosis, mitral annular calcifications, coronary artery disease, hypertension, hyperlipidemia, COPD, polycythemia     Procedure(s):  ATTEMPTED TRANSCATHETER SXME-DC-EQZE MITRAL VALVE REPAIR (SKYLER MitraClip NT), transseptal puncture and intraoperative transesophageal echocardiography    Surgeon(s):  EZEKIEL Sanchez M.D.    Anesthesiologist/Type of Anesthesia:  Anesthesiologist: Gael Remy M.D./General    Surgical Staff:  Circulator: Dayron Smith R.N.; Juana Zamora R.N.  Scrub Person: Halima Gates  Radiology Technologist: Grisell de La Rosa Marquez  Cath Lab Tech: Krupa Morales    Specimens removed if any: None    Estimated Blood Loss: Minimal    Findings: Severe mitral regurgitation, mild to moderate mitral stenosis, mitral annular calcifications    Complications: None    Indications:  Ms. Barnard is a 73-year-old female with severe symptomatic mitral regurgitation.    Procedure:  The patient was brought to the operating room and placed on the operating room table in the supine position.  After successful induction of general anesthesia and endotracheal ovation, the patient was prepped and draped in the usual sterile fashion.  Ultrasound-guided right femoral venous access was obtained.  A 1 cm incision was made in the right groin and a single Perclose suture was deployed.  The [place] catheter was used to perform a transseptal puncture in the mid posterior interatrial septum.  A coil tip wire was introduced into the left atrium and the [place] catheter was removed.  Serial dilatations of the  right groin wound were performed.  A steerable guide catheter was then advanced into the left atrium and the coil tip wire was removed.  A clip delivery system catheter with a MitraClip NT at its tip was positioned appropriately over the mid A2 P2.  However, the MitraClip malfunction and the arms could not open.  The clip delivery system catheter with the MitraClip was removed and exchanged for another one with a different MitraClip NT at its tip.  This time the MitraClip functioning appropriately.  During the first attempt it was positioned over the mid A2 P2.  The clip was advanced into the left ventricle and pulled back to grasp the leaflets.  Intraoperative transesophageal echocardiography, however, showed the mean mitral valve gradient of 16 mmHg.  The clip was released and a more medial grasp was attempted.  The mean mitral transvalvular gradient was still 9 to 10 mmHg.  The MitraClip was released from the leaflets and the entire system was pulled out of the patient.  The Perclose suture was tightened down and Dermabond was applied over the small right groin incision.  The remainder of the operation will be dictated by Dr. Easton.  There were no apparent complications.  The patient tolerated the procedure well and left the operating room in stable condition.      4/30/2024 9:03 AM Salina Washburn MD, FACS

## 2024-04-30 NOTE — PROCEDURES
DATE OF PROCEDURE: 4/30/24    REFERRING PHYSICIAN:     PROCEDURES:  1. Transcatheter mitral valve edge to edge repair (SKYLER or Mitraclip)    PRE PROCEDURAL DIAGNOSIS:  Severe symptomatic mitral regurgitation NYHA III C , due to degenerative mitral valve disease and high surgical risk.    POST PROCEDURAL DIAGNOSIS:  Unsuccessful transcatheter mitral valve edge to edge repair using NTW clip due to high gradient    Surgeons: Dr. Easton (interventional cardiologist) Dr. Washburn (Cardiothoracic surgeon)    Anaesthesiologist:     DESCRIPTION OF PROCEDURE:  After informed consent was signed by the   patient, the patient was brought into the OR 19.  Bilateral groin was prepped and draped in   usual sterile manner.  The initial timeout was performed.     A 6-[place] venous sheath was placed in the right femoral vein by Modified Seldinger   technique under ultrasound guidance. A PerClose devices was delivered after dilatation of skin track.  An 8.5-[place] TorFlex venous sheath was advanced. Half dose IV heparin was given. A [place]   needle was inserted into the catheter and both the needle and the sheath were placed   in the right atrium. Under transesophageal echocardiogram guidance, the [place] needle   was used to puncture the septum and the catheter was advanced into the left atrium by   myself. Rest of the half dose Heparin was given. A Protrack pigtail wire was positioned into the left   atrium. The sheath was removed and the femoral access site was dilated with   multiple dialators. The 22/24-Pal[place] steerable guide catheter handle was inserted into  the left atrium under transesophageal echocardiogram guidance and the dilator was   removed by .  A NT Clip attached to the delivery catheter handle was inserted in to   the left atrium. Again, under transesophageal echocardiogram guidance, the clip was   opened, advanced into the left ventricle and pulled up to the mitral valve leaflets at  the   A2/P2 position. The leaflets were grasped and the clip was partially closed. The steerable   device was manipulated by . gradient in A2/P2 position was 19, A3/P3 was 10 mmHg, both unacceptable to release the clip, so clip was not deployed, removed from the body.    The patient tolerated the procedure well. At the end of procedure hemodynamics were   again obtained. The steerable guide catheter was removed and the right femoral venous   access site was closed via PerClose closure.  The patient was then extubated and transferred to PACU in stable condition.      RECOMMENDATION: Guideline directed medical therapy. Evaluate for percutaneous mitral valve replacement.

## 2024-04-30 NOTE — ANESTHESIA PROCEDURE NOTES
Arterial Line    Performed by: Gael Remy M.D.  Authorized by: Gael Remy M.D.    Start Time:  4/30/2024 7:45 AM  End Time:  4/30/2024 7:46 AM  Patient Location:  OR  Indication: continuous blood pressure monitoring        Catheter Size:  20 G  Seldinger Technique?: Yes    Laterality:  Right  Site:  Radial artery  Line Secured:  Antimicrobial disc, tape and transparent dressing  Events: patient tolerated procedure well with no complications

## 2024-04-30 NOTE — ANESTHESIA PROCEDURE NOTES
Airway    Date/Time: 4/30/2024 7:45 AM    Performed by: Gael Remy M.D.  Authorized by: Gael Remy M.D.    Location:  OR  Urgency:  Elective  Difficult Airway: No    Indications for Airway Management:  Anesthesia      Spontaneous Ventilation: absent    Sedation Level:  Deep  Preoxygenated: Yes    Patient Position:  Sniffing  Mask Difficulty Assessment:  0 - not attempted  Final Airway Type:  Endotracheal airway  Final Endotracheal Airway:  ETT  Cuffed: Yes    Technique Used for Successful ETT Placement:  Direct laryngoscopy    Insertion Site:  Oral  Blade Type:  Ge  Laryngoscope Blade/Videolaryngoscope Blade Size:  3  ETT Size (mm):  7.0  Measured from:  Teeth  ETT to Teeth (cm):  22  Placement Verified by: auscultation and capnometry    Cormack-Lehane Classification:  Grade I - full view of glottis  Number of Attempts at Approach:  1

## 2024-04-30 NOTE — OR NURSING
1130 Pt arrives to phase II from PACU. VSS. Pt reports 0/10 pain. Dressing to R groin clean, dry, and intact.     1200 Discharge instructions reviewed with pt and son . Both verbalize understanding. Copy of instructions sent home with pt    1210 IV removed. Dressing remains clean, dry, and intact. VSS. Pt dressed independently    1220 Pt wheeled to car with all belongings

## 2024-04-30 NOTE — ANESTHESIA PROCEDURE NOTES
CELESTE    Date/Time: 4/30/2024 7:51 AM    Performed by: Gael Remy M.D.  Authorized by: Gael Remy M.D.    Start Time:4/30/2024 7:51 AM  Preanesthetic Checklist: patient identified, IV checked, site marked, risks and benefits discussed, surgical consent, monitors and equipment checked, pre-op evaluation and timeout performed    Indication for CELESTE: diagnostic   Patient Location: OR  Intubated: Yes  Bite Block: Yes  Heart Visualized: Yes  Insertion: atraumatic    **See FULL CELESTE report in patient's chart via CV Syn[facility]**

## 2024-04-30 NOTE — PROGRESS NOTES
0909: Pt arrived from OR post attempted mitral valve clip under anesthesia. Pt is awake' restless. R groin sight is CDI with gauze and tegaderm.    0920: Pt has restless legs; reports taking 10mEq of oral potassium for this at home; updated Dr. Mireles; new order received.     1000: Pt calm.     1122: Pt up to restroom with standby assistance; voided.     1133: Report to MAURA Kingston. Pt to phase II via tera with RN.

## 2024-04-30 NOTE — H&P
HPI:  73 y.o.-year-old patient here for elective transcatheter mitral valve repair using gabino clip    Medications / Drug list prior to admission:  No current facility-administered medications on file prior to encounter.     Current Outpatient Medications on File Prior to Encounter   Medication Sig Dispense Refill    albuterol 108 (90 Base) MCG/ACT Aero Soln inhalation aerosol INHALE 2 PUFFS BY MOUTH EVERY 6 HOURS AS NEEDED 54 g 3    torsemide (DEMADEX) 20 MG Tab TAKE 1 TABLET BY MOUTH EVERY DAY 90 Tablet 3    clopidogrel (PLAVIX) 75 MG Tab Take 1 Tablet by mouth every day. 90 Tablet 3    aspirin 81 MG EC tablet Take 1 Tablet by mouth every day. 90 Tablet 3    hydroxyurea (HYDREA) 500 MG Cap Take 500 mg by mouth every day.      potassium chloride ER (KLOR-CON) 10 MEQ tablet TAKE 1 TABLET BY MOUTH THREE TIMES DAILY 270 Tablet 0    carvedilol (COREG) 6.25 MG Tab Take 1 Tablet by mouth 2 times a day with meals. 60 Tablet 11    dapagliflozin propanediol (FARXIGA) 10 MG Tab Take 1 Tablet by mouth every day. 30 Tablet 11    Budeson-Glycopyrrol-Formoterol (BREZTRI AEROSPHERE) 160-9-4.8 MCG/ACT Aerosol Inhale 2 Puffs 2 (two) times a day. 32.1 g 3    BLACK COHOSH PO Take 1 Tablet by mouth every day. OTC supplement      ibuprofen (MOTRIN) 200 MG Tab Take 400 mg by mouth every 6 hours as needed for Mild Pain.      ipratropium-albuterol (DUONEB) 0.5-2.5 (3) MG/3ML nebulizer solution Take 3 mL by nebulization every four hours as needed for Shortness of Breath. 120 mL 11       Current list of administered Medications:    Current Facility-Administered Medications:     lidocaine (Xylocaine) 1 % injection 0.5 mL, 0.5 mL, Intradermal, Once PRN, Hung Easton M.D.    lactated ringers infusion, , Intravenous, Continuous, Hung Easton M.D.    Past Medical History:   Diagnosis Date    Breath shortness     COPD (chronic obstructive pulmonary disease) (HCC)     Patient suspects her SOB is r/t to her leako valve and that  "she does not have COPD    Dental disorder     upper dentures    Diverticulosis     Emphysema of lung (HCC)     COPD    Heart murmur     Heart valve disease     \"leaky valve\"; Followed by Dr. Reid    High cholesterol     Hypertension     Thrombocytosis     Plavix    Urinary incontinence     Urgency, frequency, leakage       Past Surgical History:   Procedure Laterality Date    ANGIOGRAM      COLONOSCOPY      TUBAL COAGULATION LAPAROSCOPIC BILATERAL         Family History   Problem Relation Age of Onset    Alcohol/Drug Father         alcohol    Other Father         malnutrition    Psychiatric Illness Father     Other Mother         blood clot from surgery    Hypertension Mother     Diabetes Mother         ?    Psychiatric Illness Sister     Other Sister         malnutrition    No Known Problems Maternal Grandmother     No Known Problems Maternal Grandfather     No Known Problems Paternal Grandmother     No Known Problems Paternal Grandfather      Patient family history was personally reviewed, no pertinent family history to current presentation    Social History     Tobacco Use    Smoking status: Never    Smokeless tobacco: Never   Vaping Use    Vaping Use: Never used   Substance Use Topics    Alcohol use: No    Drug use: Yes     Types: Marijuana, Inhaled     Comment: daily- midnight 1/31/24       ALLERGIES:  No Known Allergies    Review of systems:  A complete review of symptoms was reviewed with patient. This is reviewed in H&P and PMH. ALL OTHERS reviewed and negative    Physical exam:  Patient Vitals for the past 24 hrs:   BP Temp Temp src Pulse Resp SpO2 Height Weight   04/30/24 0609 118/66 -- -- -- -- -- -- --   04/30/24 0606 123/72 35.9 °C (96.6 °F) Temporal 68 16 93 % 1.549 m (5' 1\") 86.5 kg (190 lb 11.2 oz)   04/30/24 0546 -- -- -- -- -- -- -- 89.4 kg (197 lb 1.5 oz)     General: No acute distress.   EYES: no jaundice  HEENT: OP clear   Neck:  No JVD.   CVS:  Regular  Resp: Normal respiratory effort, " CTAB. No wheezing or crackles/rhonchi.        Data:  Laboratory studies personally reviewed by me:  No results found for this or any previous visit (from the past 24 hour(s)).    Imaging:  No orders to display         Pertinent cardiac testing, EKG, echocardiogram, prior angiogram films if any reviewed    All pertinent features of laboratory and imaging reviewed including primary images where applicable      Active Problems:    * No active hospital problems. *  Resolved Problems:    * No resolved hospital problems. *      Assessment / Plan:  73 y.o.-year-old patient here for elective transcatheter mitral valve repair using gabino clip  Risk benefits of the procedure discussed in detail, patient agrees to proceed        I personally discussed her case with  Dr Hung Easton M.D.    Future Appointments   Date Time Provider Department Center   5/9/2024  2:45 PM YISEL Chung None   5/27/2024  2:15 PM [facility] EXAM 12 [facility]   6/6/2024  2:45 PM YISEL Chung None   8/14/2024  9:00 [facility]   4/30/2025  1:15 PM [facility] EXAM 9 [facility]       It is my pleasure to participate in the care of Ms. Leonor Barnard.  Please do not hesitate to contact me with questions or concerns.    Hung Easton M.D.    4/30/2024

## 2024-04-30 NOTE — ANESTHESIA POSTPROCEDURE EVALUATION
Patient: Jina Barnard    Procedure Summary       Date: 04/30/24 Room / Location: [place] [address] / SURGERY [facility]    Anesthesia Start: 0738 Anesthesia Stop: 0913    Procedures:       TRANSCATHETER MITRAL VALVE PROCEDURE WITH MITRAL CLIP SYSTEM ATTEMPTED (Bilateral: Groin)      ECHOCARDIOGRAM, TRANSESOPHAGEAL, INTRAOPERATIVE (Esophagus) Diagnosis: (SEVERE MITRAL REGURGITATION)    Surgeons: Hung Easton M.D. Responsible Provider: Gael Remy M.D.    Anesthesia Type: general ASA Status: 4            Final Anesthesia Type: general  Last vitals  BP   Blood Pressure : 118/66    Temp   35.9 °C (96.6 °F)    Pulse   68   Resp   16    SpO2   93 %      Anesthesia Post Evaluation    Patient location during evaluation: PACU  Patient participation: complete - patient participated  Level of consciousness: awake and alert    Airway patency: patent  Anesthetic complications: no  Cardiovascular status: hemodynamically stable  Respiratory status: face mask    PONV: none          No notable events documented.     Nurse Pain Score: 0 (NPRS)

## 2024-04-30 NOTE — ANESTHESIA PREPROCEDURE EVALUATION
" Case: [id number] Date/Time: 04/30/24 0715    Procedures:       TRANSCATHETER MITRAL VALVE PROCEDURE WITH ANY ASSOCIATED POTENTIALLY INCLUDING TRANSESOPHAGEAL ECHOCARDIOGRAM AND POSSIBLE ATRIAL SEPTAL CLOSURE      REPAIR, ATRIAL SEPTAL DEFECT      ECHOCARDIOGRAM, TRANSESOPHAGEAL, INTRAOPERATIVE    Pre-op diagnosis: SEVERE MITRAL REGURGITATION    Location: [place] OR 19 / SURGERY [facility]    Surgeons: Hung Easton M.D.            Relevant Problems   PULMONARY   (positive) Asthma-COPD overlap syndrome (HCC)   (positive) Moderate persistent asthma      CARDIAC   (positive) Acute on chronic diastolic congestive heart failure (HCC)   (positive) Coronary artery disease involving native coronary artery   (positive) Essential hypertension   (positive) Nonrheumatic mitral valve regurgitation      Other   (positive) Polycythemia vera(238.4)     /66   Pulse 68   Temp 35.9 °C (96.6 °F) (Temporal)   Resp 16   Ht 1.549 m (5' 1\")   Wt 86.5 kg (190 lb 11.2 oz)   SpO2 93%   BMI 36.03 kg/m²     Physical Exam    Airway   Mallampati: II  TM distance: >3 FB  Neck ROM: full       Cardiovascular - normal exam  Rhythm: regular  Rate: normal  (-) murmur     Dental - normal exam           Pulmonary - normal exam  Breath sounds clear to auscultation     Abdominal    Neurological - normal exam                   Anesthesia Plan    ASA 4       Plan - general       Airway plan will be ETT  CELESTE Planned        Induction: intravenous    Postoperative Plan: Postoperative administration of opioids is intended.    Pertinent diagnostic labs and testing reviewed    Informed Consent:    Anesthetic plan and risks discussed with patient.    Use of blood products discussed with: patient whom consented to blood products.           "

## 2024-04-30 NOTE — ANESTHESIA TIME REPORT
Anesthesia Start and Stop Event Times       Date Time Event    4/30/2024 0736 Ready for Procedure     0738 Anesthesia Start     0913 Anesthesia Stop          Responsible Staff  04/30/24      Name Role Begin End    Gael Remy M.D. Anesth 0738 0913          Overtime Reason:  no overtime (within assigned shift)    Comments:

## 2024-04-30 NOTE — DISCHARGE INSTRUCTIONS
HOME CARE INSTRUCTIONS    ACTIVITY: Rest and take it easy for the first 24 hours.  A responsible adult is recommended to remain with you during that time.  It is normal to feel sleepy.  We encourage you to not do anything that requires balance, judgment or coordination.    FOR 24 HOURS DO NOT:  Drive, operate machinery or run household appliances.  Drink beer or alcoholic beverages.  Make important decisions or sign legal documents.    SPECIAL INSTRUCTIONS:     Femoral Site Care  The following information offers guidance on how to care for yourself after your procedure. Your health care provider may also give you more specific instructions. If you have problems or questions, contact your health care provider.  What can I expect after the procedure?  After the procedure, it is common to have bruising and tenderness at the incision site. This usually fades within 1-2 weeks.  Follow these instructions at home:  Incision site care  Follow instructions from your health care provider about how to take care of your incision site. Make sure you:  Wash your hands with soap and water for at least 20 seconds before and after you change your bandage (dressing). If soap and water are not available, use hand .  Change or remove your dressing as told by your health care provider.  Leave stitches (sutures), skin glue, or adhesive strips in place. These skin closures may need to stay in place for 2 weeks or longer. If adhesive strip edges start to loosen and curl up, you may trim the loose edges. Do not remove adhesive strips completely unless your health care provider tells you to do that.  Do not take baths, swim, or use a hot tub until your health care provider approves.  You may shower 24-48 hours after the procedure or as told by your health care provider.  Gently wash the incision site with plain soap and water.  Pat the area dry with a clean towel.  Do not rub the site. This may cause bleeding.  Do not apply powder  or lotion to the site. Keep the site clean and dry.  Check your femoral site every day for signs of infection. Check for:  Redness, swelling, or pain.  Fluid or blood.  Warmth.  Pus or a bad smell.  Activity  If you were given a sedative during the procedure, it can affect you for several hours. Do not drive or operate machinery until your health care provider says that it is safe.  Rest as told by your health care provider.  Avoid sitting for a long time without moving. Get up to take short walks every 1-2 hours. This is important to improve blood flow and breathing. Ask for help if you feel weak or unsteady.  Return to your normal activities as told by your health care provider. Ask your health care provider what activities are safe for you and when you can return to work.  Avoid activities that take a lot of effort for the first 2-3 days after your procedure, or as long as directed.  Do not lift anything that is heavier than 10 lb (4.5 kg), or the limit that you are told, until your health care provider says that it is safe.  General instructions  Take over-the-counter and prescription medicines only as told by your health care provider.  If you will be going home right after the procedure, plan to have a responsible adult care for you for the time you are told. This is important.  Keep all follow-up visits. This is important.  Contact a health care provider if:  You have a fever or chills.  You have any of these signs of infection at your incision site:  Redness, swelling, or pain.  Fluid or blood.  Warmth.  Pus or a bad smell.  Get help right away if:  The incision area swells very fast.  The incision area is bleeding, and the bleeding does not stop when you hold steady pressure on the area.  Your leg or foot becomes pale, cool, tingly, or numb.  These symptoms may represent a serious problem that is an emergency. Do not wait to see if the symptoms will go away. Get medical help right away. Call your local  emergency services (911 in the [place] Do not drive yourself to the hospital.  Summary  After the procedure, it is common to have bruising and tenderness that fade within 1-2 weeks.  Check your femoral site every day for signs of infection.  Do not lift anything that is heavier than 10 lb (4.5 kg), or the limit that you are told, until your health care provider says that it is safe.  Get help right away if the incision area swells very fast, you have bleeding at the incision area that does not stop, or your leg or foot becomes pale, cool, or numb.  This information is not intended to replace advice given to you by your health care provider. Make sure you discuss any questions you have with your health care provider.      DIET: To avoid nausea, slowly advance diet as tolerated, avoiding spicy or greasy foods for the first day.  Add more substantial food to your diet according to your physician's instructions.  INCREASE FLUIDS AND FIBER TO AVOID CONSTIPATION.      MEDICATIONS: Resume taking daily medication.  Take prescribed pain medication with food.  If no medication is prescribed, you may take non-aspirin pain medication if needed.  PAIN MEDICATION CAN BE VERY CONSTIPATING.  Take a stool softener or laxative such as senokot, pericolace, or milk of magnesia if needed.    A follow-up appointment should be arranged with your doctor in 1 month; call to schedule.    You should CALL YOUR PHYSICIAN if you develop:  Fever greater than 101 degrees F.  Pain not relieved by medication, or persistent nausea or vomiting.  Excessive bleeding (blood soaking through dressing) or unexpected drainage from the wound.  Extreme redness or swelling around the incision site, drainage of pus or foul smelling drainage.  Inability to urinate or empty your bladder within 8 hours.  Problems with breathing or chest pain.    You should call 911 if you develop problems with breathing or chest pain.  If you are unable to contact your doctor or  surgical center, you should go to the nearest emergency room or urgent care center.  Physician's telephone #: [phone]    MILD FLU-LIKE SYMPTOMS ARE NORMAL.  YOU MAY EXPERIENCE GENERALIZED MUSCLE ACHES, THROAT IRRITATION, HEADACHE AND/OR SOME NAUSEA.    If any questions arise, call your doctor.  If your doctor is not available, please feel free to call the Surgical Center at [phone].  The Center is open Monday through Friday from 7AM to 7PM.      A registered nurse may call you a few days after your surgery to see how you are doing after your procedure.    You may also receive a survey in the mail within the next two weeks and we ask that you take a few moments to complete the survey and return it to us.  Our goal is to provide you with very good care and we value your comments.     Depression / Suicide Risk    As you are discharged from this [facility] facility, it is important to learn how to keep safe from harming yourself.    Recognize the warning signs:  Abrupt changes in personality, positive or negative- including increase in energy   Giving away possessions  Change in eating patterns- significant weight changes-  positive or negative  Change in sleeping patterns- unable to sleep or sleeping all the time   Unwillingness or inability to communicate  Depression  Unusual sadness, discouragement and loneliness  Talk of wanting to die  Neglect of personal appearance   Rebelliousness- reckless behavior  Withdrawal from people/activities they love  Confusion- inability to concentrate     If you or a loved one observes any of these behaviors or has concerns about self-harm, here's what you can do:  Talk about it- your feelings and reasons for harming yourself  Remove any means that you might use to hurt yourself (examples: pills, rope, extension cords, firearm)  Get professional help from the community (Mental Health, Substance Abuse, psychological counseling)  Do not be alone:Call your Safe Contact-  someone whom you trust who will be there for you.  Call your local CRISIS HOTLINE 910-6401 or 406-998-3943  Call your local Children's Mobile Crisis Response Team [place] (785) 998-6592 or www.Springr  Call the toll free National Suicide Prevention Hotlines   National Suicide Prevention Lifeline 764-253-UGBF (4232)  [facility] 800-DIWRUCU (565-8554)    I acknowledge receipt and understanding of these Home Care instructions.

## 2024-05-03 ENCOUNTER — TELEPHONE (OUTPATIENT)
Dept: CARDIOLOGY | Facility: MEDICAL CENTER | Age: 74
End: 2024-05-03
Payer: MEDICARE

## 2024-05-03 NOTE — TELEPHONE ENCOUNTER
AK         Caller: Albert (Son)    Topic/issue: Patient had a  surgery earlier this week with Cardio and she was under the impression they were going to start a new medication but has heard nothing further, please advise     Callback Number: [phone]    Thank You   Monica DAVIDSON

## 2024-05-06 NOTE — TELEPHONE ENCOUNTER
YISEL Chung  You5 minutes ago (9:30 AM)     I don't remember any medications she was supposed to start, we can review at her upcoming apt in 3 days if needing anything though. [place]     Phone Number Called: [phone]    Call outcome:  spoke to patient son     Message: Called to inform patient son that new medications to start at this time and would review if new meds need to  be started at upcoming appt. All questions answered at this time. Advised to call back with any further questions or concerns.

## 2024-05-09 ENCOUNTER — OFFICE VISIT (OUTPATIENT)
Dept: CARDIOLOGY | Facility: MEDICAL CENTER | Age: 74
End: 2024-05-09
Attending: NURSE PRACTITIONER
Payer: MEDICARE

## 2024-05-09 ENCOUNTER — TELEPHONE (OUTPATIENT)
Dept: CARDIOLOGY | Facility: MEDICAL CENTER | Age: 74
End: 2024-05-09

## 2024-05-09 VITALS
SYSTOLIC BLOOD PRESSURE: 122 MMHG | OXYGEN SATURATION: 98 % | DIASTOLIC BLOOD PRESSURE: 80 MMHG | BODY MASS INDEX: 37.57 KG/M2 | WEIGHT: 199 LBS | HEART RATE: 66 BPM | HEIGHT: 61 IN | RESPIRATION RATE: 18 BRPM

## 2024-05-09 DIAGNOSIS — I10 ESSENTIAL HYPERTENSION: ICD-10-CM

## 2024-05-09 DIAGNOSIS — E66.9 OBESITY (BMI 35.0-39.9 WITHOUT COMORBIDITY): ICD-10-CM

## 2024-05-09 DIAGNOSIS — I25.10 CORONARY ARTERY DISEASE INVOLVING NATIVE CORONARY ARTERY OF NATIVE HEART WITHOUT ANGINA PECTORIS: ICD-10-CM

## 2024-05-09 DIAGNOSIS — I34.0 NONRHEUMATIC MITRAL VALVE REGURGITATION: ICD-10-CM

## 2024-05-09 DIAGNOSIS — E78.5 DYSLIPIDEMIA: ICD-10-CM

## 2024-05-09 DIAGNOSIS — I50.33 ACUTE ON CHRONIC DIASTOLIC CONGESTIVE HEART FAILURE (HCC): ICD-10-CM

## 2024-05-09 PROCEDURE — 3074F SYST BP LT 130 MM HG: CPT | Performed by: NURSE PRACTITIONER

## 2024-05-09 PROCEDURE — 3079F DIAST BP 80-89 MM HG: CPT | Performed by: NURSE PRACTITIONER

## 2024-05-09 PROCEDURE — 99214 OFFICE O/P EST MOD 30 MIN: CPT | Performed by: NURSE PRACTITIONER

## 2024-05-09 RX ORDER — POTASSIUM CHLORIDE 1500 MG/1
20 TABLET, EXTENDED RELEASE ORAL 2 TIMES DAILY
Qty: 200 TABLET | Refills: 3 | Status: SHIPPED | OUTPATIENT
Start: 2024-05-09

## 2024-05-09 RX ORDER — TORSEMIDE 20 MG/1
20 TABLET ORAL 2 TIMES DAILY
Qty: 200 TABLET | Refills: 3 | Status: SHIPPED | OUTPATIENT
Start: 2024-05-09

## 2024-05-09 ASSESSMENT — ENCOUNTER SYMPTOMS
PALPITATIONS: 0
CLAUDICATION: 0
COUGH: 0
FEVER: 0
WHEEZING: 1
MYALGIAS: 0
DIZZINESS: 0
SHORTNESS OF BREATH: 1
ABDOMINAL PAIN: 0
PND: 0
ORTHOPNEA: 0

## 2024-05-09 ASSESSMENT — FIBROSIS 4 INDEX: FIB4 SCORE: 0.71

## 2024-05-09 NOTE — PROGRESS NOTES
"Chief Complaint   Patient presents with    Follow-Up     1 week post Mitral     Subjective     Jina Barnard is a 73 y.o. female who presents today for review of symptoms and plan of care with aborted gabino clip procedure (SKYLER).    She is a patient of Dr. Easton in our office. Hx of HTN, obesity, severe MR with acute on chronic heart failure (not able to do gabino clip due to anatomical constraints), and HLD.    She presents today with continued symptoms of progressing symptoms of shortness of breath and fatigue at rest and exertion. She is also wheezy at rest.    She is here with her son Albert. Her groin site had opened with glue coming off. Re-dressed with gauze, tegaderm.    He has no chest pain, dizziness/lightheadedness, or palpitations.    Past Medical History:   Diagnosis Date    Breath shortness     COPD (chronic obstructive pulmonary disease) (HCC)     Patient suspects her SOB is r/t to her leako valve and that she does not have COPD    Dental disorder     upper dentures    Diverticulosis     Emphysema of lung (HCC)     COPD    Heart murmur     Heart valve disease     \"leaky valve\"; Followed by Dr. Reid    High cholesterol     Hypertension     Thrombocytosis     Plavix    Urinary incontinence     Urgency, frequency, leakage     Past Surgical History:   Procedure Laterality Date    TRANSCATHETER MITRAL VALVE REPAIR Bilateral 4/30/2024    Procedure: TRANSCATHETER MITRAL VALVE PROCEDURE WITH MITRAL CLIP SYSTEM ATTEMPTED;  Surgeon: Hung Easton M.D.;  Location: SURGERY [facility];  Service: Cardiac    ECHOCARDIOGRAM, TRANSESOPHAGEAL, INTRAOPERATIVE N/A 4/30/2024    Procedure: ECHOCARDIOGRAM, TRANSESOPHAGEAL, INTRAOPERATIVE;  Surgeon: Hung Easton M.D.;  Location: SURGERY [facility];  Service: Cardiac    ANGIOGRAM      COLONOSCOPY      TUBAL COAGULATION LAPAROSCOPIC BILATERAL       Family History   Problem Relation Age of Onset    Alcohol/Drug Father         alcohol    Other Father  "        malnutrition    Psychiatric Illness Father     Other Mother         blood clot from surgery    Hypertension Mother     Diabetes Mother         ?    Psychiatric Illness Sister     Other Sister         malnutrition    No Known Problems Maternal Grandmother     No Known Problems Maternal Grandfather     No Known Problems Paternal Grandmother     No Known Problems Paternal Grandfather      Social History     Socioeconomic History    Marital status: Single     Spouse name: Not on file    Number of children: Not on file    Years of education: Not on file    Highest education level: Not on file   Occupational History    Not on file   Tobacco Use    Smoking status: Never    Smokeless tobacco: Never   Vaping Use    Vaping Use: Never used   Substance and Sexual Activity    Alcohol use: No    Drug use: Yes     Types: Marijuana, Inhaled     Comment: daily- midnight 1/31/24    Sexual activity: Never     Partners: Male     Birth control/protection: Post-Menopausal   Other Topics Concern    Not on file   Social History Narrative    Not on file     Social Determinants of Health     Financial Resource Strain: Not on file   Food Insecurity: Not on file   Transportation Needs: Not on file   Physical Activity: Not on file   Stress: Not on file   Social Connections: Not on file   Intimate Partner Violence: Not on file   Housing Stability: Not on file     No Known Allergies  Outpatient Encounter Medications as of 5/9/2024   Medication Sig Dispense Refill    ipratropium-albuterol (DUONEB) 0.5-2.5 (3) MG/3ML nebulizer solution Take 3 mL by nebulization every four hours as needed for Shortness of Breath. 120 mL 11    albuterol 108 (90 Base) MCG/ACT Aero Soln inhalation aerosol INHALE 2 PUFFS BY MOUTH EVERY 6 HOURS AS NEEDED 54 g 3    torsemide (DEMADEX) 20 MG Tab TAKE 1 TABLET BY MOUTH EVERY DAY 90 Tablet 3    clopidogrel (PLAVIX) 75 MG Tab Take 1 Tablet by mouth every day. 90 Tablet 3    aspirin 81 MG EC tablet Take 1 Tablet by  "mouth every day. 90 Tablet 3    hydroxyurea (HYDREA) 500 MG Cap Take 500 mg by mouth every day.      potassium chloride ER (KLOR-CON) 10 MEQ tablet TAKE 1 TABLET BY MOUTH THREE TIMES DAILY 270 Tablet 0    carvedilol (COREG) 6.25 MG Tab Take 1 Tablet by mouth 2 times a day with meals. 60 Tablet 11    dapagliflozin propanediol (FARXIGA) 10 MG Tab Take 1 Tablet by mouth every day. 30 Tablet 11    Budeson-Glycopyrrol-Formoterol (BREZTRI AEROSPHERE) 160-9-4.8 MCG/ACT Aerosol Inhale 2 Puffs 2 (two) times a day. 32.1 g 3    BLACK COHOSH PO Take 1 Tablet by mouth every day. OTC supplement      ibuprofen (MOTRIN) 200 MG Tab Take 400 mg by mouth every 6 hours as needed for Mild Pain.       No facility-administered encounter medications on file as of 5/9/2024.     Review of Systems   Constitutional:  Positive for malaise/fatigue. Negative for fever.   Respiratory:  Positive for shortness of breath and wheezing. Negative for cough.    Cardiovascular:  Negative for chest pain, palpitations, orthopnea, claudication, leg swelling and PND.   Gastrointestinal:  Negative for abdominal pain.   Musculoskeletal:  Negative for myalgias.   Neurological:  Negative for dizziness.              Objective     BP (!) 0/0 (BP Location: Left arm, Patient Position: Sitting, BP Cuff Size: Adult)   Ht 1.549 m (5' 1\")   BMI 36.03 kg/m²     Physical Exam  Vitals and nursing note reviewed.   Constitutional:       Appearance: Normal appearance. She is well-developed. She is obese.   HENT:      Head: Normocephalic and atraumatic.   Neck:      Vascular: No JVD.   Cardiovascular:      Rate and Rhythm: Normal rate and regular rhythm.      Pulses: Normal pulses.      Heart sounds: Normal heart sounds.   Pulmonary:      Effort: Pulmonary effort is normal.      Breath sounds: Wheezing present.   Musculoskeletal:         General: Normal range of motion.      Right lower leg: Edema present.      Left lower leg: Edema present.      Comments: Mild LE Edema "   Skin:     General: Skin is warm and dry.      Capillary Refill: Capillary refill takes less than 2 seconds.   Neurological:      General: No focal deficit present.      Mental Status: She is alert and oriented to person, place, and time. Mental status is at baseline.   Psychiatric:         Mood and Affect: Mood normal.         Behavior: Behavior normal.         Thought Content: Thought content normal.         Judgment: Judgment normal.                Assessment & Plan     1. Acute on chronic diastolic congestive heart failure (HCC)        2. Dyslipidemia        3. Essential hypertension        4. Nonrheumatic mitral valve regurgitation        5. Obesity (BMI 35.0-39.9 without comorbidity) (HCC)        6. Coronary artery disease involving native coronary artery of native heart without angina pectoris          Medical Decision Making: Today's Assessment/Status/Plan:      1. Severe MR with heart failure (not amendable with SKYLER (gabino clip) due to anatomical constraints)  -symptoms remain  -increase torsemide to 20 mg BID, increase potassium 20 mEq BID  -not gabino clip candidate, unable to clip intra-op  -consider secondary consultation at [facility] if wanted, follow  -bmp, cbc, bnp in 1 month  -follow weight daily    2. Obesity with HLD with PTCA of mid Cx  -RODRIGUEZ but no angina  -work on weight loss with diet and exercise  -LDL goal <70, lipid panel in 1 month  -cont asa, statin  -cont plavix for 6 months    3. HTN  -good control  -watch with diuretic increase  -cont coreg 6.25 mg BID, torsemide 20 mg BID with K    Patient is to follow up with [facility] in 3 months with labs.

## 2024-05-09 NOTE — PATIENT INSTRUCTIONS
INCREASE torsemide to 20 mg twice a day.  INCREASE potassium to 20 mEq twice a day.    I sent this to your pharmacy.    Labs in 1 month with follow up in 3 months.    Take BP at home with these medication changes.    BP goal 110-130/60-80. Always call with concerns.

## 2024-05-10 NOTE — TELEPHONE ENCOUNTER
On behalf of [facility]'s Structural Heart Program, we would like to thank you for allowing us to participate in the care of your patient, Ms. Barnard.     She did not undergo a successful mitral valve transcatheter [id number] repair (Mitral SKYLER) on Tuesday, April 30 2024. Due to anatomical constraints procedure was aborted. Patient will be prescribed guideline directed medical therapy. There will be consideration of secondary consultation to evaluate for percutaneous mitral valve replacement in the future.     Again, thank you for allowing us to participate in the care of your patient. If you have any questions, please do not hesitate to contact our Structural Heart team.     Sincerely,    [facility]'s Structural Heart Team    PEDRO Cerda, RN, Structural Heart Program Nurse Coordinator ([phone])  PEDRO Dorado, RN, Structural Heart Program Nurse Coordinator ([phone])

## 2024-08-02 ENCOUNTER — TELEPHONE (OUTPATIENT)
Dept: SLEEP MEDICINE | Facility: MEDICAL CENTER | Age: 74
End: 2024-08-02
Payer: MEDICARE

## 2024-08-02 DIAGNOSIS — J45.40 MODERATE PERSISTENT ASTHMA, UNSPECIFIED WHETHER COMPLICATED: ICD-10-CM

## 2024-08-02 NOTE — TELEPHONE ENCOUNTER
Patient called this morning stating she needs an order for a new nebulizer and equipment order sent to [facility]. She said she did not realize it has been over a year since she was in office. She does have a follow-up scheduled with Indigo Fermin 8/14/24. But she is requesting an order be put in before then because she says she cannot wait another 2 weeks.

## 2024-08-09 ASSESSMENT — ENCOUNTER SYMPTOMS
FALLS: 0
FEVER: 0
DIAPHORESIS: 0
SHORTNESS OF BREATH: 1
HEMOPTYSIS: 0
MYALGIAS: 0
PALPITATIONS: 0
VOMITING: 0
HEADACHES: 0
DIZZINESS: 0
NAUSEA: 0
SINUS PAIN: 0
COUGH: 1
HEARTBURN: 0
WEAKNESS: 0
CHILLS: 0
DIARRHEA: 0

## 2024-08-09 NOTE — PROGRESS NOTES
Pulmonary Clinic Note    Date of Visit: 8/14/2024     Chief Complaint:  Chief Complaint   Patient presents with    Follow-Up     Last seen 4/10/24 DOMINGA Cheung     HPI:   Jina Barnard is a very pleasant 72 y.o. year old female never smoker but significant marijuana use, with a PMHx of COPD asthma overlap, CAD, s/p stent placement HTN, marijuana use, intramural leiomyoma uterus, polycythemia vera who presented to the Pulmonary Clinic for a regular follow up. Last seen in the office on 4/10/2024 with myself.     Patient is followed by the pulmonary office for COPD asthma overlap.  PFTs in 2022 show an FVC of 1.79 L or 69%, FEV1 1.19 L or 59%, FEV1/FVC 66%, %, %, DLCO 85% predicted, without positive bronchodilator response.  PFTs done at [facility] in June 2023 shows an FVC of 1.87 L or 74%, FEV1 1.23 L or 64%, FEV1/FVC 65.5, %, TLC 1 1%, DLCO 91% predicted.  CT chest in 2022 shows tree-in-bud GGO nodules throughout both lungs and several sub-3 mm micronodules.  Patient is currently on Trelegy 100, Singulair, DuoNebs, and albuterol as needed.  Patient underwent PCI in March with stent placement to mid circumflex.    Interval events:   10/10/2023-   Patient states that she has been out of Breztri for the last month.  She did receive samples yesterday.  She also reports that she would like to get off of Singulair because she does not feel that it is helping her.  She uses DuoNebs once at night and albuterol inhaler 3 times a day.  She continues to smoke marijuana 1.5 joints per day.  Symptomatically, she states she is short of breath with mMRC of 4, has a cough with clear thick sputum and wheezing.  She was unable to schedule her overnight oxygen test.      4/10/2024-   Patient reports that she is more wheezy due to running low on her DuoNeb and albuterol.  She continues to use and benefit from Breztri.  Symptomatically, she is short of breath with mMRC of 4, which she attributes to her  "baseline mitral regurgitation.  She will also have an occasional dry cough and wheezing.  Patient reports that she is feeling better after her stent placement.  She is also scheduled to have a mitral valve replacement with cardiology at the end of the month.    8/14/2024-  Patient reports that she has been more wheezing due to needing a refill on her DuoNeb.  She states that she will be intermittently short of breath and will have a cough with clear sputum production and wheezing.  She states that her wheezing is nonexistent today.  She continues to use breast tree and just restarted Singulair.  She has not been able to use DuoNeb for several weeks and because of this has been using her albuterol inhaler 4-5 times a day.  Patient continues to smoke marijuana, but states that she has significantly cut back.  Patient is also on torsemide 20 mg twice daily and does not have any lower extremity swelling.  Patient has not had any exacerbations    Exacerbations this year:  0    Current medication regimen: Breztri, DuoNeb, and albuterol as needed    Oxygen use:  None    MMRC Grade:  4- Breathless with ambulating around house or ADLs      Past Medical History:   Diagnosis Date    Breath shortness     COPD (chronic obstructive pulmonary disease) ([facility])     Patient suspects her SOB is r/t to her leako valve and that she does not have COPD    Dental disorder     upper dentures    Diverticulosis     Emphysema of lung ([facility])     COPD    Heart murmur     Heart valve disease     \"leaky valve\"; Followed by Dr. Reid    High cholesterol     Hypertension     Thrombocytosis     Plavix    Urinary incontinence     Urgency, frequency, leakage     Past Surgical History:   Procedure Laterality Date    TRANSCATHETER MITRAL VALVE REPAIR Bilateral 4/30/2024    Procedure: TRANSCATHETER MITRAL VALVE PROCEDURE WITH MITRAL CLIP SYSTEM ATTEMPTED;  Surgeon: Hung Easton M.D.;  Location: SURGERY [facility];  Service: Cardiac    " ECHOCARDIOGRAM, TRANSESOPHAGEAL, INTRAOPERATIVE N/A 4/30/2024    Procedure: ECHOCARDIOGRAM, TRANSESOPHAGEAL, INTRAOPERATIVE;  Surgeon: Hung Easton M.D.;  Location: SURGERY [facility];  Service: Cardiac    ANGIOGRAM      COLONOSCOPY      TUBAL COAGULATION LAPAROSCOPIC BILATERAL       Social History     Socioeconomic History    Marital status: Single     Spouse name: Not on file    Number of children: Not on file    Years of education: Not on file    Highest education level: Not on file   Occupational History    Not on file   Tobacco Use    Smoking status: Never    Smokeless tobacco: Never   Vaping Use    Vaping status: Never Used   Substance and Sexual Activity    Alcohol use: No    Drug use: Yes     Types: Marijuana, Inhaled     Comment: daily- midnight 1/31/24    Sexual activity: Never     Partners: Male     Birth control/protection: Post-Menopausal   Other Topics Concern    Not on file   Social History Narrative    Not on file     Social Determinants of Health     Financial Resource Strain: Not on file   Food Insecurity: Not on file   Transportation Needs: Not on file   Physical Activity: Not on file   Stress: Not on file   Social Connections: Not on file   Intimate Partner Violence: Not on file   Housing Stability: Not on file        Family History   Problem Relation Age of Onset    Alcohol/Drug Father         alcohol    Other Father         malnutrition    Psychiatric Illness Father     Other Mother         blood clot from surgery    Hypertension Mother     Diabetes Mother         ?    Psychiatric Illness Sister     Other Sister         malnutrition    No Known Problems Maternal Grandmother     No Known Problems Maternal Grandfather     No Known Problems Paternal Grandmother     No Known Problems Paternal Grandfather      Current Outpatient Medications on File Prior to Visit   Medication Sig Dispense Refill    torsemide (DEMADEX) 20 MG Tab Take 1 Tablet by mouth 2 times a day. 200 Tablet 3    potassium  "chloride ER (K-TAB) 20 MEQ Tab CR tablet Take 1 Tablet by mouth 2 times a day. 200 Tablet 3    clopidogrel (PLAVIX) 75 MG Tab Take 1 Tablet by mouth every day. 90 Tablet 3    aspirin 81 MG EC tablet Take 1 Tablet by mouth every day. 90 Tablet 3    hydroxyurea (HYDREA) 500 MG Cap Take 500 mg by mouth every day.      carvedilol (COREG) 6.25 MG Tab Take 1 Tablet by mouth 2 times a day with meals. 60 Tablet 11    dapagliflozin propanediol (FARXIGA) 10 MG Tab Take 1 Tablet by mouth every day. 30 Tablet 11    Budeson-Glycopyrrol-Formoterol (BREZTRI AEROSPHERE) 160-9-4.8 MCG/ACT Aerosol Inhale 2 Puffs 2 (two) times a day. 32.1 g 3    BLACK COHOSH PO Take 1 Tablet by mouth every day. OTC supplement      ibuprofen (MOTRIN) 200 MG Tab Take 400 mg by mouth every 6 hours as needed for Mild Pain.       No current facility-administered medications on file prior to visit.     Allergies: Patient has no known allergies.    ROS:   Review of Systems   Constitutional:  Negative for chills, diaphoresis, fever and malaise/fatigue.   HENT:  Negative for congestion and sinus pain.    Respiratory:  Positive for cough (dry), sputum production (clear), shortness of breath and wheezing. Negative for hemoptysis.    Cardiovascular:  Negative for chest pain, palpitations and leg swelling.   Gastrointestinal:  Negative for diarrhea, heartburn, nausea and vomiting.   Musculoskeletal:  Negative for falls and myalgias.   Neurological:  Negative for dizziness, weakness and headaches.     Vitals:  /78 (BP Location: Right arm, Patient Position: Sitting, BP Cuff Size: Adult)   Pulse 65   Ht 1.575 m (5' 2\")   Wt 86.6 kg (191 lb)   SpO2 97%     Physical Exam  Constitutional:       General: She is not in acute distress.     Appearance: Normal appearance. She is not ill-appearing, toxic-appearing or diaphoretic.   Cardiovascular:      Rate and Rhythm: Normal rate and regular rhythm.      Heart sounds: No murmur heard.     No friction rub. No " gallop.   Pulmonary:      Effort: No respiratory distress.      Breath sounds: No stridor. Wheezing (faint) present. No rhonchi or rales.   Musculoskeletal:         General: No swelling.      Right lower leg: No edema.      Left lower leg: No edema.   Skin:     General: Skin is warm.   Neurological:      General: No focal deficit present.      Mental Status: She is alert and oriented to person, place, and time.   Psychiatric:         Mood and Affect: Mood normal.         Behavior: Behavior normal.         Thought Content: Thought content normal.         Judgment: Judgment normal.         Laboratory Data:  PFTs at [facility]: (Date: 7/1/2022)-        CT Chest: (Date: 10/13/2022)-  Impression:  1.  Several scattered tree-in-bud groundglass nodules throughout both lungs, likely sequela of infectious/inflammatory bronchiolitis. No findings to suggest pneumonia.  2.  Mild pneumobilia.  3.  Colonic diverticulosis.      Assessment and Plan:    Problem List Items Addressed This Visit          Pulmonary/Sleep Medicine Problems    Asthma-COPD overlap syndrome (HCC)     PFTs in 2022 show an FVC of 1.79 L or 69%, FEV1 1.19 L or 59%, FEV1/FVC 66%, %, %, DLCO 85% predicted, without positive bronchodilator response.  PFTs done at [facility] in June 2023 shows an FVC of 1.87 L or 74%, FEV1 1.23 L or 64%, FEV1/FVC 65.5, %, TLC 1 1%, DLCO 91% predicted.  Patient is currently on Trelegy 100, Singulair, DuoNebs, and albuterol as needed.  Gold group B.  -- Continue Breztri  -- Continue DuoNebs and albuterol as needed  -- Encouraged marijuana smoking cessation  -- Advised patient to increase exercise as tolerated  -- Advised patient to remain up-to-date on all vaccines.         Relevant Medications    ipratropium-albuterol (DUONEB) 0.5-2.5 (3) MG/3ML nebulizer solution    albuterol 108 (90 Base) MCG/ACT Aero Soln inhalation aerosol       Other    Marijuana use     Patient continues to smoke less than a gram a  day.  --Advised smoking cessation         Nonrheumatic mitral valve regurgitation     Followed by cardiology.  Not gabino clip candidate, unable to clip intra-op.  Patient was given option to consult with KARMA Lopez.           Acute on chronic diastolic congestive heart failure (HCC)     Euvolemic today.  Patient is on torsemide 20 mg twice daily.  --Continue current treatment and encouraged daily weights.          Diagnostic studies have been reviewed with the patient.    Return in about 8 months (around 4/14/2025), or if symptoms worsen or fail to improve, for COPD-ASTHMA, Reva.     This note was generated using voice recognition software which has a chance of producing errors of grammar and possibly content.  I have made every reasonable attempt to find and correct any obvious errors, but it should be expected that some may not be found prior to finalization of this note.    Time spent in record review prior to patient arrival, reviewing results, and in face-to-face encounter totaled 20 min.  __________  DOMINGA Estrella  Pulmonary Medicine  Formerly [facility], [facility]

## 2024-08-14 ENCOUNTER — OFFICE VISIT (OUTPATIENT)
Dept: SLEEP MEDICINE | Facility: MEDICAL CENTER | Age: 74
End: 2024-08-14
Payer: MEDICARE

## 2024-08-14 VITALS
BODY MASS INDEX: 35.15 KG/M2 | WEIGHT: 191 LBS | HEART RATE: 65 BPM | HEIGHT: 62 IN | SYSTOLIC BLOOD PRESSURE: 124 MMHG | DIASTOLIC BLOOD PRESSURE: 78 MMHG | OXYGEN SATURATION: 97 %

## 2024-08-14 DIAGNOSIS — F12.90 MARIJUANA USE: ICD-10-CM

## 2024-08-14 DIAGNOSIS — J44.89 ASTHMA-COPD OVERLAP SYNDROME (HCC): ICD-10-CM

## 2024-08-14 DIAGNOSIS — I50.33 ACUTE ON CHRONIC DIASTOLIC CONGESTIVE HEART FAILURE (HCC): ICD-10-CM

## 2024-08-14 DIAGNOSIS — I34.0 NONRHEUMATIC MITRAL VALVE REGURGITATION: ICD-10-CM

## 2024-08-14 PROCEDURE — 99213 OFFICE O/P EST LOW 20 MIN: CPT

## 2024-08-14 PROCEDURE — 3078F DIAST BP <80 MM HG: CPT

## 2024-08-14 PROCEDURE — 3074F SYST BP LT 130 MM HG: CPT

## 2024-08-14 RX ORDER — ALBUTEROL SULFATE 90 UG/1
AEROSOL, METERED RESPIRATORY (INHALATION)
Qty: 54 G | Refills: 3 | Status: SHIPPED | OUTPATIENT
Start: 2024-08-14

## 2024-08-14 RX ORDER — IPRATROPIUM BROMIDE AND ALBUTEROL SULFATE 2.5; .5 MG/3ML; MG/3ML
3 SOLUTION RESPIRATORY (INHALATION) EVERY 4 HOURS PRN
Qty: 120 ML | Refills: 11 | Status: SHIPPED | OUTPATIENT
Start: 2024-08-14

## 2024-08-14 ASSESSMENT — FIBROSIS 4 INDEX: FIB4 SCORE: 0.71

## 2024-08-14 ASSESSMENT — ENCOUNTER SYMPTOMS
WHEEZING: 1
SPUTUM PRODUCTION: 1

## 2024-08-14 NOTE — ASSESSMENT & PLAN NOTE
Followed by cardiology.  Not gabino clip candidate, unable to clip intra-op.  Patient was given option to consult with KARMA Lopez.

## 2024-08-14 NOTE — ASSESSMENT & PLAN NOTE
PFTs in 2022 show an FVC of 1.79 L or 69%, FEV1 1.19 L or 59%, FEV1/FVC 66%, %, %, DLCO 85% predicted, without positive bronchodilator response.  PFTs done at [place] in June 2023 shows an FVC of 1.87 L or 74%, FEV1 1.23 L or 64%, FEV1/FVC 65.5, %, TLC 1 1%, DLCO 91% predicted.  Patient is currently on Trelegy 100, Singulair, DuoNebs, and albuterol as needed.  Gold group B.  -- Continue Breztri  -- Continue DuoNebs and albuterol as needed  -- Encouraged marijuana smoking cessation  -- Advised patient to increase exercise as tolerated  -- Advised patient to remain up-to-date on all vaccines.

## 2024-08-14 NOTE — ASSESSMENT & PLAN NOTE
Euvolemic today.  Patient is on torsemide 20 mg twice daily.  --Continue current treatment and encouraged daily weights.

## 2024-08-23 ENCOUNTER — TELEPHONE (OUTPATIENT)
Dept: SLEEP MEDICINE | Facility: MEDICAL CENTER | Age: 74
End: 2024-08-23
Payer: MEDICARE

## 2024-08-23 NOTE — TELEPHONE ENCOUNTER
Patient called and is requesting that an order for nebulizer supplies be sent to [facility]. Please notify the patient when it has been done.

## 2024-08-26 NOTE — TELEPHONE ENCOUNTER
Called [facility] and they stated that they have the order for supplies but patient not eligible for new neb until 2025.    Called pt and spoke to son (Albert) and let him know that supplies should be shipped soon

## 2024-08-28 ENCOUNTER — APPOINTMENT (OUTPATIENT)
Dept: SLEEP MEDICINE | Facility: MEDICAL CENTER | Age: 74
End: 2024-08-28
Payer: MEDICARE

## 2024-08-28 DIAGNOSIS — J44.89 ASTHMA-COPD OVERLAP SYNDROME (HCC): ICD-10-CM

## 2024-08-28 DIAGNOSIS — G47.34 NOCTURNAL HYPOXIA: ICD-10-CM

## 2024-08-28 PROCEDURE — 94762 N-INVAS EAR/PLS OXIMTRY CONT: CPT

## 2024-08-28 PROCEDURE — 94762 N-INVAS EAR/PLS OXIMTRY CONT: CPT | Performed by: STUDENT IN AN ORGANIZED HEALTH CARE EDUCATION/TRAINING PROGRAM

## 2024-09-09 ENCOUNTER — HOSPITAL ENCOUNTER (OUTPATIENT)
Dept: LAB | Facility: MEDICAL CENTER | Age: 74
End: 2024-09-09
Attending: NURSE PRACTITIONER
Payer: MEDICARE

## 2024-09-09 LAB — UREA BREATH TEST QL: NEGATIVE

## 2024-09-09 PROCEDURE — 83013 H PYLORI (C-13) BREATH: CPT

## 2024-09-10 ENCOUNTER — HOSPITAL ENCOUNTER (OUTPATIENT)
Dept: LAB | Facility: MEDICAL CENTER | Age: 74
End: 2024-09-10
Attending: NURSE PRACTITIONER
Payer: MEDICARE

## 2024-09-10 DIAGNOSIS — I50.33 ACUTE ON CHRONIC DIASTOLIC CONGESTIVE HEART FAILURE (HCC): ICD-10-CM

## 2024-09-10 DIAGNOSIS — E78.5 DYSLIPIDEMIA: ICD-10-CM

## 2024-09-10 LAB
ANION GAP SERPL CALC-SCNC: 13 MMOL/L (ref 7–16)
BUN SERPL-MCNC: 15 MG/DL (ref 8–22)
CALCIUM SERPL-MCNC: 9.2 MG/DL (ref 8.5–10.5)
CHLORIDE SERPL-SCNC: 105 MMOL/L (ref 96–112)
CHOLEST SERPL-MCNC: 203 MG/DL (ref 100–199)
CO2 SERPL-SCNC: 23 MMOL/L (ref 20–33)
CREAT SERPL-MCNC: 0.94 MG/DL (ref 0.5–1.4)
ERYTHROCYTE [DISTWIDTH] IN BLOOD BY AUTOMATED COUNT: 58.7 FL (ref 35.9–50)
FASTING STATUS PATIENT QL REPORTED: NORMAL
GFR SERPLBLD CREATININE-BSD FMLA CKD-EPI: 63 ML/MIN/1.73 M 2
GLUCOSE SERPL-MCNC: 101 MG/DL (ref 65–99)
HCT VFR BLD AUTO: 44.4 % (ref 37–47)
HDLC SERPL-MCNC: 54 MG/DL
HGB BLD-MCNC: 15.1 G/DL (ref 12–16)
LDLC SERPL CALC-MCNC: 108 MG/DL
MCH RBC QN AUTO: 36.6 PG (ref 27–33)
MCHC RBC AUTO-ENTMCNC: 34 G/DL (ref 32.2–35.5)
MCV RBC AUTO: 107.5 FL (ref 81.4–97.8)
NT-PROBNP SERPL IA-MCNC: 570 PG/ML (ref 0–125)
PLATELET # BLD AUTO: 260 K/UL (ref 164–446)
PMV BLD AUTO: 10.3 FL (ref 9–12.9)
POTASSIUM SERPL-SCNC: 4.3 MMOL/L (ref 3.6–5.5)
RBC # BLD AUTO: 4.13 M/UL (ref 4.2–5.4)
SODIUM SERPL-SCNC: 141 MMOL/L (ref 135–145)
TRIGL SERPL-MCNC: 205 MG/DL (ref 0–149)
WBC # BLD AUTO: 7.2 K/UL (ref 4.8–10.8)

## 2024-09-10 PROCEDURE — 85027 COMPLETE CBC AUTOMATED: CPT

## 2024-09-10 PROCEDURE — 83880 ASSAY OF NATRIURETIC PEPTIDE: CPT

## 2024-09-10 PROCEDURE — 36415 COLL VENOUS BLD VENIPUNCTURE: CPT

## 2024-09-10 PROCEDURE — 80061 LIPID PANEL: CPT

## 2024-09-10 PROCEDURE — 80048 BASIC METABOLIC PNL TOTAL CA: CPT

## 2024-09-12 ENCOUNTER — TELEPHONE (OUTPATIENT)
Dept: CARDIOLOGY | Facility: MEDICAL CENTER | Age: 74
End: 2024-09-12
Payer: MEDICARE

## 2024-09-12 NOTE — TELEPHONE ENCOUNTER
----- Message from Nurse Practitioner YISEL Pitt sent at 9/12/2024 12:07 PM PDT -----  Off statin, LDL goal <70 with CAD. Recommend re-start statin, unsure why she stopped?    BNP remains stable but slightly elevated with known severe MR, any HF symptoms? SC

## 2024-09-12 NOTE — TELEPHONE ENCOUNTER
Phone number called: [phone]     Call outcome: Spoke with son Albert and he states he believes she is still taking the statin medication. Upon chart review the medication looks to have been discontinued upon discharge by mistake. Albert is going to see Jina lowery and will confirm her medication and let us know if she is on it or not. He states she hasn't had any new symptoms and no swelling, just occasional periods where she feels short of breath seemingly unprovoked. Will await message from Albert regarding patient's statin medication.

## 2024-09-17 ENCOUNTER — PATIENT MESSAGE (OUTPATIENT)
Dept: CARDIOLOGY | Facility: MEDICAL CENTER | Age: 74
End: 2024-09-17
Payer: MEDICARE

## 2024-09-17 DIAGNOSIS — E78.5 DYSLIPIDEMIA: ICD-10-CM

## 2024-09-17 DIAGNOSIS — I25.10 CORONARY ARTERY DISEASE INVOLVING NATIVE CORONARY ARTERY OF NATIVE HEART WITHOUT ANGINA PECTORIS: ICD-10-CM

## 2024-09-17 RX ORDER — ROSUVASTATIN CALCIUM 10 MG/1
10 TABLET, COATED ORAL EVERY EVENING
Qty: 90 TABLET | Refills: 3 | Status: SHIPPED | OUTPATIENT
Start: 2024-09-17

## 2024-09-20 ENCOUNTER — HOSPITAL ENCOUNTER (OUTPATIENT)
Dept: RADIOLOGY | Facility: MEDICAL CENTER | Age: 74
End: 2024-09-20
Attending: NURSE PRACTITIONER
Payer: MEDICARE

## 2024-09-20 DIAGNOSIS — K57.92 DIVERTICULITIS: ICD-10-CM

## 2024-09-20 DIAGNOSIS — R10.84 ABDOMINAL PAIN, GENERALIZED: ICD-10-CM

## 2024-09-20 PROCEDURE — 74177 CT ABD & PELVIS W/CONTRAST: CPT

## 2024-09-20 PROCEDURE — 700117 HCHG RX CONTRAST REV CODE 255: Mod: UD

## 2024-09-20 RX ADMIN — IOHEXOL 100 ML: 350 INJECTION, SOLUTION INTRAVENOUS at 11:27

## 2024-09-20 RX ADMIN — IOHEXOL 25 ML: 240 INJECTION, SOLUTION INTRATHECAL; INTRAVASCULAR; INTRAVENOUS; ORAL at 11:25

## 2024-10-04 DIAGNOSIS — I34.0 NONRHEUMATIC MITRAL VALVE REGURGITATION: ICD-10-CM

## 2024-10-07 RX ORDER — CARVEDILOL 6.25 MG/1
6.25 TABLET ORAL 2 TIMES DAILY WITH MEALS
Qty: 180 TABLET | Refills: 2 | Status: SHIPPED | OUTPATIENT
Start: 2024-10-07

## 2024-11-20 DIAGNOSIS — J44.89 ASTHMA-COPD OVERLAP SYNDROME (HCC): ICD-10-CM

## 2024-11-20 RX ORDER — BUDESONIDE, GLYCOPYRROLATE, AND FORMOTEROL FUMARATE 160; 9; 4.8 UG/1; UG/1; UG/1
2 AEROSOL, METERED RESPIRATORY (INHALATION) 2 TIMES DAILY
Qty: 32.1 G | Refills: 3 | Status: SHIPPED | OUTPATIENT
Start: 2024-11-20

## 2024-11-20 NOTE — TELEPHONE ENCOUNTER
Have we ever prescribed this med? Yes.  If yes, what date? 10/10/23    Last OV: 8/14/24 DOMINGA Cheung    Next OV: no pending appt    DX: Asthma-COPD overlap syndrome [J44.89]     Medications: Budeson-Glycopyrrol-Formoterol (BREZTRI AEROSPHERE) 160-9-4.8 MCG/ACT Aerosol

## 2025-01-03 ENCOUNTER — TELEPHONE (OUTPATIENT)
Dept: HEALTH INFORMATION MANAGEMENT | Facility: OTHER | Age: 75
End: 2025-01-03
Payer: MEDICARE

## 2025-01-10 ENCOUNTER — APPOINTMENT (OUTPATIENT)
Dept: SLEEP MEDICINE | Facility: MEDICAL CENTER | Age: 75
End: 2025-01-10
Payer: MEDICARE

## 2025-02-02 DIAGNOSIS — I50.33 ACUTE ON CHRONIC DIASTOLIC CONGESTIVE HEART FAILURE (HCC): ICD-10-CM

## 2025-02-03 NOTE — TELEPHONE ENCOUNTER
Is the patient due for a refill? Yes    Was the patient seen the last 15 months? Yes    Date of last office visit: 5/9/2024    Does the patient have an upcoming appointment?  No    Provider to refill:SC    Does the patient have [facility] Plus and need 100-day supply? (This applies to ALL medications) Patient does not have SCP

## 2025-02-04 RX ORDER — DAPAGLIFLOZIN 10 MG/1
10 TABLET, FILM COATED ORAL
Qty: 90 TABLET | Refills: 0 | Status: SHIPPED | OUTPATIENT
Start: 2025-02-04

## 2025-03-03 ENCOUNTER — APPOINTMENT (OUTPATIENT)
Dept: CARDIOLOGY | Facility: MEDICAL CENTER | Age: 75
End: 2025-03-03
Payer: MEDICARE

## 2025-03-03 VITALS
SYSTOLIC BLOOD PRESSURE: 92 MMHG | HEART RATE: 64 BPM | DIASTOLIC BLOOD PRESSURE: 56 MMHG | WEIGHT: 180 LBS | HEIGHT: 62 IN | OXYGEN SATURATION: 96 % | RESPIRATION RATE: 18 BRPM | BODY MASS INDEX: 33.13 KG/M2

## 2025-03-03 DIAGNOSIS — I50.33 ACUTE ON CHRONIC DIASTOLIC CONGESTIVE HEART FAILURE (HCC): ICD-10-CM

## 2025-03-03 DIAGNOSIS — E66.9 OBESITY (BMI 35.0-39.9 WITHOUT COMORBIDITY): ICD-10-CM

## 2025-03-03 DIAGNOSIS — E78.5 DYSLIPIDEMIA: ICD-10-CM

## 2025-03-03 DIAGNOSIS — I25.10 CORONARY ARTERY DISEASE INVOLVING NATIVE CORONARY ARTERY OF NATIVE HEART WITHOUT ANGINA PECTORIS: ICD-10-CM

## 2025-03-03 DIAGNOSIS — I10 ESSENTIAL HYPERTENSION: ICD-10-CM

## 2025-03-03 DIAGNOSIS — I34.0 NONRHEUMATIC MITRAL VALVE REGURGITATION: ICD-10-CM

## 2025-03-03 PROCEDURE — 3074F SYST BP LT 130 MM HG: CPT | Performed by: NURSE PRACTITIONER

## 2025-03-03 PROCEDURE — 99214 OFFICE O/P EST MOD 30 MIN: CPT | Performed by: NURSE PRACTITIONER

## 2025-03-03 PROCEDURE — 3078F DIAST BP <80 MM HG: CPT | Performed by: NURSE PRACTITIONER

## 2025-03-03 PROCEDURE — 99213 OFFICE O/P EST LOW 20 MIN: CPT | Performed by: NURSE PRACTITIONER

## 2025-03-03 RX ORDER — DAPAGLIFLOZIN 10 MG/1
10 TABLET, FILM COATED ORAL
Qty: 100 TABLET | Refills: 3 | Status: SHIPPED | OUTPATIENT
Start: 2025-03-03

## 2025-03-03 ASSESSMENT — ENCOUNTER SYMPTOMS
CLAUDICATION: 0
SHORTNESS OF BREATH: 1
COUGH: 0
ABDOMINAL PAIN: 0
PALPITATIONS: 0
DIZZINESS: 0
WHEEZING: 1
FEVER: 0
MYALGIAS: 0
ORTHOPNEA: 0
PND: 0

## 2025-03-03 ASSESSMENT — FIBROSIS 4 INDEX: FIB4 SCORE: 0.82

## 2025-03-03 NOTE — PATIENT INSTRUCTIONS
STOP clopidogrel (Plavix).    I have refilled your farxiga to [facility].    All other heart medications will remain the same, no refills needed until end of year.    Follow up in 6 months.    Obtain labs.

## 2025-03-03 NOTE — PROGRESS NOTES
"Chief Complaint   Patient presents with    Follow-Up     Acute on chronic diastolic congestive heart failure (HCC)    Hypertension    Dyslipidemia     Subjective     Jina Barnard is a 73 y.o. female who presents today for review of symptoms and plan of care with aborted gabino clip procedure (SKYLER).     She is a patient of Dr. Easton in our office. Hx of HTN, obesity, severe MR with acute on chronic heart failure (not able to do gabino clip due to anatomical constraints), and HLD.    She presents today with continued symptoms of progressing symptoms of shortness of breath and fatigue at rest and exertion.     She is here with her son Albert.     He has no chest pain, dizziness/lightheadedness, or palpitations.    Past Medical History:   Diagnosis Date    Breath shortness     COPD (chronic obstructive pulmonary disease) ([facility])     Patient suspects her SOB is r/t to her leako valve and that she does not have COPD    Dental disorder     upper dentures    Diverticulosis     Emphysema of lung ([facility])     COPD    Heart murmur     Heart valve disease     \"leaky valve\"; Followed by Dr. Reid    High cholesterol     Hypertension     Thrombocytosis     Plavix    Urinary incontinence     Urgency, frequency, leakage     Past Surgical History:   Procedure Laterality Date    TRANSCATHETER MITRAL VALVE REPAIR Bilateral 4/30/2024    Procedure: TRANSCATHETER MITRAL VALVE PROCEDURE WITH MITRAL CLIP SYSTEM ATTEMPTED;  Surgeon: Hung Easton M.D.;  Location: SURGERY [facility];  Service: Cardiac    ECHOCARDIOGRAM, TRANSESOPHAGEAL, INTRAOPERATIVE N/A 4/30/2024    Procedure: ECHOCARDIOGRAM, TRANSESOPHAGEAL, INTRAOPERATIVE;  Surgeon: Hung Easton M.D.;  Location: SURGERY [facility];  Service: Cardiac    ANGIOGRAM      COLONOSCOPY      TUBAL COAGULATION LAPAROSCOPIC BILATERAL       Family History   Problem Relation Age of Onset    Alcohol/Drug Father         alcohol    Other Father         malnutrition    Psychiatric " Illness Father     Other Mother         blood clot from surgery    Hypertension Mother     Diabetes Mother         ?    Psychiatric Illness Sister     Other Sister         malnutrition    No Known Problems Maternal Grandmother     No Known Problems Maternal Grandfather     No Known Problems Paternal Grandmother     No Known Problems Paternal Grandfather      Social History     Socioeconomic History    Marital status: Single     Spouse name: Not on file    Number of children: Not on file    Years of education: Not on file    Highest education level: Not on file   Occupational History    Not on file   Tobacco Use    Smoking status: Never    Smokeless tobacco: Never   Vaping Use    Vaping status: Never Used   Substance and Sexual Activity    Alcohol use: No    Drug use: Yes     Types: Marijuana, Inhaled     Comment: occ    Sexual activity: Never     Partners: Male     Birth control/protection: Post-Menopausal   Other Topics Concern    Not on file   Social History Narrative    Not on file     Social Drivers of Health     Financial Resource Strain: Not on file   Food Insecurity: Not on file   Transportation Needs: Not on file   Physical Activity: Not on file   Stress: Not on file   Social Connections: Not on file   Intimate Partner Violence: Not on file   Housing Stability: Not on file     No Known Allergies  Outpatient Encounter Medications as of 3/3/2025   Medication Sig Dispense Refill    dapagliflozin propanediol (FARXIGA) 10 MG Tab Take 1 Tablet by mouth every day. 100 Tablet 3    BREZTRI AEROSPHERE 160-9-4.8 MCG/ACT Aerosol INHALE 2 PUFFS BY MOUTH TWICE DAILY 32.1 g 3    carvedilol (COREG) 6.25 MG Tab TAKE 1 TABLET BY MOUTH TWICE DAILY WITH MEALS 180 Tablet 2    rosuvastatin (CRESTOR) 10 MG Tab Take 1 Tablet by mouth every evening. 90 Tablet 3    ipratropium-albuterol (DUONEB) 0.5-2.5 (3) MG/3ML nebulizer solution Take 3 mL by nebulization every four hours as needed for Shortness of Breath. 120 mL 11    albuterol  "108 (90 Base) MCG/ACT Aero Soln inhalation aerosol INHALE 2 PUFFS BY MOUTH EVERY 6 HOURS AS NEEDED 54 g 3    torsemide (DEMADEX) 20 MG Tab Take 1 Tablet by mouth 2 times a day. 200 Tablet 3    potassium chloride ER (K-TAB) 20 MEQ Tab CR tablet Take 1 Tablet by mouth 2 times a day. 200 Tablet 3    aspirin 81 MG EC tablet Take 1 Tablet by mouth every day. 90 Tablet 3    hydroxyurea (HYDREA) 500 MG Cap Take 500 mg by mouth every day.      BLACK COHOSH PO Take 1 Tablet by mouth every day. OTC supplement      ibuprofen (MOTRIN) 200 MG Tab Take 400 mg by mouth every 6 hours as needed for Mild Pain.      [DISCONTINUED] FARXIGA 10 MG Tab TAKE 1 TABLET BY MOUTH EVERY DAY 90 Tablet 0    [DISCONTINUED] clopidogrel (PLAVIX) 75 MG Tab Take 1 Tablet by mouth every day. 90 Tablet 3     No facility-administered encounter medications on file as of 3/3/2025.     Review of Systems   Constitutional:  Positive for malaise/fatigue. Negative for fever.   Respiratory:  Positive for shortness of breath and wheezing. Negative for cough.    Cardiovascular:  Negative for chest pain, palpitations, orthopnea, claudication, leg swelling and PND.   Gastrointestinal:  Negative for abdominal pain.   Musculoskeletal:  Negative for myalgias.   Neurological:  Negative for dizziness.              Objective     BP 92/56 (BP Location: Left arm, Patient Position: Sitting, BP Cuff Size: Adult)   Pulse 64   Resp 18   Ht 1.575 m (5' 2\")   Wt 81.6 kg (180 lb)   SpO2 96%   BMI 32.92 kg/m²     Physical Exam  Vitals and nursing note reviewed.   Constitutional:       Appearance: Normal appearance. She is well-developed. She is obese.   HENT:      Head: Normocephalic and atraumatic.   Neck:      Vascular: No JVD.   Cardiovascular:      Rate and Rhythm: Normal rate and regular rhythm.      Pulses: Normal pulses.      Heart sounds: Normal heart sounds.   Pulmonary:      Effort: Pulmonary effort is normal.      Breath sounds: Wheezing present.   Musculoskeletal: "         General: Normal range of motion.      Right lower leg: Edema present.      Left lower leg: Edema present.      Comments: Mild LE Edema   Skin:     General: Skin is warm and dry.      Capillary Refill: Capillary refill takes less than 2 seconds.   Neurological:      General: No focal deficit present.      Mental Status: She is alert and oriented to person, place, and time. Mental status is at baseline.   Psychiatric:         Mood and Affect: Mood normal.         Behavior: Behavior normal.         Thought Content: Thought content normal.         Judgment: Judgment normal.                Assessment & Plan     1. Nonrheumatic mitral valve regurgitation  proBrain Natriuretic Peptide, NT    Basic Metabolic Panel      2. Acute on chronic diastolic congestive heart failure (HCC)  dapagliflozin propanediol (FARXIGA) 10 MG Tab    proBrain Natriuretic Peptide, NT    Basic Metabolic Panel    CBC WITHOUT DIFFERENTIAL      3. Coronary artery disease involving native coronary artery of native heart without angina pectoris        4. Dyslipidemia        5. Essential hypertension  CBC WITHOUT DIFFERENTIAL      6. Obesity (BMI 35.0-39.9 without comorbidity) (HCC)  CBC WITHOUT DIFFERENTIAL          Medical Decision Making: Today's Assessment/Status/Plan:      1. Severe MR with heart failure (not amendable with SKYLER (gabino clip) due to anatomical constraints)  -symptoms remain, consider IV diuretic therapy  -cont torsemide 20 mg BID and potassium 20 mEq BID  -not gabino clip candidate, unable to clip intra-op  -consider secondary consultation at [facility] if wanted, not wanting at this time  -bmp, cbc, bnp in 1 month  -follow weight daily    2. Obesity with HLD with PTCA of mid Cx  -RODRIGUEZ but no angina  -work on weight loss with diet and exercise  -LDL goal <70  -cont asa, statin  -OK to stop plavix    3. HTN  -good control, low normal today  -watch with diuretic increase  -cont coreg 6.25 mg BID, torsemide 20 mg BID with  K    Patient is to follow up with [facility] in 6 months with labs. Follow BP and symptoms closely at home.

## 2025-03-18 ENCOUNTER — APPOINTMENT (OUTPATIENT)
Dept: RADIOLOGY | Facility: MEDICAL CENTER | Age: 75
DRG: 683 | End: 2025-03-18
Attending: EMERGENCY MEDICINE
Payer: MEDICARE

## 2025-03-18 ENCOUNTER — HOSPITAL ENCOUNTER (INPATIENT)
Facility: MEDICAL CENTER | Age: 75
LOS: 2 days | DRG: 683 | End: 2025-03-20
Attending: EMERGENCY MEDICINE | Admitting: STUDENT IN AN ORGANIZED HEALTH CARE EDUCATION/TRAINING PROGRAM
Payer: MEDICARE

## 2025-03-18 DIAGNOSIS — N17.9 AKI (ACUTE KIDNEY INJURY) (HCC): ICD-10-CM

## 2025-03-18 DIAGNOSIS — R10.13 EPIGASTRIC PAIN: ICD-10-CM

## 2025-03-18 PROBLEM — R10.9 AP (ABDOMINAL PAIN): Status: ACTIVE | Noted: 2025-03-18

## 2025-03-18 LAB
ALBUMIN SERPL BCP-MCNC: 4.2 G/DL (ref 3.2–4.9)
ALBUMIN/GLOB SERPL: 1.7 G/DL
ALP SERPL-CCNC: 66 U/L (ref 30–99)
ALT SERPL-CCNC: <5 U/L (ref 2–50)
ANION GAP SERPL CALC-SCNC: 19 MMOL/L (ref 7–16)
APPEARANCE UR: CLEAR
AST SERPL-CCNC: 16 U/L (ref 12–45)
BACTERIA #/AREA URNS HPF: ABNORMAL /HPF
BASOPHILS # BLD AUTO: 0.3 % (ref 0–1.8)
BASOPHILS # BLD: 0.03 K/UL (ref 0–0.12)
BILIRUB SERPL-MCNC: 0.3 MG/DL (ref 0.1–1.5)
BILIRUB UR QL STRIP.AUTO: NEGATIVE
BUN SERPL-MCNC: 53 MG/DL (ref 8–22)
CALCIUM ALBUM COR SERPL-MCNC: 9 MG/DL (ref 8.5–10.5)
CALCIUM SERPL-MCNC: 9.2 MG/DL (ref 8.5–10.5)
CASTS URNS QL MICRO: ABNORMAL /LPF (ref 0–2)
CHLORIDE SERPL-SCNC: 100 MMOL/L (ref 96–112)
CO2 SERPL-SCNC: 16 MMOL/L (ref 20–33)
COLOR UR: YELLOW
CREAT SERPL-MCNC: 2.56 MG/DL (ref 0.5–1.4)
EKG IMPRESSION: NORMAL
EOSINOPHIL # BLD AUTO: 0.06 K/UL (ref 0–0.51)
EOSINOPHIL NFR BLD: 0.6 % (ref 0–6.9)
EPITHELIAL CELLS 1715: ABNORMAL /HPF (ref 0–5)
EPITHELIAL CELLS RENAL  1715R: PRESENT /HPF
ERYTHROCYTE [DISTWIDTH] IN BLOOD BY AUTOMATED COUNT: 54.5 FL (ref 35.9–50)
GFR SERPLBLD CREATININE-BSD FMLA CKD-EPI: 19 ML/MIN/1.73 M 2
GLOBULIN SER CALC-MCNC: 2.5 G/DL (ref 1.9–3.5)
GLUCOSE SERPL-MCNC: 158 MG/DL (ref 65–99)
GLUCOSE UR STRIP.AUTO-MCNC: 100 MG/DL
HCT VFR BLD AUTO: 44 % (ref 37–47)
HGB BLD-MCNC: 15.6 G/DL (ref 12–16)
HYALINE CAST   1831: PRESENT /LPF
IMM GRANULOCYTES # BLD AUTO: 0.04 K/UL (ref 0–0.11)
IMM GRANULOCYTES NFR BLD AUTO: 0.4 % (ref 0–0.9)
KETONES UR STRIP.AUTO-MCNC: ABNORMAL MG/DL
LEUKOCYTE ESTERASE UR QL STRIP.AUTO: NEGATIVE
LIPASE SERPL-CCNC: 42 U/L (ref 11–82)
LYMPHOCYTES # BLD AUTO: 2.26 K/UL (ref 1–4.8)
LYMPHOCYTES NFR BLD: 24 % (ref 22–41)
MCH RBC QN AUTO: 37.3 PG (ref 27–33)
MCHC RBC AUTO-ENTMCNC: 35.5 G/DL (ref 32.2–35.5)
MCV RBC AUTO: 105.3 FL (ref 81.4–97.8)
MICRO URNS: ABNORMAL
MONOCYTES # BLD AUTO: 0.55 K/UL (ref 0–0.85)
MONOCYTES NFR BLD AUTO: 5.8 % (ref 0–13.4)
NEUTROPHILS # BLD AUTO: 6.49 K/UL (ref 1.82–7.42)
NEUTROPHILS NFR BLD: 68.9 % (ref 44–72)
NITRITE UR QL STRIP.AUTO: NEGATIVE
NRBC # BLD AUTO: 0 K/UL
NRBC BLD-RTO: 0 /100 WBC (ref 0–0.2)
PH UR STRIP.AUTO: 5.5 [PH] (ref 5–8)
PLATELET # BLD AUTO: 322 K/UL (ref 164–446)
PMV BLD AUTO: 10.1 FL (ref 9–12.9)
POTASSIUM SERPL-SCNC: 3.6 MMOL/L (ref 3.6–5.5)
PROT SERPL-MCNC: 6.7 G/DL (ref 6–8.2)
PROT UR QL STRIP: 30 MG/DL
RBC # BLD AUTO: 4.18 M/UL (ref 4.2–5.4)
RBC # URNS HPF: ABNORMAL /HPF (ref 0–2)
RBC UR QL AUTO: NEGATIVE
SODIUM SERPL-SCNC: 135 MMOL/L (ref 135–145)
SP GR UR STRIP.AUTO: 1.01
TROPONIN T SERPL-MCNC: 34 NG/L (ref 6–19)
UROBILINOGEN UR STRIP.AUTO-MCNC: 0.2 EU/DL
WBC # BLD AUTO: 9.4 K/UL (ref 4.8–10.8)
WBC #/AREA URNS HPF: ABNORMAL /HPF

## 2025-03-18 PROCEDURE — 96375 TX/PRO/DX INJ NEW DRUG ADDON: CPT

## 2025-03-18 PROCEDURE — 700105 HCHG RX REV CODE 258: Performed by: STUDENT IN AN ORGANIZED HEALTH CARE EDUCATION/TRAINING PROGRAM

## 2025-03-18 PROCEDURE — 93005 ELECTROCARDIOGRAM TRACING: CPT | Mod: TC

## 2025-03-18 PROCEDURE — 700102 HCHG RX REV CODE 250 W/ 637 OVERRIDE(OP): Performed by: STUDENT IN AN ORGANIZED HEALTH CARE EDUCATION/TRAINING PROGRAM

## 2025-03-18 PROCEDURE — 93005 ELECTROCARDIOGRAM TRACING: CPT | Mod: TC | Performed by: EMERGENCY MEDICINE

## 2025-03-18 PROCEDURE — 700105 HCHG RX REV CODE 258: Mod: UD | Performed by: EMERGENCY MEDICINE

## 2025-03-18 PROCEDURE — 85025 COMPLETE CBC W/AUTO DIFF WBC: CPT

## 2025-03-18 PROCEDURE — 99222 1ST HOSP IP/OBS MODERATE 55: CPT | Mod: AI | Performed by: STUDENT IN AN ORGANIZED HEALTH CARE EDUCATION/TRAINING PROGRAM

## 2025-03-18 PROCEDURE — A9270 NON-COVERED ITEM OR SERVICE: HCPCS | Performed by: EMERGENCY MEDICINE

## 2025-03-18 PROCEDURE — 99285 EMERGENCY DEPT VISIT HI MDM: CPT

## 2025-03-18 PROCEDURE — 700111 HCHG RX REV CODE 636 W/ 250 OVERRIDE (IP): Mod: JZ,UD | Performed by: EMERGENCY MEDICINE

## 2025-03-18 PROCEDURE — 71045 X-RAY EXAM CHEST 1 VIEW: CPT

## 2025-03-18 PROCEDURE — 700102 HCHG RX REV CODE 250 W/ 637 OVERRIDE(OP): Performed by: EMERGENCY MEDICINE

## 2025-03-18 PROCEDURE — 74176 CT ABD & PELVIS W/O CONTRAST: CPT

## 2025-03-18 PROCEDURE — 770001 HCHG ROOM/CARE - MED/SURG/GYN PRIV*

## 2025-03-18 PROCEDURE — 700111 HCHG RX REV CODE 636 W/ 250 OVERRIDE (IP): Performed by: STUDENT IN AN ORGANIZED HEALTH CARE EDUCATION/TRAINING PROGRAM

## 2025-03-18 PROCEDURE — 84484 ASSAY OF TROPONIN QUANT: CPT

## 2025-03-18 PROCEDURE — 36415 COLL VENOUS BLD VENIPUNCTURE: CPT

## 2025-03-18 PROCEDURE — 83690 ASSAY OF LIPASE: CPT

## 2025-03-18 PROCEDURE — 81001 URINALYSIS AUTO W/SCOPE: CPT

## 2025-03-18 PROCEDURE — 96374 THER/PROPH/DIAG INJ IV PUSH: CPT

## 2025-03-18 PROCEDURE — 80053 COMPREHEN METABOLIC PANEL: CPT

## 2025-03-18 PROCEDURE — A9270 NON-COVERED ITEM OR SERVICE: HCPCS | Performed by: STUDENT IN AN ORGANIZED HEALTH CARE EDUCATION/TRAINING PROGRAM

## 2025-03-18 RX ORDER — OXYCODONE HYDROCHLORIDE 5 MG/1
5 TABLET ORAL
Refills: 0 | Status: DISCONTINUED | OUTPATIENT
Start: 2025-03-18 | End: 2025-03-20 | Stop reason: HOSPADM

## 2025-03-18 RX ORDER — CARVEDILOL 6.25 MG/1
6.25 TABLET ORAL 2 TIMES DAILY WITH MEALS
Status: DISCONTINUED | OUTPATIENT
Start: 2025-03-18 | End: 2025-03-20 | Stop reason: HOSPADM

## 2025-03-18 RX ORDER — POLYETHYLENE GLYCOL 3350 17 G/17G
1 POWDER, FOR SOLUTION ORAL
Status: DISCONTINUED | OUTPATIENT
Start: 2025-03-18 | End: 2025-03-18

## 2025-03-18 RX ORDER — ALBUTEROL SULFATE 90 UG/1
2 INHALANT RESPIRATORY (INHALATION) EVERY 6 HOURS PRN
Status: DISCONTINUED | OUTPATIENT
Start: 2025-03-18 | End: 2025-03-20 | Stop reason: HOSPADM

## 2025-03-18 RX ORDER — HYDROMORPHONE HYDROCHLORIDE 2 MG/ML
0.25 INJECTION, SOLUTION INTRAMUSCULAR; INTRAVENOUS; SUBCUTANEOUS
Status: DISCONTINUED | OUTPATIENT
Start: 2025-03-18 | End: 2025-03-20 | Stop reason: HOSPADM

## 2025-03-18 RX ORDER — HYDROXYUREA 500 MG/1
1000 CAPSULE ORAL
Status: DISCONTINUED | OUTPATIENT
Start: 2025-03-18 | End: 2025-03-20 | Stop reason: HOSPADM

## 2025-03-18 RX ORDER — OXYCODONE HYDROCHLORIDE 5 MG/1
2.5 TABLET ORAL
Refills: 0 | Status: DISCONTINUED | OUTPATIENT
Start: 2025-03-18 | End: 2025-03-20 | Stop reason: HOSPADM

## 2025-03-18 RX ORDER — PANTOPRAZOLE SODIUM 40 MG/10ML
40 INJECTION, POWDER, LYOPHILIZED, FOR SOLUTION INTRAVENOUS DAILY
Status: DISCONTINUED | OUTPATIENT
Start: 2025-03-18 | End: 2025-03-19

## 2025-03-18 RX ORDER — AMOXICILLIN 250 MG
2 CAPSULE ORAL EVERY EVENING
Status: DISCONTINUED | OUTPATIENT
Start: 2025-03-18 | End: 2025-03-18

## 2025-03-18 RX ORDER — ACETAMINOPHEN 325 MG/1
650 TABLET ORAL EVERY 6 HOURS PRN
Status: DISCONTINUED | OUTPATIENT
Start: 2025-03-18 | End: 2025-03-20 | Stop reason: HOSPADM

## 2025-03-18 RX ORDER — MORPHINE SULFATE 4 MG/ML
4 INJECTION INTRAVENOUS ONCE
Status: COMPLETED | OUTPATIENT
Start: 2025-03-18 | End: 2025-03-18

## 2025-03-18 RX ORDER — ASPIRIN 81 MG/1
81 TABLET ORAL DAILY
Status: DISCONTINUED | OUTPATIENT
Start: 2025-03-18 | End: 2025-03-20 | Stop reason: HOSPADM

## 2025-03-18 RX ORDER — ROSUVASTATIN CALCIUM 20 MG/1
10 TABLET, COATED ORAL EVERY EVENING
Status: DISCONTINUED | OUTPATIENT
Start: 2025-03-18 | End: 2025-03-20 | Stop reason: HOSPADM

## 2025-03-18 RX ORDER — SODIUM CHLORIDE 9 MG/ML
INJECTION, SOLUTION INTRAVENOUS CONTINUOUS
Status: DISCONTINUED | OUTPATIENT
Start: 2025-03-18 | End: 2025-03-20 | Stop reason: HOSPADM

## 2025-03-18 RX ORDER — SODIUM CHLORIDE, SODIUM LACTATE, POTASSIUM CHLORIDE, AND CALCIUM CHLORIDE .6; .31; .03; .02 G/100ML; G/100ML; G/100ML; G/100ML
1000 INJECTION, SOLUTION INTRAVENOUS ONCE
Status: COMPLETED | OUTPATIENT
Start: 2025-03-18 | End: 2025-03-18

## 2025-03-18 RX ORDER — ONDANSETRON 2 MG/ML
4 INJECTION INTRAMUSCULAR; INTRAVENOUS ONCE
Status: COMPLETED | OUTPATIENT
Start: 2025-03-18 | End: 2025-03-18

## 2025-03-18 RX ORDER — HEPARIN SODIUM 5000 [USP'U]/ML
5000 INJECTION, SOLUTION INTRAVENOUS; SUBCUTANEOUS EVERY 8 HOURS
Status: DISCONTINUED | OUTPATIENT
Start: 2025-03-18 | End: 2025-03-19

## 2025-03-18 RX ADMIN — MORPHINE SULFATE 4 MG: 4 INJECTION, SOLUTION INTRAMUSCULAR; INTRAVENOUS at 08:53

## 2025-03-18 RX ADMIN — CARVEDILOL 6.25 MG: 6.25 TABLET, FILM COATED ORAL at 17:32

## 2025-03-18 RX ADMIN — ASPIRIN 81 MG: 81 TABLET, COATED ORAL at 11:57

## 2025-03-18 RX ADMIN — LIDOCAINE HYDROCHLORIDE 30 ML: 20 SOLUTION ORAL; TOPICAL at 11:15

## 2025-03-18 RX ADMIN — HYDROXYUREA 1000 MG: 500 CAPSULE ORAL at 21:26

## 2025-03-18 RX ADMIN — SODIUM CHLORIDE, POTASSIUM CHLORIDE, SODIUM LACTATE AND CALCIUM CHLORIDE 1000 ML: 600; 310; 30; 20 INJECTION, SOLUTION INTRAVENOUS at 09:45

## 2025-03-18 RX ADMIN — FLUTICASONE FUROATE, UMECLIDINIUM BROMIDE AND VILANTEROL TRIFENATATE 1 PUFF: 200; 62.5; 25 POWDER RESPIRATORY (INHALATION) at 17:51

## 2025-03-18 RX ADMIN — SODIUM CHLORIDE: 9 INJECTION, SOLUTION INTRAVENOUS at 12:33

## 2025-03-18 RX ADMIN — ONDANSETRON 4 MG: 2 INJECTION INTRAMUSCULAR; INTRAVENOUS at 08:53

## 2025-03-18 RX ADMIN — PANTOPRAZOLE SODIUM 40 MG: 40 INJECTION, POWDER, FOR SOLUTION INTRAVENOUS at 11:57

## 2025-03-18 RX ADMIN — HEPARIN SODIUM 5000 UNITS: 5000 INJECTION, SOLUTION INTRAVENOUS; SUBCUTANEOUS at 17:31

## 2025-03-18 RX ADMIN — ROSUVASTATIN CALCIUM 10 MG: 20 TABLET, FILM COATED ORAL at 17:32

## 2025-03-18 SDOH — ECONOMIC STABILITY: TRANSPORTATION INSECURITY
IN THE PAST 12 MONTHS, HAS THE LACK OF TRANSPORTATION KEPT YOU FROM MEDICAL APPOINTMENTS OR FROM GETTING MEDICATIONS?: NO

## 2025-03-18 SDOH — ECONOMIC STABILITY: TRANSPORTATION INSECURITY
IN THE PAST 12 MONTHS, HAS LACK OF RELIABLE TRANSPORTATION KEPT YOU FROM MEDICAL APPOINTMENTS, MEETINGS, WORK OR FROM GETTING THINGS NEEDED FOR DAILY LIVING?: NO

## 2025-03-18 ASSESSMENT — COGNITIVE AND FUNCTIONAL STATUS - GENERAL
MOBILITY SCORE: 24
SUGGESTED CMS G CODE MODIFIER MOBILITY: CH
DAILY ACTIVITIY SCORE: 24
SUGGESTED CMS G CODE MODIFIER DAILY ACTIVITY: CH

## 2025-03-18 ASSESSMENT — ENCOUNTER SYMPTOMS
EYE PAIN: 0
NAUSEA: 0
SENSORY CHANGE: 0
CHILLS: 0
WEIGHT LOSS: 1
SPEECH CHANGE: 0
DIARRHEA: 1
FOCAL WEAKNESS: 0
ABDOMINAL PAIN: 1
FEVER: 0
BLOOD IN STOOL: 0
VOMITING: 0
SHORTNESS OF BREATH: 0
MYALGIAS: 0

## 2025-03-18 ASSESSMENT — SOCIAL DETERMINANTS OF HEALTH (SDOH)
WITHIN THE LAST YEAR, HAVE YOU BEEN AFRAID OF YOUR PARTNER OR EX-PARTNER?: NO
WITHIN THE PAST 12 MONTHS, THE FOOD YOU BOUGHT JUST DIDN'T LAST AND YOU DIDN'T HAVE MONEY TO GET MORE: NEVER TRUE
WITHIN THE PAST 12 MONTHS, YOU WORRIED THAT YOUR FOOD WOULD RUN OUT BEFORE YOU GOT THE MONEY TO BUY MORE: NEVER TRUE
WITHIN THE LAST YEAR, HAVE YOU BEEN KICKED, HIT, SLAPPED, OR OTHERWISE PHYSICALLY HURT BY YOUR PARTNER OR EX-PARTNER?: NO
WITHIN THE LAST YEAR, HAVE TO BEEN RAPED OR FORCED TO HAVE ANY KIND OF SEXUAL ACTIVITY BY YOUR PARTNER OR EX-PARTNER?: NO
WITHIN THE LAST YEAR, HAVE YOU BEEN HUMILIATED OR EMOTIONALLY ABUSED IN OTHER WAYS BY YOUR PARTNER OR EX-PARTNER?: NO
IN THE PAST 12 MONTHS, HAS THE ELECTRIC, GAS, OIL, OR WATER COMPANY THREATENED TO SHUT OFF SERVICE IN YOUR HOME?: NO

## 2025-03-18 ASSESSMENT — LIFESTYLE VARIABLES
TOTAL SCORE: 0
HAVE PEOPLE ANNOYED YOU BY CRITICIZING YOUR DRINKING: NO
ON A TYPICAL DAY WHEN YOU DRINK ALCOHOL HOW MANY DRINKS DO YOU HAVE: 0
AVERAGE NUMBER OF DAYS PER WEEK YOU HAVE A DRINK CONTAINING ALCOHOL: 0
TOTAL SCORE: 0
ALCOHOL_USE: NO
HAVE YOU EVER FELT YOU SHOULD CUT DOWN ON YOUR DRINKING: NO
DOES PATIENT WANT TO STOP DRINKING: NO
EVER HAD A DRINK FIRST THING IN THE MORNING TO STEADY YOUR NERVES TO GET RID OF A HANGOVER: NO
EVER FELT BAD OR GUILTY ABOUT YOUR DRINKING: NO
CONSUMPTION TOTAL: NEGATIVE
HOW MANY TIMES IN THE PAST YEAR HAVE YOU HAD 5 OR MORE DRINKS IN A DAY: 0
TOTAL SCORE: 0

## 2025-03-18 ASSESSMENT — PATIENT HEALTH QUESTIONNAIRE - PHQ9
3. TROUBLE FALLING OR STAYING ASLEEP OR SLEEPING TOO MUCH: NOT AT ALL
SUM OF ALL RESPONSES TO PHQ QUESTIONS 1-9: 1
9. THOUGHTS THAT YOU WOULD BE BETTER OFF DEAD, OR OF HURTING YOURSELF: NOT AT ALL
8. MOVING OR SPEAKING SO SLOWLY THAT OTHER PEOPLE COULD HAVE NOTICED. OR THE OPPOSITE, BEING SO FIGETY OR RESTLESS THAT YOU HAVE BEEN MOVING AROUND A LOT MORE THAN USUAL: NOT AT ALL
4. FEELING TIRED OR HAVING LITTLE ENERGY: NOT AT ALL
7. TROUBLE CONCENTRATING ON THINGS, SUCH AS READING THE NEWSPAPER OR WATCHING TELEVISION: NOT AT ALL
6. FEELING BAD ABOUT YOURSELF - OR THAT YOU ARE A FAILURE OR HAVE LET YOURSELF OR YOUR FAMILY DOWN: NOT AL ALL
1. LITTLE INTEREST OR PLEASURE IN DOING THINGS: NOT AT ALL
SUM OF ALL RESPONSES TO PHQ9 QUESTIONS 1 AND 2: 1
2. FEELING DOWN, DEPRESSED, IRRITABLE, OR HOPELESS: SEVERAL DAYS
5. POOR APPETITE OR OVEREATING: NOT AT ALL

## 2025-03-18 ASSESSMENT — FIBROSIS 4 INDEX: FIB4 SCORE: 0.82

## 2025-03-18 ASSESSMENT — PAIN DESCRIPTION - PAIN TYPE
TYPE: ACUTE PAIN
TYPE: ACUTE PAIN

## 2025-03-18 NOTE — ASSESSMENT & PLAN NOTE
S/p PCI on 3/4/24, off DAPT  No CP/SOB currently, trop elevated mildly but likely 2/2 ANN  -Continue home coreg, asa, and crestor

## 2025-03-18 NOTE — ED PROVIDER NOTES
ED Provider Note    CHIEF COMPLAINT  Chief Complaint   Patient presents with    Abdominal Pain    Diarrhea       EXTERNAL RECORDS REVIEWED  Most recent cardiology note reviewed.  Patient with a history of diastolic congestive heart failure mitral bebeto hours.  Recently evaluated for CELESTE our MitraClip however not amenable due to anatomic constraints.  Has also been seen by primary care recently and initiated on antibiotics for presumed diverticulitis    HPI/ROS    LIMITATION TO HISTORY     OUTSIDE HISTORIAN(S):      Jina Barnard is a 74 y.o. female who presents to the emergency department chief complaint of abdominal pain.  3 days of abdominal pain getting progressively worse over that time.  Mainly in the epigastrium but some radiation throughout the abdomen.  She is also had profound diarrhea.  No blood in her emesis she has had no fevers no chills no nausea or vomiting she reports no cough congestion chest pain shortness of breath no other acute symptom change or concern at this time.    PAST MEDICAL HISTORY   has a past medical history of Breath shortness, COPD (chronic obstructive pulmonary disease) (HCC), Dental disorder, Diverticulosis, Emphysema of lung (HCC), Heart murmur, Heart valve disease, High cholesterol, Hypertension, Thrombocytosis, and Urinary incontinence.    SURGICAL HISTORY   has a past surgical history that includes tubal coagulation laparoscopic bilateral; colonoscopy; angiogram; transcatheter mitral valve repair (Bilateral, 4/30/2024); and echocardiogram, transesophageal, intraoperative (N/A, 4/30/2024).    FAMILY HISTORY  Family History   Problem Relation Age of Onset    Alcohol/Drug Father         alcohol    Other Father         malnutrition    Psychiatric Illness Father     Other Mother         blood clot from surgery    Hypertension Mother     Diabetes Mother         ?    Psychiatric Illness Sister     Other Sister         malnutrition    No Known Problems Maternal Grandmother     No  "Known Problems Maternal Grandfather     No Known Problems Paternal Grandmother     No Known Problems Paternal Grandfather        SOCIAL HISTORY  Social History     Tobacco Use    Smoking status: Never    Smokeless tobacco: Never   Vaping Use    Vaping status: Never Used   Substance and Sexual Activity    Alcohol use: No    Drug use: Yes     Types: Marijuana, Inhaled     Comment: occ    Sexual activity: Never     Partners: Male     Birth control/protection: Post-Menopausal       CURRENT MEDICATIONS  Home Medications    **Home medications have not yet been reviewed for this encounter**       Audit from Redirected Encounters    **Home medications have not yet been reviewed for this encounter**         ALLERGIES  No Known Allergies    PHYSICAL EXAM  VITAL SIGNS: /77   Pulse 70   Temp 36.3 °C (97.4 °F) (Temporal)   Resp 20   Ht 1.575 m (5' 2\")   Wt 77.1 kg (170 lb)   SpO2 97%   BMI 31.09 kg/m²      Pulse ox interpretation: I interpret this pulse ox as normal.  Constitutional: Alert and oriented x 3, minimal distress  HEENT: Atraumatic normocephalic, pupils are equal round reactive to light extraocular movements are intact. The nares is clear, external ears are normal, mouth shows moist mucous membranes normal dentition for age  Neck: Supple, no JVD no tracheal deviation  Cardiovascular: Regular rate and rhythm no murmur rub or gallop 2+ pulses peripherally x4  Thorax & Lungs: No respiratory distress, no wheezes rales or rhonchi, No chest tenderness.   GI: Tender to palpation in the epigastrium no rebound or guarding positive bowel sounds nondistended  Skin: Warm dry no acute rash or lesion  Musculoskeletal: Moving all extremities with full range and 5 of 5 strength no acute  deformity  Neurologic: Cranial nerves III through XII are grossly intact no sensory deficit no cerebellar dysfunction   Psychiatric: Appropriate affect for situation at this time      EKG/LABS  Results for orders placed or performed " during the hospital encounter of 25   EKG    Collection Time: 25  8:17 AM   Result Value Ref Range    Report       [facility] Emergency Dept.    Test Date:  2025  Pt Name:    JOSE MARIA COOK            Department: ER  MRN:        [id number]                      Room:  Gender:     Female                       Technician: [id number]  :        [id number]                   Requested By:ER TRIAGE PROTOCOL  Order #:    [id number]                    Reading MD:    Measurements  Intervals                                Axis  Rate:       74                           P:          -47  RI:         230                          QRS:        18  QRSD:       136                          T:          89  QT:         411  QTc:        456    Interpretive Statements  Sinus or ectopic atrial rhythm  Ventricular premature complex  Prolonged RI interval  Consider left atrial enlargement  Nonspecific intraventricular conduction delay  Inferior infarct, old  Compared to ECG 2024 14:43:37  Ectopic atrial rhythm now present  First degree AV block now present  Intraventricular conduction delay now pr esent  Myocardial infarct finding now present  Sinus rhythm no longer present     CBC with Differential    Collection Time: 25  8:26 AM   Result Value Ref Range    WBC 9.4 4.8 - 10.8 K/uL    RBC 4.18 (L) 4.20 - 5.40 M/uL    Hemoglobin 15.6 12.0 - 16.0 g/dL    Hematocrit 44.0 37.0 - 47.0 %    .3 (H) 81.4 - 97.8 fL    MCH 37.3 (H) 27.0 - 33.0 pg    MCHC 35.5 32.2 - 35.5 g/dL    RDW 54.5 (H) 35.9 - 50.0 fL    Platelet Count 322 164 - 446 K/uL    MPV 10.1 9.0 - 12.9 fL    Neutrophils-Polys 68.90 44.00 - 72.00 %    Lymphocytes 24.00 22.00 - 41.00 %    Monocytes 5.80 0.00 - 13.40 %    Eosinophils 0.60 0.00 - 6.90 %    Basophils 0.30 0.00 - 1.80 %    Immature Granulocytes 0.40 0.00 - 0.90 %    Nucleated RBC 0.00 0.00 - 0.20 /100 WBC    Neutrophils (Absolute) 6.49 1.82 - 7.42 K/uL    Lymphs (Absolute)  2.26 1.00 - 4.80 K/uL    Monos (Absolute) 0.55 0.00 - 0.85 K/uL    Eos (Absolute) 0.06 0.00 - 0.51 K/uL    Baso (Absolute) 0.03 0.00 - 0.12 K/uL    Immature Granulocytes (abs) 0.04 0.00 - 0.11 K/uL    NRBC (Absolute) 0.00 K/uL   Complete Metabolic Panel    Collection Time: 03/18/25  8:26 AM   Result Value Ref Range    Sodium 135 135 - 145 mmol/L    Potassium 3.6 3.6 - 5.5 mmol/L    Chloride 100 96 - 112 mmol/L    Co2 16 (L) 20 - 33 mmol/L    Anion Gap 19.0 (H) 7.0 - 16.0    Glucose 158 (H) 65 - 99 mg/dL    Bun 53 (H) 8 - 22 mg/dL    Creatinine 2.56 (H) 0.50 - 1.40 mg/dL    Calcium 9.2 8.5 - 10.5 mg/dL    Correct Calcium 9.0 8.5 - 10.5 mg/dL    AST(SGOT) 16 12 - 45 U/L    ALT(SGPT) <5 2 - 50 U/L    Alkaline Phosphatase 66 30 - 99 U/L    Total Bilirubin 0.3 0.1 - 1.5 mg/dL    Albumin 4.2 3.2 - 4.9 g/dL    Total Protein 6.7 6.0 - 8.2 g/dL    Globulin 2.5 1.9 - 3.5 g/dL    A-G Ratio 1.7 g/dL   Lipase    Collection Time: 03/18/25  8:26 AM   Result Value Ref Range    Lipase 42 11 - 82 U/L   TROPONIN    Collection Time: 03/18/25  8:26 AM   Result Value Ref Range    Troponin T 34 (H) 6 - 19 ng/L   ESTIMATED GFR    Collection Time: 03/18/25  8:26 AM   Result Value Ref Range    GFR (CKD-EPI) 19 (A) >60 mL/min/1.73 m 2   URINALYSIS CULTURE, IF INDICATED    Collection Time: 03/18/25 10:05 AM    Specimen: Urine, Clean Catch   Result Value Ref Range    Color Yellow     Character Clear     Specific Gravity 1.014 <1.035    Ph 5.5 5.0 - 8.0    Glucose 100 (A) Negative mg/dL    Ketones Trace (A) Negative mg/dL    Protein 30 (A) Negative mg/dL    Bilirubin Negative Negative    Urobilinogen, Urine 0.2 <=1.0 EU/dL    Nitrite Negative Negative    Leukocyte Esterase Negative Negative    Occult Blood Negative Negative    Micro Urine Req Microscopic    URINE MICROSCOPIC (W/UA)    Collection Time: 03/18/25 10:05 AM   Result Value Ref Range    WBC 0-2 /hpf    RBC 0-2 0 - 2 /hpf    Bacteria None Seen None /hpf    Epithelial Cells 0-2 0 - 5  /hpf    Epithelial Cells Renal Present (A) Absent /hpf    Urine Casts 6-10 (A) 0 - 2 /lpf    Hyaline Cast Present /lpf     *Note: Due to a large number of results and/or encounters for the requested time period, some results have not been displayed. A complete set of results can be found in Results Review.      I have independently interpreted this EKG    RADIOLOGY/PROCEDURES   I have independently interpreted the diagnostic imaging associated with this visit and am waiting the final reading from the radiologist.   My preliminary interpretation is as follows:   No acute inflammatory process in the abdomen or pelvis    Radiologist interpretation:  DX-CHEST-PORTABLE (1 VIEW)    (Results Pending)       COURSE & MEDICAL DECISION MAKING    ASSESSMENT, COURSE AND PLAN  Care Narrative: Patient with acute renal insufficiency with elevated BUN and creatinine likely secondary to her acute diarrheal disease.  CAT scan does not reveal any acute inflammatory or surgical process within the abdomen.  I think her abdominal pain is likely gastritis initiated with GI cocktail for further information here.  Patient will however require further IV hydration and close monitoring of her renal function therefore have discussed her case with hospitalist Dr. Razo.  Patient will be admitted for ongoing evaluation and treatment.      FINAL DIAGNOSIS  1. Epigastric pain Active   2. ANN (acute kidney injury) ([facility])         Electronically signed by: Chris Olivia M.D       Unknown

## 2025-03-18 NOTE — ED NOTES
Med Rec complete per patient and son at bedside   Allergies reviewed  Antibiotics in the past 30 days:no  Anticoagulant in past 14 days:no  Pharmacy patient utilizes:[facility] [address]    Pt on daily Aspirin    Son states pt is supposed to be on Rosuvastatin but pt states she doesn't remember the last time she took it.

## 2025-03-18 NOTE — ED NOTES
PIV placed, labs drawn and sent.   Pt medicated per MAR, pt tolerated , pt attached to monitor , call light in reach , no needs at this time

## 2025-03-18 NOTE — ASSESSMENT & PLAN NOTE
In setting of diarrhea w/ diuretic use, likely 2/2 dehydration but also taking NSAIDs  Got 1 L in ED  -Supportive IVF  -Instructed pt to stop taking ibuprofen  -Trend BMP

## 2025-03-18 NOTE — ED TRIAGE NOTES
".  Chief Complaint   Patient presents with    Abdominal Pain    Diarrhea     Hx diverticulitis  Pt prescribed abx for flare up x 10 days, per pt abx has not helped, told by PCP to come to ED for follow up    .Patient wheeled to triage for above complaint. Patient aware to notify RN of any changes in symptoms. Patient educated on triage process. A&O x 4, speaking in full sentences     ./77   Pulse 83   Temp 36.3 °C (97.4 °F) (Temporal)   Resp 20   Ht 1.575 m (5' 2\")   Wt 77.1 kg (170 lb)   SpO2 98%   BMI 31.09 kg/m²       "

## 2025-03-18 NOTE — ED NOTES
Pt wheeled to ER room 64 with steady gait , pt placed on gurney , pt changed into gown , pt attached to monitor Pt given call light and instructions on how to use, pt chart up for ERP to see.

## 2025-03-18 NOTE — ASSESSMENT & PLAN NOTE
Feels it is diverticulitis but it is epigastric, states this is where her pain chronically is  CT w/o any acute dz  Some concern for PUD  given DAPT and NSAID usage though not anemic  -PPI  -Check C diff given diarrhea and abx  -Outpt GI f/u

## 2025-03-18 NOTE — H&P
Hospital Medicine History & Physical Note    Date of Service  3/18/2025    Primary Care Physician  Corine Alas P.A.-C.    Consultants  none    Specialist Names: none    Code Status  DNR/DNI    Chief Complaint  Chief Complaint   Patient presents with    Abdominal Pain    Diarrhea       History of Presenting Illness  Jina Barnard is a 74 y.o. 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 Recently on abx for suspected diverticulitis. States she still has diverticulitis pain however points to epigastrum for this. Recently on DAPT for PCI and also taking ibuprofen for her pain. Has lost weight recently due to abd pain which is chronic issue from multiple episodes of diverticulitis. Denies blood/melena or N/V.  In the ED vitals were wnl, labs showed ANN, CT AP w/o acute dz. She was given 1 L of LR in the ED and admitted.     I discussed the plan of care with patient.    Review of Systems  Review of Systems   Constitutional:  Positive for malaise/fatigue and weight loss. Negative for chills and fever.   HENT:  Negative for congestion.    Eyes:  Negative for pain.   Respiratory:  Negative for shortness of breath.    Cardiovascular:  Negative for chest pain and leg swelling.   Gastrointestinal:  Positive for abdominal pain and diarrhea. Negative for blood in stool, melena, nausea and vomiting.   Genitourinary:  Negative for dysuria.   Musculoskeletal:  Negative for myalgias.   Skin:  Negative for rash.   Neurological:  Negative for sensory change, speech change and focal weakness.       Past Medical History   has a past medical history of Breath shortness, COPD (chronic obstructive pulmonary disease) (HCC), Dental disorder, Diverticulosis, Emphysema of lung (HCC), Heart murmur, Heart valve disease, High cholesterol, Hypertension, Thrombocytosis, and Urinary incontinence.    Surgical History   has a past surgical history that  includes tubal coagulation laparoscopic bilateral; colonoscopy; angiogram; transcatheter mitral valve repair (Bilateral, 4/30/2024); and echocardiogram, transesophageal, intraoperative (N/A, 4/30/2024).     Family History  family history includes Alcohol/Drug in her father; Diabetes in her mother; Hypertension in her mother; No Known Problems in her maternal grandfather, maternal grandmother, paternal grandfather, and paternal grandmother; Other in her father, mother, and sister; Psychiatric Illness in her father and sister.   Family history reviewed with patient. There is no family history that is pertinent to the chief complaint.     Social History   reports that she has never smoked. She has never used smokeless tobacco. She reports current drug use. Drugs: Marijuana and Inhaled. She reports that she does not drink alcohol.    Allergies  No Known Allergies    Medications  Prior to Admission Medications   Prescriptions Last Dose Informant Patient Reported? Taking?   BLACK COHOSH PO  Patient Yes No   Sig: Take 1 Tablet by mouth every day. OTC supplement   BREZTRI AEROSPHERE 160-9-4.8 MCG/ACT Aerosol   No No   Sig: INHALE 2 PUFFS BY MOUTH TWICE DAILY   albuterol 108 (90 Base) MCG/ACT Aero Soln inhalation aerosol   No No   Sig: INHALE 2 PUFFS BY MOUTH EVERY 6 HOURS AS NEEDED   aspirin 81 MG EC tablet  Patient No No   Sig: Take 1 Tablet by mouth every day.   carvedilol (COREG) 6.25 MG Tab   No No   Sig: TAKE 1 TABLET BY MOUTH TWICE DAILY WITH MEALS   dapagliflozin propanediol (FARXIGA) 10 MG Tab   No No   Sig: Take 1 Tablet by mouth every day.   hydroxyurea (HYDREA) 500 MG Cap  Patient Yes No   Sig: Take 500 mg by mouth every day.   ibuprofen (MOTRIN) 200 MG Tab  Patient Yes No   Sig: Take 400 mg by mouth every 6 hours as needed for Mild Pain.   ipratropium-albuterol (DUONEB) 0.5-2.5 (3) MG/3ML nebulizer solution   No No   Sig: Take 3 mL by nebulization every four hours as needed for Shortness of Breath.   potassium  chloride ER (K-TAB) 20 MEQ Tab CR tablet   No No   Sig: Take 1 Tablet by mouth 2 times a day.   rosuvastatin (CRESTOR) 10 MG Tab   No No   Sig: Take 1 Tablet by mouth every evening.   torsemide (DEMADEX) 20 MG Tab   No No   Sig: Take 1 Tablet by mouth 2 times a day.      Facility-Administered Medications: None       Physical Exam  Temp:  [36.3 °C (97.4 °F)] 36.3 °C (97.4 °F)  Pulse:  [70-83] 70  Resp:  [20] 20  BP: (100-125)/(77) 125/77  SpO2:  [97 %-98 %] 97 %  Blood Pressure : 125/77   Temperature: 36.3 °C (97.4 °F)   Pulse: 70   Respiration: 20   Pulse Oximetry: 97 %       Physical Exam  Constitutional:       Appearance: Normal appearance.   HENT:      Head: Normocephalic and atraumatic.      Nose: Nose normal.      Mouth/Throat:      Mouth: Mucous membranes are moist.      Pharynx: Oropharynx is clear.   Eyes:      Conjunctiva/sclera: Conjunctivae normal.      Pupils: Pupils are equal, round, and reactive to light.   Cardiovascular:      Rate and Rhythm: Normal rate and regular rhythm.      Heart sounds: No murmur heard.  Pulmonary:      Effort: Pulmonary effort is normal.      Breath sounds: Normal breath sounds.   Abdominal:      General: There is no distension.      Palpations: Abdomen is soft.      Tenderness: There is abdominal tenderness.   Musculoskeletal:         General: No swelling or tenderness.      Cervical back: Normal range of motion and neck supple.   Skin:     General: Skin is warm and dry.   Neurological:      General: No focal deficit present.      Mental Status: She is alert and oriented to person, place, and time.   Psychiatric:         Mood and Affect: Mood normal.         Behavior: Behavior normal.         Laboratory:  Recent Labs     03/18/25  0826   WBC 9.4   RBC 4.18*   HEMOGLOBIN 15.6   HEMATOCRIT 44.0   .3*   MCH 37.3*   MCHC 35.5   RDW 54.5*   PLATELETCT 322   MPV 10.1     Recent Labs     03/18/25  0826   SODIUM 135   POTASSIUM 3.6   CHLORIDE 100   CO2 16*   GLUCOSE 158*   BUN  "53*   CREATININE 2.56*   CALCIUM 9.2     Recent Labs     03/18/25  0826   ALTSGPT <5   ASTSGOT 16   ALKPHOSPHAT 66   TBILIRUBIN 0.3   LIPASE 42   GLUCOSE 158*         No results for input(s): \"NTPROBNP\" in the last 72 hours.      Recent Labs     03/18/25  0826   TROPONINT 34*       Imaging:  CT-ABDOMEN-PELVIS W/O   Final Result      1. No bowel obstruction. The appendix is not identified.   2. Colonic diverticula, without diverticulitis. No free fluid.   3. Stable bilateral adrenal nodules, cysts or hemangiomata in the liver and exophytic calcified uterine fibroid.   4. Atherosclerosis.      DX-CHEST-PORTABLE (1 VIEW)   Final Result      1. Stable linear parenchymal scarring in the right lung base.   2. The cardiac silhouette size remains enlarged.   3. Atherosclerosis.          EKG:  My impression is: NSR w/ PVC possible old inferior infarct    Assessment/Plan:  Justification for Admission Status  I anticipate this patient will require at least two midnights for appropriate medical management, necessitating inpatient admission because ANN      * ANN (acute kidney injury) (HCC)- (present on admission)  Assessment & Plan  In setting of diarrhea w/ diuretic use, likely 2/2 dehydration but also taking NSAIDs  Got 1 L in ED  -Supportive IVF  -Instructed pt to stop taking ibuprofen  -Trend BMP    AP (abdominal pain)  Assessment & Plan  Feels it is diverticulitis but it is epigastric, states this is where her pain chronically is  CT w/o any acute dz  Some concern for PUD  given DAPT and NSAID usage though not anemic  -PPI  -Check C diff given diarrhea and abx  -Outpt GI f/u    Coronary artery disease involving native coronary artery of native heart without angina pectoris- (present on admission)  Assessment & Plan  S/p PCI on 3/4/24, off DAPT  No CP/SOB currently, trop elevated mildly but likely 2/2 ANN  -Continue home coreg, asa, and crestor    Nonrheumatic mitral valve regurgitation- (present on admission)  Assessment & " Plan  Not candidate for mitral clip, last echo EF 55%  -Hold home torsemide and farxiga for ANN    Asthma-COPD overlap syndrome (HCC)- (present on admission)  Assessment & Plan  Sating comfortably on RA  -Continue home meds    Polycythemia vera(238.4)- (present on admission)  Assessment & Plan  Continue home hydroxychloroquine     Essential hypertension- (present on admission)  Assessment & Plan  See CAD and MVR        VTE prophylaxis: heparin ppx

## 2025-03-18 NOTE — ED NOTES
Pt ambulated to restroom with x1 assist, UA provided and sent to lab, pt assisted back on to [facility] and attached to monitor.

## 2025-03-19 DIAGNOSIS — J44.89 ASTHMA-COPD OVERLAP SYNDROME (HCC): ICD-10-CM

## 2025-03-19 LAB
ANION GAP SERPL CALC-SCNC: 7 MMOL/L (ref 7–16)
BUN SERPL-MCNC: 38 MG/DL (ref 8–22)
CALCIUM SERPL-MCNC: 8.8 MG/DL (ref 8.5–10.5)
CHLORIDE SERPL-SCNC: 108 MMOL/L (ref 96–112)
CO2 SERPL-SCNC: 22 MMOL/L (ref 20–33)
CREAT SERPL-MCNC: 1.5 MG/DL (ref 0.5–1.4)
ERYTHROCYTE [DISTWIDTH] IN BLOOD BY AUTOMATED COUNT: 57.1 FL (ref 35.9–50)
GFR SERPLBLD CREATININE-BSD FMLA CKD-EPI: 36 ML/MIN/1.73 M 2
GLUCOSE SERPL-MCNC: 105 MG/DL (ref 65–99)
HCT VFR BLD AUTO: 38.3 % (ref 37–47)
HGB BLD-MCNC: 13.1 G/DL (ref 12–16)
MCH RBC QN AUTO: 37 PG (ref 27–33)
MCHC RBC AUTO-ENTMCNC: 34.2 G/DL (ref 32.2–35.5)
MCV RBC AUTO: 108.2 FL (ref 81.4–97.8)
PLATELET # BLD AUTO: 219 K/UL (ref 164–446)
PMV BLD AUTO: 9.9 FL (ref 9–12.9)
POTASSIUM SERPL-SCNC: 3.9 MMOL/L (ref 3.6–5.5)
RBC # BLD AUTO: 3.54 M/UL (ref 4.2–5.4)
SODIUM SERPL-SCNC: 137 MMOL/L (ref 135–145)
WBC # BLD AUTO: 6.5 K/UL (ref 4.8–10.8)

## 2025-03-19 PROCEDURE — 700102 HCHG RX REV CODE 250 W/ 637 OVERRIDE(OP): Performed by: STUDENT IN AN ORGANIZED HEALTH CARE EDUCATION/TRAINING PROGRAM

## 2025-03-19 PROCEDURE — 80048 BASIC METABOLIC PNL TOTAL CA: CPT

## 2025-03-19 PROCEDURE — 700105 HCHG RX REV CODE 258: Performed by: STUDENT IN AN ORGANIZED HEALTH CARE EDUCATION/TRAINING PROGRAM

## 2025-03-19 PROCEDURE — 700111 HCHG RX REV CODE 636 W/ 250 OVERRIDE (IP): Performed by: STUDENT IN AN ORGANIZED HEALTH CARE EDUCATION/TRAINING PROGRAM

## 2025-03-19 PROCEDURE — A9270 NON-COVERED ITEM OR SERVICE: HCPCS | Performed by: STUDENT IN AN ORGANIZED HEALTH CARE EDUCATION/TRAINING PROGRAM

## 2025-03-19 PROCEDURE — 94664 DEMO&/EVAL PT USE INHALER: CPT

## 2025-03-19 PROCEDURE — 99406 BEHAV CHNG SMOKING 3-10 MIN: CPT

## 2025-03-19 PROCEDURE — 770001 HCHG ROOM/CARE - MED/SURG/GYN PRIV*

## 2025-03-19 PROCEDURE — 85027 COMPLETE CBC AUTOMATED: CPT

## 2025-03-19 PROCEDURE — 99232 SBSQ HOSP IP/OBS MODERATE 35: CPT | Performed by: FAMILY MEDICINE

## 2025-03-19 RX ORDER — ENOXAPARIN SODIUM 100 MG/ML
40 INJECTION SUBCUTANEOUS DAILY
Status: DISCONTINUED | OUTPATIENT
Start: 2025-03-19 | End: 2025-03-20 | Stop reason: HOSPADM

## 2025-03-19 RX ORDER — TIOTROPIUM BROMIDE AND OLODATEROL 3.124; 2.736 UG/1; UG/1
2 SPRAY, METERED RESPIRATORY (INHALATION) DAILY
Qty: 12 G | Refills: 3 | Status: SHIPPED | OUTPATIENT
Start: 2025-03-19

## 2025-03-19 RX ADMIN — CARVEDILOL 6.25 MG: 6.25 TABLET, FILM COATED ORAL at 08:14

## 2025-03-19 RX ADMIN — SODIUM CHLORIDE: 9 INJECTION, SOLUTION INTRAVENOUS at 11:40

## 2025-03-19 RX ADMIN — ROSUVASTATIN CALCIUM 10 MG: 20 TABLET, FILM COATED ORAL at 17:47

## 2025-03-19 RX ADMIN — SODIUM CHLORIDE: 9 INJECTION, SOLUTION INTRAVENOUS at 23:11

## 2025-03-19 RX ADMIN — SODIUM CHLORIDE: 9 INJECTION, SOLUTION INTRAVENOUS at 00:00

## 2025-03-19 RX ADMIN — HYDROXYUREA 1000 MG: 500 CAPSULE ORAL at 20:30

## 2025-03-19 RX ADMIN — ASPIRIN 81 MG: 81 TABLET, COATED ORAL at 04:06

## 2025-03-19 RX ADMIN — PANTOPRAZOLE SODIUM 40 MG: 40 INJECTION, POWDER, FOR SOLUTION INTRAVENOUS at 04:06

## 2025-03-19 RX ADMIN — FLUTICASONE FUROATE, UMECLIDINIUM BROMIDE AND VILANTEROL TRIFENATATE 1 PUFF: 200; 62.5; 25 POWDER RESPIRATORY (INHALATION) at 08:14

## 2025-03-19 ASSESSMENT — ENCOUNTER SYMPTOMS
NAUSEA: 1
CHILLS: 0
FEVER: 0
MUSCULOSKELETAL NEGATIVE: 1
DIARRHEA: 1
DEPRESSION: 0
ABDOMINAL PAIN: 1
COUGH: 0
VOMITING: 1
HEMOPTYSIS: 0
DIZZINESS: 0
HEADACHES: 0

## 2025-03-19 ASSESSMENT — FIBROSIS 4 INDEX: FIB4 SCORE: 2.58

## 2025-03-19 ASSESSMENT — PAIN DESCRIPTION - PAIN TYPE: TYPE: ACUTE PAIN

## 2025-03-19 NOTE — CARE PLAN
The patient is Watcher - Medium risk of patient condition declining or worsening    Shift Goals  Clinical Goals: maintain pt safety    Progress made toward(s) clinical / shift goals:    Problem: Pain - Standard  Goal: Alleviation of pain or a reduction in pain to the patient’s comfort goal  Outcome: Progressing  Flowsheets  Taken 3/18/2025 1757  Non Verbal Scale:   Calm   Unlabored Breathing  OB Pain Intervention: Declines  Taken 3/18/2025 1734  Pain Rating Scale (NPRS): 0  Note: No complaints of pain or discomfort at this time      Problem: Knowledge Deficit - Standard  Goal: Patient and family/care givers will demonstrate understanding of plan of care, disease process/condition, diagnostic tests and medications  Outcome: Progressing  Note: Pt aox4, able to make needs known, routine meds given per mar, no complaints of pain or discomfort, vitals stable, oriented to room, fall education provided, call light within reach        Patient is not progressing towards the following goals:

## 2025-03-19 NOTE — PROGRESS NOTES
Hospital Medicine Daily Progress Note    Date of Service  3/19/2025    Chief Complaint  Jina Barnard is a 74 y.o. 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 Recently on abx for suspected diverticulitis. States she still has diverticulitis pain however points to epigastrum for this. Recently on DAPT for PCI and also taking ibuprofen for her pain. Has lost weight recently due to abd pain which is chronic issue from multiple episodes of diverticulitis. Denies blood/melena or N/V.  In the ED vitals were wnl, labs showed ANN, CT AP w/o acute dz. She was given 1 L of LR in the ED and admitted.     Hospital Course  Patient reports she is feeling better.  Patient reports that she is having no diarrhea at this time.  And her abdominal pain is markedly improved.  Patient is excited to eat as much food as possible so that she can have a bowel movement.  Patient thinks she has not had a bowel movement because she has not had food in her stomach.    Interval Problem Update      I have discussed this patient's plan of care and discharge plan at IDT rounds today with Case Management, Nursing, Nursing leadership, and other members of the IDT team.    Consultants/Specialty      Code Status  DNAR/DNI    Disposition  The patient is not medically cleared for discharge to home or a post-acute facility.  Anticipate discharge to: home with close outpatient follow-up    I have placed the appropriate orders for post-discharge needs.    Review of Systems  Review of Systems   Constitutional:  Negative for chills and fever.   Respiratory:  Negative for cough and hemoptysis.    Cardiovascular:  Negative for chest pain and leg swelling.   Gastrointestinal:  Positive for abdominal pain, diarrhea (Resolved), nausea and vomiting.   Musculoskeletal: Negative.    Skin:  Negative for rash.   Neurological:  Negative for dizziness and headaches.    Psychiatric/Behavioral:  Negative for depression.    All other systems reviewed and are negative.       Physical Exam  Temp:  [36.1 °C (97 °F)-36.3 °C (97.3 °F)] 36.1 °C (97 °F)  Pulse:  [60-70] 60  Resp:  [16-18] 16  BP: ()/(46-69) 109/51  SpO2:  [97 %-99 %] 98 %    Physical Exam  Vitals and nursing note reviewed.   Constitutional:       Appearance: Normal appearance. She is obese.   HENT:      Head: Normocephalic and atraumatic.   Eyes:      Extraocular Movements: Extraocular movements intact.   Cardiovascular:      Rate and Rhythm: Normal rate and regular rhythm.      Heart sounds: No murmur heard.  Pulmonary:      Effort: Pulmonary effort is normal. No respiratory distress.      Breath sounds: Normal breath sounds. No wheezing or rales.   Abdominal:      General: Abdomen is flat. Bowel sounds are normal. There is no distension.   Musculoskeletal:         General: Normal range of motion.      Cervical back: Normal range of motion.   Skin:     General: Skin is warm and dry.   Neurological:      General: No focal deficit present.      Mental Status: She is alert and oriented to person, place, and time. Mental status is at baseline.   Psychiatric:         Mood and Affect: Mood normal.         Behavior: Behavior normal.         Fluids    Intake/Output Summary (Last 24 hours) at 3/19/2025 1409  Last data filed at 3/19/2025 0406  Gross per 24 hour   Intake 100 ml   Output --   Net 100 ml        Laboratory  Recent Labs     03/18/25  0826 03/19/25  0546   WBC 9.4 6.5   RBC 4.18* 3.54*   HEMOGLOBIN 15.6 13.1   HEMATOCRIT 44.0 38.3   .3* 108.2*   MCH 37.3* 37.0*   MCHC 35.5 34.2   RDW 54.5* 57.1*   PLATELETCT 322 219   MPV 10.1 9.9     Recent Labs     03/18/25  0826 03/19/25  0546   SODIUM 135 137   POTASSIUM 3.6 3.9   CHLORIDE 100 108   CO2 16* 22   GLUCOSE 158* 105*   BUN 53* 38*   CREATININE 2.56* 1.50*   CALCIUM 9.2 8.8                     Assessment/Plan  * ANN (acute kidney injury) (HCC)- (present on  admission)  Assessment & Plan  In setting of diarrhea w/ diuretic use, likely 2/2 dehydration but also taking NSAIDs  Got 1 L in ED  -Supportive IVF  -Instructed pt to stop taking ibuprofen  -Trend BMP    AP (abdominal pain)  Assessment & Plan  Feels it is diverticulitis but it is epigastric, states this is where her pain chronically is  CT w/o any acute dz  Some concern for PUD  given DAPT and NSAID usage though not anemic  -PPI  -Check C diff given diarrhea and abx  -Outpt GI f/u    Coronary artery disease involving native coronary artery of native heart without angina pectoris- (present on admission)  Assessment & Plan  S/p PCI on 3/4/24, off DAPT  No CP/SOB currently, trop elevated mildly but likely 2/2 ANN  -Continue home coreg, asa, and crestor    Nonrheumatic mitral valve regurgitation- (present on admission)  Assessment & Plan  Not candidate for mitral clip, last echo EF 55%  -Hold home torsemide and farxiga for ANN    Asthma-COPD overlap syndrome (HCC)- (present on admission)  Assessment & Plan  Sating comfortably on RA  -Continue home meds    Polycythemia vera(238.4)- (present on admission)  Assessment & Plan  Continue home hydroxychloroquine     Essential hypertension- (present on admission)  Assessment & Plan  See CAD and MVR         VTE prophylaxis:    enoxaparin ppx      I have performed a physical exam and reviewed and updated ROS and Plan today (3/19/2025). In review of yesterday's note (3/18/2025), there are no changes except as documented above.

## 2025-03-19 NOTE — PROGRESS NOTES
4 Eyes Skin Assessment Completed by MAURA Lopez and MAURA Lares.    Head WDL  Ears WDL  Nose WDL  Mouth WDL  Neck WDL  Breast/Chest WDL  Shoulder Blades WDL  Spine WDL  (R) Arm/Elbow/Hand WDL  (L) Arm/Elbow/Hand WDL  Abdomen WDL  Groin WDL  Scrotum/Coccyx/Buttocks WDL  (R) Leg WDL  (L) Leg WDL  (R) Heel/Foot/Toe WDL  (L) Heel/Foot/Toe WDL          Devices In Places Blood Pressure Cuff      Interventions In Place Pillows    Possible Skin Injury No    Pictures Uploaded Into Epic N/A  Wound Consult Placed N/A  RN Wound Prevention Protocol Ordered No

## 2025-03-19 NOTE — CARE PLAN
The patient is Stable - Low risk of patient condition declining or worsening    Shift Goals  Clinical Goals: Monitor labs and BM  Patient Goals: Rest  Family Goals: HANNAH    Progress made toward(s) clinical / shift goals:  Patient is alert and oriented x4. Updated on POC and verbalized understanding. Medicated per MAR and tolerated well. Fall precautions ( Non-skid socks and alarm) hourly rounding in place. Needs met.     Patient is not progressing towards the following goals: pending S/E    Problem: Knowledge Deficit - Standard  Goal: Patient and family/care givers will demonstrate understanding of plan of care, disease process/condition, diagnostic tests and medications  Description: Target End Date:  1-3 days or as soon as patient condition allowsDocument in Patient Education1.  Patient and family/caregiver oriented to unit, equipment, visitation policy and means for communicating concern2.  Complete/review Learning Assessment3.  Assess knowledge level of disease process/condition, treatment plan, diagnostic tests and medications4.  Explain disease process/condition, treatment plan, diagnostic tests and medications  Outcome: Not Progressing

## 2025-03-19 NOTE — RESPIRATORY CARE
"COPD EDUCATION by COPD CLINICAL EDUCATOR  3/19/2025  at  8:53 AM by Babita Lancaster, RRT     Patient interviewed by education team.  Patient declined or is unable to participate in the full program.  Therefore, a short intervention has been conducted.  A comprehensive packet including information about COPD, types of treatments to manage their disease and safe home Oxygen usage was provided and reviewed with patient at the bedside.     Patient established with [facility]. Has not followed up for night time over night ox study. Will request one inpatient. Patient notes she's not compliant with Breztri due to losing sense of taste. We discussed using a spacer and rinsing her mouth afterwards. Also discussed other medications that do not have steroids. Patient does not feel that is helping. She does have a nebulizer and rescue that she uses frequently and it has not increased since she stopped Breztri. Made Pulmonary appointment.       COPD Assessment  COPD Clinical Specialists ONLY  COPD Education Initiated: Yes--Short Intervention (HX COPD, Breztri but non compliant with it discussed other options with no steroids. Patient given spacer and pamphlet. Smoking cessation. Made Pulm apt. asked for overnight OPO)  Is this a COPD exacerbation patient?: No  DME Company: [employer]  DME Equipment Type: 2L as needed  Physician Name: Corine Alas P.A.-C.  Pulmonologist Name: [facility]  Referrals Initiated: Yes  Smoking Cessation: Yes  $ Smoking Cessation 3-10 Minutes: Asymptomatic  $ Demo/Eval of SVN's, MDI's and Aerosols: Yes  (OP) Pulmonary Function Testing: Yes (7/2022 Ratio 66%)  Interdisciplinary Rounds: Attendance at Rounds (30 Min)    PFT Results    No results found for: \"PFT\"    Meds to Beds  [facility] provides bedside medication delivery for all eligible patients at discharge and you have been automatically enrolled in the Meds to Beds Program. Would you like to opt out of this program for any reason?: No - Stay Opted " "In     MY COPD ACTION PLAN     It is recommended that patients and physicians/healthcare providers complete this action plan together. This plan should be discussed at each physician visit and updated as needed.    The green, yellow and red zones show groups of symptoms of COPD. This list of symptoms is not comprehensive, and you may experience other symptoms. In the \"Actions\" column, your healthcare provider has recommended actions for you to take based on your symptoms.    Patient Name: Jina Barnard   YOB: 1950   Last Updated on: 3/19/2025  8:45 AM   Green Zone:  I am doing well today Actions     Usual activitiy and exercise level   Take daily medications     Usual amounts of cough and phlegm/mucus   Use oxygen as prescribed     Sleep well at night   Continue regular exercise/diet plan     Appetite is good   At all times avoid cigarette smoke, inhaled irritants     Daily Medications (these medications are taken every day):   Budesonide/Glycopyrrolate/Formoterol Fumarate (Breztri Aerosphere) 2 Puffs Twice daily     Additional Information:  Use a spacer and rinse your mouth     Yellow Zone:  I am having a bad day or a COPD flare Actions     More breathless than usual   Continue daily medications     I have less energy for my daily activities   Use quick relief inhaler as ordered     Increased or thicker phlegm/mucus   Use oxygen as prescribed     Using quick relief inhaler/nebulizer more often   Get plenty of rest     Swelling of ankles more than usual   Use pursed lip breathing     More coughing than usual   At all times avoid cigarette smoke, inhaled irritants     I feel like I have a \"chest cold\"     Poor sleep and my symptoms woke me up     My appetite is not good     My medicine is not helping      Call provider immediately if symptoms don’t improve     Continue daily medications, add rescue medications:   Albuterol  Albuterol/Ipratropium (Combivent, Duoneb) 2 Puffs  3mL via nebulizer " PRN  Every 4 hours PRN       Medications to be used during a flare up, (as Discussed with Provider):           Additional Information:  Use a spacer  Clean and rinse your nebulizer     Red Zone:  I need urgent medical care Actions     Severe shortness of breath even at rest   Call 911 or seek medical care immediately     Not able to do any activity because of breathing      Fever or shaking chills      Feeling confused or very drowsy       Chest pains      Coughing up blood

## 2025-03-20 VITALS
WEIGHT: 177.25 LBS | DIASTOLIC BLOOD PRESSURE: 70 MMHG | SYSTOLIC BLOOD PRESSURE: 115 MMHG | RESPIRATION RATE: 18 BRPM | HEART RATE: 60 BPM | BODY MASS INDEX: 32.62 KG/M2 | OXYGEN SATURATION: 98 % | HEIGHT: 62 IN | TEMPERATURE: 97.6 F

## 2025-03-20 PROBLEM — N17.9 AKI (ACUTE KIDNEY INJURY) (HCC): Status: RESOLVED | Noted: 2025-03-18 | Resolved: 2025-03-20

## 2025-03-20 PROBLEM — R10.9 AP (ABDOMINAL PAIN): Status: RESOLVED | Noted: 2025-03-18 | Resolved: 2025-03-20

## 2025-03-20 LAB
ALBUMIN SERPL BCP-MCNC: 3.2 G/DL (ref 3.2–4.9)
ALBUMIN/GLOB SERPL: 1.8 G/DL
ALP SERPL-CCNC: 48 U/L (ref 30–99)
ALT SERPL-CCNC: <5 U/L (ref 2–50)
ANION GAP SERPL CALC-SCNC: 8 MMOL/L (ref 7–16)
AST SERPL-CCNC: 12 U/L (ref 12–45)
BILIRUB SERPL-MCNC: 0.2 MG/DL (ref 0.1–1.5)
BUN SERPL-MCNC: 24 MG/DL (ref 8–22)
CALCIUM ALBUM COR SERPL-MCNC: 9.2 MG/DL (ref 8.5–10.5)
CALCIUM SERPL-MCNC: 8.6 MG/DL (ref 8.5–10.5)
CHLORIDE SERPL-SCNC: 115 MMOL/L (ref 96–112)
CO2 SERPL-SCNC: 20 MMOL/L (ref 20–33)
CREAT SERPL-MCNC: 1.07 MG/DL (ref 0.5–1.4)
ERYTHROCYTE [DISTWIDTH] IN BLOOD BY AUTOMATED COUNT: 58.2 FL (ref 35.9–50)
GFR SERPLBLD CREATININE-BSD FMLA CKD-EPI: 54 ML/MIN/1.73 M 2
GLOBULIN SER CALC-MCNC: 1.8 G/DL (ref 1.9–3.5)
GLUCOSE SERPL-MCNC: 101 MG/DL (ref 65–99)
HCT VFR BLD AUTO: 35.6 % (ref 37–47)
HGB BLD-MCNC: 12.6 G/DL (ref 12–16)
MCH RBC QN AUTO: 38.2 PG (ref 27–33)
MCHC RBC AUTO-ENTMCNC: 35.4 G/DL (ref 32.2–35.5)
MCV RBC AUTO: 107.9 FL (ref 81.4–97.8)
PLATELET # BLD AUTO: 209 K/UL (ref 164–446)
PMV BLD AUTO: 10 FL (ref 9–12.9)
POTASSIUM SERPL-SCNC: 3.5 MMOL/L (ref 3.6–5.5)
PROT SERPL-MCNC: 5 G/DL (ref 6–8.2)
RBC # BLD AUTO: 3.3 M/UL (ref 4.2–5.4)
SODIUM SERPL-SCNC: 143 MMOL/L (ref 135–145)
WBC # BLD AUTO: 4.9 K/UL (ref 4.8–10.8)

## 2025-03-20 PROCEDURE — 99238 HOSP IP/OBS DSCHRG MGMT 30/<: CPT | Performed by: FAMILY MEDICINE

## 2025-03-20 PROCEDURE — 80053 COMPREHEN METABOLIC PANEL: CPT

## 2025-03-20 PROCEDURE — A9270 NON-COVERED ITEM OR SERVICE: HCPCS | Performed by: STUDENT IN AN ORGANIZED HEALTH CARE EDUCATION/TRAINING PROGRAM

## 2025-03-20 PROCEDURE — 85027 COMPLETE CBC AUTOMATED: CPT

## 2025-03-20 PROCEDURE — 700102 HCHG RX REV CODE 250 W/ 637 OVERRIDE(OP): Performed by: STUDENT IN AN ORGANIZED HEALTH CARE EDUCATION/TRAINING PROGRAM

## 2025-03-20 RX ADMIN — CARVEDILOL 6.25 MG: 6.25 TABLET, FILM COATED ORAL at 08:47

## 2025-03-20 RX ADMIN — ASPIRIN 81 MG: 81 TABLET, COATED ORAL at 05:11

## 2025-03-20 RX ADMIN — FLUTICASONE FUROATE, UMECLIDINIUM BROMIDE AND VILANTEROL TRIFENATATE 1 PUFF: 200; 62.5; 25 POWDER RESPIRATORY (INHALATION) at 08:47

## 2025-03-20 NOTE — DISCHARGE SUMMARY
Discharge Summary    CHIEF COMPLAINT ON ADMISSION  Chief Complaint   Patient presents with    Abdominal Pain    Diarrhea       Reason for Admission  Abd Pain     Admission Date  3/18/2025    CODE STATUS  DNAR/DNI    HPI & HOSPITAL COURSE  Jina Barnard is a 74 y.o. 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 Recently on abx for suspected diverticulitis. States she still has diverticulitis pain however points to epigastrum for this. Recently on DAPT for PCI and also taking ibuprofen for her pain. Has lost weight recently due to abd pain which is chronic issue from multiple episodes of diverticulitis. Denies blood/melena or N/V.  In the ED vitals were wnl, labs showed ANN, CT AP w/o acute dz. She was given 1 L of LR in the ED and admitted.     The patient was admitted to the [facility] floor given IV fluids for her ANN and luckily her ANN resolved.  Patient's diarrhea abdominal pain also resolved and in fact patient had constipation but I gave her milk of mag and patient states that she did have a relatively normal size but small bowel movement yesterday.    Patient seen and examined this morning and her first question is can I go home today.  I told her yes she is able to be discharged safely home today    I recommended to the patient that she stay well-hydrated at home and gave her return precautions and patient expressed understanding of discharge instructions and stated she will comply           Therefore, she is discharged in good and stable condition to home with close outpatient follow-up.    The patient met 2-midnight criteria for an inpatient stay at the time of discharge.    Discharge Date  3/20/2025    FOLLOW UP ITEMS POST DISCHARGE  Primary care provider    DISCHARGE DIAGNOSES  Principal Problem (Resolved):    ANN (acute kidney injury) (HCC) (POA: Yes)  Active Problems:    Essential hypertension (POA: Yes)     Polycythemia vera(238.4) (POA: Yes)      Overview: IMO Update    Asthma-COPD overlap syndrome (HCC) (POA: Yes)    Nonrheumatic mitral valve regurgitation (POA: Yes)    Coronary artery disease involving native coronary artery of native heart without angina pectoris (POA: Yes)      Overview: 3/4/24: Final diagnosis:       Diffuse multivessel disease, successful PTCA of mid circumflex artery.  Resolved Problems:    AP (abdominal pain) (POA: Yes)      FOLLOW UP  Future Appointments   Date Time Provider Department Center   4/2/2025  3:00 PM YISEL Mckeon [facility] None   9/12/2025 10:45 AM YISEL Chung [facility] None     No follow-up provider specified.    MEDICATIONS ON DISCHARGE     Medication List        START taking these medications        Instructions   Stiolto Respimat 2.5-2.5 MCG/ACT Aers  Generic drug: Tiotropium Bromide-Olodaterol   Take 2 Puffs by mouth every day.  Dose: 2 Puff            CHANGE how you take these medications        Instructions   potassium Chloride ER 20 MEQ Tbcr tablet  What changed:   when to take this  reasons to take this  Commonly known as: K-Tab   Take 1 Tablet by mouth 2 times a day.  Dose: 20 mEq            CONTINUE taking these medications        Instructions   albuterol 108 (90 Base) MCG/ACT Aers inhalation aerosol   Doctor's comments: Please provide 90 day supply with 3 refills  INHALE 2 PUFFS BY MOUTH EVERY 6 HOURS AS NEEDED     aspirin 81 MG EC tablet   Take 1 Tablet by mouth every day.  Dose: 81 mg     carvedilol 6.25 MG Tabs  Commonly known as: Coreg   Doctor's comments: ZERO refills remain on this prescription. Your patient is requesting advance approval of refills for this medication to PREVENT ANY MISSED DOSES  TAKE 1 TABLET BY MOUTH TWICE DAILY WITH MEALS  Dose: 6.25 mg     dapagliflozin propanediol 10 MG Tabs  Commonly known as: Farxiga   Take 1 Tablet by mouth every day.  Dose: 10 mg     hydroxyurea 500 MG Caps  Commonly known as: Hydrea   Take  1,000 mg by mouth at bedtime. 1,000 mg = 2 tabs  Dose: 1,000 mg     ibuprofen 200 MG Tabs  Commonly known as: Motrin   Take 600 mg by mouth every 6 hours as needed for Mild Pain. 600 mg = 3 tabs  Dose: 600 mg     ipratropium-albuterol 0.5-2.5 (3) MG/3ML nebulizer solution  Commonly known as: Duoneb   Take 3 mL by nebulization every four hours as needed for Shortness of Breath.  Dose: 3 mL     rosuvastatin 10 MG Tabs  Commonly known as: Crestor   Take 1 Tablet by mouth every evening.  Dose: 10 mg     torsemide 20 MG Tabs  Commonly known as: Demadex   Take 1 Tablet by mouth 2 times a day.  Dose: 20 mg              Allergies  No Known Allergies    DIET  Orders Placed This Encounter   Procedures    Diet Order Diet: Regular     Standing Status:   Standing     Number of Occurrences:   1     Diet::   Regular [1]       ACTIVITY  As tolerated.  Weight bearing as tolerated    CONSULTATIONS  None    PROCEDURES  None    LABORATORY  Lab Results   Component Value Date    SODIUM 143 03/20/2025    POTASSIUM 3.5 (L) 03/20/2025    CHLORIDE 115 (H) 03/20/2025    CO2 20 03/20/2025    GLUCOSE 101 (H) 03/20/2025    BUN 24 (H) 03/20/2025    CREATININE 1.07 03/20/2025        Lab Results   Component Value Date    WBC 4.9 03/20/2025    HEMOGLOBIN 12.6 03/20/2025    HEMATOCRIT 35.6 (L) 03/20/2025    PLATELETCT 209 03/20/2025        Total time of the discharge process exceeds 25   minutes.

## 2025-03-20 NOTE — RESPIRATORY CARE
Pulse Oximetry machine is currently being use on anther patient. We will try doing the nocturnal study on the following night. Unfortunately we only one machine in the hospital.

## 2025-03-20 NOTE — CARE PLAN
The patient is Stable - Low risk of patient condition declining or worsening    Shift Goals  Clinical Goals: pt will be able to tolerate diet  Patient Goals: to go home  Family Goals: n/a none present    Outcome: Progressing  Problem: Gastrointestinal Irritability  Goal: Diarrhea will be absent or improved  Description:  Prior to discharge or change in level of care.  Assess for abdominal discomfort, pain, cramping, frequency, urgency, loose or liquid stools, and hyperactive bowel sensations.Evaluate pattern of defecation.  Administer antidiarrheal agents per order. Culture stool, per order5.  Inquire about food intolerances, medications, change in eating pattern and current stressors.  .      Patient reports no diarrhea today. Up with assist to use bathroom. Able to tolerate diet.

## 2025-03-20 NOTE — CARE PLAN
The patient is Stable - Low risk of patient condition declining or worsening    Shift Goals  Clinical Goals: Patient will remain free from any falls or injury within the shift  Patient Goals: Go Home Soon, Rest  Family Goals: HANNAH    Progress made toward(s) clinical / shift goals: Patient is alert and oriented, able to make needs known. Call light placed within easy reach, calls appropriately. SBA with toileting and ambulation. Patient remained free from any falls or injury within the shift.       Patient is not progressing towards the following goals:      Problem: Bowel Elimination  Goal: Establish and maintain regular bowel function  Description: Target End Date:  Prior to discharge or change in level of care1.   Note date of last BM2.   Educate about diet, fluid intake, medication and activity to promote bowel function3.   Educate signs and symptoms of constipation and interventions to implement4.   Pharmacologic bowel management per provider order5.   Regular toileting schedule6.   Upright position for toileting7.   High fiber diet8.   Encourage hydration9.   Collaborate with Clinical [id number]. Care and maintenance of ostomy if applicable  Outcome: Not Progressing     Problem: Gastrointestinal Irritability  Goal: Diarrhea will be absent or improved  Description: Target End Date:  Prior to discharge or change in level of care1.  Assess for abdominal discomfort, pain, cramping, frequency, urgency, loose or liquid stools, and hyperactive bowel sensations.2.  Evaluate pattern of defecation3.  Administer antidiarrheal agents per order4. Culture stool, per order5.  Inquire about food intolerances, medications, change in eating pattern and current stressors6.  Assess for fecal impaction7.  Assess hydration status8.  Assess condition of perianal skin9.  Loss of bowel elimination control can lead of feelings of decreased self esteem.  Examine the emotional impact of illness, hospitalization and/or accidents.  Outcome:  Not Progressing  Note: Patient reported loose bowel movement episode.

## 2025-03-20 NOTE — RESPIRATORY CARE
Called and informed son that pulmonary sent Stiolto prescription to her pharmacy and apologized for not being able to perform noc ox study. Also notified of pulmonary apt.    Single

## 2025-03-20 NOTE — PROGRESS NOTES
Pt was brought down to discharge lounge. Pt's IV was removed. Pt states having all belongings. Pt did not have any questions regarding discharge. Pt states having all belongings. RN called doc about pt's inhaler. Per pharmacy they didn't see an order for an inhaler. RN messaged doc to see if med can be sent to [place]. Per pt's son they will come back to  the pt's med later. Pt's son is taking pt home    1003-Per Dr. Sumner pt has a similar med at home and no new med was ordered.

## 2025-03-20 NOTE — DISCHARGE PLANNING
Care Transition Team Assessment    Primary emergency Contact is pt's Son Albert at [phone]     LMSW met with pt at bedside to complete discharge assessment and chart review was completed to obtain the information used in this assessment. Pt is A/Ox4 and agreeable to assessment. Pt verified information on face sheet.      - Prior to admission, pt lived alone with her dog at [address], [place] [address], but patient's son lives three doors down.    - Per pt, family is good support for discharge back into community.   - Pt stated to be independent with ADL/IADLs with the assistance of her son  - Per pt, no DME or O2 was owned/used prior to admission   - Pt is retired and insured through Medicare and Medicaid FFS    - Preferred pharmacy is the [facility] at [address] [place] [address]  -Patient is transitioning to a new PCP.   -Patient does not report any history of GARCIA or MH    IMM completed with the patient at bedside. IMM charted and scanned to [facility]. Patient's son will be the DC ride home. Social Detriments of Health completed and added to AVS.     Information Source  Orientation Level: Oriented X4  Information Given By: Patient  Who is responsible for making decisions for patient? : Patient    Readmission Evaluation  Is this a readmission?: No    Elopement Risk  Legal Hold: No  Ambulatory or Self Mobile in Wheelchair: Yes  Disoriented: No  Psychiatric Symptoms: None  History of Wandering: No  Elopement this Admit: No  Vocalizing Wanting to Leave: No  Displays Behaviors, Body Language Wanting to Leave: No-Not at Risk for Elopement  Elopement Risk: Not at Risk for Elopement    Interdisciplinary Discharge Planning  Lives with - Patient's Self Care Capacity: (Patient-Rptd) (P) Alone and Able to Care For Self  Patient or legal guardian wants to designate a caregiver: No  Support Systems: (Patient-Rptd) (P)  / , Children, Friends / Neighbors, Home Care Staff  Housing  / Facility: (Patient-Rptd) (P) Mobile Home  Patient Prefers to be Discharged to:: (Patient-Rptd) (P) Home [address] [place] [address]  Durable Medical Equipment: (Patient-Rptd) (P) Home Oxygen, Walker  DME Provider / Phone: (Patient-Rptd) (P) [facility] oxygen And Medicare    Discharge Preparedness  What is your plan after discharge?: Home with help  What are your discharge supports?: Child  Prior Functional Level: Independent with Activities of Daily Living, Independent with Medication Management, Ambulatory  Difficulity with ADLs: None  Difficulity with IADLs: None    Functional Assesment  Prior Functional Level: Independent with Activities of Daily Living, Independent with Medication Management, Ambulatory    Finances  Financial Barriers to Discharge: No  Prescription Coverage: Yes    Vision / Hearing Impairment  Vision Impairment : Yes  Right Eye Vision: Wears Glasses  Left Eye Vision: Wears Glasses  Hearing Impairment : No         Advance Directive  Advance Directive?: None    Domestic Abuse  Have you ever been the victim of abuse or violence?: No  Possible Abuse/Neglect Reported to:: Not Applicable    Psychological Assessment  History of Substance Abuse: None  History of Psychiatric Problems: No  Non-compliant with Treatment: No  Newly Diagnosed Illness: No    Discharge Risks or Barriers  Discharge risks or barriers?: Complex medical needs  Patient risk factors: Complex medical needs, Vulnerable adult    Anticipated Discharge Information  Discharge Disposition: Discharged to home/self care (01)

## 2025-03-21 ENCOUNTER — TELEPHONE (OUTPATIENT)
Dept: PHARMACY | Facility: MEDICAL CENTER | Age: 75
End: 2025-03-21
Payer: MEDICARE

## 2025-03-21 DIAGNOSIS — J44.89 ASTHMA-COPD OVERLAP SYNDROME (HCC): ICD-10-CM

## 2025-03-21 RX ORDER — ALBUTEROL SULFATE 90 UG/1
INHALANT RESPIRATORY (INHALATION)
Qty: 54 G | Refills: 3 | Status: SHIPPED | OUTPATIENT
Start: 2025-03-21

## 2025-03-21 NOTE — TELEPHONE ENCOUNTER
Received New Start request via [employer]  for STIOLTO RESPIMAT 2.5-2.5 MCG/ACT . (Quantity:12 G, Day Supply:30)     Insurance: [employer]  Member ID:  [id number]  BIN: [id number]  PCN: [id number]  Group: [id number]     Ran Test claim via [place] & medication Rejects stating prior authorization is required.    Thank you,   Sweetie Quiroga, [facility]  Pharmacy Liaison

## 2025-03-21 NOTE — TELEPHONE ENCOUNTER
Caller Name: Jina Barnard                 Call Back Number: [phone] (home)         Patient approves a detailed voicemail message: N\A    Have we ever prescribed this med? Yes.  If yes, what date? 8/14/24    Last OV: 8/14/24    Next OV: 4/2/25    DX: Asthma-COPD overlap syndrome (HCC) [J44.89]     Medications:  Requested Prescriptions     Pending Prescriptions Disp Refills    albuterol 108 (90 Base) MCG/ACT Aero Soln inhalation aerosol 54 g 3     Sig: INHALE 2 PUFFS BY MOUTH EVERY 6 HOURS AS NEEDED

## 2025-03-24 NOTE — TELEPHONE ENCOUNTER
Prior Authorization for STIOLTO RESPIMAT 2.5-2.5 MCG/ACT  (Quantity: 12 g, Days: 30) has been submitted via Cover My Meds: Key ([id number])    Insurance: [employer]    Will follow up in 24-48 business hours.     Thank you,   Sweetie Quiroga, [facility]  Pharmacy Liaison

## 2025-04-01 ASSESSMENT — ENCOUNTER SYMPTOMS
SPUTUM PRODUCTION: 1
PALPITATIONS: 0
SINUS PAIN: 0
COUGH: 1
HEARTBURN: 0
SHORTNESS OF BREATH: 1
CHILLS: 0
WEAKNESS: 0
VOMITING: 0
FEVER: 0
HEMOPTYSIS: 0
MYALGIAS: 0
DIARRHEA: 0
HEADACHES: 0
DIZZINESS: 0
DIAPHORESIS: 0
WHEEZING: 1
FALLS: 0
NAUSEA: 0

## 2025-04-02 ENCOUNTER — APPOINTMENT (OUTPATIENT)
Dept: SLEEP MEDICINE | Facility: MEDICAL CENTER | Age: 75
End: 2025-04-02
Payer: MEDICARE

## 2025-04-02 NOTE — PROGRESS NOTES
Pulmonary Clinic Note    Date of Visit: 4/2/2024     Chief Complaint:  No chief complaint on file.    HPI:   Jina Barnard is a very pleasant 72 y.o. year old female never smoker but significant marijuana use, with a PMHx of COPD asthma overlap, CAD, s/p stent placement HTN, marijuana use, intramural leiomyoma uterus, polycythemia vera who presented to the Pulmonary Clinic for a regular follow up. Last seen in the office on 8/14/2024 with myself.     Patient is followed by the pulmonary office for COPD asthma overlap.  PFTs in 2022 show an FVC of 1.79 L or 69%, FEV1 1.19 L or 59%, FEV1/FVC 66%, %, %, DLCO 85% predicted, without positive bronchodilator response.  PFTs done at [facility] in June 2023 shows an FVC of 1.87 L or 74%, FEV1 1.23 L or 64%, FEV1/FVC 65.5, %, TLC 1 1%, DLCO 91% predicted.  CT chest in 2022 shows tree-in-bud GGO nodules throughout both lungs and several sub-3 mm micronodules.  Patient is currently on Trelegy 100, Singulair, DuoNebs, and albuterol as needed.  Patient underwent PCI in March with stent placement to mid circumflex.  OPO on room air in August 2024 shows saturations less than 88% for 26.9 minutes.    Interval events:   10/10/2023-   Patient states that she has been out of Breztri for the last month.  She did receive samples yesterday.  She also reports that she would like to get off of Singulair because she does not feel that it is helping her.  She uses DuoNebs once at night and albuterol inhaler 3 times a day.  She continues to smoke marijuana 1.5 joints per day.  Symptomatically, she states she is short of breath with mMRC of 4, has a cough with clear thick sputum and wheezing.  She was unable to schedule her overnight oxygen test.      4/10/2024-   Patient reports that she is more wheezy due to running low on her DuoNeb and albuterol.  She continues to use and benefit from Breztri.  Symptomatically, she is short of breath with mMRC of 4, which she  "attributes to her baseline mitral regurgitation.  She will also have an occasional dry cough and wheezing.  Patient reports that she is feeling better after her stent placement.  She is also scheduled to have a mitral valve replacement with cardiology at the end of the month.    8/14/2024-  Patient reports that she has been more wheezing due to needing a refill on her DuoNeb.  She states that she will be intermittently short of breath and will have a cough with clear sputum production and wheezing.  She states that her wheezing is nonexistent today.  She continues to use breast tree and just restarted Singulair.  She has not been able to use DuoNeb for several weeks and because of this has been using her albuterol inhaler 4-5 times a day.  Patient continues to smoke marijuana, but states that she has significantly cut back.  Patient is also on torsemide 20 mg twice daily and does not have any lower extremity swelling.  Patient has not had any exacerbations    4/2/2025-  ***    Exacerbations this year:  0    Current medication regimen: Breztri, DuoNeb, and albuterol as needed    Oxygen use:  None    MMRC Grade:   0- Breathless only during strenuous exercise  1- Short of breath when hurrying or going up a small hill  2- Walks slower than friends due to breathlessness, has to stop at own pace  3- Stops to catch breath on level ground after 100m  4- Breathless with ambulating around house or ADLs          Past Medical History:   Diagnosis Date    Breath shortness     COPD (chronic obstructive pulmonary disease) ([facility])     Patient suspects her SOB is r/t to her leako valve and that she does not have COPD    Dental disorder     upper dentures    Diverticulosis     Emphysema of lung ([facility])     COPD    Heart murmur     Heart valve disease     \"leaky valve\"; Followed by Dr. Reid    High cholesterol     Hypertension     Thrombocytosis     Plavix    Urinary incontinence     Urgency, frequency, leakage     Past Surgical " History:   Procedure Laterality Date    TRANSCATHETER MITRAL VALVE REPAIR Bilateral 4/30/2024    Procedure: TRANSCATHETER MITRAL VALVE PROCEDURE WITH MITRAL CLIP SYSTEM ATTEMPTED;  Surgeon: Hung Easton M.D.;  Location: SURGERY [facility];  Service: Cardiac    ECHOCARDIOGRAM, TRANSESOPHAGEAL, INTRAOPERATIVE N/A 4/30/2024    Procedure: ECHOCARDIOGRAM, TRANSESOPHAGEAL, INTRAOPERATIVE;  Surgeon: Hung Easton M.D.;  Location: SURGERY [facility];  Service: Cardiac    ANGIOGRAM      COLONOSCOPY      TUBAL COAGULATION LAPAROSCOPIC BILATERAL       Social History     Socioeconomic History    Marital status: Single     Spouse name: Not on file    Number of children: Not on file    Years of education: Not on file    Highest education level: Bachelor's degree (e.g., BA, AB, BS)   Occupational History    Not on file   Tobacco Use    Smoking status: Never    Smokeless tobacco: Never   Vaping Use    Vaping status: Never Used   Substance and Sexual Activity    Alcohol use: No    Drug use: Yes     Types: Marijuana, Inhaled     Comment: occ    Sexual activity: Never     Partners: Male     Birth control/protection: Post-Menopausal   Other Topics Concern    Not on file   Social History Narrative    Not on file     Social Drivers of Health     Financial Resource Strain: High Risk (3/19/2025)    Overall Financial Resource Strain (CARDIA)     Difficulty of Paying Living Expenses: Very hard   Food Insecurity: Food Insecurity Present (3/19/2025)    Hunger Vital Sign     Worried About Running Out of Food in the Last Year: Often true     Ran Out of Food in the Last Year: Sometimes true   Transportation Needs: No Transportation Needs (3/19/2025)    PRAPARE - Transportation     Lack of Transportation (Medical): No     Lack of Transportation (Non-Medical): No   Physical Activity: Inactive (3/19/2025)    Exercise Vital Sign     Days of Exercise per Week: 0 days     Minutes of Exercise per Session: 0 min   Stress: Stress Concern  Present (3/19/2025)    [employer] [place] [employer] - Occupational Stress Questionnaire     Feeling of Stress : To some extent   Social Connections: Moderately Isolated (3/19/2025)    Social Connection and Isolation Panel [NHANES]     Frequency of Communication with Friends and Family: More than three times a week     Frequency of Social Gatherings with Friends and Family: More than three times a week     Attends Orthodoxy Services: Never     Active Member of Clubs or Organizations: No     Attends Club or Organization Meetings: 1 to 4 times per year     Marital Status:    Intimate Partner Violence: Not At Risk (3/18/2025)    Humiliation, Afraid, Rape, and Kick questionnaire     Fear of Current or Ex-Partner: No     Emotionally Abused: No     Physically Abused: No     Sexually Abused: No   Housing Stability: Low Risk  (3/19/2025)    Housing Stability Vital Sign     Unable to Pay for Housing in the Last Year: No     Number of Times Moved in the Last Year: 0     Homeless in the Last Year: No        Family History   Problem Relation Age of Onset    Alcohol/Drug Father         alcohol    Other Father         malnutrition    Psychiatric Illness Father     Other Mother         blood clot from surgery    Hypertension Mother     Diabetes Mother         ?    Psychiatric Illness Sister     Other Sister         malnutrition    No Known Problems Maternal Grandmother     No Known Problems Maternal Grandfather     No Known Problems Paternal Grandmother     No Known Problems Paternal Grandfather      Current Outpatient Medications on File Prior to Visit   Medication Sig Dispense Refill    albuterol 108 (90 Base) MCG/ACT Aero Soln inhalation aerosol INHALE 2 PUFFS BY MOUTH EVERY 6 HOURS AS NEEDED 54 g 3    Tiotropium Bromide-Olodaterol (STIOLTO RESPIMAT) 2.5-2.5 MCG/ACT Aero Soln Take 2 Puffs by mouth every day. 12 g 3    dapagliflozin propanediol (FARXIGA) 10 MG Tab Take 1 Tablet by mouth every day. 100 Tablet 3     carvedilol (COREG) 6.25 MG Tab TAKE 1 TABLET BY MOUTH TWICE DAILY WITH MEALS 180 Tablet 2    rosuvastatin (CRESTOR) 10 MG Tab Take 1 Tablet by mouth every evening. 90 Tablet 3    ipratropium-albuterol (DUONEB) 0.5-2.5 (3) MG/3ML nebulizer solution Take 3 mL by nebulization every four hours as needed for Shortness of Breath. 120 mL 11    torsemide (DEMADEX) 20 MG Tab Take 1 Tablet by mouth 2 times a day. 200 Tablet 3    potassium chloride ER (K-TAB) 20 MEQ Tab CR tablet Take 1 Tablet by mouth 2 times a day. 200 Tablet 3    aspirin 81 MG EC tablet Take 1 Tablet by mouth every day. 90 Tablet 3    hydroxyurea (HYDREA) 500 MG Cap Take 1,000 mg by mouth at bedtime. 1,000 mg = 2 tabs      ibuprofen (MOTRIN) 200 MG Tab Take 600 mg by mouth every 6 hours as needed for Mild Pain. 600 mg = 3 tabs       No current facility-administered medications on file prior to visit.     Allergies: Patient has no known allergies.    ROS:   Review of Systems   Constitutional:  Negative for chills, diaphoresis, fever and malaise/fatigue.   HENT:  Negative for congestion and sinus pain.    Respiratory:  Positive for cough (dry), sputum production (clear), shortness of breath and wheezing. Negative for hemoptysis.    Cardiovascular:  Negative for chest pain, palpitations and leg swelling.   Gastrointestinal:  Negative for diarrhea, heartburn, nausea and vomiting.   Musculoskeletal:  Negative for falls and myalgias.   Neurological:  Negative for dizziness, weakness and headaches.     Vitals:  There were no vitals taken for this visit.    Physical Exam  Constitutional:       General: She is not in acute distress.     Appearance: Normal appearance. She is not ill-appearing, toxic-appearing or diaphoretic.   Cardiovascular:      Rate and Rhythm: Normal rate and regular rhythm.      Heart sounds: No murmur heard.     No friction rub. No gallop.   Pulmonary:      Effort: No respiratory distress.      Breath sounds: No stridor. Wheezing (faint)  present. No rhonchi or rales.   Musculoskeletal:         General: No swelling.      Right lower leg: No edema.      Left lower leg: No edema.   Skin:     General: Skin is warm.   Neurological:      General: No focal deficit present.      Mental Status: She is alert and oriented to person, place, and time.   Psychiatric:         Mood and Affect: Mood normal.         Behavior: Behavior normal.         Thought Content: Thought content normal.         Judgment: Judgment normal.         Laboratory Data:  OPO on room air in August 2024 shows saturations less than 88% for 26.9 minutes.    PFTs at [place]: (Date: 7/1/2022)-        CT Chest: (Date: 10/13/2022)-  Impression:  1.  Several scattered tree-in-bud groundglass nodules throughout both lungs, likely sequela of infectious/inflammatory bronchiolitis. No findings to suggest pneumonia.  2.  Mild pneumobilia.  3.  Colonic diverticulosis.      Assessment and Plan:    Problem List Items Addressed This Visit    None      Diagnostic studies have been reviewed with the patient.    No follow-ups on file.     This note was generated using voice recognition software which has a chance of producing errors of grammar and possibly content.  I have made every reasonable attempt to find and correct any obvious errors, but it should be expected that some may not be found prior to finalization of this note.    Time spent in record review prior to patient arrival, reviewing results, and in face-to-face encounter totaled 20 min.  __________  DOMINGA Estrella  Pulmonary Medicine  [facility]

## 2025-05-01 ENCOUNTER — HOSPITAL ENCOUNTER (INPATIENT)
Facility: MEDICAL CENTER | Age: 75
LOS: 1 days | DRG: 641 | End: 2025-05-03
Attending: EMERGENCY MEDICINE | Admitting: INTERNAL MEDICINE
Payer: MEDICARE

## 2025-05-01 ENCOUNTER — APPOINTMENT (OUTPATIENT)
Dept: RADIOLOGY | Facility: MEDICAL CENTER | Age: 75
DRG: 641 | End: 2025-05-01
Attending: EMERGENCY MEDICINE
Payer: MEDICARE

## 2025-05-01 DIAGNOSIS — R19.7 DIARRHEA, UNSPECIFIED TYPE: ICD-10-CM

## 2025-05-01 DIAGNOSIS — R10.13 EPIGASTRIC PAIN: ICD-10-CM

## 2025-05-01 DIAGNOSIS — E87.6 HYPOKALEMIA: ICD-10-CM

## 2025-05-01 PROBLEM — D72.829 LEUKOCYTOSIS: Status: ACTIVE | Noted: 2025-05-01

## 2025-05-01 PROBLEM — R10.9 ABDOMINAL PAIN: Status: ACTIVE | Noted: 2025-05-01

## 2025-05-01 LAB
ALBUMIN SERPL BCP-MCNC: 3.7 G/DL (ref 3.2–4.9)
ALBUMIN/GLOB SERPL: 1.2 G/DL
ALP SERPL-CCNC: 139 U/L (ref 30–99)
ALT SERPL-CCNC: 39 U/L (ref 2–50)
ANION GAP SERPL CALC-SCNC: 15 MMOL/L (ref 7–16)
APPEARANCE UR: ABNORMAL
AST SERPL-CCNC: 18 U/L (ref 12–45)
BACTERIA #/AREA URNS HPF: ABNORMAL /HPF
BASOPHILS # BLD AUTO: 0.2 % (ref 0–1.8)
BASOPHILS # BLD: 0.02 K/UL (ref 0–0.12)
BILIRUB SERPL-MCNC: 0.5 MG/DL (ref 0.1–1.5)
BILIRUB UR QL STRIP.AUTO: NEGATIVE
BUN SERPL-MCNC: 19 MG/DL (ref 8–22)
CALCIUM ALBUM COR SERPL-MCNC: 9.6 MG/DL (ref 8.5–10.5)
CALCIUM SERPL-MCNC: 9.4 MG/DL (ref 8.5–10.5)
CASTS URNS QL MICRO: ABNORMAL /LPF (ref 0–2)
CHLORIDE SERPL-SCNC: 88 MMOL/L (ref 96–112)
CO2 SERPL-SCNC: 35 MMOL/L (ref 20–33)
COLOR UR: YELLOW
CREAT SERPL-MCNC: 1.94 MG/DL (ref 0.5–1.4)
EKG IMPRESSION: NORMAL
EOSINOPHIL # BLD AUTO: 0.03 K/UL (ref 0–0.51)
EOSINOPHIL NFR BLD: 0.2 % (ref 0–6.9)
EPITHELIAL CELLS 1715: ABNORMAL /HPF (ref 0–5)
ERYTHROCYTE [DISTWIDTH] IN BLOOD BY AUTOMATED COUNT: 58 FL (ref 35.9–50)
GFR SERPLBLD CREATININE-BSD FMLA CKD-EPI: 26 ML/MIN/1.73 M 2
GLOBULIN SER CALC-MCNC: 3.2 G/DL (ref 1.9–3.5)
GLUCOSE SERPL-MCNC: 143 MG/DL (ref 65–99)
GLUCOSE UR STRIP.AUTO-MCNC: 500 MG/DL
HCT VFR BLD AUTO: 39.2 % (ref 37–47)
HGB BLD-MCNC: 13.8 G/DL (ref 12–16)
HYALINE CAST   1831: PRESENT /LPF
IMM GRANULOCYTES # BLD AUTO: 0.07 K/UL (ref 0–0.11)
IMM GRANULOCYTES NFR BLD AUTO: 0.6 % (ref 0–0.9)
KETONES UR STRIP.AUTO-MCNC: NEGATIVE MG/DL
LEUKOCYTE ESTERASE UR QL STRIP.AUTO: NEGATIVE
LIPASE SERPL-CCNC: 21 U/L (ref 11–82)
LYMPHOCYTES # BLD AUTO: 1.77 K/UL (ref 1–4.8)
LYMPHOCYTES NFR BLD: 13.9 % (ref 22–41)
MCH RBC QN AUTO: 38.2 PG (ref 27–33)
MCHC RBC AUTO-ENTMCNC: 35.2 G/DL (ref 32.2–35.5)
MCV RBC AUTO: 108.6 FL (ref 81.4–97.8)
MICRO URNS: ABNORMAL
MONOCYTES # BLD AUTO: 0.67 K/UL (ref 0–0.85)
MONOCYTES NFR BLD AUTO: 5.3 % (ref 0–13.4)
NEUTROPHILS # BLD AUTO: 10.14 K/UL (ref 1.82–7.42)
NEUTROPHILS NFR BLD: 79.8 % (ref 44–72)
NITRITE UR QL STRIP.AUTO: NEGATIVE
NRBC # BLD AUTO: 0 K/UL
NRBC BLD-RTO: 0 /100 WBC (ref 0–0.2)
PH UR STRIP.AUTO: 5.5 [PH] (ref 5–8)
PLATELET # BLD AUTO: 317 K/UL (ref 164–446)
PMV BLD AUTO: 9.4 FL (ref 9–12.9)
POTASSIUM SERPL-SCNC: 2.6 MMOL/L (ref 3.6–5.5)
PROT SERPL-MCNC: 6.9 G/DL (ref 6–8.2)
PROT UR QL STRIP: 30 MG/DL
RBC # BLD AUTO: 3.61 M/UL (ref 4.2–5.4)
RBC # URNS HPF: ABNORMAL /HPF (ref 0–2)
RBC UR QL AUTO: ABNORMAL
SODIUM SERPL-SCNC: 138 MMOL/L (ref 135–145)
SP GR UR STRIP.AUTO: 1.02
TROPONIN T SERPL-MCNC: 37 NG/L (ref 6–19)
UROBILINOGEN UR STRIP.AUTO-MCNC: 0.2 EU/DL
WBC # BLD AUTO: 12.7 K/UL (ref 4.8–10.8)
WBC #/AREA URNS HPF: ABNORMAL /HPF

## 2025-05-01 PROCEDURE — 700111 HCHG RX REV CODE 636 W/ 250 OVERRIDE (IP): Mod: UD | Performed by: EMERGENCY MEDICINE

## 2025-05-01 PROCEDURE — 99285 EMERGENCY DEPT VISIT HI MDM: CPT

## 2025-05-01 PROCEDURE — A9270 NON-COVERED ITEM OR SERVICE: HCPCS | Performed by: EMERGENCY MEDICINE

## 2025-05-01 PROCEDURE — 93005 ELECTROCARDIOGRAM TRACING: CPT | Mod: TC | Performed by: EMERGENCY MEDICINE

## 2025-05-01 PROCEDURE — 36415 COLL VENOUS BLD VENIPUNCTURE: CPT

## 2025-05-01 PROCEDURE — 700105 HCHG RX REV CODE 258: Mod: UD | Performed by: INTERNAL MEDICINE

## 2025-05-01 PROCEDURE — 80053 COMPREHEN METABOLIC PANEL: CPT

## 2025-05-01 PROCEDURE — 81001 URINALYSIS AUTO W/SCOPE: CPT

## 2025-05-01 PROCEDURE — 96375 TX/PRO/DX INJ NEW DRUG ADDON: CPT

## 2025-05-01 PROCEDURE — 83630 LACTOFERRIN FECAL (QUAL): CPT

## 2025-05-01 PROCEDURE — 93005 ELECTROCARDIOGRAM TRACING: CPT | Mod: TC

## 2025-05-01 PROCEDURE — G0378 HOSPITAL OBSERVATION PER HR: HCPCS

## 2025-05-01 PROCEDURE — 99223 1ST HOSP IP/OBS HIGH 75: CPT | Mod: AI | Performed by: INTERNAL MEDICINE

## 2025-05-01 PROCEDURE — A9270 NON-COVERED ITEM OR SERVICE: HCPCS | Mod: UD | Performed by: INTERNAL MEDICINE

## 2025-05-01 PROCEDURE — 700102 HCHG RX REV CODE 250 W/ 637 OVERRIDE(OP): Mod: UD | Performed by: INTERNAL MEDICINE

## 2025-05-01 PROCEDURE — 71045 X-RAY EXAM CHEST 1 VIEW: CPT

## 2025-05-01 PROCEDURE — 700102 HCHG RX REV CODE 250 W/ 637 OVERRIDE(OP): Performed by: EMERGENCY MEDICINE

## 2025-05-01 PROCEDURE — 83690 ASSAY OF LIPASE: CPT

## 2025-05-01 PROCEDURE — 96366 THER/PROPH/DIAG IV INF ADDON: CPT

## 2025-05-01 PROCEDURE — 85025 COMPLETE CBC W/AUTO DIFF WBC: CPT

## 2025-05-01 PROCEDURE — 87045 FECES CULTURE AEROBIC BACT: CPT

## 2025-05-01 PROCEDURE — 87077 CULTURE AEROBIC IDENTIFY: CPT | Mod: 91

## 2025-05-01 PROCEDURE — 84484 ASSAY OF TROPONIN QUANT: CPT

## 2025-05-01 PROCEDURE — 87046 STOOL CULTR AEROBIC BACT EA: CPT

## 2025-05-01 PROCEDURE — 96365 THER/PROPH/DIAG IV INF INIT: CPT

## 2025-05-01 PROCEDURE — 87324 CLOSTRIDIUM AG IA: CPT

## 2025-05-01 PROCEDURE — 87493 C DIFF AMPLIFIED PROBE: CPT

## 2025-05-01 RX ORDER — AMOXICILLIN 250 MG
2 CAPSULE ORAL EVERY EVENING
Status: DISCONTINUED | OUTPATIENT
Start: 2025-05-01 | End: 2025-05-01

## 2025-05-01 RX ORDER — IPRATROPIUM BROMIDE AND ALBUTEROL SULFATE 2.5; .5 MG/3ML; MG/3ML
3 SOLUTION RESPIRATORY (INHALATION) EVERY 4 HOURS PRN
Status: DISCONTINUED | OUTPATIENT
Start: 2025-05-01 | End: 2025-05-03 | Stop reason: HOSPADM

## 2025-05-01 RX ORDER — ACETAMINOPHEN 325 MG/1
650 TABLET ORAL EVERY 6 HOURS PRN
Status: DISCONTINUED | OUTPATIENT
Start: 2025-05-01 | End: 2025-05-03 | Stop reason: HOSPADM

## 2025-05-01 RX ORDER — SODIUM CHLORIDE 9 MG/ML
INJECTION, SOLUTION INTRAVENOUS CONTINUOUS
Status: ACTIVE | OUTPATIENT
Start: 2025-05-01 | End: 2025-05-02

## 2025-05-01 RX ORDER — HYDROXYUREA 500 MG/1
1000 CAPSULE ORAL DAILY
Status: DISCONTINUED | OUTPATIENT
Start: 2025-05-02 | End: 2025-05-03 | Stop reason: HOSPADM

## 2025-05-01 RX ORDER — ALBUTEROL SULFATE 90 UG/1
1 INHALANT RESPIRATORY (INHALATION) EVERY 4 HOURS PRN
Status: DISCONTINUED | OUTPATIENT
Start: 2025-05-01 | End: 2025-05-03 | Stop reason: HOSPADM

## 2025-05-01 RX ORDER — OMEPRAZOLE 20 MG/1
20 CAPSULE, DELAYED RELEASE ORAL 2 TIMES DAILY
Status: DISCONTINUED | OUTPATIENT
Start: 2025-05-01 | End: 2025-05-03 | Stop reason: HOSPADM

## 2025-05-01 RX ORDER — DAPAGLIFLOZIN 10 MG/1
10 TABLET, FILM COATED ORAL DAILY
Status: DISCONTINUED | OUTPATIENT
Start: 2025-05-01 | End: 2025-05-03 | Stop reason: HOSPADM

## 2025-05-01 RX ORDER — UMECLIDINIUM BROMIDE AND VILANTEROL TRIFENATATE 62.5; 25 UG/1; UG/1
1 POWDER RESPIRATORY (INHALATION) DAILY
Status: DISCONTINUED | OUTPATIENT
Start: 2025-05-01 | End: 2025-05-03 | Stop reason: HOSPADM

## 2025-05-01 RX ORDER — POLYETHYLENE GLYCOL 3350 17 G/17G
1 POWDER, FOR SOLUTION ORAL
Status: DISCONTINUED | OUTPATIENT
Start: 2025-05-01 | End: 2025-05-01

## 2025-05-01 RX ORDER — POTASSIUM CHLORIDE 750 MG/1
20 TABLET, EXTENDED RELEASE ORAL 2 TIMES DAILY
Status: DISCONTINUED | OUTPATIENT
Start: 2025-05-01 | End: 2025-05-02

## 2025-05-01 RX ORDER — ASPIRIN 81 MG/1
81 TABLET ORAL DAILY
Status: DISCONTINUED | OUTPATIENT
Start: 2025-05-02 | End: 2025-05-03 | Stop reason: HOSPADM

## 2025-05-01 RX ORDER — PANTOPRAZOLE SODIUM 40 MG/1
40 TABLET, DELAYED RELEASE ORAL 2 TIMES DAILY
COMMUNITY
Start: 2025-04-08

## 2025-05-01 RX ORDER — ONDANSETRON 2 MG/ML
4 INJECTION INTRAMUSCULAR; INTRAVENOUS EVERY 4 HOURS PRN
Status: DISCONTINUED | OUTPATIENT
Start: 2025-05-01 | End: 2025-05-03 | Stop reason: HOSPADM

## 2025-05-01 RX ORDER — SUCRALFATE ORAL 1 G/10ML
1 SUSPENSION ORAL EVERY 6 HOURS
Status: DISCONTINUED | OUTPATIENT
Start: 2025-05-01 | End: 2025-05-03 | Stop reason: HOSPADM

## 2025-05-01 RX ORDER — ONDANSETRON 4 MG/1
4 TABLET, ORALLY DISINTEGRATING ORAL EVERY 4 HOURS PRN
Status: DISCONTINUED | OUTPATIENT
Start: 2025-05-01 | End: 2025-05-03 | Stop reason: HOSPADM

## 2025-05-01 RX ORDER — CARVEDILOL 6.25 MG/1
6.25 TABLET ORAL 2 TIMES DAILY WITH MEALS
Status: DISCONTINUED | OUTPATIENT
Start: 2025-05-01 | End: 2025-05-03 | Stop reason: HOSPADM

## 2025-05-01 RX ORDER — POTASSIUM CHLORIDE 7.45 MG/ML
10 INJECTION INTRAVENOUS
Status: COMPLETED | OUTPATIENT
Start: 2025-05-01 | End: 2025-05-01

## 2025-05-01 RX ORDER — SULFAMETHOXAZOLE AND TRIMETHOPRIM 800; 160 MG/1; MG/1
1 TABLET ORAL 2 TIMES DAILY
Status: ON HOLD | COMMUNITY
Start: 2025-04-30 | End: 2025-05-03

## 2025-05-01 RX ORDER — ENOXAPARIN SODIUM 100 MG/ML
40 INJECTION SUBCUTANEOUS DAILY
Status: DISCONTINUED | OUTPATIENT
Start: 2025-05-01 | End: 2025-05-03 | Stop reason: HOSPADM

## 2025-05-01 RX ORDER — PANTOPRAZOLE SODIUM 40 MG/1
40 TABLET, DELAYED RELEASE ORAL 2 TIMES DAILY
Status: DISCONTINUED | OUTPATIENT
Start: 2025-05-01 | End: 2025-05-01

## 2025-05-01 RX ADMIN — CARVEDILOL 6.25 MG: 6.25 TABLET, FILM COATED ORAL at 17:12

## 2025-05-01 RX ADMIN — DAPAGLIFLOZIN 10 MG: 10 TABLET, FILM COATED ORAL at 15:12

## 2025-05-01 RX ADMIN — UMECLIDINIUM BROMIDE AND VILANTEROL TRIFENATATE 1 PUFF: 62.5; 25 POWDER RESPIRATORY (INHALATION) at 15:12

## 2025-05-01 RX ADMIN — POTASSIUM CHLORIDE 10 MEQ: 7.45 INJECTION INTRAVENOUS at 13:29

## 2025-05-01 RX ADMIN — LIDOCAINE HYDROCHLORIDE 30 ML: 20 SOLUTION OROPHARYNGEAL at 18:32

## 2025-05-01 RX ADMIN — POTASSIUM CHLORIDE 20 MEQ: 750 TABLET, EXTENDED RELEASE ORAL at 17:11

## 2025-05-01 RX ADMIN — POTASSIUM CHLORIDE 10 MEQ: 7.45 INJECTION INTRAVENOUS at 14:30

## 2025-05-01 RX ADMIN — LIDOCAINE HYDROCHLORIDE 30 ML: 20 SOLUTION ORAL; TOPICAL at 11:21

## 2025-05-01 RX ADMIN — SUCRALFATE ORAL SUSPENSION 1 G: 1 SUSPENSION ORAL at 15:11

## 2025-05-01 RX ADMIN — SODIUM CHLORIDE: 9 INJECTION, SOLUTION INTRAVENOUS at 14:45

## 2025-05-01 RX ADMIN — POTASSIUM CHLORIDE 10 MEQ: 7.45 INJECTION INTRAVENOUS at 15:31

## 2025-05-01 RX ADMIN — OMEPRAZOLE 20 MG: 20 CAPSULE, DELAYED RELEASE ORAL at 17:12

## 2025-05-01 RX ADMIN — SUCRALFATE ORAL SUSPENSION 1 G: 1 SUSPENSION ORAL at 21:14

## 2025-05-01 RX ADMIN — ACETAMINOPHEN 650 MG: 325 TABLET ORAL at 21:05

## 2025-05-01 RX ADMIN — POTASSIUM CHLORIDE 10 MEQ: 7.45 INJECTION INTRAVENOUS at 16:25

## 2025-05-01 ASSESSMENT — ENCOUNTER SYMPTOMS
COUGH: 0
SHORTNESS OF BREATH: 0
ABDOMINAL PAIN: 1
NAUSEA: 0
CHILLS: 0
FEVER: 0
WEAKNESS: 1
DIARRHEA: 1
BLOOD IN STOOL: 0
PALPITATIONS: 0
VOMITING: 0

## 2025-05-01 ASSESSMENT — COGNITIVE AND FUNCTIONAL STATUS - GENERAL
MOVING FROM LYING ON BACK TO SITTING ON SIDE OF FLAT BED: A LITTLE
CLIMB 3 TO 5 STEPS WITH RAILING: A LITTLE
TURNING FROM BACK TO SIDE WHILE IN FLAT BAD: A LITTLE
SUGGESTED CMS G CODE MODIFIER MOBILITY: CJ
DAILY ACTIVITIY SCORE: 24
MOBILITY SCORE: 20
SUGGESTED CMS G CODE MODIFIER DAILY ACTIVITY: CH
WALKING IN HOSPITAL ROOM: A LITTLE

## 2025-05-01 ASSESSMENT — LIFESTYLE VARIABLES
EVER HAD A DRINK FIRST THING IN THE MORNING TO STEADY YOUR NERVES TO GET RID OF A HANGOVER: NO
HAVE YOU EVER FELT YOU SHOULD CUT DOWN ON YOUR DRINKING: NO
TOTAL SCORE: 0
AVERAGE NUMBER OF DAYS PER WEEK YOU HAVE A DRINK CONTAINING ALCOHOL: 0
ALCOHOL_USE: NO
ON A TYPICAL DAY WHEN YOU DRINK ALCOHOL HOW MANY DRINKS DO YOU HAVE: 0
CONSUMPTION TOTAL: NEGATIVE
HAVE PEOPLE ANNOYED YOU BY CRITICIZING YOUR DRINKING: NO
TOTAL SCORE: 0
DOES PATIENT WANT TO STOP DRINKING: CANNOT ASSESS
HOW MANY TIMES IN THE PAST YEAR HAVE YOU HAD 5 OR MORE DRINKS IN A DAY: 0
EVER FELT BAD OR GUILTY ABOUT YOUR DRINKING: NO
TOTAL SCORE: 0

## 2025-05-01 ASSESSMENT — SOCIAL DETERMINANTS OF HEALTH (SDOH)

## 2025-05-01 ASSESSMENT — PATIENT HEALTH QUESTIONNAIRE - PHQ9
SUM OF ALL RESPONSES TO PHQ9 QUESTIONS 1 AND 2: 0
1. LITTLE INTEREST OR PLEASURE IN DOING THINGS: NOT AT ALL
2. FEELING DOWN, DEPRESSED, IRRITABLE, OR HOPELESS: NOT AT ALL

## 2025-05-01 ASSESSMENT — HEART SCORE
ECG: NON-SPECIFIC REPOLARIZATION DISTURBANCE
HISTORY: MODERATELY SUSPICIOUS
RISK FACTORS: >2 RISK FACTORS OR HX OF ATHEROSCLEROTIC DISEASE
AGE: 65+
HEART SCORE: 7
TROPONIN: 1-3 TIMES NORMAL LIMIT

## 2025-05-01 ASSESSMENT — PAIN DESCRIPTION - PAIN TYPE: TYPE: CHRONIC PAIN

## 2025-05-01 ASSESSMENT — FIBROSIS 4 INDEX
FIB4 SCORE: 0.68
FIB4 SCORE: 2.03

## 2025-05-01 NOTE — ASSESSMENT & PLAN NOTE
Continue coreg  Hold torsemide given ANN   From: Hiren Avelar  To: Gumaro Wadsworth  Sent: 10/27/2022 7:06 PM CDT  Subject: Follow up    Hello - at the office visit a few weeks ago we requested help getting an electric wheelchair and more in house rehab. We were told we’d hear from the office but haven’t heard anything since the appt. Any updates?    Thanks

## 2025-05-01 NOTE — ED NOTES
Med Rec completed per patient and home pharmacy ([facility])   Allergies reviewed    Patient started a 14 day course of Bactrim DS on 4/30/2025    Dispense history available in EPIC? Yes    Patient is not taking anticoagulants

## 2025-05-01 NOTE — ED NOTES
Bedside report received from off going RN/tech: Guy, assumed care of patient.  POC discussed with patient. Call light within reach, all needs addressed at this time.       Fall risk interventions in place: Move the patient closer to the nurse's station, Patient's personal possessions are with in their safe reach, Place fall risk sign on patient's door, Keep floor surfaces clean and dry, and Accompanied to restroom (all applicable per [place] Fall risk assessment)   Continuous monitoring: Cardiac Leads, Pulse Ox, or Blood Pressure  IVF/IV medications: Not Applicable   Oxygen: No oxygen tank in room  Bedside sitter: Not Applicable   Isolation: Not Applicable         Capillary refill less/equal to 2 seconds

## 2025-05-01 NOTE — H&P
Hospital Medicine History & Physical Note    Date of Service  5/1/2025    Primary Care Physician  Corine Alas P.A.-C.    Consultants  none    Specialist Names: none    Code Status  DNAR/DNI    Chief Complaint  Chief Complaint   Patient presents with    Abdominal Pain     X4 days, also endorses diarrhea.       History of Presenting Illness  Jina Barnard is a 74 y.o. female who presented 5/1/2025 with prior diverticulitis, coronary disease status post PCI, HFpEF, polycythemia vera on hydroxyurea, asthma/COPD overlap syndrome who comes in the hospital again chief complaint.  Patient states she started developing mid epigastric pain 4 days ago with associated liquidy diarrhea that is nonbloody.  She states she has had history of diverticulitis with this pain feels different.  She describes the pain as 12 out of 10 intensity and sharp and stabbing does not radiate to her back.  She is not sure if eating food makes it worse.  She is unclear of anything that makes it better or worse.  She did try multiple over-the-counter medications including Mylanta at home which remained refractory.  She denies any urinary symptoms, fevers chills or sick contacts.    In the emergency room she was given potassium chloride 40 mill equivalents IV x 1 as well as a GI cocktail with significant improvement in her symptoms.  I personally spoke with Dr. Aguilar of the ER physician group in detail with the patient's case.    I discussed the plan of care with patient.    Review of Systems  Review of Systems   Constitutional:  Positive for malaise/fatigue. Negative for chills and fever.   Respiratory:  Negative for cough and shortness of breath.    Cardiovascular:  Negative for chest pain and palpitations.   Gastrointestinal:  Positive for abdominal pain and diarrhea. Negative for blood in stool, nausea and vomiting.   Neurological:  Positive for weakness.       Past Medical History   has a past medical history of Breath shortness, COPD  (chronic obstructive pulmonary disease) (HCC), Dental disorder, Diverticulosis, Emphysema of lung (HCC), Heart murmur, Heart valve disease, High cholesterol, Hypertension, Thrombocytosis, and Urinary incontinence.    Surgical History   has a past surgical history that includes tubal coagulation laparoscopic bilateral; colonoscopy; angiogram; transcatheter mitral valve repair (Bilateral, 4/30/2024); and echocardiogram, transesophageal, intraoperative (N/A, 4/30/2024).     Family History  family history includes Alcohol/Drug in her father; Diabetes in her mother; Hypertension in her mother; No Known Problems in her maternal grandfather, maternal grandmother, paternal grandfather, and paternal grandmother; Other in her father, mother, and sister; Psychiatric Illness in her father and sister.   Family history reviewed with patient. There is no family history that is pertinent to the chief complaint.     Social History   reports that she has never smoked. She has never used smokeless tobacco. She reports current drug use. Drugs: Marijuana and Inhaled. She reports that she does not drink alcohol.    Allergies  No Known Allergies    Medications  Prior to Admission Medications   Prescriptions Last Dose Informant Patient Reported? Taking?   albuterol 108 (90 Base) MCG/ACT Aero Soln inhalation aerosol Unknown Patient, Patient's Home Pharmacy No No   Sig: INHALE 2 PUFFS BY MOUTH EVERY 6 HOURS AS NEEDED   aspirin 81 MG EC tablet 5/1/2025 at 12:00 AM Patient, Patient's Home Pharmacy No Yes   Sig: Take 1 Tablet by mouth every day.   carvedilol (COREG) 6.25 MG Tab 5/1/2025 at 12:00 AM Patient, Patient's Home Pharmacy No Yes   Sig: TAKE 1 TABLET BY MOUTH TWICE DAILY WITH MEALS   dapagliflozin propanediol (FARXIGA) 10 MG Tab 4/30/2025 Noon Patient, Patient's Home Pharmacy No Yes   Sig: Take 1 Tablet by mouth every day.   hydroxyurea (HYDREA) 500 MG Cap 5/1/2025 at  4:00 AM Patient, Patient's Home Pharmacy Yes Yes   Sig: Take 1,000  mg by mouth every day. 1,000 mg = 2 tabs   ipratropium-albuterol (DUONEB) 0.5-2.5 (3) MG/3ML nebulizer solution Unknown Patient, Patient's Home Pharmacy No No   Sig: Take 3 mL by nebulization every four hours as needed for Shortness of Breath.   pantoprazole (PROTONIX) 40 MG Tablet Delayed Response Unknown Patient, Patient's Home Pharmacy Yes No   Sig: Take 40 mg by mouth 2 times a day.   potassium chloride ER (K-TAB) 20 MEQ Tab CR tablet 5/1/2025 Morning Patient, Patient's Home Pharmacy No Yes   Sig: Take 1 Tablet by mouth 2 times a day.   rosuvastatin (CRESTOR) 10 MG Tab Not Taking Patient, Patient's Home Pharmacy No No   Sig: Take 1 Tablet by mouth every evening.   Patient not taking: Reported on 5/1/2025   sulfamethoxazole-trimethoprim (BACTRIM DS) 800-160 MG tablet 5/1/2025 Morning Patient, Patient's Home Pharmacy Yes Yes   Sig: Take 1 Tablet by mouth 2 times a day. 14 day course   torsemide (DEMADEX) 20 MG Tab 4/30/2025 Noon Patient, Patient's Home Pharmacy No Yes   Sig: Take 1 Tablet by mouth 2 times a day.   umeclidinium-vilanterol (ANORO ELLIPTA) 62.5-25 MCG/ACT AEROSOL POWDER, BREATH ACTIVATED inhaler 5/1/2025 Morning Patient, Patient's Home Pharmacy No Yes   Sig: Inhale 1 Puff every day.      Facility-Administered Medications: None       Physical Exam  Temp:  [36.1 °C (97 °F)-36.2 °C (97.2 °F)] 36.1 °C (97 °F)  Pulse:  [] 79  Resp:  [16-21] 18  BP: (100-149)/(50-93) 149/93  SpO2:  [93 %-98 %] 95 %  Blood Pressure : 106/50   Temperature: 36.2 °C (97.2 °F)   Pulse: 72   Respiration: 20   Pulse Oximetry: 95 %       Physical Exam  Vitals and nursing note reviewed.   Constitutional:       General: She is not in acute distress.     Appearance: She is well-developed.   HENT:      Head: Normocephalic and atraumatic.      Mouth/Throat:      Pharynx: No oropharyngeal exudate.   Eyes:      General: No scleral icterus.     Pupils: Pupils are equal, round, and reactive to light.   Neck:      Thyroid: No  "thyromegaly.   Cardiovascular:      Rate and Rhythm: Normal rate and regular rhythm.      Heart sounds: Normal heart sounds. No murmur heard.  Pulmonary:      Effort: Pulmonary effort is normal. No respiratory distress.      Breath sounds: Normal breath sounds. No wheezing.   Abdominal:      General: Bowel sounds are normal. There is no distension.      Palpations: Abdomen is soft.      Tenderness: There is abdominal tenderness (epigastric).   Musculoskeletal:         General: No tenderness. Normal range of motion.      Cervical back: Normal range of motion and neck supple.      Right lower leg: No edema.      Left lower leg: No edema.   Skin:     General: Skin is warm and dry.      Findings: No rash.   Neurological:      Mental Status: She is alert and oriented to person, place, and time.      Cranial Nerves: No cranial nerve deficit.         Laboratory:  Recent Labs     05/01/25  1028   WBC 12.7*   RBC 3.61*   HEMOGLOBIN 13.8   HEMATOCRIT 39.2   .6*   MCH 38.2*   MCHC 35.2   RDW 58.0*   PLATELETCT 317   MPV 9.4     Recent Labs     05/01/25  1028   SODIUM 138   POTASSIUM 2.6*   CHLORIDE 88*   CO2 35*   GLUCOSE 143*   BUN 19   CREATININE 1.94*   CALCIUM 9.4     Recent Labs     05/01/25  1028   ALTSGPT 39   ASTSGOT 18   ALKPHOSPHAT 139*   TBILIRUBIN 0.5   LIPASE 21   GLUCOSE 143*         No results for input(s): \"NTPROBNP\" in the last 72 hours.      Recent Labs     05/01/25  1028   TROPONINT 37*       Imaging:  DX-CHEST-PORTABLE (1 VIEW)   Final Result      Borderline cardiomegaly.          I personally reviewed the cbc, cmp, troponin and INR    I personally reviewed the EKG which shows NSR, HR 59, no e/o acute st segment changes    Assessment/Plan:  Justification for Admission Status  I anticipate this patient is appropriate for observation status at this time because hypokalemia and abdominal pain    Patient will need a Telemetry bed on MEDICAL service .  The need is secondary to above.    * Hypokalemia- " (present on admission)  Assessment & Plan  Severe, 2.6, no acute changes on EKG  2/2 diarrhea   IV KCL and oral KCL  Serial BMP  Check MG++, replace prn    Abdominal pain- (present on admission)  Assessment & Plan  Likely ?GERD, PUD?  Continue PPI  Gi cocktail to good effect in the ER  Only in the epigastric region  GI cocktail PRN    Leukocytosis- (present on admission)  Assessment & Plan  No clear e/o infection, stress reaction, defer abxs  Trend wbc/fever curve    ANN (acute kidney injury) (HCC)- (present on admission)  Assessment & Plan  Cr at 1.93, above her baseline  IVF's, trend  Hold outpatient nephrotoxic medications    Anxiety- (present on admission)  Assessment & Plan  monitor    Coronary artery disease involving native coronary artery of native heart without angina pectoris- (present on admission)  Assessment & Plan  Continue coreg, hold outpatient torsemide  Continue statin    Dyslipidemia- (present on admission)  Assessment & Plan  Continue statin    Asthma-COPD overlap syndrome (HCC)- (present on admission)  Assessment & Plan  Continue outpatient inhalers    Obesity (BMI 35.0-39.9 without comorbidity) (HCC)- (present on admission)  Assessment & Plan  Outpatient nutritional counseling    Moderate episode of recurrent major depressive disorder (HCC)- (present on admission)  Assessment & Plan  No active prescription medications    Polycythemia vera(238.4)- (present on admission)  Assessment & Plan  Continue hydrea 1000 mg daily    Marijuana use- (present on admission)  Assessment & Plan  Smokes, none in the last 4 days  No N/V  MOnitor    Essential hypertension- (present on admission)  Assessment & Plan  Continue coreg  Hold torsemide given ANN        VTE prophylaxis: SCDs/TEDs and enoxaparin ppx

## 2025-05-01 NOTE — ED NOTES
Critical K result of 2.6, ERP notified.   Bilateral Helical Rim Advancement Flap Text: The defect edges were debeveled with a #15 blade scalpel.  Given the location of the defect and the proximity to free margins (helical rim) a bilateral helical rim advancement flap was deemed most appropriate.  Using a sterile surgical marker, the appropriate advancement flaps were drawn incorporating the defect and placing the expected incisions between the helical rim and antihelix where possible.  The area thus outlined was incised through and through with a #15 scalpel blade.  With a skin hook and iris scissors, the flaps were gently and sharply undermined and freed up.

## 2025-05-01 NOTE — ASSESSMENT & PLAN NOTE
Severely low again this AM  Aggressive IV and oral replacement  MG++ levels are ok  Continue to follow levels closely  Minima ectopy on tele, continue on tele

## 2025-05-01 NOTE — ASSESSMENT & PLAN NOTE
"Likely ?GERD, PUD?  Continue PPI  Slowly improving, but minimal interval improvement and very poor oral tolerance of oral intake (ate about 10% of her breakfast this AM)  Diarrhea is relatively unchanged, C diff test c/w colonization, but NOT active infection. Suspicion for infectious diarrhea (especially bacterial) is low  Start loperamide scheduled  IVF\"s  Continue GI cocktail PRN (used twice overnight with some good effect)  WBC normalized, I personally reviewed this lab on 5/2  Continue carafate  1 gm QID  "

## 2025-05-01 NOTE — ED TRIAGE NOTES
"Chief Complaint   Patient presents with    Abdominal Pain     X4 days, also endorses diarrhea.     AAOx4, speaking in full sentences, answers questions appropriately. Wheeled to and from lobby. Respirations even and unlabored,    /67   Pulse 91   Temp 36.2 °C (97.2 °F) (Temporal)   Resp 16   Ht 1.549 m (5' 1\")   Wt 80.3 kg (177 lb)   SpO2 93%   BMI 33.44 kg/m²     "

## 2025-05-01 NOTE — ED PROVIDER NOTES
ED Provider Note    CHIEF COMPLAINT  Chief Complaint   Patient presents with    Abdominal Pain     X4 days, also endorses diarrhea.       EXTERNAL RECORDS REVIEWED  Other the patient was seen on March 18, 2025 and at that time had a CT scan that showed diverticuli, no acute disease    HPI/ROS  LIMITATION TO HISTORY   Select: : None  OUTSIDE HISTORIAN(S):  None    Jina Barnard is a 74 y.o. female who presents stating that she has a history of GERD and that when it happens with pain in her epigastric area she develops diarrhea.  She presents with the symptoms, she says this is consistent with GERD she has had in the past.  She denies any chest pain.    PAST MEDICAL HISTORY   has a past medical history of Breath shortness, COPD (chronic obstructive pulmonary disease) (HCC), Dental disorder, Diverticulosis, Emphysema of lung (HCC), Heart murmur, Heart valve disease, High cholesterol, Hypertension, Thrombocytosis, and Urinary incontinence.    SURGICAL HISTORY   has a past surgical history that includes tubal coagulation laparoscopic bilateral; colonoscopy; angiogram; transcatheter mitral valve repair (Bilateral, 4/30/2024); and echocardiogram, transesophageal, intraoperative (N/A, 4/30/2024).    FAMILY HISTORY  Family History   Problem Relation Age of Onset    Alcohol/Drug Father         alcohol    Other Father         malnutrition    Psychiatric Illness Father     Other Mother         blood clot from surgery    Hypertension Mother     Diabetes Mother         ?    Psychiatric Illness Sister     Other Sister         malnutrition    No Known Problems Maternal Grandmother     No Known Problems Maternal Grandfather     No Known Problems Paternal Grandmother     No Known Problems Paternal Grandfather        SOCIAL HISTORY  Social History     Tobacco Use    Smoking status: Never    Smokeless tobacco: Never   Vaping Use    Vaping status: Never Used   Substance and Sexual Activity    Alcohol use: No    Drug use: Yes      "Types: Marijuana, Inhaled     Comment: occ    Sexual activity: Never     Partners: Male     Birth control/protection: Post-Menopausal       CURRENT MEDICATIONS  Home Medications       Reviewed by Puneet Santiago, Student (Nurse Apprentice) on 05/01/25 at v2 Ratings  Med List Status: Partial     Medication Last Dose Status   albuterol 108 (90 Base) MCG/ACT Aero Soln inhalation aerosol  Active   aspirin 81 MG EC tablet  Active   carvedilol (COREG) 6.25 MG Tab  Active   dapagliflozin propanediol (FARXIGA) 10 MG Tab  Active   hydroxyurea (HYDREA) 500 MG Cap  Active   ibuprofen (MOTRIN) 200 MG Tab  Active   ipratropium-albuterol (DUONEB) 0.5-2.5 (3) MG/3ML nebulizer solution  Active   potassium chloride ER (K-TAB) 20 MEQ Tab CR tablet  Active   rosuvastatin (CRESTOR) 10 MG Tab  Active   Tiotropium Bromide-Olodaterol (STIOLTO RESPIMAT) 2.5-2.5 MCG/ACT Aero Soln  Active   torsemide (DEMADEX) 20 MG Tab  Active   umeclidinium-vilanterol (ANORO ELLIPTA) 62.5-25 MCG/ACT AEROSOL POWDER, BREATH ACTIVATED inhaler  Active                         ALLERGIES  No Known Allergies    PHYSICAL EXAM  VITAL SIGNS: /50   Pulse 72   Temp 36.2 °C (97.2 °F) (Temporal)   Resp 20   Ht 1.549 m (5' 1\")   Wt 80.3 kg (177 lb)   SpO2 95%   BMI 33.44 kg/m²      Constitutional: Alert.  HENT: No signs of trauma, Bilateral external ears normal, Nose normal.   Eyes: Pupils are equal and reactive, Conjunctiva normal, Non-icteric.   Neck: Normal range of motion, No tenderness, Supple, No stridor.   Lymphatic: No lymphadenopathy noted.   Cardiovascular: Regular rate and rhythm, no murmurs.   Thorax & Lungs: Normal breath sounds, No respiratory distress, No wheezing, No chest tenderness.   Abdomen: Bowel sounds normal, Soft, No tenderness, No peritoneal signs, No masses, No pulsatile masses.   Skin: Warm, Dry, No erythema, No rash.   Back: No bony tenderness, No CVA tenderness.   Extremities: Intact distal pulses, No edema, No tenderness, No " cyanosis.  Musculoskeletal: Good range of motion in all major joints. No tenderness to palpation or major deformities noted.   Neurologic: Alert , Normal motor function, Normal sensory function, No focal deficits noted.   Psychiatric: Affect normal, Judgment normal, Mood normal.       EKG/LABS    Sinus rhythm rate 87  Atrial premature complexes  Low voltage, precordial leads  Borderline ST depression, lateral leads  Prolonged QT interval 510  Nonspecific ST-T wave changes  Compared to ECG 03/18/2025 08:17:59  Nonspecific changes, does not indicate ischemia nor arrhythmia at this time  I have independently interpreted this EKG    RADIOLOGY/PROCEDURES   I have independently interpreted the diagnostic imaging associated with this visit and am waiting the final reading from the radiologist.   My preliminary interpretation is as follows: Cardiomegaly    Radiologist interpretation:  DX-CHEST-PORTABLE (1 VIEW)   Final Result      Borderline cardiomegaly.          COURSE & MEDICAL DECISION MAKING    ASSESSMENT, COURSE AND PLAN  Care Narrative:     Patient presents with epigastric pain, history of cardiac disease with stent, diarrhea.  She was given a GI cocktail, labs were ordered, chest x-ray was ordered,  EKG was ordered.      EKG does not demonstrate STEMI.      12:17 PM patient feels better after GI cocktail.  Her troponin is elevated but it chronically appears to be elevated.  Her potassium is markedly low 2.6.  I spoke with the pharmacist and we will replete her potassium.      CHEST PAIN:   HEART Score for Major Cardiac Events  HEART Score     History: Moderately suspicious  ECG: Non-specific repolarization disturbance  Age: 65+  Risk Factors: >2 risk factors or hx of atherosclerotic disease  Troponin: 1-3 times normal limit    Heart Score: 7    Total Score   0-3 Points = Low Score, risk of MACE 0.9-1.7%.  4-6 Points = Moderate Score, risk of MACE 12-16.6%  7-10 Points = High Score, risk of MACE 50-65%           ADDITIONAL PROBLEMS MANAGED  Epigastric pain, hypokalemia, diarrhea    DISPOSITION AND DISCUSSIONS  I have discussed management of the patient with the following physicians and TITUS's: I spoke with the [facility] hospitalist to assess the patient for hospitalization.    Discussion of management with other [facility] or appropriate source(s): Pharmacy I spoke with the pharmacist who will order the patient potassium      Escalation of care considered, and ultimately not performed: EKG does not demonstrate STEMI, she does not need to go straight to the Cath Lab    Barriers to care at this time, including but not limited to:  None .     Decision tools and prescription drugs considered including, but not limited to:  IV potassium given in the emergency department .    CRITICAL CARE  The very real possibilty of a deterioration of this patient's condition required the highest level of my preparedness for sudden, emergent intervention.  I provided critical care services, which included medication orders, frequent reevaluations of the patient's condition and response to treatment, ordering and reviewing test results, and discussing the case with various consultants.  The critical care time associated with the care of the patient was 40 minutes. Review chart for interventions. This time is exclusive of any other billable procedures.       FINAL DIAGNOSIS  1. Epigastric pain    2. Diarrhea, unspecified type    3. Hypokalemia      Total critical care time 40 minutes as outlined above     Electronically signed by: Cosmo Poon M.D., 5/1/2025 11:21 AM

## 2025-05-02 PROBLEM — R19.7 DIARRHEA: Status: ACTIVE | Noted: 2025-05-02

## 2025-05-02 LAB
ANION GAP SERPL CALC-SCNC: 11 MMOL/L (ref 7–16)
ANION GAP SERPL CALC-SCNC: 9 MMOL/L (ref 7–16)
BASOPHILS # BLD AUTO: 0.2 % (ref 0–1.8)
BASOPHILS # BLD: 0.02 K/UL (ref 0–0.12)
BUN SERPL-MCNC: 12 MG/DL (ref 8–22)
BUN SERPL-MCNC: 14 MG/DL (ref 8–22)
C DIFF TOX A+B STL QL IA: NEGATIVE
C DIFF TOX GENS STL QL NAA+PROBE: ABNORMAL
CALCIUM SERPL-MCNC: 8 MG/DL (ref 8.5–10.5)
CALCIUM SERPL-MCNC: 8.2 MG/DL (ref 8.5–10.5)
CHLORIDE SERPL-SCNC: 94 MMOL/L (ref 96–112)
CHLORIDE SERPL-SCNC: 99 MMOL/L (ref 96–112)
CO2 SERPL-SCNC: 26 MMOL/L (ref 20–33)
CO2 SERPL-SCNC: 30 MMOL/L (ref 20–33)
CREAT SERPL-MCNC: 1.16 MG/DL (ref 0.5–1.4)
CREAT SERPL-MCNC: 1.39 MG/DL (ref 0.5–1.4)
EOSINOPHIL # BLD AUTO: 0.04 K/UL (ref 0–0.51)
EOSINOPHIL NFR BLD: 0.4 % (ref 0–6.9)
ERYTHROCYTE [DISTWIDTH] IN BLOOD BY AUTOMATED COUNT: 61.1 FL (ref 35.9–50)
GFR SERPLBLD CREATININE-BSD FMLA CKD-EPI: 39 ML/MIN/1.73 M 2
GFR SERPLBLD CREATININE-BSD FMLA CKD-EPI: 49 ML/MIN/1.73 M 2
GLUCOSE SERPL-MCNC: 107 MG/DL (ref 65–99)
GLUCOSE SERPL-MCNC: 117 MG/DL (ref 65–99)
HCT VFR BLD AUTO: 33.3 % (ref 37–47)
HGB BLD-MCNC: 11.7 G/DL (ref 12–16)
IMM GRANULOCYTES # BLD AUTO: 0.08 K/UL (ref 0–0.11)
IMM GRANULOCYTES NFR BLD AUTO: 0.8 % (ref 0–0.9)
LACTOFERRIN STL QL IA: POSITIVE
LYMPHOCYTES # BLD AUTO: 1.56 K/UL (ref 1–4.8)
LYMPHOCYTES NFR BLD: 15.4 % (ref 22–41)
MAGNESIUM SERPL-MCNC: 2.8 MG/DL (ref 1.5–2.5)
MCH RBC QN AUTO: 39.5 PG (ref 27–33)
MCHC RBC AUTO-ENTMCNC: 35.1 G/DL (ref 32.2–35.5)
MCV RBC AUTO: 112.5 FL (ref 81.4–97.8)
MONOCYTES # BLD AUTO: 0.61 K/UL (ref 0–0.85)
MONOCYTES NFR BLD AUTO: 6 % (ref 0–13.4)
NEUTROPHILS # BLD AUTO: 7.81 K/UL (ref 1.82–7.42)
NEUTROPHILS NFR BLD: 77.2 % (ref 44–72)
NRBC # BLD AUTO: 0 K/UL
NRBC BLD-RTO: 0 /100 WBC (ref 0–0.2)
PLATELET # BLD AUTO: 238 K/UL (ref 164–446)
PMV BLD AUTO: 9.4 FL (ref 9–12.9)
POTASSIUM SERPL-SCNC: 2.7 MMOL/L (ref 3.6–5.5)
POTASSIUM SERPL-SCNC: 3.9 MMOL/L (ref 3.6–5.5)
RBC # BLD AUTO: 2.96 M/UL (ref 4.2–5.4)
SODIUM SERPL-SCNC: 134 MMOL/L (ref 135–145)
SODIUM SERPL-SCNC: 135 MMOL/L (ref 135–145)
WBC # BLD AUTO: 10.1 K/UL (ref 4.8–10.8)

## 2025-05-02 PROCEDURE — 700105 HCHG RX REV CODE 258: Performed by: INTERNAL MEDICINE

## 2025-05-02 PROCEDURE — 700111 HCHG RX REV CODE 636 W/ 250 OVERRIDE (IP): Mod: JZ | Performed by: INTERNAL MEDICINE

## 2025-05-02 PROCEDURE — 99233 SBSQ HOSP IP/OBS HIGH 50: CPT | Performed by: INTERNAL MEDICINE

## 2025-05-02 PROCEDURE — 700111 HCHG RX REV CODE 636 W/ 250 OVERRIDE (IP): Mod: UD | Performed by: NURSE PRACTITIONER

## 2025-05-02 PROCEDURE — 700102 HCHG RX REV CODE 250 W/ 637 OVERRIDE(OP): Mod: UD | Performed by: NURSE PRACTITIONER

## 2025-05-02 PROCEDURE — A9270 NON-COVERED ITEM OR SERVICE: HCPCS | Mod: UD | Performed by: INTERNAL MEDICINE

## 2025-05-02 PROCEDURE — 700102 HCHG RX REV CODE 250 W/ 637 OVERRIDE(OP): Mod: UD | Performed by: INTERNAL MEDICINE

## 2025-05-02 PROCEDURE — 770001 HCHG ROOM/CARE - MED/SURG/GYN PRIV*

## 2025-05-02 PROCEDURE — 80048 BASIC METABOLIC PNL TOTAL CA: CPT

## 2025-05-02 PROCEDURE — 85025 COMPLETE CBC W/AUTO DIFF WBC: CPT

## 2025-05-02 PROCEDURE — 83735 ASSAY OF MAGNESIUM: CPT

## 2025-05-02 PROCEDURE — 96366 THER/PROPH/DIAG IV INF ADDON: CPT

## 2025-05-02 PROCEDURE — 94664 DEMO&/EVAL PT USE INHALER: CPT

## 2025-05-02 PROCEDURE — 36415 COLL VENOUS BLD VENIPUNCTURE: CPT

## 2025-05-02 PROCEDURE — A9270 NON-COVERED ITEM OR SERVICE: HCPCS | Mod: UD | Performed by: NURSE PRACTITIONER

## 2025-05-02 RX ORDER — POTASSIUM CHLORIDE 7.45 MG/ML
10 INJECTION INTRAVENOUS
Status: COMPLETED | OUTPATIENT
Start: 2025-05-02 | End: 2025-05-02

## 2025-05-02 RX ORDER — POTASSIUM CHLORIDE 1500 MG/1
40 TABLET, EXTENDED RELEASE ORAL
Status: COMPLETED | OUTPATIENT
Start: 2025-05-02 | End: 2025-05-02

## 2025-05-02 RX ORDER — LOPERAMIDE HYDROCHLORIDE 2 MG/1
2 CAPSULE ORAL 3 TIMES DAILY
Status: DISCONTINUED | OUTPATIENT
Start: 2025-05-02 | End: 2025-05-03 | Stop reason: HOSPADM

## 2025-05-02 RX ORDER — POTASSIUM CHLORIDE 750 MG/1
40 TABLET, EXTENDED RELEASE ORAL 3 TIMES DAILY
Status: DISCONTINUED | OUTPATIENT
Start: 2025-05-02 | End: 2025-05-02

## 2025-05-02 RX ORDER — POTASSIUM CHLORIDE 750 MG/1
40 TABLET, EXTENDED RELEASE ORAL 3 TIMES DAILY
Status: DISCONTINUED | OUTPATIENT
Start: 2025-05-02 | End: 2025-05-03

## 2025-05-02 RX ORDER — SODIUM CHLORIDE 9 MG/ML
INJECTION, SOLUTION INTRAVENOUS CONTINUOUS
Status: ACTIVE | OUTPATIENT
Start: 2025-05-02 | End: 2025-05-03

## 2025-05-02 RX ADMIN — POTASSIUM CHLORIDE 10 MEQ: 7.45 INJECTION INTRAVENOUS at 10:12

## 2025-05-02 RX ADMIN — LOPERAMIDE HYDROCHLORIDE 2 MG: 2 CAPSULE ORAL at 20:49

## 2025-05-02 RX ADMIN — DAPAGLIFLOZIN 10 MG: 10 TABLET, FILM COATED ORAL at 06:19

## 2025-05-02 RX ADMIN — CARVEDILOL 6.25 MG: 6.25 TABLET, FILM COATED ORAL at 09:22

## 2025-05-02 RX ADMIN — LIDOCAINE HYDROCHLORIDE 30 ML: 20 SOLUTION OROPHARYNGEAL at 21:20

## 2025-05-02 RX ADMIN — SUCRALFATE ORAL SUSPENSION 1 G: 1 SUSPENSION ORAL at 06:18

## 2025-05-02 RX ADMIN — ALBUTEROL SULFATE 1 PUFF: 90 AEROSOL, METERED RESPIRATORY (INHALATION) at 04:01

## 2025-05-02 RX ADMIN — LOPERAMIDE HYDROCHLORIDE 2 MG: 2 CAPSULE ORAL at 15:10

## 2025-05-02 RX ADMIN — POTASSIUM CHLORIDE 40 MEQ: 1500 TABLET, EXTENDED RELEASE ORAL at 03:55

## 2025-05-02 RX ADMIN — UMECLIDINIUM BROMIDE AND VILANTEROL TRIFENATATE 1 PUFF: 62.5; 25 POWDER RESPIRATORY (INHALATION) at 06:24

## 2025-05-02 RX ADMIN — POTASSIUM CHLORIDE 40 MEQ: 1500 TABLET, EXTENDED RELEASE ORAL at 02:15

## 2025-05-02 RX ADMIN — HYDROXYUREA 1000 MG: 500 CAPSULE ORAL at 06:20

## 2025-05-02 RX ADMIN — SUCRALFATE ORAL SUSPENSION 1 G: 1 SUSPENSION ORAL at 17:18

## 2025-05-02 RX ADMIN — ALBUTEROL SULFATE 1 PUFF: 90 AEROSOL, METERED RESPIRATORY (INHALATION) at 19:33

## 2025-05-02 RX ADMIN — LIDOCAINE HYDROCHLORIDE 30 ML: 20 SOLUTION OROPHARYNGEAL at 17:18

## 2025-05-02 RX ADMIN — SUCRALFATE ORAL SUSPENSION 1 G: 1 SUSPENSION ORAL at 23:33

## 2025-05-02 RX ADMIN — ALBUTEROL SULFATE 1 PUFF: 90 AEROSOL, METERED RESPIRATORY (INHALATION) at 14:34

## 2025-05-02 RX ADMIN — CARVEDILOL 6.25 MG: 6.25 TABLET, FILM COATED ORAL at 17:18

## 2025-05-02 RX ADMIN — POTASSIUM CHLORIDE 10 MEQ: 7.45 INJECTION INTRAVENOUS at 09:21

## 2025-05-02 RX ADMIN — POTASSIUM CHLORIDE 40 MEQ: 750 TABLET, EXTENDED RELEASE ORAL at 12:26

## 2025-05-02 RX ADMIN — LIDOCAINE HYDROCHLORIDE 30 ML: 20 SOLUTION OROPHARYNGEAL at 23:33

## 2025-05-02 RX ADMIN — LIDOCAINE HYDROCHLORIDE 30 ML: 20 SOLUTION OROPHARYNGEAL at 01:10

## 2025-05-02 RX ADMIN — POTASSIUM CHLORIDE 10 MEQ: 7.45 INJECTION INTRAVENOUS at 12:30

## 2025-05-02 RX ADMIN — SODIUM CHLORIDE: 9 INJECTION, SOLUTION INTRAVENOUS at 14:11

## 2025-05-02 RX ADMIN — ASPIRIN 81 MG: 81 TABLET, COATED ORAL at 06:17

## 2025-05-02 RX ADMIN — POTASSIUM CHLORIDE 10 MEQ: 7.46 INJECTION, SOLUTION INTRAVENOUS at 03:42

## 2025-05-02 RX ADMIN — POTASSIUM CHLORIDE 10 MEQ: 7.45 INJECTION INTRAVENOUS at 11:26

## 2025-05-02 RX ADMIN — LOPERAMIDE HYDROCHLORIDE 2 MG: 2 CAPSULE ORAL at 09:23

## 2025-05-02 RX ADMIN — OMEPRAZOLE 20 MG: 20 CAPSULE, DELAYED RELEASE ORAL at 17:18

## 2025-05-02 RX ADMIN — POTASSIUM CHLORIDE 40 MEQ: 750 TABLET, EXTENDED RELEASE ORAL at 17:19

## 2025-05-02 RX ADMIN — SUCRALFATE ORAL SUSPENSION 1 G: 1 SUSPENSION ORAL at 12:24

## 2025-05-02 RX ADMIN — OMEPRAZOLE 20 MG: 20 CAPSULE, DELAYED RELEASE ORAL at 06:18

## 2025-05-02 RX ADMIN — POTASSIUM CHLORIDE 20 MEQ: 750 TABLET, EXTENDED RELEASE ORAL at 06:21

## 2025-05-02 RX ADMIN — ALBUTEROL SULFATE 1 PUFF: 90 AEROSOL, METERED RESPIRATORY (INHALATION) at 10:14

## 2025-05-02 RX ADMIN — POTASSIUM CHLORIDE 10 MEQ: 7.46 INJECTION, SOLUTION INTRAVENOUS at 02:27

## 2025-05-02 ASSESSMENT — PAIN DESCRIPTION - PAIN TYPE
TYPE: ACUTE PAIN
TYPE: ACUTE PAIN

## 2025-05-02 ASSESSMENT — ENCOUNTER SYMPTOMS
VOMITING: 1
FEVER: 0
CHILLS: 0
NAUSEA: 1
ABDOMINAL PAIN: 1
DIARRHEA: 1

## 2025-05-02 NOTE — PROGRESS NOTES
4 Eyes Skin Assessment Completed by MAURA Murphy and MAURA Rodas.    Head WDL  Ears WDL  Nose WDL  Mouth WDL  Neck WDL  Breast/Chest WDL  Shoulder Blades WDL  Spine WDL  (R) Arm/Elbow/Hand WDL  (L) Arm/Elbow/Hand WDL  Abdomen WDL  Groin WDL  Scrotum/Coccyx/Buttocks WDL  (R) Leg WDL  (L) Leg WDL  (R) Heel/Foot/Toe WDL  (L) Heel/Foot/Toe WDL          Devices In Places n/a      Interventions In Place Pillows    Possible Skin Injury No    Pictures Uploaded Into Epic N/A  Wound Consult Placed N/A  RN Wound Prevention Protocol Ordered No

## 2025-05-02 NOTE — ASSESSMENT & PLAN NOTE
As noted above  If continues consider GI PCR panel and calprotectin levels  Consider holding PPI if continues

## 2025-05-02 NOTE — PROGRESS NOTES
4 Eyes Skin Assessment Completed by MAURA Paez and MAURA Cole.     Head WDL  Ears WDL  Nose WDL  Mouth WDL  Neck WDL  Breast/Chest WDL  Shoulder Blades WDL  Spine WDL  (R) Arm/Elbow/Hand WDL  (L) Arm/Elbow/Hand WDL  Abdomen WDL  Groin WDL  Scrotum/Coccyx/Buttocks WDL  (R) Leg WDL  (L) Leg WDL  (R) Heel/Foot/Toe WDL  (L) Heel/Foot/Toe WDL              Devices In Places Tele Box and Pulse Ox        Interventions In Place Pillows     Possible Skin Injury No     Pictures Uploaded Into Epic N/A  Wound Consult Placed N/A  RN Wound Prevention Protocol Ordered No

## 2025-05-02 NOTE — CARE PLAN
The patient is Stable - Low risk of patient condition declining or worsening    Shift Goals  Clinical Goals: monitor labs, electrolyte replacement, safety  Patient Goals: rest  Family Goals: HANNAH    Progress made toward(s) clinical / shift goals:    Problem: Pain - Standard  Goal: Alleviation of pain or a reduction in pain to the patient’s comfort goal  Outcome: Progressing     Problem: Knowledge Deficit - Standard  Goal: Patient and family/care givers will demonstrate understanding of plan of care, disease process/condition, diagnostic tests and medications  Outcome: Progressing     Problem: Fall Risk  Goal: Patient will remain free from falls  Outcome: Progressing     Problem: Gastrointestinal Irritability  Goal: Diarrhea will be absent or improved  Outcome: Progressing

## 2025-05-02 NOTE — RESPIRATORY CARE
"COPD EDUCATION by COPD CLINICAL EDUCATOR  5/2/2025  at  8:35 AM by Babita Lancaster, RRT     Patient interviewed by education team.  Patient declined or is unable to participate in the full program.  Therefore, a short intervention has been conducted.  A comprehensive packet including information about COPD, types of treatments to manage their disease and safe home Oxygen usage was provided and reviewed with patient at the bedside.      Patient seen last admission by team and switched from triple therapy to laba/lama due to side effects from steroid. Patient notes her breathing hasn't changed and she feels good. She declined new spacer has one from last admission but wanted new packet.     COPD Assessment  COPD Clinical Specialists ONLY  Is this a COPD exacerbation patient?: No  DME Company: [email or web address]  DME Equipment Type: 2L as needed  Physician Name: Corine Alas P.A.-C.  Pulmonologist Name: [facility]  Smoking Cessation: Declined  $ Demo/Eval of SVN's, MDI's and Aerosols: Yes  Interdisciplinary Rounds: Attendance at Rounds (30 Min)    PFT Results    No results found for: \"PFT\"    Meds to Beds  [facility] provides bedside medication delivery for all eligible patients at discharge and you have been automatically enrolled in the Meds to Beds Program. Would you like to opt out of this program for any reason?: No - Stay Opted In     MY COPD ACTION PLAN     It is recommended that patients and physicians/healthcare providers complete this action plan together. This plan should be discussed at each physician visit and updated as needed.    The green, yellow and red zones show groups of symptoms of COPD. This list of symptoms is not comprehensive, and you may experience other symptoms. In the \"Actions\" column, your healthcare provider has recommended actions for you to take based on your symptoms.    Patient Name: Jina Barnard   YOB: 1950   Last Updated on: 5/2/2025  8:35 AM   Green Zone:  I am doing well today " "Actions     Usual activitiy and exercise level   Take daily medications     Usual amounts of cough and phlegm/mucus   Use oxygen as prescribed     Sleep well at night   Continue regular exercise/diet plan     Appetite is good   At all times avoid cigarette smoke, inhaled irritants     Daily Medications (these medications are taken every day):   Umeclidinium and Vilanterol (Anoro) 1 Puff Once daily        Yellow Zone:  I am having a bad day or a COPD flare Actions     More breathless than usual   Continue daily medications     I have less energy for my daily activities   Use quick relief inhaler as ordered     Increased or thicker phlegm/mucus   Use oxygen as prescribed     Using quick relief inhaler/nebulizer more often   Get plenty of rest     Swelling of ankles more than usual   Use pursed lip breathing     More coughing than usual   At all times avoid cigarette smoke, inhaled irritants     I feel like I have a \"chest cold\"     Poor sleep and my symptoms woke me up     My appetite is not good     My medicine is not helping      Call provider immediately if symptoms don’t improve     Continue daily medications, add rescue medications:   Albuterol  Albuterol/Ipratropium (Combivent, Duoneb) 2 Puffs  3mL via nebulizer PRN  Every 4 hours PRN       Medications to be used during a flare up, (as Discussed with Provider):           Additional Information:  Use a spacer  Clean and rinse your nebulizer     Red Zone:  I need urgent medical care Actions     Severe shortness of breath even at rest   Call 911 or seek medical care immediately     Not able to do any activity because of breathing      Fever or shaking chills      Feeling confused or very drowsy       Chest pains      Coughing up blood                  "

## 2025-05-02 NOTE — PROGRESS NOTES
Monitor summary: SR 60-89, MO .20, QRS .08, QT .46, oPAC, rPVC, rCoup per strip from monitor room.

## 2025-05-02 NOTE — CARE PLAN
The patient is Stable - Low risk of patient condition declining or worsening    Shift Goals  Clinical Goals: Monitor Labs, safety,  Patient Goals: rest  Family Goals: not at bedside          Problem: Pain - Standard  Goal: Alleviation of pain or a reduction in pain to the patient’s comfort goal  Description: Target End Date:  Prior to discharge or change in level of careDocument on Vitals flowsheet1.  Document pain using the appropriate pain scale per order or unit policy2.  Educate and implement non-pharmacologic comfort measures (i.e. relaxation, distraction, cold/heat therapy, etc.)3.  Pain management medications as ordered4.  Reassess pain after pain med administration per policy5.  If opiods administered assess patient's response to pain medication is appropriate per POSS sedation scale6.  Follow pain management plan developed in collaboration with patient   5/2/2025 0509 by Janie De Santiago R.N.  Outcome: Progressing  5/2/2025 0509 by Janie De Santiago R.N.  Outcome: Progressing     Problem: Gastrointestinal Irritability  Goal: Diarrhea will be absent or improved  Description: Target End Date:  Prior to discharge or change in level of care1.  Assess for abdominal discomfort, pain, cramping, frequency, urgency, loose or liquid stools, and hyperactive bowel sensations.2.  Evaluate pattern of defecation3.  Administer antidiarrheal agents per order4. Culture stool, per order5.  Inquire about food intolerances, medications, change in eating pattern and current stressors6.  Assess for fecal impaction7.  Assess hydration status8.  Assess condition of perianal skin9.  Loss of bowel elimination control can lead of feelings of decreased self esteem.  Examine the emotional impact of illness, hospitalization and/or accidents.  Outcome: Progressing

## 2025-05-02 NOTE — PROGRESS NOTES
Hospital Medicine Daily Progress Note    Date of Service  5/2/2025    Chief Complaint  Jina Barnard is a 74 y.o. female admitted 5/1/2025 with with prior diverticulitis, coronary disease status post PCI, HFpEF, polycythemia vera on hydroxyurea, asthma/COPD overlap syndrome who comes in the hospital again chief complaint.  Patient's imaging was unremarkable.  She was admitted to the observation unit.  She had continued diarrhea overnight and C. difficile test was negative but likely reflects evidence of colonization.  She also had severe hypokalemia but finally improved today.  She continues have poor oral intake and ongoing abdominal pain but some improvement of GI cocktail.    Hospital Course  See above    Interval Problem Update  5/2  As noted above. Tele showed some ectopy, but otherwise without issue. She continues to have abdominal pain, feeling weak and very poor oral tolerance (she ate 10% of her breakfast this AM and then had to stop). Severe hypokalemia again this AM of 2.7. Received multiple rounds of IV KCL riders and C diff test negative. Immodium has been started. Remains afebrile and WBC has normalized.     I have discussed this patient's plan of care and discharge plan at IDT rounds today with Case Management, Nursing, Nursing leadership, and other members of the IDT team.    Consultants/Specialty  none    Code Status  DNAR/DNI    Disposition  The patient is not medically cleared for discharge to home or a post-acute facility.  Anticipate discharge to: home with close outpatient follow-up    I have placed the appropriate orders for post-discharge needs.    Review of Systems  Review of Systems   Constitutional:  Negative for chills and fever.   Gastrointestinal:  Positive for abdominal pain, diarrhea, nausea and vomiting.        Minimal improvement        Physical Exam  Temp:  [36.1 °C (97 °F)-37 °C (98.6 °F)] 37 °C (98.6 °F)  Pulse:  [] 70  Resp:  [16-20] 16  BP: (100-149)/(51-93)  116/67  SpO2:  [85 %-97 %] 92 %    Physical Exam  Vitals and nursing note reviewed.   Constitutional:       General: She is not in acute distress.     Appearance: She is well-developed.      Comments: Lying in bed, appears slightly uncomfortable today   HENT:      Head: Normocephalic and atraumatic.      Mouth/Throat:      Pharynx: No oropharyngeal exudate.   Eyes:      General: No scleral icterus.     Pupils: Pupils are equal, round, and reactive to light.   Neck:      Thyroid: No thyromegaly.   Cardiovascular:      Rate and Rhythm: Normal rate and regular rhythm.      Heart sounds: Normal heart sounds. No murmur heard.  Pulmonary:      Effort: Pulmonary effort is normal. No respiratory distress.      Breath sounds: Normal breath sounds. No wheezing.   Abdominal:      General: Bowel sounds are normal. There is no distension.      Palpations: Abdomen is soft.      Tenderness: There is no abdominal tenderness.   Musculoskeletal:         General: No tenderness. Normal range of motion.      Cervical back: Normal range of motion and neck supple.   Skin:     General: Skin is warm and dry.      Findings: No rash.   Neurological:      Mental Status: She is alert and oriented to person, place, and time.      Cranial Nerves: No cranial nerve deficit.      Sensory: Sensory deficit present.         Fluids  No intake or output data in the 24 hours ending 05/02/25 1259     Laboratory  Recent Labs     05/01/25  1028 05/02/25  0054   WBC 12.7* 10.1   RBC 3.61* 2.96*   HEMOGLOBIN 13.8 11.7*   HEMATOCRIT 39.2 33.3*   .6* 112.5*   MCH 38.2* 39.5*   MCHC 35.2 35.1   RDW 58.0* 61.1*   PLATELETCT 317 238   MPV 9.4 9.4     Recent Labs     05/01/25  1028 05/02/25  0054 05/02/25  0808   SODIUM 138 135 134*   POTASSIUM 2.6* 2.7* 3.9   CHLORIDE 88* 94* 99   CO2 35* 30 26   GLUCOSE 143* 117* 107*   BUN 19 14 12   CREATININE 1.94* 1.39 1.16   CALCIUM 9.4 8.0* 8.2*                   Imaging  DX-CHEST-PORTABLE (1 VIEW)   Final Result     "  Borderline cardiomegaly.           Assessment/Plan  * Hypokalemia- (present on admission)  Assessment & Plan  Severely low again this AM  Aggressive IV and oral replacement  MG++ levels are ok  Continue to follow levels closely  Minima ectopy on tele, continue on tele    Diarrhea- (present on admission)  Assessment & Plan  As noted above  If continues consider GI PCR panel and calprotectin levels  Consider holding PPI if continues    Abdominal pain- (present on admission)  Assessment & Plan  Likely ?GERD, PUD?  Continue PPI  Slowly improving, but minimal interval improvement and very poor oral tolerance of oral intake (ate about 10% of her breakfast this AM)  Diarrhea is relatively unchanged, C diff test c/w colonization, but NOT active infection. Suspicion for infectious diarrhea (especially bacterial) is low  Start loperamide scheduled  IVF\"s  Continue GI cocktail PRN (used twice overnight with some good effect)  WBC normalized, I personally reviewed this lab on 5/2  Continue carafate  1 gm QID    Leukocytosis- (present on admission)  Assessment & Plan  Resolved, see above    ANN (acute kidney injury) (HCC)- (present on admission)  Assessment & Plan  Resolved  I personally reviewed the BMP on 5/2    Anxiety- (present on admission)  Assessment & Plan  monitor    Coronary artery disease involving native coronary artery of native heart without angina pectoris- (present on admission)  Assessment & Plan  Continue coreg, hold outpatient torsemide  Continue statin    Dyslipidemia- (present on admission)  Assessment & Plan  Continue statin    Asthma-COPD overlap syndrome (HCC)- (present on admission)  Assessment & Plan  Continue outpatient inhalers    Obesity (BMI 35.0-39.9 without comorbidity) (HCC)- (present on admission)  Assessment & Plan  Outpatient nutritional counseling    Moderate episode of recurrent major depressive disorder (HCC)- (present on admission)  Assessment & Plan  No active prescription " medications    Polycythemia vera(238.4)- (present on admission)  Assessment & Plan  Continue hydrea 1000 mg daily    Marijuana use- (present on admission)  Assessment & Plan  Smokes, none in the last 4 days  No N/V  MOnitor    Essential hypertension- (present on admission)  Assessment & Plan  Continue coreg  Hold torsemide given ANN         VTE prophylaxis:    enoxaparin ppx      I have performed a physical exam and reviewed and updated ROS and Plan today (5/2/2025). In review of yesterday's note (5/1/2025), there are no changes except as documented above.

## 2025-05-03 ENCOUNTER — PHARMACY VISIT (OUTPATIENT)
Dept: PHARMACY | Facility: MEDICAL CENTER | Age: 75
End: 2025-05-03
Payer: COMMERCIAL

## 2025-05-03 VITALS
DIASTOLIC BLOOD PRESSURE: 59 MMHG | BODY MASS INDEX: 32.55 KG/M2 | TEMPERATURE: 97.9 F | OXYGEN SATURATION: 92 % | HEIGHT: 61 IN | SYSTOLIC BLOOD PRESSURE: 110 MMHG | WEIGHT: 172.4 LBS | RESPIRATION RATE: 18 BRPM | HEART RATE: 68 BPM

## 2025-05-03 LAB
ANION GAP SERPL CALC-SCNC: 6 MMOL/L (ref 7–16)
ANION GAP SERPL CALC-SCNC: 7 MMOL/L (ref 7–16)
BUN SERPL-MCNC: 9 MG/DL (ref 8–22)
BUN SERPL-MCNC: 9 MG/DL (ref 8–22)
CALCIUM SERPL-MCNC: 8.3 MG/DL (ref 8.5–10.5)
CALCIUM SERPL-MCNC: 8.6 MG/DL (ref 8.5–10.5)
CHLORIDE SERPL-SCNC: 104 MMOL/L (ref 96–112)
CHLORIDE SERPL-SCNC: 107 MMOL/L (ref 96–112)
CO2 SERPL-SCNC: 19 MMOL/L (ref 20–33)
CO2 SERPL-SCNC: 22 MMOL/L (ref 20–33)
CREAT SERPL-MCNC: 0.98 MG/DL (ref 0.5–1.4)
CREAT SERPL-MCNC: 1.09 MG/DL (ref 0.5–1.4)
GFR SERPLBLD CREATININE-BSD FMLA CKD-EPI: 53 ML/MIN/1.73 M 2
GFR SERPLBLD CREATININE-BSD FMLA CKD-EPI: 60 ML/MIN/1.73 M 2
GLUCOSE SERPL-MCNC: 101 MG/DL (ref 65–99)
GLUCOSE SERPL-MCNC: 108 MG/DL (ref 65–99)
POTASSIUM SERPL-SCNC: 5.1 MMOL/L (ref 3.6–5.5)
POTASSIUM SERPL-SCNC: 5.3 MMOL/L (ref 3.6–5.5)
SODIUM SERPL-SCNC: 132 MMOL/L (ref 135–145)
SODIUM SERPL-SCNC: 133 MMOL/L (ref 135–145)

## 2025-05-03 PROCEDURE — 700101 HCHG RX REV CODE 250: Performed by: INTERNAL MEDICINE

## 2025-05-03 PROCEDURE — A9270 NON-COVERED ITEM OR SERVICE: HCPCS

## 2025-05-03 PROCEDURE — RXMED WILLOW AMBULATORY MEDICATION CHARGE: Performed by: HOSPITALIST

## 2025-05-03 PROCEDURE — 99239 HOSP IP/OBS DSCHRG MGMT >30: CPT | Performed by: HOSPITALIST

## 2025-05-03 PROCEDURE — A9270 NON-COVERED ITEM OR SERVICE: HCPCS | Performed by: INTERNAL MEDICINE

## 2025-05-03 PROCEDURE — 80048 BASIC METABOLIC PNL TOTAL CA: CPT

## 2025-05-03 PROCEDURE — 700101 HCHG RX REV CODE 250

## 2025-05-03 PROCEDURE — 94640 AIRWAY INHALATION TREATMENT: CPT

## 2025-05-03 PROCEDURE — 700102 HCHG RX REV CODE 250 W/ 637 OVERRIDE(OP)

## 2025-05-03 PROCEDURE — 700102 HCHG RX REV CODE 250 W/ 637 OVERRIDE(OP): Performed by: INTERNAL MEDICINE

## 2025-05-03 RX ORDER — LIDOCAINE 4 G/G
2 PATCH TOPICAL EVERY 24 HOURS
Status: DISCONTINUED | OUTPATIENT
Start: 2025-05-03 | End: 2025-05-03 | Stop reason: HOSPADM

## 2025-05-03 RX ORDER — OXYCODONE HYDROCHLORIDE 5 MG/1
2.5 TABLET ORAL EVERY 4 HOURS PRN
Refills: 0 | Status: DISCONTINUED | OUTPATIENT
Start: 2025-05-03 | End: 2025-05-03 | Stop reason: HOSPADM

## 2025-05-03 RX ORDER — OXYCODONE HYDROCHLORIDE 5 MG/1
5 TABLET ORAL EVERY 4 HOURS PRN
Refills: 0 | Status: DISCONTINUED | OUTPATIENT
Start: 2025-05-03 | End: 2025-05-03 | Stop reason: HOSPADM

## 2025-05-03 RX ORDER — LOPERAMIDE HYDROCHLORIDE 2 MG/1
2 CAPSULE ORAL 3 TIMES DAILY
Qty: 20 CAPSULE | Refills: 0 | Status: SHIPPED | OUTPATIENT
Start: 2025-05-03 | End: 2025-05-03

## 2025-05-03 RX ORDER — SUCRALFATE 1 G/1
1 TABLET ORAL EVERY 6 HOURS
Qty: 56 TABLET | Refills: 0 | Status: SHIPPED | OUTPATIENT
Start: 2025-05-03 | End: 2025-05-17

## 2025-05-03 RX ADMIN — LIDOCAINE HYDROCHLORIDE 30 ML: 20 SOLUTION OROPHARYNGEAL at 10:39

## 2025-05-03 RX ADMIN — LIDOCAINE 2 PATCH: 4 PATCH TOPICAL at 06:28

## 2025-05-03 RX ADMIN — POTASSIUM CHLORIDE 40 MEQ: 750 TABLET, EXTENDED RELEASE ORAL at 03:11

## 2025-05-03 RX ADMIN — DAPAGLIFLOZIN 10 MG: 10 TABLET, FILM COATED ORAL at 04:50

## 2025-05-03 RX ADMIN — ALBUTEROL SULFATE 1 PUFF: 90 AEROSOL, METERED RESPIRATORY (INHALATION) at 03:02

## 2025-05-03 RX ADMIN — OXYCODONE 5 MG: 5 TABLET ORAL at 06:28

## 2025-05-03 RX ADMIN — LOPERAMIDE HYDROCHLORIDE 2 MG: 2 CAPSULE ORAL at 10:38

## 2025-05-03 RX ADMIN — ASPIRIN 81 MG: 81 TABLET, COATED ORAL at 04:50

## 2025-05-03 RX ADMIN — OMEPRAZOLE 20 MG: 20 CAPSULE, DELAYED RELEASE ORAL at 04:50

## 2025-05-03 RX ADMIN — HYDROXYUREA 1000 MG: 500 CAPSULE ORAL at 04:49

## 2025-05-03 RX ADMIN — ACETAMINOPHEN 650 MG: 325 TABLET ORAL at 01:11

## 2025-05-03 RX ADMIN — SUCRALFATE ORAL SUSPENSION 1 G: 1 SUSPENSION ORAL at 04:50

## 2025-05-03 RX ADMIN — UMECLIDINIUM BROMIDE AND VILANTEROL TRIFENATATE 1 PUFF: 62.5; 25 POWDER RESPIRATORY (INHALATION) at 04:50

## 2025-05-03 RX ADMIN — CARVEDILOL 6.25 MG: 6.25 TABLET, FILM COATED ORAL at 10:38

## 2025-05-03 RX ADMIN — IPRATROPIUM BROMIDE AND ALBUTEROL SULFATE 3 ML: .5; 2.5 SOLUTION RESPIRATORY (INHALATION) at 03:56

## 2025-05-03 RX ADMIN — OXYCODONE 5 MG: 5 TABLET ORAL at 10:39

## 2025-05-03 RX ADMIN — TIZANIDINE 2 MG: 4 TABLET ORAL at 03:53

## 2025-05-03 RX ADMIN — LIDOCAINE HYDROCHLORIDE 30 ML: 20 SOLUTION OROPHARYNGEAL at 04:15

## 2025-05-03 ASSESSMENT — PAIN DESCRIPTION - PAIN TYPE
TYPE: ACUTE PAIN

## 2025-05-03 NOTE — HOSPITAL COURSE
This is a 74-year-old female with a past medical history significant for coronary artery disease status post PCI on DAPT, HFrEF, severe MR  s/p Mitral valve clip on 4/30 by Dr Washburn/ [facility] , PV on hydroxyurea asthma, COPD, history of diverticulitis presented to the ER on 5/1/2025 with abdominal pain that has been going on for the last 4 days associated with loose bowel movement.    She was initially admitted in the observation unit, noted to have severe hypokalemia received aggressive present supplementation.  At the time of discharge her potassium was noted to be at 5.3    She was  noted to  have loose  bowel movement, C. difficile study was obtained, noted to be negative, patient was started on Imodium.  Noted, lactoferrin noted to be positive, referred to GI in place.     On presentation, she is noted to be acutely  renal failure with a creatinine of 1.9 traumatic and received aggressive IV fluid resuscitation, renal function improved.  She was recommended continue torsemide and 5/4 evening along with potassium.    F other chronic medical medical conditions, patient will resume her home medication.  At this time patient medically stable to discharge home.

## 2025-05-03 NOTE — PROGRESS NOTES
DC instructions reviewed. Per pt IV removed upstairs. Meds to beds provided along with education on them. Pulse ox removed

## 2025-05-03 NOTE — DISCHARGE SUMMARY
Discharge Summary    CHIEF COMPLAINT ON ADMISSION  Chief Complaint   Patient presents with    Abdominal Pain     X4 days, also endorses diarrhea.       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    She was initially admitted in the observation unit, noted to have severe hypokalemia received aggressive present supplementation.  At the time of discharge her potassium was noted to be at 5.3    She was  noted to  have loose  bowel movement, C. difficile study was obtained, noted to be negative, patient was started on Imodium.  Noted, lactoferrin noted to be positive, referred to GI in place.     On presentation, she is noted to be acutely  renal failure with a creatinine of 1.9 traumatic and received aggressive IV fluid resuscitation, renal function improved.  She was recommended continue torsemide and 5/4 evening along with potassium.    F other chronic medical medical conditions, patient will resume her home medication.  At this time patient medically stable to discharge home.    Therefore, she is discharged in good and stable condition to home with close outpatient follow-up.      Discharge Date  5/3/2025      FOLLOW UP ITEMS POST DISCHARGE  Corine Alas P.A.-C.      DISCHARGE DIAGNOSES  Principal Problem:    Hypokalemia (POA: Yes)  Active Problems:    Essential hypertension (POA: Yes)    Marijuana use (POA: Yes)    Polycythemia vera(238.4) (POA: Yes)      Overview: IMO Update    Moderate episode of recurrent major depressive disorder (HCC) (POA: Yes)    Obesity  (BMI 35.0-39.9 without comorbidity) (HCC) (POA: Yes)    Asthma-COPD overlap syndrome (HCC) (POA: Yes)    Dyslipidemia (POA: Yes)    Coronary artery disease involving native coronary artery of native heart without angina pectoris (POA: Yes)      Overview: 3/4/24: Final diagnosis:       Diffuse multivessel disease, successful PTCA of mid circumflex artery.    Anxiety (POA: Yes)    ANN (acute kidney injury) (HCC) (POA: Yes)    Leukocytosis (POA: Yes)    Abdominal pain (POA: Yes)    Diarrhea (POA: Yes)  Resolved Problems:    * No resolved hospital problems. *      FOLLOW UP  Future Appointments   Date Time Provider Department Center   9/12/2025 10:45 AM YISEL Chung None     No follow-up provider specified.    MEDICATIONS ON DISCHARGE     Medication List        START taking these medications        Instructions   sucralfate 1 GM Tabs  Commonly known as: Carafate   Take 1 Tablet by mouth every 6 hours for 14 days.  Dose: 1 g            CONTINUE taking these medications        Instructions   albuterol 108 (90 Base) MCG/ACT Aers inhalation aerosol   Doctor's comments: Please provide 90 day supply with 3 refills  INHALE 2 PUFFS BY MOUTH EVERY 6 HOURS AS NEEDED     Anoro Ellipta 62.5-25 MCG/ACT Aepb inhaler  Generic drug: umeclidinium-vilanterol   Doctor's comments: 3inhalers, 3 refills  Inhale 1 Puff every day.  Dose: 1 Puff     aspirin 81 MG EC tablet   Take 1 Tablet by mouth every day.  Dose: 81 mg     carvedilol 6.25 MG Tabs  Commonly known as: Coreg   Doctor's comments: ZERO refills remain on this prescription. Your patient is requesting advance approval of refills for this medication to PREVENT ANY MISSED DOSES  TAKE 1 TABLET BY MOUTH TWICE DAILY WITH MEALS  Dose: 6.25 mg     dapagliflozin propanediol 10 MG Tabs  Commonly known as: Farxiga   Take 1 Tablet by mouth every day.  Dose: 10 mg     hydroxyurea 500 MG Caps  Commonly known as: Hydrea   Take 1,000 mg by mouth every day. 1,000 mg = 2  tabs  Dose: 1,000 mg     ipratropium-albuterol 0.5-2.5 (3) MG/3ML nebulizer solution  Commonly known as: Duoneb   Take 3 mL by nebulization every four hours as needed for Shortness of Breath.  Dose: 3 mL     pantoprazole 40 MG Tbec  Commonly known as: Protonix   Take 40 mg by mouth 2 times a day.  Dose: 40 mg     potassium Chloride ER 20 MEQ Tbcr tablet  Commonly known as: K-Tab   Take 1 Tablet by mouth 2 times a day.  Dose: 20 mEq     torsemide 20 MG Tabs  Commonly known as: Demadex   Take 1 Tablet by mouth 2 times a day.  Dose: 20 mg            STOP taking these medications      rosuvastatin 10 MG Tabs  Commonly known as: Crestor     sulfamethoxazole-trimethoprim 800-160 MG tablet  Commonly known as: Bactrim DS              Allergies  No Known Allergies    DIET  Orders Placed This Encounter   Procedures    Diet Order Diet: Cardiac     Standing Status:   Standing     Number of Occurrences:   1     Diet::   Cardiac [6]       ACTIVITY  As tolerated.  Weight bearing as tolerated    CONSULTATIONS  None     PROCEDURES  None     LABORATORY  Lab Results   Component Value Date    SODIUM 133 (L) 05/03/2025    POTASSIUM 5.3 05/03/2025    CHLORIDE 107 05/03/2025    CO2 19 (L) 05/03/2025    GLUCOSE 101 (H) 05/03/2025    BUN 9 05/03/2025    CREATININE 0.98 05/03/2025        Lab Results   Component Value Date    WBC 10.1 05/02/2025    HEMOGLOBIN 11.7 (L) 05/02/2025    HEMATOCRIT 33.3 (L) 05/02/2025    PLATELETCT 238 05/02/2025        Total time of the discharge process exceeds 38 minutes.    I have discussed with patient to take potassium and torsemide on the evening of 5/4/2025. She stated understandig

## 2025-05-03 NOTE — CARE PLAN
The patient is Watcher - Medium risk of patient condition declining or worsening    Shift Goals  Clinical Goals: trend labs, VSS, monitor BMs  Patient Goals: rest, control diarrhea  Family Goals: diane    Progress made toward(s) clinical / shift goals:    Problem: Pain - Standard  Goal: Alleviation of pain or a reduction in pain to the patient’s comfort goal  Description: Target End Date:  Prior to discharge or change in level of careDocument on Vitals flowsheet1.  Document pain using the appropriate pain scale per order or unit policy2.  Educate and implement non-pharmacologic comfort measures (i.e. relaxation, distraction, massage, cold/heat therapy, etc.)3.  Pain management medications as ordered4.  Reassess pain after pain med administration per policy5.  If opiods administered assess patient's response to pain medication is appropriate per POSS sedation scale6.  Follow pain management plan developed in collaboration with patient and interdisciplinary team (including palliative care or pain specialists if applicable)  Outcome: Progressing     Problem: Knowledge Deficit - Standard  Goal: Patient and family/care givers will demonstrate understanding of plan of care, disease process/condition, diagnostic tests and medications  Description: Target End Date:  1-3 days or as soon as patient condition allowsDocument in Patient Education1.  Patient and family/caregiver oriented to unit, equipment, visitation policy and means for communicating concern2.  Complete/review Learning Assessment3.  Assess knowledge level of disease process/condition, treatment plan, diagnostic tests and medications4.  Explain disease process/condition, treatment plan, diagnostic tests and medications  Outcome: Progressing     Problem: Fall Risk  Goal: Patient will remain free from falls  Description: Target End Date:  Prior to discharge or change in level of careDocument interventions on the Geri Lopez Fall Risk Assessment1.  Assess for fall  risk factors2.  Implement fall precautions  Outcome: Progressing       Patient is not progressing towards the following goals:

## 2025-05-04 LAB
BACTERIA STL CULT: NORMAL
SIGNIFICANT IND 70042: NORMAL
SITE SITE: NORMAL
SOURCE SOURCE: NORMAL

## 2025-06-13 DIAGNOSIS — I50.33 ACUTE ON CHRONIC DIASTOLIC CONGESTIVE HEART FAILURE (HCC): ICD-10-CM

## 2025-06-13 NOTE — TELEPHONE ENCOUNTER
To SC - please refill potassium if appropriate. Med outside of RN protocol. Last OV 3/3/25. Last BMP on 5/3/25 K was 5.3. Thank you!

## 2025-06-16 RX ORDER — POTASSIUM CHLORIDE 1500 MG/1
20 TABLET, EXTENDED RELEASE ORAL 2 TIMES DAILY
Qty: 200 TABLET | Refills: 3 | OUTPATIENT
Start: 2025-06-16

## 2025-06-16 RX ORDER — POTASSIUM CHLORIDE 1500 MG/1
20 TABLET, EXTENDED RELEASE ORAL DAILY
Qty: 90 TABLET | Refills: 1 | Status: SHIPPED | OUTPATIENT
Start: 2025-06-16

## 2025-06-16 NOTE — TELEPHONE ENCOUNTER
Noted:   SURENDRA ChungRSANDEEP. to Me     6/16/25  7:49 AM  Reduce dose to potassium 20 meq once daily now with slightly higher K than usual. SC    Order for potassium chloride 20 meq modified to QD from BID and refilled per SC. CEINT message sent to patient regarding refill and change in dosing.

## 2025-07-02 DIAGNOSIS — I50.33 ACUTE ON CHRONIC DIASTOLIC CONGESTIVE HEART FAILURE (HCC): Primary | ICD-10-CM

## 2025-07-02 DIAGNOSIS — I34.0 NONRHEUMATIC MITRAL VALVE REGURGITATION: ICD-10-CM

## 2025-07-02 DIAGNOSIS — I10 ESSENTIAL HYPERTENSION: ICD-10-CM

## 2025-07-03 RX ORDER — CARVEDILOL 6.25 MG/1
6.25 TABLET ORAL 2 TIMES DAILY WITH MEALS
Qty: 180 TABLET | Refills: 2 | Status: SHIPPED | OUTPATIENT
Start: 2025-07-03

## 2025-07-30 DIAGNOSIS — I34.0 NONRHEUMATIC MITRAL VALVE REGURGITATION: ICD-10-CM

## 2025-08-04 RX ORDER — TORSEMIDE 20 MG/1
20 TABLET ORAL 2 TIMES DAILY
Qty: 180 TABLET | Refills: 2 | Status: SHIPPED | OUTPATIENT
Start: 2025-08-04

## 2025-08-08 ENCOUNTER — HOSPITAL ENCOUNTER (OUTPATIENT)
Facility: MEDICAL CENTER | Age: 75
End: 2025-08-08
Attending: INTERNAL MEDICINE
Payer: MEDICARE

## 2025-08-08 LAB
ALBUMIN SERPL BCP-MCNC: 4 G/DL (ref 3.2–4.9)
ALBUMIN/GLOB SERPL: 2.1 G/DL
ALP SERPL-CCNC: 67 U/L (ref 30–99)
ALT SERPL-CCNC: 10 U/L (ref 2–50)
ANION GAP SERPL CALC-SCNC: 10 MMOL/L (ref 7–16)
AST SERPL-CCNC: 15 U/L (ref 12–45)
BILIRUB SERPL-MCNC: 0.4 MG/DL (ref 0.1–1.5)
BUN SERPL-MCNC: 15 MG/DL (ref 8–22)
CALCIUM ALBUM COR SERPL-MCNC: 9.3 MG/DL (ref 8.5–10.5)
CALCIUM SERPL-MCNC: 9.3 MG/DL (ref 8.5–10.5)
CHLORIDE SERPL-SCNC: 105 MMOL/L (ref 96–112)
CO2 SERPL-SCNC: 22 MMOL/L (ref 20–33)
CREAT SERPL-MCNC: 1.07 MG/DL (ref 0.5–1.4)
GFR SERPLBLD CREATININE-BSD FMLA CKD-EPI: 54 ML/MIN/1.73 M 2
GLOBULIN SER CALC-MCNC: 1.9 G/DL (ref 1.9–3.5)
GLUCOSE SERPL-MCNC: 110 MG/DL (ref 65–99)
POTASSIUM SERPL-SCNC: 5.1 MMOL/L (ref 3.6–5.5)
PROT SERPL-MCNC: 5.9 G/DL (ref 6–8.2)
SODIUM SERPL-SCNC: 137 MMOL/L (ref 135–145)

## 2025-08-08 PROCEDURE — 80053 COMPREHEN METABOLIC PANEL: CPT

## (undated) DEVICE — ELECTRODE DUAL RETURN W/ CORD - (50/PK)

## (undated) DEVICE — TUBING PRSS MNTR 72IN M/ M LL - (25/BX) MONIT. LINE W/MALE L/L

## (undated) DEVICE — SHEATH GUIDING PIGTAIL RF WIRE ACCESS SOLUTION VERSACROSS L230 CM 45 D (1EA)

## (undated) DEVICE — CANISTER SUCTION 3000ML MECHANICAL FILTER AUTO SHUTOFF MEDI-VAC NONSTERILE LF DISP  (40EA/CA)

## (undated) DEVICE — SODIUM CHL. INJ. 0.9% 500ML (24EA/CA 50CA/PF)

## (undated) DEVICE — DECANTER FLD BLS - (50/CA)

## (undated) DEVICE — TOWELS CLOTH SURGICAL - (4/PK 20PK/CA)

## (undated) DEVICE — TUBE E-T HI-LO CUFF 7.0MM (10EA/PK)

## (undated) DEVICE — GLOVE BIOGEL SZ 7.5 SURGICAL PF LTX - (50PR/BX 4BX/CA)

## (undated) DEVICE — CHLORAPREP 26 ML APPLICATOR - ORANGE TINT(25/CA)

## (undated) DEVICE — INTRODUCER CATHETER  DILATOR PROTRUDING 8FR 2.5CM (10EA/BX)

## (undated) DEVICE — PEN SKIN MARKER W/RULER - (50EA/BX)

## (undated) DEVICE — GLOVE SZ 6.5 BIOGEL PI MICRO - PF LF (50PR/BX)

## (undated) DEVICE — KIT VASCULAR DILATOR

## (undated) DEVICE — PACK SINGLE BASIN - (6/CA)

## (undated) DEVICE — SODIUM CHL IRRIGATION 0.9% 1000ML (12EA/CA)

## (undated) DEVICE — GLOVE BIOGEL INDICATOR SZ 8 SURGICAL PF LTX - (50/BX 4BX/CA)

## (undated) DEVICE — GOWN WARMING STANDARD FLEX - (30/CA)

## (undated) DEVICE — GLOVE SIZE 6.5  SURGEON ACCELERATOR FREE GREEN (50PR/BX)

## (undated) DEVICE — Device

## (undated) DEVICE — SUCTION INSTRUMENT YANKAUER BULBOUS TIP W/O VENT (50EA/CA)

## (undated) DEVICE — SENSOR OXIMETER ADULT SPO2 RD SET (20EA/BX)

## (undated) DEVICE — SUTURE DEVICE CLOSURE REPAIR SYSTEM PERCLOSE PROSTYLE (10EA/PK)

## (undated) DEVICE — KIT ULTRASND COVER - (20EA/CA)

## (undated) DEVICE — COVER LIGHT HANDLE ALC PLUS DISP (18EA/BX)

## (undated) DEVICE — STOPCOCK MALE 4-WAY - (50/CA)

## (undated) DEVICE — DRESSING TRANSPARENT FILM TEGADERM 2.375 X 2.75"  (100EA/BX)"

## (undated) DEVICE — BAG RESUSCITATION DISPOSABLE - WITH MASK (10 EA/CA)

## (undated) DEVICE — TUBING CLEARLINK DUO-VENT - C-FLO (48EA/CA)

## (undated) DEVICE — DRAPE CLEAR W/ELASTIC BAND RAD CARM 40 X40" (20EA/CA)"

## (undated) DEVICE — KIT NRG RF TRANSSEPTAL WITH STANDARD CURVE NEEDLE

## (undated) DEVICE — KIT RADIAL ARTERY 20GA W/MAX BARRIER AND BIOPATCH  (5EA/CA) #10740 IS FOR THE SET RADIAL ARTERIAL

## (undated) DEVICE — STOPCOCK IV 400 PSI 3W ROT (50EA/BX)

## (undated) DEVICE — GLOVE BIOGEL SZ 7 SURGICAL PF LTX - (50PR/BX 4BX/CA)

## (undated) DEVICE — LACTATED RINGERS INJ 1000 ML - (14EA/CA 60CA/PF)

## (undated) DEVICE — ELECTRODE DFBR ADLT 6.5X5IN (12PR/CA) *8900-4003 PART # FOR CA QTY. PART #8900-4004 IS EACH QTY

## (undated) DEVICE — DRESSING TRANSPARENT FILM TEGADERM 4 X 4.75" (50EA/BX)"

## (undated) DEVICE — SET BIFURCATED BLOOD - (48EA/CS)

## (undated) DEVICE — ELECTRODE RADIOLUCNT SOLID GEL DEFIB PADS (12EA/CA)

## (undated) DEVICE — SET FLUID WARMING STANDARD FLOW - (10/CA)

## (undated) DEVICE — SOD. CHL. INJ. 0.9% 1000 ML - (14EA/CA 60CA/PF)

## (undated) DEVICE — SET EXTENSION WITH 2 PORTS (48EA/CA) ***PART #2C8610 IS A SUBSTITUTE*****

## (undated) DEVICE — GLOVE SZ 7 BIOGEL PI MICRO - PF LF (50PR/BX 4BX/CA)

## (undated) DEVICE — TRANSDUCER BIFURCATED MONITORING KIT (10EA/CA)

## (undated) DEVICE — PACK TAVR (3EA/CA)